# Patient Record
Sex: MALE | Race: WHITE | NOT HISPANIC OR LATINO | Employment: OTHER | URBAN - METROPOLITAN AREA
[De-identification: names, ages, dates, MRNs, and addresses within clinical notes are randomized per-mention and may not be internally consistent; named-entity substitution may affect disease eponyms.]

---

## 2017-02-07 ENCOUNTER — OFFICE VISIT (OUTPATIENT)
Dept: NEPHROLOGY | Facility: CLINIC | Age: 72
End: 2017-02-07
Payer: MEDICARE

## 2017-02-07 ENCOUNTER — LAB VISIT (OUTPATIENT)
Dept: LAB | Facility: HOSPITAL | Age: 72
End: 2017-02-07
Attending: INTERNAL MEDICINE
Payer: MEDICARE

## 2017-02-07 VITALS
SYSTOLIC BLOOD PRESSURE: 122 MMHG | HEIGHT: 75 IN | HEART RATE: 84 BPM | WEIGHT: 283.5 LBS | DIASTOLIC BLOOD PRESSURE: 64 MMHG | BODY MASS INDEX: 35.25 KG/M2

## 2017-02-07 DIAGNOSIS — N18.30 CKD (CHRONIC KIDNEY DISEASE) STAGE 3, GFR 30-59 ML/MIN: Primary | ICD-10-CM

## 2017-02-07 DIAGNOSIS — N17.9 AKI (ACUTE KIDNEY INJURY): ICD-10-CM

## 2017-02-07 DIAGNOSIS — N18.30 CKD (CHRONIC KIDNEY DISEASE) STAGE 3, GFR 30-59 ML/MIN: ICD-10-CM

## 2017-02-07 LAB
ALBUMIN SERPL BCP-MCNC: 3.8 G/DL
ANION GAP SERPL CALC-SCNC: 14 MMOL/L
BUN SERPL-MCNC: 59 MG/DL
CALCIUM SERPL-MCNC: 9.7 MG/DL
CHLORIDE SERPL-SCNC: 88 MMOL/L
CO2 SERPL-SCNC: 31 MMOL/L
CREAT SERPL-MCNC: 2.1 MG/DL
EST. GFR  (AFRICAN AMERICAN): 36 ML/MIN/1.73 M^2
EST. GFR  (NON AFRICAN AMERICAN): 31 ML/MIN/1.73 M^2
GLUCOSE SERPL-MCNC: 218 MG/DL
PHOSPHATE SERPL-MCNC: 4.2 MG/DL
POTASSIUM SERPL-SCNC: 3.6 MMOL/L
SODIUM SERPL-SCNC: 133 MMOL/L

## 2017-02-07 PROCEDURE — 99213 OFFICE O/P EST LOW 20 MIN: CPT | Mod: PBBFAC,PO | Performed by: INTERNAL MEDICINE

## 2017-02-07 PROCEDURE — 99999 PR PBB SHADOW E&M-EST. PATIENT-LVL III: CPT | Mod: PBBFAC,,, | Performed by: INTERNAL MEDICINE

## 2017-02-07 PROCEDURE — 99214 OFFICE O/P EST MOD 30 MIN: CPT | Mod: S$PBB,,, | Performed by: INTERNAL MEDICINE

## 2017-02-07 NOTE — MR AVS SNAPSHOT
[facility] Neph[facility]  [address] [place] [address]  Phone: [phone]  Fax: [phone]                  Beni Cosby   2017 1:20 PM   Office Visit    Description:  Male : 1945   Provider:  Francisco Jackson MD   Department:  [facility] - Nephrology           Reason for Visit     Chronic Kidney Disease     acute kidney injury     Follow-up           Diagnoses this Visit        Comments    CKD (chronic kidney disease) stage 3, GFR 30-59 ml/min    -  Primary     ANA LAURA (acute kidney injury)                To Do List           Future Appointments        Provider Department Dept Phone    3/8/2017 12:00 PM SPECIMEN, [facility] [phone]    3/8/2017 12:10 PM LABORATORY, [facility] [phone]    3/8/2017 1:20 PM Francisco Jackson [facility] [phone]      Goals (5 Years of Data)     None      Follow-Up and Disposition     Return in about 4 weeks (around 3/7/2017).      [facility]     [facility] Care [facility] -  Assistance  Registered nurses in the [facility] provide clinical advisement, health education, appointment booking, and other advisory services.  Call for this free service at [phone].             Medications           STOP taking these medications     glimepiride (AMARYL) 1 MG tablet            Verify that the below list of medications is an accurate representation of the medications you are currently taking.  If none reported, the list may be blank. If incorrect, please contact your healthcare provider. Carry this list with you in case of emergency.           Current Medications     aspirin (ECOTRIN) 81 MG EC tablet Take 1 tablet (81 mg total) by mouth once daily.    blood sugar diagnostic (CONTOUR NEXT STRIPS) Strp Use once daily.    bumetanide (BUMEX) 1 MG tablet Take 1 mg by mouth 2 (two) times daily. 2 mg in am & 1mg in pm    gabapentin (NEURONTIN) 100 MG capsule Take 100 mg by  "mouth 3 (three) times daily.    glipiZIDE (GLUCOTROL) 5 MG tablet Take 1 tablet (5 mg total) by mouth daily with breakfast.    hydrocodone-acetaminophen 7.5-325mg (NORCO) 7.5-325 mg per tablet     lansoprazole (PREVACID) 30 MG capsule TAKE 1 CAPSULE BY MOUTH ONCE DAILY    metolazone (ZAROXOLYN) 5 MG tablet once daily.     metoprolol succinate (TOPROL-XL) 100 MG 24 hr tablet Take 100 mg by mouth once daily. 1/2 tablet daily    mometasone (NASONEX) 50 mcg/actuation nasal spray 2 sprays by Nasal route daily as needed.     potassium chloride SA (K-DUR,KLOR-CON) 20 MEQ tablet Take 20 mEq by mouth 3 (three) times daily. Take 2 tablets daily    rosuvastatin (CRESTOR) 10 MG tablet 10 mg.    spironolactone (ALDACTONE) 25 MG tablet Take 25 mg by mouth 2 (two) times daily.     warfarin (COUMADIN) 5 MG tablet 5 mg.    warfarin (COUMADIN) 7.5 MG tablet 7.5 mg.           Clinical Reference Information           Your Vitals Were     BP Pulse Height Weight BMI    122/64 84 6' 3" (1.905 m) 128.6 kg (283 lb 8.2 oz) 35.44 kg/m2      Blood Pressure          Most Recent Value    BP  122/64      Allergies as of 2/7/2017     Morphine      Immunizations Administered on Date of Encounter - 2/7/2017     None      Orders Placed During Today's Visit     Future Labs/Procedures Expected by Expires    Urinalysis  2/7/2017 4/8/2018    Recurring Lab Work Interval Expires    CBC auto differential  Every Weekday until 2/7/2018 2/7/2018    Renal function panel  Every Weekday until 2/7/2018 2/7/2018      [facility] Sign-Up     Activating your [facility] account is as easy as 1-2-3!     1) Visit [email or web address], select Sign Up Now, enter this activation code and your date of birth, then select Next.  [email or web address]  Expires: 3/24/2017  1:48 PM      2) Create a username and password to use when you visit [facility] in the future and select a security question in case you lose your password and select Next.    3) Enter your e-mail address and click Sign " Up!    Additional Information  If you have questions, please e-mail [email or web address] or call [phone] to talk to our [facility] staff. Remember, [facility] is NOT to be used for urgent needs. For medical emergencies, dial 911.         Instructions    Increase Bumex to 2 mg twice per day (maximum would be 2 mg three times per day if needed).        Language Assistance Services     ATTENTION: Language assistance services are available, free of charge. Please call [phone].      ATENCIÓN: Si habla español, tiene a lira disposición servicios gratuitos de asistencia lingüística. Llame al [phone].     CHÚ Ý: N?u b?n nói Ti?ng Vi?t, có các d?ch v? h? tr? ngôn ng? mi?n phí dành cho b?n. G?i s? [phone].         [facility] complies with applicable Federal civil rights laws and does not discriminate on the basis of race, color, national origin, age, disability, or sex.

## 2017-02-07 NOTE — PROGRESS NOTES
NEPHROLOGY CONSULTATION    PHYSICIAN REQUESTING THE CONSULT: Hospital follow up, PMD: Dr. Jackson Brandt    REASON FOR CONSULTATION: ANA LAURA    71 y.o. male with history of AV stenosis (s/p AVR with pig valve), atrial fibrillation, right renal cell cancer (s/p partial resection of right kidney), CHF, CKD 3, CAD (s/p CABG), DM2 presents to the renal clinic for evaluation of renal insufficiency.     Patient  reports hospital stay in 1/2017 at [facility] for pneumonia. He also mentions chronic bilateral LE edema. His is currently on Bumex 2 mg po am/1 mg po pm plus daily metolazone.             Past Medical History   Diagnosis Date    Aortic valve stenosis with insufficiency 4/2008     Surgery [facility] [place]    Chronic a-fib     CKD (chronic kidney disease) stage 3, GFR 30-59 ml/min     Primary cancer of right kidney      Surgery, no further treatment       No past surgical history on file.    Review of patient's allergies indicates:   Allergen Reactions    Morphine      Other reaction(s): Nausea Only       Current Outpatient Prescriptions   Medication Sig Dispense Refill    aspirin (ECOTRIN) 81 MG EC tablet Take 1 tablet (81 mg total) by mouth once daily.  0    blood sugar diagnostic (CONTOUR NEXT STRIPS) Strp Use once daily.      bumetanide (BUMEX) 1 MG tablet Take 1 mg by mouth 2 (two) times daily. 2 mg in am & 1mg in pm      gabapentin (NEURONTIN) 100 MG capsule Take 100 mg by mouth 3 (three) times daily.      glimepiride (AMARYL) 1 MG tablet       glipiZIDE (GLUCOTROL) 5 MG tablet Take 1 tablet (5 mg total) by mouth daily with breakfast. 30 tablet 11    hydrocodone-acetaminophen 7.5-325mg (NORCO) 7.5-325 mg per tablet       lansoprazole (PREVACID) 30 MG capsule TAKE 1 CAPSULE BY MOUTH ONCE DAILY      metolazone (ZAROXOLYN) 5 MG tablet once daily.       metoprolol succinate (TOPROL-XL) 100 MG 24 hr tablet Take 100 mg by mouth once daily. 1/2 tablet daily      mometasone (NASONEX) 50 mcg/actuation nasal spray 2  "sprays by Nasal route daily as needed.       potassium chloride SA (K-DUR,KLOR-CON) 20 MEQ tablet Take 20 mEq by mouth once daily. Take 2 tablets daily      rosuvastatin (CRESTOR) 10 MG tablet 10 mg.      spironolactone (ALDACTONE) 25 MG tablet Take 25 mg by mouth 2 (two) times daily.       warfarin (COUMADIN) 5 MG tablet 5 mg.      warfarin (COUMADIN) 7.5 MG tablet 7.5 mg.       No current facility-administered medications for this visit.        No family history on file.    Social History     Social History    Marital status:      Spouse name: N/A    Number of children: N/A    Years of education: N/A     Occupational History    Not on file.     Social History Main Topics    Smoking status: Never Smoker    Smokeless tobacco: Never Used    Alcohol use No    Drug use: Not on file    Sexual activity: Not on file     Other Topics Concern    Not on file     Social History Narrative       Review of Systems:  1. GENERAL: patient denies any fever, weight changes, no dizziness  2. HEENT: patient denies headaches, visual disturbances, swallowing problems, sinus pain, nasal congestion.  3. CARDIOVASCULAR: patient denies chest pain, palpitations.  4. PULMONARY: patient denies SOB, no coughing, hemoptysis, wheezing.  5. GASTROINTESTINAL: patient denies abdominal pain, nausea, vomiting, diarrhea.  6. GENITOURINARY: patient denies dysuria, hematuria, hesitancy, frequency.  7. EXTREMITIES: patient reports bilateral LE edema, no LE cramping.  8. DERMATOLOGY: patient denies rashes, ulcers.  9. NEURO: patient denies tremors, extremity weakness, extremity numbness/tingling.  10. MUSCULOSKELETAL: patient denies joint pain, joint swelling.  11. HEMATOLOGY: patient denies rectal or gum bleeding.  12: PSYCH: patient denies anxiety, depression.      PHYSICAL EXAM:  Visit Vitals    Ht 6' 3" (1.905 m)    Wt 128.6 kg (283 lb 8.2 oz)    BMI 35.44 kg/m2       GENERAL: Pleasant tall gentleman presents to clinic with " non-labored breathing.  HEENT: PERRL, no nasal discharge, no icterus, no oral exudates, moist mucosal membranes.  NECK: no thyroid mass, no lymphadenopathy.  HEART: Regular, S1/S2, no rubs, good peripheral pulses.  LUNGS: CTA bilaterally, no wheezing, breathing is nonlabored.  ABDOMEN: soft, nontender, not distended, bowel sounds are present, no abdominal hernia.  EXTREM: pitting bilateral LE edema, +1  SKIN: no rashes, skin is warm and dry.  NEURO: A & O x 3, no obvious focal signs.    LABORATORY RESULTS:    Lab Results   Component Value Date    CREATININE 2.1 (H) 02/07/2017    BUN 59 (H) 02/07/2017     (L) 02/07/2017    K 3.6 02/07/2017    CL 88 (L) 02/07/2017    CO2 31 (H) 02/07/2017     Lab Results   Component Value Date    CALCIUM 9.7 02/07/2017    PHOS 4.2 02/07/2017     Lab Results   Component Value Date    ALBUMIN 3.8 02/07/2017       Lab Results   Component Value Date    WBC 11.23 05/12/2016    HGB 13.6 (L) 05/12/2016    HCT 43.8 05/12/2016    MCV 89 05/12/2016     05/12/2016         OUTSIDE LABS (11/28/16):    Na-129, K-3.5, CO2-35, BUN-29, Cr-1.6, Ca-8.8, Alb-3.9  WBC-8.3, Hgb-8.8, Plt-277          ASSESSMENT AND PLAN:  71 y.o. male with history of AV stenosis (s/p AVR with pig valve), atrial fibrillation, right renal cell cancer, CHF, CKD 3, CAD (s/p CABG), DM2 presents to the renal clinic for evaluation of renal insufficiency.     1. Renal insufficiency: Patient's renal function has worsened with creatinine increasing to 2.1. This is likely related to cardiorenal syndrome. Patient was advised to increase Bumex to 2 mg po bid (plus metolazone).     2. Electrolytes: Hyponatremia: stable, likely due to CHF, patient on fluid restrictions (as per cardiologist, Dr. Chew).     3. Acid base status: mild alkalosis, monitor. This is chronic.     4. Volume: Bilateral LE edema. Continue Bumex at 2mg am/2 mg pm plus metolazone.     5. Hypertension: good BP control.       6. Medications: Reviewed.  (Benazepril was stopped previously)    7. CHF: continue fluid restrictions. Patient has follow up with Cardiology (Dr. Chew).    8. Atrial fibrillation/AVR: continue Coumadin.    9. DM2: Continue glipizide.     10. Anemia: mild, monitor for now.

## 2017-02-23 ENCOUNTER — TELEPHONE (OUTPATIENT)
Dept: PULMONOLOGY | Facility: CLINIC | Age: 72
End: 2017-02-23

## 2017-02-23 DIAGNOSIS — Z01.818 PRE-OP TESTING: Primary | ICD-10-CM

## 2017-02-23 DIAGNOSIS — J96.11 CHRONIC RESPIRATORY FAILURE WITH HYPOXIA: ICD-10-CM

## 2017-02-23 DIAGNOSIS — R06.02 SOB (SHORTNESS OF BREATH): ICD-10-CM

## 2017-02-23 NOTE — TELEPHONE ENCOUNTER
----- Message from Felipe Mclain sent at 2/23/2017  3:10 PM CST -----  Contact: pt's spouse  She's calling in regards to working the pt into the dr's schedule on 3/8/17 after Dr. Jackson appt, please advise, [phone] (home)

## 2017-03-08 ENCOUNTER — PROCEDURE VISIT (OUTPATIENT)
Dept: PULMONOLOGY | Facility: CLINIC | Age: 72
End: 2017-03-08
Payer: MEDICARE

## 2017-03-08 ENCOUNTER — OFFICE VISIT (OUTPATIENT)
Dept: URGENT CARE | Facility: CLINIC | Age: 72
End: 2017-03-08
Payer: MEDICARE

## 2017-03-08 ENCOUNTER — OFFICE VISIT (OUTPATIENT)
Dept: NEPHROLOGY | Facility: CLINIC | Age: 72
End: 2017-03-08
Payer: MEDICARE

## 2017-03-08 ENCOUNTER — HOSPITAL ENCOUNTER (OUTPATIENT)
Dept: RADIOLOGY | Facility: HOSPITAL | Age: 72
Discharge: HOME | End: 2017-03-08
Attending: INTERNAL MEDICINE
Payer: MEDICARE

## 2017-03-08 VITALS
DIASTOLIC BLOOD PRESSURE: 72 MMHG | WEIGHT: 284.19 LBS | SYSTOLIC BLOOD PRESSURE: 120 MMHG | BODY MASS INDEX: 35.34 KG/M2 | HEIGHT: 75 IN | HEART RATE: 76 BPM

## 2017-03-08 VITALS
SYSTOLIC BLOOD PRESSURE: 100 MMHG | DIASTOLIC BLOOD PRESSURE: 56 MMHG | OXYGEN SATURATION: 98 % | BODY MASS INDEX: 34.94 KG/M2 | TEMPERATURE: 97 F | HEART RATE: 58 BPM | WEIGHT: 281 LBS | HEIGHT: 75 IN

## 2017-03-08 DIAGNOSIS — J96.11 CHRONIC RESPIRATORY FAILURE WITH HYPOXIA: ICD-10-CM

## 2017-03-08 DIAGNOSIS — D64.9 ANEMIA, UNSPECIFIED TYPE: ICD-10-CM

## 2017-03-08 DIAGNOSIS — R42 DIZZY: ICD-10-CM

## 2017-03-08 DIAGNOSIS — R06.02 SOB (SHORTNESS OF BREATH): ICD-10-CM

## 2017-03-08 DIAGNOSIS — N18.4 CKD (CHRONIC KIDNEY DISEASE) STAGE 4, GFR 15-29 ML/MIN: Primary | ICD-10-CM

## 2017-03-08 DIAGNOSIS — I95.9 HYPOTENSION, UNSPECIFIED HYPOTENSION TYPE: Primary | ICD-10-CM

## 2017-03-08 LAB — GLUCOSE SERPL-MCNC: 174 MG/DL (ref 70–110)

## 2017-03-08 PROCEDURE — 99214 OFFICE O/P EST MOD 30 MIN: CPT | Mod: S$PBB,,, | Performed by: INTERNAL MEDICINE

## 2017-03-08 PROCEDURE — 99214 OFFICE O/P EST MOD 30 MIN: CPT | Mod: PBBFAC,PO | Performed by: NURSE PRACTITIONER

## 2017-03-08 PROCEDURE — 99214 OFFICE O/P EST MOD 30 MIN: CPT | Mod: S$PBB,,, | Performed by: NURSE PRACTITIONER

## 2017-03-08 PROCEDURE — 82948 REAGENT STRIP/BLOOD GLUCOSE: CPT | Mod: PBBFAC,PO | Performed by: NURSE PRACTITIONER

## 2017-03-08 PROCEDURE — 99999 PR PBB SHADOW E&M-EST. PATIENT-LVL IV: CPT | Mod: PBBFAC,,, | Performed by: NURSE PRACTITIONER

## 2017-03-08 PROCEDURE — 99999 PR PBB SHADOW E&M-EST. PATIENT-LVL III: CPT | Mod: PBBFAC,,, | Performed by: INTERNAL MEDICINE

## 2017-03-08 RX ORDER — BUMETANIDE 2 MG/1
2 TABLET ORAL 2 TIMES DAILY
COMMUNITY

## 2017-03-08 NOTE — MR AVS SNAPSHOT
[facility]phrology  [address] [place] [address]  Phone: [phone]  Fax: [phone]                  Beni Cosby   3/8/2017 1:20 PM   Office Visit    Description:  Male : 1945   Provider:  Francisco Jackson MD   Department:  [facility] - Nephrology           Reason for Visit     Chronic Kidney Disease     acute kidney injury     Follow-up           Diagnoses this Visit        Comments    CKD (chronic kidney disease) stage 4, GFR 15-29 ml/min    -  Primary     Anemia, unspecified type                To Do List           Future Appointments        Provider Department Dept Phone    3/8/2017 3:00 PM PULMONARY LAB, [place] Pul[facility] Function [facility] [phone]    3/8/2017 3:40 PM PULMONARY LAB, [place]- Pul[facility] Function [facility] [phone]    3/8/2017 4:20 PM Holden Johnson MD [facility] [phone]    2017 10:30 AM LABORATORY, [facility] [phone]    2017 10:40 AM SPECIMEN, [facility] [phone]      Goals (5 Years of Data)     None      Follow-Up and Disposition     Return in about 4 weeks (around 2017).      [facility]     [facility] Care [facility] - 24/ Assistance  Registered nurses in the [facility] provide clinical advisement, health education, appointment booking, and other advisory services.  Call for this free service at [phone].             Medications                Verify that the below list of medications is an accurate representation of the medications you are currently taking.  If none reported, the list may be blank. If incorrect, please contact your healthcare provider. Carry this list with you in case of emergency.           Current Medications     aspirin (ECOTRIN) 81 MG EC tablet Take 1 tablet (81 mg total) by mouth once daily.    blood sugar diagnostic (CONTOUR NEXT STRIPS) Strp Use once daily.    bumetanide (BUMEX) 2 MG tablet Take 2 mg by mouth  "2 (two) times daily.    gabapentin (NEURONTIN) 100 MG capsule Take 100 mg by mouth 3 (three) times daily.    glipiZIDE (GLUCOTROL) 5 MG tablet Take 1 tablet (5 mg total) by mouth daily with breakfast.    hydrocodone-acetaminophen 7.5-325mg (NORCO) 7.5-325 mg per tablet     lansoprazole (PREVACID) 30 MG capsule TAKE 1 CAPSULE BY MOUTH ONCE DAILY    metolazone (ZAROXOLYN) 5 MG tablet once daily.     metoprolol succinate (TOPROL-XL) 100 MG 24 hr tablet Take 100 mg by mouth once daily. 1/2 tablet daily    mometasone (NASONEX) 50 mcg/actuation nasal spray 2 sprays by Nasal route daily as needed.     potassium chloride SA (K-DUR,KLOR-CON) 20 MEQ tablet Take 20 mEq by mouth 3 (three) times daily. Take 2 tablets daily    rosuvastatin (CRESTOR) 10 MG tablet 10 mg.    spironolactone (ALDACTONE) 25 MG tablet Take 25 mg by mouth 2 (two) times daily.     warfarin (COUMADIN) 5 MG tablet 5 mg.    warfarin (COUMADIN) 7.5 MG tablet 7.5 mg.           Clinical Reference Information           Your Vitals Were     BP Pulse Height Weight BMI    120/72 76 6' 3" (1.905 m) 128.9 kg (284 lb 2.8 oz) 35.52 kg/m2      Blood Pressure          Most Recent Value    BP  120/72      Allergies as of 3/8/2017     Morphine      Immunizations Administered on Date of Encounter - 3/8/2017     None      Orders Placed During Today's Visit      Normal Orders This Visit    Ambulatory Referral to Hematology / Oncology     Future Labs/Procedures Expected by Expires    PTH, intact  3/8/2017 5/7/2018    Urinalysis  3/8/2017 5/7/2018    Recurring Lab Work Interval Expires    CBC auto differential  Every Weekday until 3/8/2018 3/8/2018    Renal function panel  Every Weekday until 3/8/2018 3/8/2018      [facility] Sign-Up     Activating your [facility] account is as easy as 1-2-3!     1) Visit [email or web address], select Sign Up Now, enter this activation code and your date of birth, then select Next.  [email or web address]  Expires: 3/24/2017  1:48 PM      2) Create a " username and password to use when you visit [facility] in the future and select a security question in case you lose your password and select Next.    3) Enter your e-mail address and click Sign Up!    Additional Information  If you have questions, please e-mail [email or web address] or call [phone] to talk to our [facility] staff. Remember, [facility] is NOT to be used for urgent needs. For medical emergencies, dial 911.         Instructions    Please follow up with your GI physician, Dr. Jensen.        Language Assistance Services     ATTENTION: Language assistance services are available, free of charge. Please call [phone].      ATENCIÓN: Si habla lv, tiene a lira disposición servicios gratuitos de asistencia lingüística. Llame al [phone].     CHÚ Ý: N?u b?n nói Ti?ng Vi?t, có các d?ch v? h? tr? ngôn ng? mi?n phí dành cho b?n. G?i s? [phone].         [facility] complies with applicable Federal civil rights laws and does not discriminate on the basis of race, color, national origin, age, disability, or sex.

## 2017-03-08 NOTE — MR AVS SNAPSHOT
[facility]  [address] [facility] [place] [address]  Phone: [phone]  Fax: [phone]                  Beni Cosby   3/8/2017 3:50 PM   Office Visit    Description:  Male : 1945   Provider:  Mathew Gamble NP   Department:  [facility]           Reason for Visit     Hypotension           Diagnoses this Visit        Comments    Hypotension, unspecified hypotension type    -  Primary     Dizzy                To Do List           Future Appointments        Provider Department Dept Phone    3/10/2017 2:00 PM Natalia Santiago MD [facility] Hemotology Oncology [phone]    2017 10:30 AM LABORATORY, [facility] [phone]    2017 10:40 AM SPECIMEN, [facility] [phone]    2017 12:05 PM LABORATORY, [facility] [phone]    2017 1:40 PM Francisco Jackson MD [facility] Nephrology [phone]      Goals (5 Years of Data)     None      Follow-Up and Disposition     Return if symptoms worsen or fail to improve.      [facility]     [facility] Nurse Care Line -  Assistance  Registered nurses in the [facility] provide clinical advisement, health education, appointment booking, and other advisory services.  Call for this free service at [phone].             Medications                Verify that the below list of medications is an accurate representation of the medications you are currently taking.  If none reported, the list may be blank. If incorrect, please contact your healthcare provider. Carry this list with you in case of emergency.           Current Medications     aspirin (ECOTRIN) 81 MG EC tablet Take 1 tablet (81 mg total) by mouth once daily.    blood sugar diagnostic (CONTOUR NEXT STRIPS) Strp Use once daily.    bumetanide (BUMEX) 2 MG tablet Take 2 mg by mouth 2 (two) times daily.    gabapentin (NEURONTIN) 100 MG capsule Take 100 mg by mouth  "3 (three) times daily.    glipiZIDE (GLUCOTROL) 5 MG tablet Take 1 tablet (5 mg total) by mouth daily with breakfast.    hydrocodone-acetaminophen 7.5-325mg (NORCO) 7.5-325 mg per tablet     lansoprazole (PREVACID) 30 MG capsule TAKE 1 CAPSULE BY MOUTH ONCE DAILY    metolazone (ZAROXOLYN) 5 MG tablet once daily.     metoprolol succinate (TOPROL-XL) 100 MG 24 hr tablet Take 100 mg by mouth once daily. 1/2 tablet daily    mometasone (NASONEX) 50 mcg/actuation nasal spray 2 sprays by Nasal route daily as needed.     potassium chloride SA (K-DUR,KLOR-CON) 20 MEQ tablet Take 20 mEq by mouth 3 (three) times daily. Take 2 tablets daily    rosuvastatin (CRESTOR) 10 MG tablet 10 mg.    spironolactone (ALDACTONE) 25 MG tablet Take 25 mg by mouth 2 (two) times daily.     warfarin (COUMADIN) 5 MG tablet 5 mg.    warfarin (COUMADIN) 7.5 MG tablet 7.5 mg.           Clinical Reference Information           Your Vitals Were     BP Pulse Temp Height Weight SpO2    100/56 (BP Location: Left arm, Patient Position: Sitting, BP Method: Manual) 58 97.3 °F (36.3 °C) (Tympanic) 6' 3" (1.905 m) 127.5 kg (281 lb) 98%    BMI                35.12 kg/m2          Blood Pressure          Most Recent Value    BP  (!)  100/56      Allergies as of 3/8/2017     Morphine      Immunizations Administered on Date of Encounter - 3/8/2017     None      Orders Placed During Today's Visit      Normal Orders This Visit    Orthostatic blood pressure     POCT glucose          3/8/2017  4:46 PM - Rosi Greenwood LPN      Component Results     Component Value Flag Ref Range Units Status    POC Glucose 174 (A) 70 - 110 mg/dL Final            [facility] Sign-Up     Activating your [facility] account is as easy as 1-2-3!     1) Visit [email or web address], select Sign Up Now, enter this activation code and your date of birth, then select Next.  [email or web address]  Expires: 3/24/2017  1:48 PM      2) Create a username and password to use when you visit [facility] in the future " and select a security question in case you lose your password and select Next.    3) Enter your e-mail address and click Sign Up!    Additional Information  If you have questions, please e-mail [email or web address] or call [phone] to talk to our [facility] staff. Remember, [facility] is NOT to be used for urgent needs. For medical emergencies, dial 911.         Instructions      Low Blood Pressure (All Causes)  A blood pressure reading is made up of 2 numbers There is a top number over a bottom number. The top number is the systolic pressure. The bottom number is the diastolic pressure. A normal blood pressure is less than 120 over less than 80. Low blood pressure (hypotension) is a blood pressure that is less than what is normal for you. Low blood pressure can cause dizziness, lightheadedness, or fainting.  Medicines can cause low blood pressure. They include:  · High blood pressure pills  · Water pills (diuretics)  · Some heart medicines  · Some antidepressants  · Pain, anxiety, sedative, and sleeping medicines  Other causes include:  · Dehydration, severe infection, or fever  · Blood loss. This could be bleeding from the stomach or intestines.  · Congestive heart failure  · Change in heart rate or rhythm (arrhythmia)  · Orthostatic hypotension from a sudden change in body position, from lying down to standing  · Alcohol or drug intoxication  · Changed responses of the blood vessels and heart that keep blood pressure normal as you change position  Treatment will depend on what is causing your low blood pressure.  Home care  Follow these guidelines when caring for yourself at home:  · Rest until your symptoms get better.  · Follow the treatment plan described by your health care provider.  Follow-up care  Follow up with your health care provider, or as advised.  When to seek medical advice  Call your health care provider right away if any of these occur:  · Dizziness, lightheadedness, or fainting  · Black or  red color in your stools or vomit  · Shortness of breath or difficulty breathing  · Chest, shoulder, arm, neck, or upper back pain  · Abdominal pain  · Diarrhea or vomiting that doesnt go away  · You arent able to eat or drink  · Fever of 100.4°F (38°C) or higher, or as directed by your health care provider  · Burning when you urinate  · Foul-smelling urine  Date Last Reviewed: 11/25/2014 © 2000-2016 [employer]. [address] [place] [address]. All rights reserved. This information is not intended as a substitute for professional medical care. Always follow your healthcare professional's instructions.        Orthostatic Low Blood Pressure (Hypotension)  A blood pressure reading is made up of 2 numbers There is a top number over a bottom number. The top number is the systolic pressure. The bottom number is the diastolic pressure. A normal blood pressure is less than 120 over less than 80. Low blood pressure (hypotension) is a blood pressure that is less than what is normal for you.  Orthostatic hypotension is a type of low blood pressure that occurs only when you change position from lying to standing. It can cause dizziness, lightheadedness, or fainting.  Medicines can cause orthostatic hypotension. These include:  · High blood pressure medicines  · Water pills (diuretics)  · Some heart medicines  · Some antidepressants  · Pain, anxiety, sedative, and sleeping medicines  Other causes include:  · Dehydration from vomiting, diarrhea, or not getting enough fluids  · Severe infection  · High fever  · Blood loss. This could be bleeding from the stomach or intestines.  Treatment will depend on what is causing your low blood pressure.  Home care  Follow these guidelines when caring for yourself at home:  · Rest until your symptoms get better.  · Change positions slowly from lying to standing. When getting out of bed, sit on the side of the bed with your legs down for at least 30 seconds before  standing. This gives your body time to adjust to the position change.  · Follow the treatment plan described by your health care provider.  Follow-up care  Follow up with your health care provider, or as advised.  When to seek medical advice  Call your health care provider right away if any of these occur:  · Dizziness, lightheadedness, or fainting  · Black or red color in your stools or vomit  · Diarrhea or vomiting that doesnt go away  · You arent able to eat or drink  · Fever of 100.4°F (38°C) or higher, or as directed by your health care provider  · Burning when you urinate  · Foul-smelling urine  Date Last Reviewed: 11/25/2014  © 7626-4518 [employer]. [address] [place] [address], [place] [address]. All rights reserved. This information is not intended as a substitute for professional medical care. Always follow your healthcare professional's instructions.             Language Assistance Services     ATTENTION: Language assistance services are available, free of charge. Please call [phone].      ATENCIÓN: Si habla lv, tiene a lira disposición servicios gratuitos de asistencia lingüística. Llame al [phone].     CHÚ Ý: N?u b?n nói Ti?ng Vi?t, có các d?ch v? h? tr? ngôn ng? mi?n phí dành cho b?n. G?i s? [phone].         [facility] complies with applicable Federal civil rights laws and does not discriminate on the basis of race, color, national origin, age, disability, or sex.

## 2017-03-08 NOTE — PROGRESS NOTES
NEPHROLOGY CONSULTATION    PHYSICIAN REQUESTING THE CONSULT: Hospital follow up, PMD: Dr. Jackson Brandt    REASON FOR CONSULTATION: ANAL AURA    71 y.o. male with history of AV stenosis (s/p AVR with pig valve), atrial fibrillation, right renal cell cancer (s/p partial resection of right kidney), CHF, CKD 3, CAD (s/p CABG), DM2 presents to the renal clinic for evaluation of renal insufficiency.     Patient  reports hospital stay in 1/2017 at [facility] for pneumonia. He was seen by Vascular Surgery (Dr. Tellez) for his chronic LE edema and his LE have been wrapped. No interventions are being planned at present. His is currently on Bumex 2 mg po am/1 mg po pm plus daily metolazone. He reports chronic SOB with exercise and chronic LE edema. He denies any rectal bleeding or black stool.             Past Medical History:   Diagnosis Date    Aortic valve stenosis with insufficiency 4/2008    Surgery [facility]    Chronic a-fib     CKD (chronic kidney disease) stage 3, GFR 30-59 ml/min     Primary cancer of right kidney     Surgery, no further treatment       No past surgical history on file.    Review of patient's allergies indicates:   Allergen Reactions    Morphine      Other reaction(s): Nausea Only       Current Outpatient Prescriptions   Medication Sig Dispense Refill    bumetanide (BUMEX) 2 MG tablet Take 2 mg by mouth 2 (two) times daily.      glipiZIDE (GLUCOTROL) 5 MG tablet Take 1 tablet (5 mg total) by mouth daily with breakfast. 30 tablet 11    warfarin (COUMADIN) 5 MG tablet 5 mg.      warfarin (COUMADIN) 7.5 MG tablet 7.5 mg.      aspirin (ECOTRIN) 81 MG EC tablet Take 1 tablet (81 mg total) by mouth once daily.  0    blood sugar diagnostic (CONTOUR NEXT STRIPS) Strp Use once daily.      gabapentin (NEURONTIN) 100 MG capsule Take 100 mg by mouth 3 (three) times daily.      hydrocodone-acetaminophen 7.5-325mg (NORCO) 7.5-325 mg per tablet       lansoprazole (PREVACID) 30 MG capsule TAKE 1 CAPSULE BY  MOUTH ONCE DAILY      metolazone (ZAROXOLYN) 5 MG tablet once daily.       metoprolol succinate (TOPROL-XL) 100 MG 24 hr tablet Take 100 mg by mouth once daily. 1/2 tablet daily      mometasone (NASONEX) 50 mcg/actuation nasal spray 2 sprays by Nasal route daily as needed.       potassium chloride SA (K-DUR,KLOR-CON) 20 MEQ tablet Take 20 mEq by mouth 3 (three) times daily. Take 2 tablets daily      rosuvastatin (CRESTOR) 10 MG tablet 10 mg.      spironolactone (ALDACTONE) 25 MG tablet Take 25 mg by mouth 2 (two) times daily.        No current facility-administered medications for this visit.        No family history on file.    Social History     Social History    Marital status:      Spouse name: N/A    Number of children: N/A    Years of education: N/A     Occupational History    Not on file.     Social History Main Topics    Smoking status: Never Smoker    Smokeless tobacco: Never Used    Alcohol use No    Drug use: Not on file    Sexual activity: Not on file     Other Topics Concern    Not on file     Social History Narrative       Review of Systems:  1. GENERAL: patient denies any fever, weight changes, no dizziness, he reports chronic fatigue  2. HEENT: patient denies headaches, visual disturbances, swallowing problems, sinus pain, nasal congestion.  3. CARDIOVASCULAR: patient denies chest pain, palpitations.  4. PULMONARY: patient reports chronic SOB with exercise, no coughing, hemoptysis, wheezing.  5. GASTROINTESTINAL: patient denies abdominal pain, nausea, vomiting, diarrhea.  6. GENITOURINARY: patient denies dysuria, hematuria, hesitancy, frequency.  7. EXTREMITIES: patient reports bilateral LE edema, no LE cramping.  8. DERMATOLOGY: patient denies rashes, ulcers.  9. NEURO: patient denies tremors, extremity weakness, extremity numbness/tingling.  10. MUSCULOSKELETAL: patient denies joint pain, joint swelling.  11. HEMATOLOGY: patient denies rectal or gum bleeding.  12: PSYCH:  "patient denies anxiety, depression.      PHYSICAL EXAM:  Ht 6' 3" (1.905 m)  Wt 128.9 kg (284 lb 2.8 oz)  BMI 35.52 kg/m2    GENERAL: Pleasant tall gentleman presents to clinic with non-labored breathing.  HEENT: PERRL, no nasal discharge, no icterus, no oral exudates, moist mucosal membranes.  NECK: no thyroid mass, no lymphadenopathy.  HEART: Regular, S1/S2, no rubs, good peripheral pulses.  LUNGS: CTA bilaterally, no wheezing, breathing is nonlabored.  ABDOMEN: soft, nontender, not distended, bowel sounds are present, no abdominal hernia.  EXTREM: pitting bilateral LE edema, +1  SKIN: no rashes, skin is warm and dry.  NEURO: A & O x 3, no obvious focal signs.    LABORATORY RESULTS:    Lab Results   Component Value Date    CREATININE 2.3 (H) 03/08/2017    BUN 97 (H) 03/08/2017     (L) 03/08/2017    K 3.7 03/08/2017    CL 83 (L) 03/08/2017    CO2 33 (H) 03/08/2017     Lab Results   Component Value Date    CALCIUM 9.8 03/08/2017    PHOS 5.3 (H) 03/08/2017     Lab Results   Component Value Date    ALBUMIN 3.9 03/08/2017       Lab Results   Component Value Date    WBC 8.20 03/08/2017    HGB 7.6 (L) 03/08/2017    HCT 25.1 (L) 03/08/2017    MCV 74 (L) 03/08/2017     03/08/2017             ASSESSMENT AND PLAN:  71 y.o. male with history of AV stenosis (s/p AVR with pig valve), atrial fibrillation, right renal cell cancer, CHF, CKD 3, CAD (s/p CABG), DM2 presents to the renal clinic for evaluation of renal insufficiency.     1. Renal insufficiency: Patient's renal function has slightly worsened with creatinine increasing to 2.3. This is likely related to cardiorenal syndrome and drop in hematocrit. Patient was advised to continue Bumex and metolazone at current dose. He will return in 1-2 months for follow up.     2. Electrolytes: Hyponatremia: stable, likely due to CHF, patient on fluid restrictions (as per cardiologist, Dr. Chew).                            Mild hyperphosphatemia. Monitor for now.     3. " Acid base status: mild alkalosis, monitor. This is chronic. Monitor.     4. Volume: Bilateral LE edema. Continue Bumex at 2mg am/2 mg pm plus metolazone.     5. Hypertension: good BP control.       6. Medications: Reviewed.     7. CHF: continue fluid restrictions. Patient has follow up with Cardiology (Dr. Chew).    8. Atrial fibrillation/AVR: continue Coumadin.    9. DM2: Continue glipizide.     10. Anemia: Hematology referral was made. Patient will also follow up with his GI physician, Dr. Jensen.

## 2017-03-08 NOTE — PATIENT INSTRUCTIONS
Low Blood Pressure (All Causes)  A blood pressure reading is made up of 2 numbers There is a top number over a bottom number. The top number is the systolic pressure. The bottom number is the diastolic pressure. A normal blood pressure is less than 120 over less than 80. Low blood pressure (hypotension) is a blood pressure that is less than what is normal for you. Low blood pressure can cause dizziness, lightheadedness, or fainting.  Medicines can cause low blood pressure. They include:  · High blood pressure pills  · Water pills (diuretics)  · Some heart medicines  · Some antidepressants  · Pain, anxiety, sedative, and sleeping medicines  Other causes include:  · Dehydration, severe infection, or fever  · Blood loss. This could be bleeding from the stomach or intestines.  · Congestive heart failure  · Change in heart rate or rhythm (arrhythmia)  · Orthostatic hypotension from a sudden change in body position, from lying down to standing  · Alcohol or drug intoxication  · Changed responses of the blood vessels and heart that keep blood pressure normal as you change position  Treatment will depend on what is causing your low blood pressure.  Home care  Follow these guidelines when caring for yourself at home:  · Rest until your symptoms get better.  · Follow the treatment plan described by your health care provider.  Follow-up care  Follow up with your health care provider, or as advised.  When to seek medical advice  Call your health care provider right away if any of these occur:  · Dizziness, lightheadedness, or fainting  · Black or red color in your stools or vomit  · Shortness of breath or difficulty breathing  · Chest, shoulder, arm, neck, or upper back pain  · Abdominal pain  · Diarrhea or vomiting that doesnt go away  · You arent able to eat or drink  · Fever of 100.4°F (38°C) or higher, or as directed by your health care provider  · Burning when you urinate  · Foul-smelling urine  Date Last Reviewed:  11/25/2014 © 2000-2016 [employer]. [address] [place] [address], [place] [address]. All rights reserved. This information is not intended as a substitute for professional medical care. Always follow your healthcare professional's instructions.        Orthostatic Low Blood Pressure (Hypotension)  A blood pressure reading is made up of 2 numbers There is a top number over a bottom number. The top number is the systolic pressure. The bottom number is the diastolic pressure. A normal blood pressure is less than 120 over less than 80. Low blood pressure (hypotension) is a blood pressure that is less than what is normal for you.  Orthostatic hypotension is a type of low blood pressure that occurs only when you change position from lying to standing. It can cause dizziness, lightheadedness, or fainting.  Medicines can cause orthostatic hypotension. These include:  · High blood pressure medicines  · Water pills (diuretics)  · Some heart medicines  · Some antidepressants  · Pain, anxiety, sedative, and sleeping medicines  Other causes include:  · Dehydration from vomiting, diarrhea, or not getting enough fluids  · Severe infection  · High fever  · Blood loss. This could be bleeding from the stomach or intestines.  Treatment will depend on what is causing your low blood pressure.  Home care  Follow these guidelines when caring for yourself at home:  · Rest until your symptoms get better.  · Change positions slowly from lying to standing. When getting out of bed, sit on the side of the bed with your legs down for at least 30 seconds before standing. This gives your body time to adjust to the position change.  · Follow the treatment plan described by your health care provider.  Follow-up care  Follow up with your health care provider, or as advised.  When to seek medical advice  Call your health care provider right away if any of these occur:  · Dizziness, lightheadedness, or fainting  · Black or red color in your  stools or vomit  · Diarrhea or vomiting that doesnt go away  · You arent able to eat or drink  · Fever of 100.4°F (38°C) or higher, or as directed by your health care provider  · Burning when you urinate  · Foul-smelling urine  Date Last Reviewed: 11/25/2014  © 1664-0594 [employer]. [address] [place] [address], [place] [address]. All rights reserved. This information is not intended as a substitute for professional medical care. Always follow your healthcare professional's instructions.

## 2017-03-10 ENCOUNTER — INITIAL CONSULT (OUTPATIENT)
Dept: HEMATOLOGY/ONCOLOGY | Facility: CLINIC | Age: 72
End: 2017-03-10
Payer: MEDICARE

## 2017-03-10 ENCOUNTER — HOSPITAL ENCOUNTER (OUTPATIENT)
Facility: HOSPITAL | Age: 72
Discharge: HOME | End: 2017-03-11
Attending: EMERGENCY MEDICINE | Admitting: INTERNAL MEDICINE
Payer: MEDICARE

## 2017-03-10 VITALS
WEIGHT: 278.88 LBS | TEMPERATURE: 98 F | BODY MASS INDEX: 34.68 KG/M2 | DIASTOLIC BLOOD PRESSURE: 70 MMHG | HEIGHT: 75 IN | HEART RATE: 74 BPM | OXYGEN SATURATION: 100 % | RESPIRATION RATE: 18 BRPM | SYSTOLIC BLOOD PRESSURE: 112 MMHG

## 2017-03-10 DIAGNOSIS — N18.30 CHRONIC KIDNEY DISEASE (CKD), STAGE III (MODERATE): Primary | ICD-10-CM

## 2017-03-10 DIAGNOSIS — I50.9 CONGESTIVE HEART FAILURE, UNSPECIFIED CONGESTIVE HEART FAILURE CHRONICITY, UNSPECIFIED CONGESTIVE HEART FAILURE TYPE: ICD-10-CM

## 2017-03-10 DIAGNOSIS — E08.21 DIABETES MELLITUS DUE TO UNDERLYING CONDITION WITH DIABETIC NEPHROPATHY, WITH LONG-TERM CURRENT USE OF INSULIN: ICD-10-CM

## 2017-03-10 DIAGNOSIS — D64.9 SYMPTOMATIC ANEMIA: Primary | ICD-10-CM

## 2017-03-10 DIAGNOSIS — Z79.4 DIABETES MELLITUS DUE TO UNDERLYING CONDITION WITH DIABETIC NEPHROPATHY, WITH LONG-TERM CURRENT USE OF INSULIN: ICD-10-CM

## 2017-03-10 DIAGNOSIS — D50.9 MICROCYTIC ANEMIA: ICD-10-CM

## 2017-03-10 DIAGNOSIS — D50.0 IRON DEFICIENCY ANEMIA DUE TO CHRONIC BLOOD LOSS: ICD-10-CM

## 2017-03-10 DIAGNOSIS — I50.32 CHRONIC DIASTOLIC CONGESTIVE HEART FAILURE: ICD-10-CM

## 2017-03-10 DIAGNOSIS — I50.33 ACUTE ON CHRONIC DIASTOLIC CONGESTIVE HEART FAILURE: ICD-10-CM

## 2017-03-10 LAB
ABO + RH BLD: NORMAL
ALBUMIN SERPL BCP-MCNC: 3.9 G/DL
ALP SERPL-CCNC: 72 U/L
ALT SERPL W/O P-5'-P-CCNC: 14 U/L
ANION GAP SERPL CALC-SCNC: 14 MMOL/L
ANISOCYTOSIS BLD QL SMEAR: SLIGHT
AST SERPL-CCNC: 24 U/L
BASO STIPL BLD QL SMEAR: ABNORMAL
BASOPHILS # BLD AUTO: 0.01 K/UL
BASOPHILS NFR BLD: 0.1 %
BILIRUB SERPL-MCNC: 0.8 MG/DL
BLD GP AB SCN CELLS X3 SERPL QL: NORMAL
BUN SERPL-MCNC: 97 MG/DL
CALCIUM SERPL-MCNC: 9.9 MG/DL
CHLORIDE SERPL-SCNC: 85 MMOL/L
CO2 SERPL-SCNC: 33 MMOL/L
CREAT SERPL-MCNC: 2 MG/DL
DACRYOCYTES BLD QL SMEAR: ABNORMAL
DAT IGG-SP REAG RBC-IMP: NORMAL
DIFFERENTIAL METHOD: ABNORMAL
EOSINOPHIL # BLD AUTO: 0.1 K/UL
EOSINOPHIL NFR BLD: 0.8 %
ERYTHROCYTE [DISTWIDTH] IN BLOOD BY AUTOMATED COUNT: 16.7 %
EST. GFR  (AFRICAN AMERICAN): 38 ML/MIN/1.73 M^2
EST. GFR  (NON AFRICAN AMERICAN): 33 ML/MIN/1.73 M^2
GLUCOSE SERPL-MCNC: 107 MG/DL
HCT VFR BLD AUTO: 24.6 %
HGB BLD-MCNC: 7.5 G/DL
HYPOCHROMIA BLD QL SMEAR: ABNORMAL
INR PPP: 1.7
LYMPHOCYTES # BLD AUTO: 1.2 K/UL
LYMPHOCYTES NFR BLD: 16.5 %
MCH RBC QN AUTO: 22 PG
MCHC RBC AUTO-ENTMCNC: 30.5 %
MCV RBC AUTO: 72 FL
MONOCYTES # BLD AUTO: 0.8 K/UL
MONOCYTES NFR BLD: 11.7 %
NEUTROPHILS # BLD AUTO: 5 K/UL
NEUTROPHILS NFR BLD: 71 %
OVALOCYTES BLD QL SMEAR: ABNORMAL
PLATELET # BLD AUTO: 254 K/UL
PLATELET BLD QL SMEAR: ABNORMAL
PMV BLD AUTO: 8.7 FL
POIKILOCYTOSIS BLD QL SMEAR: SLIGHT
POLYCHROMASIA BLD QL SMEAR: ABNORMAL
POTASSIUM SERPL-SCNC: 3.7 MMOL/L
PROT SERPL-MCNC: 7 G/DL
PROTHROMBIN TIME: 17.4 SEC
RBC # BLD AUTO: 3.41 M/UL
SODIUM SERPL-SCNC: 132 MMOL/L
STOMATOCYTES BLD QL SMEAR: PRESENT
TARGETS BLD QL SMEAR: ABNORMAL
WBC # BLD AUTO: 7.08 K/UL

## 2017-03-10 PROCEDURE — G0378 HOSPITAL OBSERVATION PER HR: HCPCS

## 2017-03-10 PROCEDURE — 99205 OFFICE O/P NEW HI 60 MIN: CPT | Mod: S$PBB,,, | Performed by: INTERNAL MEDICINE

## 2017-03-10 PROCEDURE — 80053 COMPREHEN METABOLIC PANEL: CPT

## 2017-03-10 PROCEDURE — 99284 EMERGENCY DEPT VISIT MOD MDM: CPT | Mod: 25,27

## 2017-03-10 PROCEDURE — 86900 BLOOD TYPING SEROLOGIC ABO: CPT

## 2017-03-10 PROCEDURE — 82728 ASSAY OF FERRITIN: CPT

## 2017-03-10 PROCEDURE — 86920 COMPATIBILITY TEST SPIN: CPT

## 2017-03-10 PROCEDURE — 99999 PR PBB SHADOW E&M-EST. PATIENT-LVL III: CPT | Mod: PBBFAC,,, | Performed by: INTERNAL MEDICINE

## 2017-03-10 PROCEDURE — P9016 RBC LEUKOCYTES REDUCED: HCPCS

## 2017-03-10 PROCEDURE — 83540 ASSAY OF IRON: CPT

## 2017-03-10 PROCEDURE — 86880 COOMBS TEST DIRECT: CPT

## 2017-03-10 PROCEDURE — 36415 COLL VENOUS BLD VENIPUNCTURE: CPT

## 2017-03-10 PROCEDURE — 36430 TRANSFUSION BLD/BLD COMPNT: CPT

## 2017-03-10 PROCEDURE — 25000003 PHARM REV CODE 250: Performed by: INTERNAL MEDICINE

## 2017-03-10 PROCEDURE — 86850 RBC ANTIBODY SCREEN: CPT

## 2017-03-10 PROCEDURE — 85025 COMPLETE CBC W/AUTO DIFF WBC: CPT

## 2017-03-10 PROCEDURE — 85610 PROTHROMBIN TIME: CPT

## 2017-03-10 RX ORDER — IBUPROFEN 200 MG
24 TABLET ORAL
Status: DISCONTINUED | OUTPATIENT
Start: 2017-03-10 | End: 2017-03-11 | Stop reason: HOSPADM

## 2017-03-10 RX ORDER — HEPARIN 100 UNIT/ML
500 SYRINGE INTRAVENOUS
Status: CANCELLED | OUTPATIENT
Start: 2017-03-10

## 2017-03-10 RX ORDER — SODIUM CHLORIDE 0.9 % (FLUSH) 0.9 %
10 SYRINGE (ML) INJECTION
Status: CANCELLED | OUTPATIENT
Start: 2017-03-10

## 2017-03-10 RX ORDER — HEPARIN 100 UNIT/ML
500 SYRINGE INTRAVENOUS
Status: CANCELLED | OUTPATIENT
Start: 2017-03-17

## 2017-03-10 RX ORDER — WARFARIN SODIUM 5 MG/1
5 TABLET ORAL
Status: DISCONTINUED | OUTPATIENT
Start: 2017-03-13 | End: 2017-03-10

## 2017-03-10 RX ORDER — PANTOPRAZOLE SODIUM 40 MG/1
40 TABLET, DELAYED RELEASE ORAL DAILY
Status: DISCONTINUED | OUTPATIENT
Start: 2017-03-11 | End: 2017-03-11 | Stop reason: HOSPADM

## 2017-03-10 RX ORDER — SODIUM CHLORIDE 0.9 % (FLUSH) 0.9 %
10 SYRINGE (ML) INJECTION
Status: CANCELLED | OUTPATIENT
Start: 2017-03-17

## 2017-03-10 RX ORDER — GABAPENTIN 100 MG/1
100 CAPSULE ORAL 3 TIMES DAILY
Status: DISCONTINUED | OUTPATIENT
Start: 2017-03-10 | End: 2017-03-11 | Stop reason: HOSPADM

## 2017-03-10 RX ORDER — BUMETANIDE 0.25 MG/ML
2 INJECTION INTRAMUSCULAR; INTRAVENOUS ONCE AS NEEDED
Status: ACTIVE | OUTPATIENT
Start: 2017-03-10 | End: 2017-03-10

## 2017-03-10 RX ORDER — METOPROLOL SUCCINATE 50 MG/1
50 TABLET, EXTENDED RELEASE ORAL DAILY
Status: DISCONTINUED | OUTPATIENT
Start: 2017-03-11 | End: 2017-03-11 | Stop reason: HOSPADM

## 2017-03-10 RX ORDER — BUMETANIDE 1 MG/1
2 TABLET ORAL 2 TIMES DAILY
Status: DISCONTINUED | OUTPATIENT
Start: 2017-03-10 | End: 2017-03-11 | Stop reason: HOSPADM

## 2017-03-10 RX ORDER — WARFARIN 2.5 MG/1
2.5 TABLET ORAL ONCE
Status: COMPLETED | OUTPATIENT
Start: 2017-03-10 | End: 2017-03-10

## 2017-03-10 RX ORDER — IBUPROFEN 200 MG
16 TABLET ORAL
Status: DISCONTINUED | OUTPATIENT
Start: 2017-03-10 | End: 2017-03-11 | Stop reason: HOSPADM

## 2017-03-10 RX ORDER — HYDROCODONE BITARTRATE AND ACETAMINOPHEN 7.5; 325 MG/1; MG/1
1 TABLET ORAL EVERY 6 HOURS PRN
Status: DISCONTINUED | OUTPATIENT
Start: 2017-03-10 | End: 2017-03-11 | Stop reason: HOSPADM

## 2017-03-10 RX ORDER — WARFARIN SODIUM 5 MG/1
5 TABLET ORAL
Status: DISCONTINUED | OUTPATIENT
Start: 2017-03-10 | End: 2017-03-11 | Stop reason: HOSPADM

## 2017-03-10 RX ORDER — METOLAZONE 5 MG/1
5 TABLET ORAL DAILY
Status: DISCONTINUED | OUTPATIENT
Start: 2017-03-11 | End: 2017-03-11 | Stop reason: HOSPADM

## 2017-03-10 RX ORDER — GLUCAGON 1 MG
1 KIT INJECTION
Status: DISCONTINUED | OUTPATIENT
Start: 2017-03-10 | End: 2017-03-11 | Stop reason: HOSPADM

## 2017-03-10 RX ORDER — HYDROCODONE BITARTRATE AND ACETAMINOPHEN 500; 5 MG/1; MG/1
TABLET ORAL
Status: DISCONTINUED | OUTPATIENT
Start: 2017-03-10 | End: 2017-03-11 | Stop reason: HOSPADM

## 2017-03-10 RX ORDER — ROSUVASTATIN CALCIUM 10 MG/1
10 TABLET, COATED ORAL NIGHTLY
Status: DISCONTINUED | OUTPATIENT
Start: 2017-03-10 | End: 2017-03-11 | Stop reason: HOSPADM

## 2017-03-10 RX ORDER — INSULIN ASPART 100 [IU]/ML
0-5 INJECTION, SOLUTION INTRAVENOUS; SUBCUTANEOUS
Status: DISCONTINUED | OUTPATIENT
Start: 2017-03-10 | End: 2017-03-11 | Stop reason: HOSPADM

## 2017-03-10 RX ORDER — ASPIRIN 81 MG/1
81 TABLET ORAL DAILY
Status: DISCONTINUED | OUTPATIENT
Start: 2017-03-11 | End: 2017-03-11 | Stop reason: HOSPADM

## 2017-03-10 RX ORDER — SPIRONOLACTONE 25 MG/1
25 TABLET ORAL 2 TIMES DAILY
Status: DISCONTINUED | OUTPATIENT
Start: 2017-03-10 | End: 2017-03-11 | Stop reason: HOSPADM

## 2017-03-10 RX ADMIN — GABAPENTIN 100 MG: 100 CAPSULE ORAL at 11:03

## 2017-03-10 RX ADMIN — ROSUVASTATIN CALCIUM 10 MG: 10 TABLET, FILM COATED ORAL at 11:03

## 2017-03-10 RX ADMIN — WARFARIN SODIUM 2.5 MG: 2.5 TABLET ORAL at 11:03

## 2017-03-10 RX ADMIN — BUMETANIDE 2 MG: 1 TABLET ORAL at 11:03

## 2017-03-10 RX ADMIN — SPIRONOLACTONE 25 MG: 25 TABLET ORAL at 11:03

## 2017-03-10 RX ADMIN — WARFARIN SODIUM 5 MG: 5 TABLET ORAL at 11:03

## 2017-03-10 NOTE — LETTER
March 10, 2017      Francisco Jackson MD  [address] [facility] [place] [address]           [facility] Hemotology Oncology  [address] [facility] [place] [address]  Phone: [phone]  Fax: [phone]          Patient: Beni Cosby   MR Number: [id number]   YOB: 1945   Date of Visit: 3/10/2017       Dear Dr. Francisco Jackson:    Thank you for referring Beni Cosby to me for evaluation. Attached you will find relevant portions of my assessment and plan of care.    If you have questions, please do not hesitate to call me. I look forward to following Beni Cosby along with you.    Sincerely,    Natalia Santiago MD    Enclosure  CC:  No Recipients    If you would like to receive this communication electronically, please contact [email or web address] or [phone] to request more information on [employer] access.    For providers and/or their staff who would like to refer a patient to [facility], please contact us through our one-stop-shop provider referral line, [facility], at [phone].    If you feel you have received this communication in error or would no longer like to receive these types of communications, please e-mail [email or web address]

## 2017-03-10 NOTE — IP AVS SNAPSHOT
[address] [place] [address]           Patient Discharge Instructions     Our goal is to set you up for success. This packet includes information on your condition, medications, and your home care. It will help you to care for yourself so you don't get sicker and need to go back to the hospital.     Please ask your nurse if you have any questions.        There are many details to remember when preparing to leave the hospital. Here is what you will need to do:    1. Take your medicine. If you are prescribed medications, review your Medication List in the following pages. You may have new medications to  at the pharmacy and others that you'll need to stop taking. Review the instructions for how and when to take your medications. Talk with your doctor or nurses if you are unsure of what to do.     2. Go to your follow-up appointments. Specific follow-up information is listed in the following pages. Your may be contacted by a transition nurse or clinical provider about future appointments. Be sure we have all of the phone numbers to reach you, if needed. Please contact your provider's office if you are unable to make an appointment.     3. Watch for warning signs. Your doctor or nurse will give you detailed warning signs to watch for and when to call for assistance. These instructions may also include educational information about your condition. If you experience any of warning signs to your health, call your doctor.               ** Verify the list of medication(s) below is accurate and up to date. Carry this with you in case of emergency. If your medications have changed, please notify your healthcare provider.             Medication List      CHANGE how you take these medications        Additional Info                      glipiZIDE 5 MG tablet   Commonly known as:  GLUCOTROL   Quantity:  30 tablet   Refills:  11   Dose:  5 mg   What changed:  when to take  this    Instructions:  Take 1 tablet (5 mg total) by mouth daily with breakfast.     Begin Date    AM    Noon    PM    Bedtime       warfarin 5 MG tablet   Commonly known as:  COUMADIN   Refills:  0   Dose:  5 mg   What changed:  Another medication with the same name was removed. Continue taking this medication, and follow the directions you see here.    Last time this was given:  5 mg on 3/10/2017 11:22 PM   Instructions:  5 mg.     Begin Date    AM    Noon    PM    Bedtime         CONTINUE taking these medications        Additional Info                      aspirin 81 MG EC tablet   Commonly known as:  ECOTRIN   Refills:  0   Dose:  81 mg    Last time this was given:  81 mg on 3/11/2017  8:55 AM   Instructions:  Take 1 tablet (81 mg total) by mouth once daily.     Begin Date    AM    Noon    PM    Bedtime       bumetanide 2 MG tablet   Commonly known as:  BUMEX   Refills:  0   Dose:  2 mg    Last time this was given:  2 mg on 3/11/2017  8:53 AM   Instructions:  Take 2 mg by mouth 2 (two) times daily.     Begin Date    AM    Noon    PM    Bedtime       CONTOUR NEXT STRIPS Strp   Refills:  0   Generic drug:  blood sugar diagnostic    Instructions:  Use once daily.     Begin Date    AM    Noon    PM    Bedtime       gabapentin 100 MG capsule   Commonly known as:  NEURONTIN   Refills:  0   Dose:  100 mg    Last time this was given:  100 mg on 3/11/2017  5:41 AM   Instructions:  Take 100 mg by mouth 3 (three) times daily.     Begin Date    AM    Noon    PM    Bedtime       hydrocodone-acetaminophen 7.5-325mg 7.5-325 mg per tablet   Commonly known as:  NORCO   Refills:  0      Begin Date    AM    Noon    PM    Bedtime       lansoprazole 30 MG capsule   Commonly known as:  PREVACID   Refills:  0    Instructions:  TAKE 1 CAPSULE BY MOUTH ONCE DAILY     Begin Date    AM    Noon    PM    Bedtime       metOLazone 5 MG tablet   Commonly known as:  ZAROXOLYN   Refills:  0    Last time this was given:  5 mg on 3/11/2017  8:53  AM   Instructions:  once daily.     Begin Date    AM    Noon    PM    Bedtime       metoprolol succinate 100 MG 24 hr tablet   Commonly known as:  TOPROL-XL   Refills:  0   Dose:  50 mg    Last time this was given:  50 mg on 3/11/2017  8:53 AM   Instructions:  Take 50 mg by mouth once daily. 1/2 tablet daily     Begin Date    AM    Noon    PM    Bedtime       mometasone 50 mcg/actuation nasal spray   Commonly known as:  NASONEX   Refills:  0   Dose:  2 spray    Instructions:  2 sprays by Nasal route daily as needed.     Begin Date    AM    Noon    PM    Bedtime       potassium chloride SA 20 MEQ tablet   Commonly known as:  K-DUR,KLOR-CON   Refills:  0   Dose:  20 mEq   Indications:  take 2 tablets daily    Instructions:  Take 20 mEq by mouth 3 (three) times daily. Take 2 tablets daily     Begin Date    AM    Noon    PM    Bedtime       rosuvastatin 10 MG tablet   Commonly known as:  CRESTOR   Refills:  0   Dose:  10 mg    Last time this was given:  10 mg on 3/10/2017 11:19 PM   Instructions:  10 mg.     Begin Date    AM    Noon    PM    Bedtime       spironolactone 25 MG tablet   Commonly known as:  ALDACTONE   Refills:  0   Dose:  25 mg    Last time this was given:  25 mg on 3/11/2017  8:53 AM   Instructions:  Take 25 mg by mouth 2 (two) times daily.     Begin Date    AM    Noon    PM    Bedtime                  Please bring to all follow up appointments:    1. A copy of your discharge instructions.  2. All medicines you are currently taking in their original bottles.  3. Identification and insurance card.    Please arrive 15 minutes ahead of scheduled appointment time.    Please call 24 hours in advance if you must reschedule your appointment and/or time.        Your Scheduled Appointments     Mar 16, 2017  1:00 PM CDT   Infusion 060 Min with CHAIR 02 [facility]    [address] [place] [address]   [phone]            Mar 23, 2017  1:00 PM CDT   Established  "Patient Visit with Natalia Santiago MD   [facility]    [address] [facility] [address] [phone]            Mar 23, 2017  1:00 PM CDT   Non-Fasting Lab with LABORATORY, [facility]    [address] [facility] [address] [phone]            Mar 23, 2017  1:30 PM CDT   Infusion 060 Min with CHAIR 01 [facility]    [address] [facility] [address]   [phone]            Apr 04, 2017 10:30 AM CDT   Non-Fasting Lab with LABORATORY, [facility]    [address] [facility] [id number] [phone]              Follow-up Information     Follow up with Jackson Brandt MD In 3 days.    Specialty:  Family Medicine    Contact information:    [facility] [place]  [facility] [address] [phone]          Follow up with Natalia Santiago MD.    Specialty:  Hematology and Oncology    Why:  As needed    Contact information:    [address] [place] [address] [address] [place] [address]  [phone]          Discharge Instructions     Future Orders    Activity as tolerated     Diet Diabetic 2000 Calories         Primary Diagnosis     Your primary diagnosis was:  Not on File      Admission Information     Date & Time Provider Department CSN    3/10/2017  4:40 PM Mc Lucas MD [facility] [id number]      Care Providers     Provider Role Specialty Primary office phone    cM Lucas MD Attending Provider Internal Medicine [phone]    Mc Lucas MD Team Attending  Internal Medicine [phone]    Mc uLcas MD Consulting Physician  Internal Medicine [phone]      Your Vitals Were     BP Pulse Temp Resp Height Weight    117/49 (BP Location: Left arm, Patient Position: Lying, BP Method: Automatic) 70 98.2 °F (36.8 °C) (Oral) 19 6' 3" (1.905 m) 126.1 kg (277 lb 14.4 oz)    " SpO2 BMI             98% 34.74 kg/m2         Recent Lab Values        5/2/2016                           8:44 PM           A1C 7.1 (H)                       Pending Labs     Order Current Status    Ferritin In process    Haptoglobin In process    Iron and TIBC In process    Prepare RBC 2 Units; symptomatic anemia In process    Prepare RBC 2 Units; symtomatic anemia In process    Protein electrophoresis, serum In process    Prepare RBC Preliminary result      Allergies as of 3/11/2017        Reactions    Morphine     Other reaction(s): Nausea Only      [facility]     [facility] Nurse Care Line - 24/7 Assistance  Unless otherwise directed by your provider, please contact [facility], our nurse care line that is available for 24/7 assistance.     Registered nurses in the [facility] provide clinical advisement, health education, appointment booking, and other advisory services.  Call for this free service at [phone].        Advance Directives     An advance directive is a document which, in the event you are no longer able to make decisions for yourself, tells your healthcare team what kind of treatment you do or do not want to receive, or who you would like to make those decisions for you.  If you do not currently have an advance directive, [facility] encourages you to create one.  For more information call:  [phone]), [phone] ([phone]),  or log on to [email or web address].        Language Assistance Services     ATTENTION: Language assistance services are available, free of charge. Please call [phone].      ATENCIÓN: Si habla español, tiene a lira disposición servicios gratuitos de asistencia lingüística. Llame al [phone].     CHÚ Ý: N?u b?n nói Ti?ng Vi?t, có các d?ch v? h? tr? ngôn ng? mi?n phí dành cho b?n. G?i s? [phone].        Blood Transfusion Reaction Signs and Symptoms     The blood you have received has been matched for you as  carefully as possible. Most patients who receive a blood transfusion do not experience problems. However, there can be a delayed reaction that happens a few weeks after your blood transfusion. Contact your physician immediately if you experience any NEW SYMPTOMS listed below:     Fever greater than 100.4 degrees    Chills   Yellow color to your skin or eyes(Jaundice)   Back pain, chest pain, or pain at the infusion site   Weakness (more than usual)   Discomfort or uneasiness more than usual (Malaise)   Nausea or vomiting   Shortness of breath, wheezing, or coughing   Higher or lower blood pressure than normal   Skin rash, itching, skin redness, or localized swelling (example: hands or feet)   Urinating less than normal   Urine appears reddish or orange and is darker than normal      Remember that some these signs may already exist for you--such as having chronic back pain or high blood pressure. You only need to look for and report to your doctor any new occurrences since your blood transfusion that are of concern.        Heart Failure Education       Heart Failure: Being Active  You have a condition called heart failure. Being active doesnt mean that you have to wear yourself out. Even a little movement each day helps to strengthen your heart. If you cant get out to exercise, you can do simple stretching and strengthening exercises at home. These are good ways to keep you well-conditioned and prevent you and your heart from becoming excessively weak.    Ideas to get you started  · Add a little movement to things you do now. Walk to mail letters. Park your car at the far end of the parking lot and walk to the store. Walk up a flight of stairs instead of taking the elevator.  · Choose activities you enjoy. You might walk, swim, or ride an exercise bike. Things like gardening and washing the car count, too. Other possibilities include: washing dishes, walking the dog, walking around the mall, and doing  aerobic activities with friends.  · Join a group exercise program at a [facility] or [facility], a senior center, or a community center. Or look into a hospital cardiac rehabilitation program. Ask your doctor if you qualify.  Tips to keep you going  · Get up and get dressed each day. Go to a coffee shop and read a newspaper or go somewhere that you'll be in the presence of other active people. Youll feel more like being active.  · Make a plan. Choose one or more activities that you enjoy and that you can easily do. Then plan to do at least one each day. You might write your plan on a calendar.  · Go with a friend or a group if you like company. This can help you feel supported and stay motivated, too.  · Plan social events that you enjoy. This will keep you mentally engaged as well as physically motivated to do things you find pleasure in.  For your safety  · Talk with your healthcare provider before starting an exercise program.  · Exercise indoors when its too hot or too cold outside, or when the air quality is poor. Try walking at a shopping mall.  · Wear socks and sturdy shoes to maintain your balance and prevent falls.  · Start slowly. Do a few minutes several times a day at first. Increase your time and speed little by little.  · Stop and rest whenever you feel tired or get short of breath.  · Dont push yourself on days when you dont feel well.  Date Last Reviewed: 3/20/2016  © 9142-5135 "[employer]. [address] [place] [address], [place] [address]. All rights reserved. This information is not intended as a substitute for professional medical care. Always follow your healthcare professional's instructions.              Heart Failure: Evaluating Your Heart  You have a condition called heart failure. To evaluate your condition, your doctor will examine you, ask questions, and do some tests. Along with looking for signs of heart failure, the doctor looks for any other health problems that may have led to heart  failure. The results of your evaluation will help your doctor form a treatment plan.  Health history and physical exam  Your visit will start with a health history. Tell the doctor about any symptoms youve noticed and about all medicines you take. Then youll have a physical exam. This includes listening to your heartbeat and breathing. Youll also be checked for swelling (edema) in your legs and neck. When you have fluid buildup or fluid in the lungs, it may be called congestive heart failure.  Diagnosing heart failure     During an echocardiogram, sound waves bounce off the heart. These are converted into a picture on the screen.   The following may be done to help your doctor form a diagnosis:  · X-rays show the size and shape of your heart. These pictures can also show fluid in your lungs.  · An electrocardiogram (ECG or EKG) shows the pattern of your heartbeat. Small pads (electrodes) are placed on your chest, arms, and legs. Wires connect the pads to the ECG machine, which records your hearts electrical signals. This can give the doctor information about heart function.  · An echocardiogram uses ultrasound waves to show the structure and movement of your heart muscle. This shows how well the heart pumps. It also shows the thickness of the heart walls, and if the heart is enlarged. It is one of the most useful, non-invasive tests as it provides information about the heart's general function. This helps your doctor make treatment decisions.  · Lab tests evaluate small amounts of blood or urine for signs of problems. A BNP lab test can help diagnose and evaluate heart failure. BNP stands for B-type natriuretic peptide. The ventricles secrete more BNP when heart failure worsens. Lab tests can also provide information about metabolic dysfunction or heart dysfunction.  Your treatment plan  Based on the results of your evaluation and tests, your doctor will develop a treatment plan. This plan is designed to relieve  some of your heart failure symptoms and help make you more comfortable. Your treatment plan may include:  · Medicine to help your heart work better and improve your quality of life  · Changes in what you eat and drink to help prevent fluid from backing up in your body  · Daily monitoring of your weight and heart failure symptoms to see how well your treatment plan is working  · Exercise to help you stay healthy  · Help with quitting smoking  · Emotional and psychological support to help adjust to the changes  · Referrals to other specialists to make sure you are being treated comprehensively  Date Last Reviewed: 3/21/2016  © 7210-4151 [employer]. [address] [place] [address]. All rights reserved. This information is not intended as a substitute for professional medical care. Always follow your healthcare professional's instructions.              Heart Failure: Making Changes to Your Diet  You have a condition called heart failure. When you have heart failure, excess fluid is more likely to build up in your body because your heart isn't working well. This makes the heart work harder to pump blood. Fluid buildup causes symptoms such as shortness of breath and swelling (edema). This is often referred to as congestive heart failure or CHF. Controlling the amount of salt (sodium) you eat may help stop fluid from building up. Your doctor may also tell you to reduce the amount of fluid you drink.  Reading food labels    Your healthcare provider will tell you how much sodium you can eat each day. Read food labels to keep track. Keep in mind that certain foods are high in salt. These include canned, frozen, and processed foods. Check the amount of sodium in each serving. Watch out for high-sodium ingredients. These include MSG (monosodium glutamate), baking soda, and sodium phosphate.   Eating less salt  Give yourself time to get used to eating less salt. It may take a little while. Here are some  tips to help:  · Take the saltshaker off the table. Replace it with salt-free herb mixes and spices.  · Eat fresh or plain frozen vegetables. These have much less salt than canned vegetables.  · Choose low-sodium snacks like sodium-free pretzels, crackers, or air-popped popcorn.  · Dont add salt to your food when youre cooking. Instead, season your foods with pepper, lemon, garlic, or onion.  · When you eat out, ask that your food be cooked without added salt.  · Avoid eating fried foods as these often have a great deal of salt.  If youre told to limit fluids  You may need to limit how much fluid you have to help prevent swelling. This includes anything that is liquid at room temperature, such as ice cream and soup. If your doctor tells you to limit fluid, try these tips:  · Measure drinks in a measuring cup before you drink them. This will help you meet daily goals.  · Chill drinks to make them more refreshing.  · Suck on frozen lemon wedges to quench thirst.  · Only drink when youre thirsty.  · Chew sugarless gum or suck on hard candy to keep your mouth moist.  · Weigh yourself daily to know if your body's fluid content is rising.  My sodium goal  Your healthcare provider may give you a sodium goal to meet each day. This includes sodium found in food as well as salt that you add. My goal is to eat no more than ___________ mg of sodium per day.     When to call your doctor  Call your doctor right away if you have any symptoms of worsening heart failure. These can include:  · Sudden weight gain  · Increased swelling of your legs or ankles  · Trouble breathing when youre resting or at night  · Increase in the number of pillows you have to sleep on  · Chest pain, pressure, discomfort, or pain in the jaw, neck, or back   Date Last Reviewed: 3/21/2016  © 8880-6830 [employer]. [address] [place] [address], [place] [address]. All rights reserved. This information is not intended as a substitute for  professional medical care. Always follow your healthcare professional's instructions.              Heart Failure: Medicines to Help Your Heart    You have a condition called heart failure (also known as congestive heart failure, or CHF). Your doctor will likely prescribe medicines for heart failure and any underlying health problems you have. Most heart failure patients take one or more types of medicinen. Your healthcare provider will work to find the combination of medicines that works best for you.  Heart failure medicines  Here are the most common heart failure medicines:  · ACE inhibitors lower blood pressure and decrease strain on the heart. This makes it easier for the heart to pump. Angiotensin receptor blockers have similar effects. These are prescribed for some patients instead of ACE inhibitors.  · Beta-blockers relieve stress on the heart. They also improve symptoms. They may also improve the heart's pumping action over time.  · Diuretics (also called water pills) help rid your body of excess water. This can help rid your body of swelling (edema). Having less fluid to pump means your heart doesnt have to work as hard. Some diuretics make your body lose a mineral called potassium. Your doctor will tell you if you need to take supplements or eat more foods high in potassium.  · Digoxin helps your heart pump with more strength. This helps your heart pump more blood with each beat. So, more oxygen-rich blood travels to the rest of the body.  · Aldosterone antagonists help alter hormones and decrease strain on the heart.  · Hydralazine and nitrates are two separate medicines used together to treat heart failure. They may come in one combination pill. They lower blood pressure and decrease how hard the heart has to pump.  Medicines for related conditions  Controlling other heart problems helps keep heart failure under control, too. Depending on other heart problems you have, medicines may be prescribed  to:  · Lower blood pressure (antihypertensives).  · Lower cholesterol levels (statins).  · Prevent blood clots (anticoagulants or aspirin).  · Keep the heartbeat steady (antiarrhythmics).  Date Last Reviewed: 3/5/2016  © 8975-5104 [employer]. [address] [place] [address]. All rights reserved. This information is not intended as a substitute for professional medical care. Always follow your healthcare professional's instructions.              Heart Failure: Procedures That May Help    The heart is a muscle that pumps oxygen-rich blood to all parts of the body. When you have heart failure, the heart is not able to pump as well as it should. Blood and fluid may back up into the lungs (congestive heart failure), and some parts of the body dont get enough oxygen-rich blood to work normally. These problems lead to the symptoms of heart failure.     Certain procedures may help the heart pump better in some cases of heart failure. Some procedures are done to treat health problems that may have caused the heart failure such as coronary artery disease or heart rhythm problems. For more serious heart failure, other options are available.  Treating artery and valve problems  If you have coronary artery disease or valve disease, procedures may be done to improve blood flow. This helps the heart pump better, which can improve heart failure symptoms. First, your doctor may do a cardiac catheterization to help detect clogged blood vessels or valve damage. During this procedure, a  thin tube (catheter) in inserted into a blood vessel and guided to the heart. There a dye is injected and a special type of X-ray (angiogram) is taken of the blood vessels. Procedures to open a blocked artery or fix damaged valves can also be done using catheterization.  · Angioplasty uses a balloon-tipped instrument at the end of the catheter. The balloon is inflated to widen the narrowed artery. In many cases, a stent is  expanded to further support the narrowed artery. A stent is a metal mesh tube.  · Valve surgery repairs or replacement of faulty valves can also be done during catheterization so blood can flow properly through the chambers of the heart.  Bypass surgery is another option to help treat blocked arteries. It uses a healthy blood vessel from elsewhere in the body. The healthy blood vessel is attached above and below the blocked area so that blood can flow around the blocked artery.  Treating heart rhythm problems  A device may be placed in the chest to help a weak heart maintain a healthy, heartbeat so the heart can pump more effectively:  · Pacemaker. A pacemaker is an implanted device that regulates your heartbeat electronically. It monitors your heart's rhythm and generates a painless electric impulse that helps the heart beat in a regular rhythm. A pacemaker is programmed to meet your specific heart rhythm needs.  · Biventricular pacing/cardiac resynchronization therapy. A type of pacemaker that paces both pumping chambers of the heart at the same time to coordinate contractions and to improve the heart's function. Some people with heart failure are candidates for this therapy.  · Implantable cardioverter defibrillator. A device similar to a pacemaker that senses when the heart is beating too fast and delivers an electrical shock to convert the fast rhythm to a normal rhythm. This can be a life saving device.  In severe cases  In more serious cases of heart failure when other treatments no longer work, other options may include:  · Ventricular assist devices (VADs). These are mechanical devices used to take over the pumping function for one or both of the heart's ventricles, or pumping chambers. A VAD may be necessary when heart failure progresses to the point that medicines and other treatments no longer help. In some cases, a VAD may be used as a bridge to transplant.  · Heart transplant. This is replacing the  diseased heart with a healthy one from a donor. This is an option for a few people who are very sick. A heart transplant is very serious and not an option for all patients. Your doctor can tell you more.  Date Last Reviewed: 3/20/2016  © 5027-5085 [employer]. [address] [place] [address]. All rights reserved. This information is not intended as a substitute for professional medical care. Always follow your healthcare professional's instructions.              Heart Failure: Tracking Your Weight  You have a condition called heart failure. When you have heart failure, a sudden weight gain or a steady rise in weight is a warning sign that your body is retaining too much water and salt. This could mean your heart failure is getting worse. If left untreated, it can cause problems for your lungs and result in shortness of breath. Weighing yourself each day is the best way to know if youre retaining water. If your weight goes up quickly, call your doctor. You will be given instructions on how to get rid of the excess water. You will likely need medicines and to avoid salt. This will help your heart work better.  Call your doctor if you gain more than 2 pounds in 1 day, more than 5 pounds in 1 week, or whatever weight gain you were told to report by your doctor. This is often a sign of worsening heart failure and needs to be evaluated and treated. Your doctor will tell you what to do next.   Tips for weighing yourself    · Weigh yourself at the same time each morning, wearing the same clothes. Weigh yourself after urinating and before eating.  · Use the same scale each day. Make sure the numbers are easy to read. Put the scale on a flat, hard surface -- not on a rug or carpet.  · Do not stop weighing yourself. If you forget one day, weigh again the next morning.  How to use your weight chart  · Keep your weight chart near the scale. Write your weight on the chart as soon as you get off the  scale.  · Fill in the month and the start date on the chart. Then write down your weight each day. Your chart will look like this:    · If you miss a day, leave the space blank. Weigh yourself the next day and write your weight in the next space.  · Take your weight chart with you when you go to see your doctor.  Date Last Reviewed: 3/20/2016  © 7280-7484 [employer] [address] [place] [address], [place] [address]. All rights reserved. This information is not intended as a substitute for professional medical care. Always follow your healthcare professional's instructions.              Heart Failure: Warning Signs of a Flare-Up  You have a condition called heart failure. Once you have heart failure, flare-ups can happen. Below are signs that can mean your heart failure is getting worse. If you notice any of these warning signs, call your healthcare provider.  Swelling    · Your feet, ankles, or lower legs get puffier.  · You notice skin changes on your lower legs.  · Your shoes feel too tight.  · Your clothes are tighter in the waist.  · You have trouble getting rings on or off your fingers.  Shortness of breath  · You have to breathe harder even when youre doing your normal activities or when youre resting.  · You are short of breath walking up stairs or even short distances.  · You wake up at night short of breath or coughing.  · You need to use more pillows or sit up to sleep.  · You wake up tired or restless.  Other warning signs  · You feel weaker, dizzy, or more tired.  · You have chest pain or changes in your heartbeat.  · You have a cough that wont go away.  · You cant remember things or dont feel like eating.  Tracking your weight  Gaining weight is often the first warning sign that heart failure is getting worse. Gaining even a few pounds can be a sign that your body is retaining excess water and salt. Weighing yourself each day in the morning after you urinate and before you eat, is the best  way to know if you're retaining water. Get a scale that is easy to read and make sure you wear the same clothes and use the same scale every time you weigh. Your healthcare provider will show you how to track your weight. Call your doctor if you gain more than 2 pounds in 1 day, 5 pounds in 1 week, or whatever weight gain you were told to report by your doctor. This is often a sign of worsening heart failure and needs to be evaluated and treated before it compromises your breathing. Your doctor will tell you what to do next.    Date Last Reviewed: 3/15/2016  © 7817-4132 [employer]. [address] [place] [address], [place] [address]. All rights reserved. This information is not intended as a substitute for professional medical care. Always follow your healthcare professional's instructions.              Pneumonmia Discharge Instructions                Coumadin Discharge Instructions                         Chronic Kindey Disease Education             Diabetes Discharge Instructions                                   [facility] Sign-Up     Activating your [facility] account is as easy as 1-2-3!     1) Visit [email or web address], select Sign Up Now, enter this activation code and your date of birth, then select Next.  [email or web address]  Expires: 3/24/2017  1:48 PM      2) Create a username and password to use when you visit [facility] in the future and select a security question in case you lose your password and select Next.    3) Enter your e-mail address and click Sign Up!    Additional Information  If you have questions, please e-mail [email or web address] or call [phone] to talk to our [facility] staff. Remember, [facility] is NOT to be used for urgent needs. For medical emergencies, dial 911.          [facility] complies with applicable Federal civil rights laws and does not discriminate on the basis of race, color, national origin, age, disability, or sex.

## 2017-03-10 NOTE — ED PROVIDER NOTES
SCRIBE #1 NOTE: I, April Casey, am scribing for, and in the presence of, Carlos Moore Jr., MD. I have scribed the entire note.      History      Chief Complaint   Patient presents with    Anemia     states their doctor sent them over for blood transfusion        Review of patient's allergies indicates:   Allergen Reactions    Morphine      Other reaction(s): Nausea Only        HPI   HPI    3/10/2017, 5:13 PM   History obtained from the patient      History of Present Illness: Beni Cosby is a 71 y.o. male patient, with a PMHx of CHF and CKD, who presents to the Emergency Department for generalized weakness which onset gradually several days ago. Pt was seen at Dr. Murillo's office (Hem/Onc) PTA and was sent to ED for a blood transfusion due a hemoglobin of 7.6. Sx have been constant and moderate in severity. No modifying factors noted. Pt notes that he has been having difficulty ambulating due to weakness. Associated sx include light-headedness and dizziness. Pt states that light-headedness/dizziness is exacerbated with sitting up. Pt also states that his BP has been fluctuating and has been as low as 80/50. Pt denies any fever, chills, CP, SOB, n/v/d, hematochezia, dysuria, hematuria, LOC, or unilateral weakness/numbness. No further complaints at this time.       Arrival mode: Personal vehicle      PCP: Jackson Brandt MD       Past Medical History:  Past Medical History:   Diagnosis Date    Aortic valve stenosis with insufficiency 4/2008    Surgery [facility]    Chronic a-fib     CKD (chronic kidney disease) stage 3, GFR 30-59 ml/min     Primary cancer of right kidney     Surgery, no further treatment       Past Surgical History:  Reviewed. Not pertinent       Family History:  Reviewed. Not pertinent     Social History:  Social History     Social History Main Topics    Smoking status: Never Smoker    Smokeless tobacco: Never Used    Alcohol use No    Drug use: Unknown     Sexual activity: Unknown         ROS   Review of Systems   Constitutional: Negative for chills, diaphoresis and fever.        (+) generalized weakness   HENT: Negative for sore throat.    Respiratory: Negative for shortness of breath.    Cardiovascular: Negative for chest pain.   Gastrointestinal: Negative for abdominal pain, blood in stool, constipation, diarrhea, nausea and vomiting.   Genitourinary: Negative for dysuria and hematuria.   Musculoskeletal: Negative for back pain, neck pain and neck stiffness.   Skin: Negative for rash.   Neurological: Positive for dizziness and light-headedness. Negative for weakness, numbness and headaches.   Hematological: Does not bruise/bleed easily.     Physical Exam    Initial Vitals   BP Pulse Resp Temp SpO2   03/10/17 1638 03/10/17 1638 03/10/17 1638 03/10/17 1638 03/10/17 1638   127/65 105 18 97.6 °F (36.4 °C) 97 %      Physical Exam  Nursing Notes and Vital Signs Reviewed.  Constitutional: Patient is in no acute distress. Awake and alert. Well-developed and well-nourished.  Head: Atraumatic. Normocephalic.  Eyes: PERRL. EOM intact. No scleral icterus.  ENT: Mucous membranes are moist. Oropharynx is clear and symmetric.    Neck: Supple. Full ROM. No lymphadenopathy.  Cardiovascular: Tachycardic. Regular rhythm. No murmurs, rubs, or gallops. Distal pulses are 2+ and symmetric.  Pulmonary/Chest: No respiratory distress. Inspiratory and expiratory wheezing. No rales or rhonchi.   Abdominal: Soft and non-distended.  There is no tenderness.  No rebound, guarding, or rigidity.   Musculoskeletal: Moves all extremities. No obvious deformities. BLE wrapped with ace wraps with minimal edema. No calf tenderness.  Skin: Warm and dry. Pale.  Multiple bruises to the BUE extremities, no active bleeding.   Neurological:  Alert, awake, and appropriate.  Normal speech.  No acute focal neurological deficits are appreciated.  Psychiatric: Normal affect. Good eye contact. Appropriate in content.    ED Course   "  Procedures  ED Vital Signs:  Vitals:    03/10/17 1638   BP: 127/65   Pulse: 105   Resp: 18   Temp: 97.6 °F (36.4 °C)   TempSrc: Oral   SpO2: 97%   Weight: 127 kg (280 lb)   Height: 6' 3" (1.905 m)       Abnormal Lab Results:  Labs Reviewed   CBC W/ AUTO DIFFERENTIAL - Abnormal; Notable for the following:        Result Value    RBC 3.41 (*)     Hemoglobin 7.5 (*)     Hematocrit 24.6 (*)     MCV 72 (*)     MCH 22.0 (*)     MCHC 30.5 (*)     RDW 16.7 (*)     MPV 8.7 (*)     Lymph% 16.5 (*)     All other components within normal limits   COMPREHENSIVE METABOLIC PANEL - Abnormal; Notable for the following:     Sodium 132 (*)     Chloride 85 (*)     CO2 33 (*)     BUN, Bld 97 (*)     Creatinine 2.0 (*)     eGFR if  38 (*)     eGFR if non  33 (*)     All other components within normal limits   PROTIME-INR - Abnormal; Notable for the following:     Prothrombin Time 17.4 (*)     INR 1.7 (*)     All other components within normal limits   FOLATE   VITAMIN B12   PROTIME-INR   IRON AND TIBC   FERRITIN   HEMOGLOBIN A1C   TYPE & SCREEN   DIRECT ANTIGLOBULIN TEST   TYPE & SCREEN   POCT GLUCOSE MONITORING CONTINUOUS   PREPARE RBC SOFT   PREPARE RBC SOFT        All Lab Results:  Results for orders placed or performed during the hospital encounter of 03/10/17   CBC auto differential   Result Value Ref Range    WBC 7.08 3.90 - 12.70 K/uL    RBC 3.41 (L) 4.60 - 6.20 M/uL    Hemoglobin 7.5 (L) 14.0 - 18.0 g/dL    Hematocrit 24.6 (L) 40.0 - 54.0 %    MCV 72 (L) 82 - 98 fL    MCH 22.0 (L) 27.0 - 31.0 pg    MCHC 30.5 (L) 32.0 - 36.0 %    RDW 16.7 (H) 11.5 - 14.5 %    Platelets 254 150 - 350 K/uL    MPV 8.7 (L) 9.2 - 12.9 fL    Gran # 5.0 1.8 - 7.7 K/uL    Lymph # 1.2 1.0 - 4.8 K/uL    Mono # 0.8 0.3 - 1.0 K/uL    Eos # 0.1 0.0 - 0.5 K/uL    Baso # 0.01 0.00 - 0.20 K/uL    Gran% 71.0 38.0 - 73.0 %    Lymph% 16.5 (L) 18.0 - 48.0 %    Mono% 11.7 4.0 - 15.0 %    Eosinophil% 0.8 0.0 - 8.0 %    Basophil% 0.1 0.0 " - 1.9 %    Platelet Estimate Appears normal     Aniso Slight     Poik Slight     Poly Occasional     Hypo Occasional     Ovalocytes Occasional     Target Cells Occasional     Tear Drop Cells Occasional     Stomatocytes Present     Basophilic Stippling Occasional     Differential Method Automated    Comprehensive metabolic panel   Result Value Ref Range    Sodium 132 (L) 136 - 145 mmol/L    Potassium 3.7 3.5 - 5.1 mmol/L    Chloride 85 (L) 95 - 110 mmol/L    CO2 33 (H) 23 - 29 mmol/L    Glucose 107 70 - 110 mg/dL    BUN, Bld 97 (H) 8 - 23 mg/dL    Creatinine 2.0 (H) 0.5 - 1.4 mg/dL    Calcium 9.9 8.7 - 10.5 mg/dL    Total Protein 7.0 6.0 - 8.4 g/dL    Albumin 3.9 3.5 - 5.2 g/dL    Total Bilirubin 0.8 0.1 - 1.0 mg/dL    Alkaline Phosphatase 72 55 - 135 U/L    AST 24 10 - 40 U/L    ALT 14 10 - 44 U/L    Anion Gap 14 8 - 16 mmol/L    eGFR if African American 38 (A) >60 mL/min/1.73 m^2    eGFR if non African American 33 (A) >60 mL/min/1.73 m^2   Protime-INR   Result Value Ref Range    Prothrombin Time 17.4 (H) 9.0 - 12.5 sec    INR 1.7 (H) 0.8 - 1.2   Type & Screen   Result Value Ref Range    Group & Rh A POS     Indirect Tanisha NEG    Direct antiglobulin test   Result Value Ref Range    Direct Tanisha (JOSE) NEG    Prepare RBC   Result Value Ref Range    UNIT NUMBER [id number]     PRODUCT CODE U8736C40     DISPENSE STATUS CROSSMATCHED     CODING SYSTEM JOMP741     Unit Blood Type Code 6200     Unit Blood Type A POS     Unit Expiration [id number]     UNIT NUMBER [id number]     PRODUCT CODE U7430X66     DISPENSE STATUS CROSSMATCHED     CODING SYSTEM [id number]     Unit Blood Type Code 6200     Unit Blood Type A POS     Unit Expiration [id number]      Imaging Results:  Imaging Results     None                  The Emergency Provider reviewed the vital signs and test results, which are outlined above.    ED Discussion     5:38 PM: Dr. Moore discussed case with Germaine Schultz NP ([facility]). Germaine  recommends calling back when lab results have come back.      5:51 PM: Re-evaluated pt. I have discussed test results, shared treatment plan, and the need for admission with patient and family at bedside. Pt and family express understanding at this time and agree with all information. All questions answered. Pt and family have no further questions or concerns at this time. Pt is ready for admit.    6:39 PM: Re-evaluated pt. Consent for blood transfusion signed at this time. D/w pt all pertinent results. D/w pt any concerns expressed at this time. Answered all questions. Pt expresses understanding at this time.    6:40 PM: Discussed case with Carmela Lowe NP ([facility]). Carmela agrees with current care and management of pt and accepts admission.   Admitting Service: Hospital medicine   Admitting Physician: Dr. Lucas  Admit to: Observation/Telemetry       ED Medication(s):  Medications   0.9%  NaCl infusion (for blood administration) (not administered)   bumetanide tablet 2 mg (not administered)   gabapentin capsule 100 mg (not administered)   hydrocodone-acetaminophen 7.5-325mg per tablet 1 tablet (not administered)   metOLazone tablet 5 mg (not administered)   aspirin EC tablet 81 mg (not administered)   pantoprazole EC tablet 40 mg (not administered)   metoprolol succinate (TOPROL-XL) 24 hr tablet 50 mg (not administered)   rosuvastatin tablet 10 mg (not administered)   spironolactone tablet 25 mg (not administered)   warfarin (COUMADIN) tablet 5 mg (not administered)   glucose chewable tablet 16 g (not administered)   glucose chewable tablet 24 g (not administered)   dextrose 50% injection 12.5 g (not administered)   dextrose 50% injection 25 g (not administered)   glucagon (human recombinant) injection 1 mg (not administered)   insulin aspart pen 0-5 Units (not administered)   bumetanide injection 2 mg (not administered)         Medical Decision Making    Medical Decision Making:   Clinical Tests:   Lab  Tests: Ordered and Reviewed           Scribe Attestation:   Scribe #1: I performed the above scribed service and the documentation accurately describes the services I performed. I attest to the accuracy of the note.    Attending:   Physician Attestation Statement for Scribe #1: I, Carlos Moore Jr., MD, personally performed the services described in this documentation, as scribed by April Casey, in my presence, and it is both accurate and complete.          Clinical Impression       ICD-10-CM ICD-9-CM   1. Symptomatic anemia D64.9 285.9   2. Congestive heart failure, unspecified congestive heart failure chronicity, unspecified congestive heart failure type I50.9 428.0       Disposition:   Disposition: Placed in Observation  Condition: Fair         Carlos Moore Jr., MD  03/10/17 2036

## 2017-03-10 NOTE — PROGRESS NOTES
Hematology/Oncology Consult Note    Reason for Consult: Anemia    Consult requested by: Dr. Jackson, Nephrology    History of Present Illness:  Mr. Cosby is a 70 y/o male with A-fib, Stage III CKD and prior RCC s/p partial nephrectomy in 2007 referred for evaluation of anemia. He reports GI blood loss back in 2007 requiring 8 pints of blood. GI workup at that time was unrevealing and the bleeding resolved. Patient had a hospital stay in 1/2017 at [facility] for pneumonia. His Hgb in 5/2016 was 13, dropped to 9.2 in 1/2017 (based on [facility]) and now is down to 7.6. He endorses ice cravings in the past few months. No melena/hematochezia/hematuria. He reports 3 days of heartburn. Last colonoscopy was a few years performed at [facility] [address], but Dr. Jensen. Patient states he is extremely fatigued, short of breath. He sees Dr. Blankenship. He has not taken any of this diuretics today given appointments.    ROS:  General:  No wt loss, fever/chills, night sweats +fatigue and ice cravings  Eyes: No vision problems, pain or inflammation.   Ears/Nose: No difficultly hearing, ear pain, rhinorrhea, or epistaxis  Oropharynx: No ulcers, dysphagia, or odynophagia  Cardiovascular: No chest pains, sob, PND or dyspnea on exertion  Pulmonary: No cough, hemoptysis +SOB  Gastrointestional: No n/v/d, melena, hematochezia, or change in bowel habits  : No dysuria, hematuria, pelvic pain or flank pain  Musculoskeletal: No myalgias, weakness, or arthralgias  Neurological: No headaches, focal deficits, or seizure activity  Endocrine: No heat or cold intolerance   Skin: No rashes pruritus, or lesions  Psychiatric: No symptoms of mood disorders  Heme/Lymph: No lymph node enlargment    Past Medical History:   Diagnosis Date    Aortic valve stenosis with insufficiency 4/2008    Surgery [facility]    Chronic a-fib     CKD (chronic kidney disease) stage 3, GFR 30-59 ml/min     Primary cancer of right kidney     Surgery, no  further treatment       Social History:  Social History     Social History    Marital status:      Spouse name: N/A    Number of children: N/A    Years of education: N/A     Social History Main Topics    Smoking status: Never Smoker    Smokeless tobacco: Never Used    Alcohol use No    Drug use: None    Sexual activity: Not Asked     Other Topics Concern    None     Social History Narrative       Family History: family history is not on file.    Physical Exam:  Vitals:    03/10/17 1442   BP: 112/70   Pulse: 74   Resp: 18   Temp: 97.9 °F (36.6 °C)     Body mass index is 34.86 kg/(m^2).  General:  AAOx4, no acute distress, sitting in wheelchair  HEENT: EOMI. Normocephalic and atraumatic. No maxillary sinus tenderness. External auditory canals clear and TMs intact without lesions. Nasal and oral mucosal membranes moist. Normal dentition and gums.   Neck: no LAD, thyromegaly, normal ROM  Pulmonary: Bilaterally clear to auscultation, Normal effort with no accessory muscle use, no wheezes/rales/rhonchi  CV: Normal rate, regular rhythm, no murmurs/rubs/gallops, no edema  ABD:  Obese, Soft, nontender, nondistended, no mass, and without hepatosplenomegaly   Ext: No clubbing, cyanosis, or edema, normal ROM. +BLE edema.  Skin: No rashes, lesions, bruising or petechiae  Neurological: No focal deficits, CN II to XII grossly intact, normal coordination    Psychiatric:  Normal mood, affect and judgement  Hem/Lymph:  No submandibular, cervical, supraclavicular, axillary LAD.    Labs:    Lab Results   Component Value Date    WBC 8.20 03/08/2017    HGB 7.6 (L) 03/08/2017    HCT 25.1 (L) 03/08/2017    MCV 74 (L) 03/08/2017     03/08/2017     BMP  Lab Results   Component Value Date     (L) 03/08/2017    K 3.7 03/08/2017    CL 83 (L) 03/08/2017    CO2 33 (H) 03/08/2017    BUN 97 (H) 03/08/2017    CREATININE 2.3 (H) 03/08/2017    CALCIUM 9.8 03/08/2017    ANIONGAP 14 03/08/2017    ESTGFRAFRICA 32 (A)  03/08/2017    EGFRNONAA 28 (A) 03/08/2017     Lab Results   Component Value Date    ALT 15 05/02/2016    AST 29 05/02/2016    ALKPHOS 85 05/02/2016    BILITOT 1.2 (H) 05/02/2016       No results found for: IRON, TIBC, FERRITIN, SATURATEDIRO  No results found for: [id number]  No results found for: FOLATE  Lab Results   Component Value Date    TSH 0.744 05/05/2016       Imaging:  Reviewed.    Assessment / Plan:  Beni Cosby is a 71 y.o. male who comes in with severe anemia.    1. Microcytic anemia: Likely due to iron deficiency given low MCV and steady decline. Pt reports h/o of possible antibodies.   -- Check iron profile with ferritin, JOSE today   -- Refer to ED for admission and 2 Units of PRBCs. Must give IV Lasix during blood transfusion given CHF.  -- Injectafer x 2 starting next week  -- Can check SPEP and EPO level in the future  -- Needs GI workup and pt is already scheduled for this    2. Stage III CKD: Responsible for some component of anemia, but not responsible for the dropping Hgb.     3. CHF: On Bumex 2mg bid, Metolazone, Spironolactone.    Natalia Santiago M.D.  Hematology Oncology

## 2017-03-11 VITALS
RESPIRATION RATE: 19 BRPM | HEART RATE: 70 BPM | BODY MASS INDEX: 34.55 KG/M2 | HEIGHT: 75 IN | DIASTOLIC BLOOD PRESSURE: 49 MMHG | SYSTOLIC BLOOD PRESSURE: 117 MMHG | OXYGEN SATURATION: 98 % | TEMPERATURE: 98 F | WEIGHT: 277.88 LBS

## 2017-03-11 LAB
ANION GAP SERPL CALC-SCNC: 14 MMOL/L
BASOPHILS # BLD AUTO: 0.02 K/UL
BASOPHILS NFR BLD: 0.3 %
BUN SERPL-MCNC: 85 MG/DL
CALCIUM SERPL-MCNC: 10.1 MG/DL
CHLORIDE SERPL-SCNC: 84 MMOL/L
CO2 SERPL-SCNC: 34 MMOL/L
CREAT SERPL-MCNC: 1.8 MG/DL
DIFFERENTIAL METHOD: ABNORMAL
EOSINOPHIL # BLD AUTO: 0.1 K/UL
EOSINOPHIL NFR BLD: 1 %
ERYTHROCYTE [DISTWIDTH] IN BLOOD BY AUTOMATED COUNT: 17.8 %
EST. GFR  (AFRICAN AMERICAN): 43 ML/MIN/1.73 M^2
EST. GFR  (NON AFRICAN AMERICAN): 37 ML/MIN/1.73 M^2
FERRITIN SERPL-MCNC: 28 NG/ML
GLUCOSE SERPL-MCNC: 176 MG/DL
HAPTOGLOB SERPL-MCNC: 20 MG/DL
HCT VFR BLD AUTO: 29 %
HGB BLD-MCNC: 9 G/DL
IRON SERPL-MCNC: 19 UG/DL
LYMPHOCYTES # BLD AUTO: 0.8 K/UL
LYMPHOCYTES NFR BLD: 9.9 %
MCH RBC QN AUTO: 23.3 PG
MCHC RBC AUTO-ENTMCNC: 31 %
MCV RBC AUTO: 75 FL
MONOCYTES # BLD AUTO: 0.6 K/UL
MONOCYTES NFR BLD: 8.3 %
NEUTROPHILS # BLD AUTO: 6.2 K/UL
NEUTROPHILS NFR BLD: 80.5 %
PLATELET # BLD AUTO: 254 K/UL
PMV BLD AUTO: 9 FL
POCT GLUCOSE: 138 MG/DL (ref 70–110)
POCT GLUCOSE: 175 MG/DL (ref 70–110)
POTASSIUM SERPL-SCNC: 3.5 MMOL/L
RBC # BLD AUTO: 3.87 M/UL
SATURATED IRON: 3 %
SODIUM SERPL-SCNC: 132 MMOL/L
TOTAL IRON BINDING CAPACITY: 604 UG/DL
TRANSFERRIN SERPL-MCNC: 408 MG/DL
WBC # BLD AUTO: 7.74 K/UL

## 2017-03-11 PROCEDURE — G0378 HOSPITAL OBSERVATION PER HR: HCPCS

## 2017-03-11 PROCEDURE — 83010 ASSAY OF HAPTOGLOBIN QUANT: CPT

## 2017-03-11 PROCEDURE — 80048 BASIC METABOLIC PNL TOTAL CA: CPT

## 2017-03-11 PROCEDURE — P9016 RBC LEUKOCYTES REDUCED: HCPCS

## 2017-03-11 PROCEDURE — 84165 PROTEIN E-PHORESIS SERUM: CPT

## 2017-03-11 PROCEDURE — 25000003 PHARM REV CODE 250: Performed by: INTERNAL MEDICINE

## 2017-03-11 PROCEDURE — 36415 COLL VENOUS BLD VENIPUNCTURE: CPT

## 2017-03-11 PROCEDURE — 82962 GLUCOSE BLOOD TEST: CPT

## 2017-03-11 PROCEDURE — 36430 TRANSFUSION BLD/BLD COMPNT: CPT

## 2017-03-11 PROCEDURE — 96374 THER/PROPH/DIAG INJ IV PUSH: CPT

## 2017-03-11 PROCEDURE — 84165 PROTEIN E-PHORESIS SERUM: CPT | Mod: 26,,, | Performed by: PATHOLOGY

## 2017-03-11 PROCEDURE — 85025 COMPLETE CBC W/AUTO DIFF WBC: CPT

## 2017-03-11 PROCEDURE — 63600175 PHARM REV CODE 636 W HCPCS: Performed by: EMERGENCY MEDICINE

## 2017-03-11 RX ORDER — HYDROCODONE BITARTRATE AND ACETAMINOPHEN 500; 5 MG/1; MG/1
TABLET ORAL
Status: DISCONTINUED | OUTPATIENT
Start: 2017-03-11 | End: 2017-03-11 | Stop reason: HOSPADM

## 2017-03-11 RX ORDER — FUROSEMIDE 10 MG/ML
40 INJECTION INTRAMUSCULAR; INTRAVENOUS
Status: COMPLETED | OUTPATIENT
Start: 2017-03-11 | End: 2017-03-11

## 2017-03-11 RX ADMIN — FUROSEMIDE 40 MG: 10 INJECTION, SOLUTION INTRAMUSCULAR; INTRAVENOUS at 01:03

## 2017-03-11 RX ADMIN — BUMETANIDE 2 MG: 1 TABLET ORAL at 08:03

## 2017-03-11 RX ADMIN — GABAPENTIN 100 MG: 100 CAPSULE ORAL at 05:03

## 2017-03-11 RX ADMIN — METOPROLOL SUCCINATE 50 MG: 50 TABLET, EXTENDED RELEASE ORAL at 08:03

## 2017-03-11 RX ADMIN — SPIRONOLACTONE 25 MG: 25 TABLET ORAL at 08:03

## 2017-03-11 RX ADMIN — PANTOPRAZOLE SODIUM 40 MG: 40 TABLET, DELAYED RELEASE ORAL at 08:03

## 2017-03-11 RX ADMIN — METOLAZONE 5 MG: 5 TABLET ORAL at 08:03

## 2017-03-11 RX ADMIN — ASPIRIN 81 MG: 81 TABLET, COATED ORAL at 08:03

## 2017-03-11 NOTE — ASSESSMENT & PLAN NOTE
EF 55%  Patient with diastolic dysfunction  Severe Aortic Stenosis  Fluid Management  Cardiology

## 2017-03-11 NOTE — DISCHARGE SUMMARY
"[facility] Hospital Medicine  Discharge Summary      Patient Name: Beni Cosby  MRN: [id number]  Admission Date: 3/10/2017  Hospital Length of Stay: 0 days  Discharge Date and Time:  03/11/2017 11:52 AM  Attending Physician: Mc Lucas MD   Discharging Provider: Mc Lucas MD  Primary Care Provider: Jackson Brandt MD      HPI:   Chief Complaint/Reason for Admission: symtomatic anemia      History of Present Illness:  Mr. Beni Cosby is a 71 y.o. with a past medical history of aortic bioprosthesis, CHF , DM, HLD , Atrial fibrillation on coumadin , Stage III CKD , prior RCC s/p partial nephrectomy here with symptomatic anemia. He states that fo tr the last few weeks, he has had weakness and lightheadedness over the last few weeks, as well as shortness of breath. He states he has been markedly more fatigued that usual. He denies changes in his bowel habits, noting no dark or discolored stools, nor does he note overt blood. He states his INR has been "in range" . His wife notes he is followed at [facility] and his INR was around 2.3 earlier this month. He states he has stable pillow orthopnea , 2-3 pillows , and states that his legs are chronically swollen as well. He otherwise denies chest pain and palpitations .      He was seen by his Hematologist , Dr Gruber in clinic today and was referred here for transfusion ,       * No surgery found *      Indwelling Lines/Drains at time of discharge:   Lines/Drains/Airways          No matching active lines, drains, or airways        Hospital Course:   Patient admitted to hospital from Hem Onc for symptomatic anemia. Patient transfused 2 uprbcs with improvement in sx. Pat with Microcytic anemia and work up underway as O/P. Patient with no sign of acute bleed and GI workup scheduled next week. Patient seen and examined / stable improved and safe for discharge to home.      Consults:     Significant Diagnostic Studies: Labs: "   BMP:   Recent Labs  Lab 03/10/17  1730 03/11/17  0835    176*   * 132*   K 3.7 3.5   CL 85* 84*   CO2 33* 34*   BUN 97* 85*   CREATININE 2.0* 1.8*   CALCIUM 9.9 10.1   , CMP   Recent Labs  Lab 03/10/17  1730 03/11/17  0835   * 132*   K 3.7 3.5   CL 85* 84*   CO2 33* 34*    176*   BUN 97* 85*   CREATININE 2.0* 1.8*   CALCIUM 9.9 10.1   PROT 7.0  --    ALBUMIN 3.9  --    BILITOT 0.8  --    ALKPHOS 72  --    AST 24  --    ALT 14  --    ANIONGAP 14 14   ESTGFRAFRICA 38* 43*   EGFRNONAA 33* 37*   , CBC   Recent Labs  Lab 03/10/17  1730 03/11/17  0835   WBC 7.08 7.74   HGB 7.5* 9.0*   HCT 24.6* 29.0*    254    and All labs within the past 24 hours have been reviewed    Pending Diagnostic Studies:     Procedure Component Value Units Date/Time    Ferritin [[id number]] Collected:  03/10/17 1730    Order Status:  Sent Lab Status:  In process Updated:  03/10/17 1738    Specimen:  Blood from Blood     Haptoglobin [[id number]] Collected:  03/11/17 0835    Order Status:  Sent Lab Status:  In process Updated:  03/11/17 0835    Specimen:  Blood from Blood     Narrative:       Collection has been rescheduled by CAR at 3/11/2017 03:48 Reason:   getting blood  Collection has been rescheduled by JSD at 3/11/2017 04:38 Reason: pt   getting blood    Iron and TIBC [[id number]] Collected:  03/10/17 1730    Order Status:  Sent Lab Status:  In process Updated:  03/10/17 1738    Specimen:  Blood from Blood     Protein electrophoresis, serum [[id number]] Collected:  03/11/17 0835    Order Status:  Sent Lab Status:  In process Updated:  03/11/17 0835    Specimen:  Blood from Blood     Narrative:       Collection has been rescheduled by CAR at 3/11/2017 03:48 Reason:   getting blood  Collection has been rescheduled by JSD at 3/11/2017 04:38 Reason: pt   getting blood  Collection has been rescheduled by CAR at 3/11/2017 03:48 Reason:   getting blood  Collection has been rescheduled by JSD at 3/11/2017 04:38 Reason:  pt   getting blood    Protime-INR [[id number]]     Order Status:  Sent Lab Status:  No result     Specimen:  Blood from Blood         Final Active Diagnoses:    Diagnosis Date Noted POA    PRINCIPAL PROBLEM:  Symptomatic anemia [D64.9] 03/10/2017 Yes    Acute on chronic diastolic congestive heart failure [I50.33] 08/31/2015 Yes    Type 2 diabetes mellitus [E11.9] 08/31/2015 Yes    Congestive heart failure [I50.9] 05/24/2012 Yes    Diabetes mellitus [E11.9] 05/24/2012 Yes    Aortic valve stenosis [I35.0] 05/24/2012 Yes      Problems Resolved During this Admission:    Diagnosis Date Noted Date Resolved POA      * Symptomatic anemia  Transfused 2 units PRBC's   Patient sx improved  Monitor      Acute on chronic diastolic congestive heart failure  Gentle Fluids  Monitor      Congestive heart failure  EF 55%  Patient with diastolic dysfunction  Severe Aortic Stenosis  Fluid Management  Cardiology      Diabetes mellitus  NISS  Accuchecks        Discharged Condition: stable    Disposition: Home or Self Care    Follow Up:  Follow-up Information     Follow up with Jackson Brandt MD In 3 days.    Specialty:  Family Medicine    Contact information:    [address] [place] [address] DR HAGAN [facility] [address] [phone]          Follow up with Natalia Santiago MD.    Specialty:  Hematology and Oncology    Why:  As needed    Contact information:    [address] [place] [address] DR Wen [place]ACON [address] [phone]          Patient Instructions:     Diet Diabetic 2000 Calories     Activity as tolerated       Medications:  Reconciled Home Medications:   Current Discharge Medication List      CONTINUE these medications which have NOT CHANGED    Details   aspirin (ECOTRIN) 81 MG EC tablet Take 1 tablet (81 mg total) by mouth once daily.  Refills: 0      blood sugar diagnostic (CONTOUR NEXT STRIPS) Strp Use once daily.      bumetanide (BUMEX) 2 MG tablet Take 2 mg by mouth 2 (two) times daily.      gabapentin (NEURONTIN) 100 MG capsule Take 100  mg by mouth 3 (three) times daily.      glipiZIDE (GLUCOTROL) 5 MG tablet Take 1 tablet (5 mg total) by mouth daily with breakfast.  Qty: 30 tablet, Refills: 11    Associated Diagnoses: Type 2 diabetes mellitus with diabetic nephropathy      hydrocodone-acetaminophen 7.5-325mg (NORCO) 7.5-325 mg per tablet       lansoprazole (PREVACID) 30 MG capsule TAKE 1 CAPSULE BY MOUTH ONCE DAILY      metolazone (ZAROXOLYN) 5 MG tablet once daily.       metoprolol succinate (TOPROL-XL) 100 MG 24 hr tablet Take 50 mg by mouth once daily. 1/2 tablet daily      mometasone (NASONEX) 50 mcg/actuation nasal spray 2 sprays by Nasal route daily as needed.       potassium chloride SA (K-DUR,KLOR-CON) 20 MEQ tablet Take 20 mEq by mouth 3 (three) times daily. Take 2 tablets daily      rosuvastatin (CRESTOR) 10 MG tablet 10 mg.      spironolactone (ALDACTONE) 25 MG tablet Take 25 mg by mouth 2 (two) times daily.       warfarin (COUMADIN) 5 MG tablet 5 mg.           Time spent on the discharge of patient: 45 minutes    Mc Lucas MD  Department of Hospital Medicine  [facility]

## 2017-03-11 NOTE — PLAN OF CARE
Problem: Patient Care Overview  Goal: Plan of Care Review  Outcome: Ongoing (interventions implemented as appropriate)  Patient remains free from falls. Fall precautions remain in place. Pt rec'd 2 units PRBC this shift. Tolerated well. No c/o pain. VS are stable. No other c/o at this time. Call bell within reach. Reminded to call for assistance.

## 2017-03-11 NOTE — PROGRESS NOTES
Patient arrived to unit via stretcher. Patient ambulated from stretcher to bed without assistance. Patient made comfortable in bed and placed on heart monitor. Vital signs and assessment completed. Patient educated on fall prevention and hourly rounding. No c/o at this time. Call bell and personal belongings within reach. Reminded to call for assistance. Will continue to monitor.

## 2017-03-11 NOTE — H&P
"History & Physical  Hospital Medicine      SUBJECTIVE:     Chief Complaint/Reason for Admission: symtomatic anemia     History of Present Illness:  Mr. Beni Cosby is a 71 y.o. with a past medical history of aortic bioprosthesis,  CHF , DM, HLD , Atrial fibrillation on coumadin , Stage III CKD , prior RCC s/p partial nephrectomy here with symptomatic anemia. He states that fo tr the last few weeks, he has had weakness and lightheadedness over the last few weeks, as well as shortness of breath. He states he has been markedly more fatigued that usual. He denies changes in his bowel habits, noting no dark or discolored stools, nor does he note overt blood. He states his INR has been "in range" . His wife notes he is followed at [facility] and his INR was around 2.3 earlier this month. He states he has stable pillow orthopnea , 2-3 pillows , and states that his legs are chronically swollen as well. He otherwise denies chest pain and palpitations .     He was seen by his Hematologist , Dr Gruber in clinic today and was referred here for transfusion ,         ED course :   ED vitals:   Initial Vitals   BP Pulse Resp Temp SpO2   03/10/17 1638 03/10/17 1638 03/10/17 1638 03/10/17 1638 03/10/17 1638   127/65 105 18 97.6 °F (36.4 °C) 97              (Not in a hospital admission)     Review of patient's allergies indicates:   Allergen Reactions    Morphine      Other reaction(s): Nausea Only       Past Medical History:   Diagnosis Date    Aortic valve stenosis with insufficiency 4/2008    Surgery [facility]    Chronic a-fib     CKD (chronic kidney disease) stage 3, GFR 30-59 ml/min     Primary cancer of right kidney     Surgery, no further treatment       History reviewed. No pertinent surgical history.    History reviewed. No pertinent family history.     Social History     Social History    Marital status:      Spouse name: N/A    Number of children: N/A    Years of education: N/A     Social " History Main Topics    Smoking status: Never Smoker    Smokeless tobacco: Never Used    Alcohol use No    Drug use: None    Sexual activity: Not Asked     Other Topics Concern    None     Social History Narrative       Review of Systems:  Constitutional: + fatigue .  Eyes: No visual changes or photophobia  HEENT: No nasal congestion or sore throat  Respiratory: +SOB as above   Cardiovascular: No chest pain or palpitations. + LE swelling   Gastrointestinal: No nausea or vomiting, no diarrhea or change in bowel habits  Genitourinary: No hematuria or dysuria  Skin: No rash or pruritis  Hematologic/lymphatic: No easy bruising, bleeding or lymphadenopathy        OBJECTIVE:     Temp:  [97.6 °F (36.4 °C)-97.9 °F (36.6 °C)] 97.6 °F (36.4 °C)  Pulse:  [] 105  Resp:  [18] 18  SpO2:  [97 %-100 %] 97 %  BP: (112-127)/(65-70) 127/65  Body mass index is 35 kg/(m^2).     Physical Exam:  General appearance: Well developed, well nourished  HEENT:  Normocephalic, atruamatic, conjunctivae normal, sclarea anictric, PERRL, Mucus membranes moist,  Trachea midline , no JVD   Lungs: bibasilar crackles   Heart: Regular rate and rhythm, S1, S2 normal,3/6 HSM at apex but also 2/6 mid systolic murmur at base  Abdomen: Soft, non-tender, non-distended; bowel sounds normal; no masses, no organomegaly  Extremities: No cyanosis or clubbing. 2+ edema  Pulses: 2+ and symmetric  Numerous ecchymosis on arms       Laboratory: Reviewed and noted  where applicable- Please see chart for full lab data.  Na 132  Creat 2.0 ( previous 2.3 3 days prior , 2.1 2/7/17)   Hbg 7.5 ( 13.6 10 months prior)   Diagnostic Tests:  EKG:     CXR:     CT    ASSESSMENT/PLAN:     Assessment Mr. Beni Cosby is a 71 y.o. with a past medical history of aortic bioprosthesis,  Atrial fibrillation on coumadin , Stage III CKD , prior RCC s/p partial nephrectomy here with symptomatic anemia      Plan     1. Symptomatic Microcytic Anemia   May be multifactorial from  iron loosing condition vs CKD   Iron/Ferritin pending   SPEP ordered  Type and Screen   Transfuse 2 units PRBC slowly , will give 2mg Bumex IV between units given underlying myopathy and clinical mild overload   Has Outpatient GI evaluation later next week.   Given moderate valvuloplasty (AS/AI) , will check heptoglobin     2. CKD III   Creatinine stable       3. Acute on Chronic Myopathy   Unclear if systolic or diastolic from availible records  Mildy wet but warm   Plan for 2mg IV BUMEX between units     4. Afib   Continue coumadin 5mg / 7.5mg alternating ( 7.5mg tonight  )   Rate controlled    5. DMII   ISS   ADA     6. HLD   Continue statin     7. DVT prophy  On coumadin             Code Status : full   Dispo: obs    Don Knowles D.O  [facility]

## 2017-03-13 LAB
ALBUMIN SERPL ELPH-MCNC: 4.01 G/DL
ALPHA1 GLOB SERPL ELPH-MCNC: 0.46 G/DL
ALPHA2 GLOB SERPL ELPH-MCNC: 0.71 G/DL
B-GLOBULIN SERPL ELPH-MCNC: 0.86 G/DL
GAMMA GLOB SERPL ELPH-MCNC: 0.87 G/DL
PROT SERPL-MCNC: 6.9 G/DL

## 2017-03-14 ENCOUNTER — DOCUMENTATION ONLY (OUTPATIENT)
Dept: HEMATOLOGY/ONCOLOGY | Facility: CLINIC | Age: 72
End: 2017-03-14

## 2017-03-14 LAB
BLD PROD TYP BPU: NORMAL
BLOOD UNIT EXPIRATION DATE: NORMAL
BLOOD UNIT TYPE CODE: 6200
BLOOD UNIT TYPE: NORMAL
CODING SYSTEM: NORMAL
DISPENSE STATUS: NORMAL
NUM UNITS TRANS PACKED RBC: NORMAL
PATHOLOGIST INTERPRETATION SPE: NORMAL

## 2017-03-14 NOTE — PROGRESS NOTES
Reviewed records from Dr. Brad Blankenship:  Patient has history of increased bruising and left chest hematoma after a fall, had normal PLT function study in 2013, but later abnormal PLT function study, but no evidence of von Willebrand disease. Recommendations were to continue Coumadin with low INR due to PLT abnormality. If patient needs surgical procedure or biopsy, recommend PLT transfusion prior to procedure.     Labs 1/20/16:  WBC 9.7, Hgb 11.2, MVC 90. 2,   Factor VIII Activity 199% high  VWF Activity 59%

## 2017-03-15 ENCOUNTER — TELEPHONE (OUTPATIENT)
Dept: HEMATOLOGY/ONCOLOGY | Facility: CLINIC | Age: 72
End: 2017-03-15

## 2017-03-15 NOTE — TELEPHONE ENCOUNTER
----- Message from Linda Victoria sent at 3/15/2017  1:20 PM CDT -----  Contact: Deepthi/wife  Deepthi called to speak with the nurse about the patient's infusion appointment tomorrow. Please call her at [phone] cell.    Thanks,  Linda

## 2017-03-16 ENCOUNTER — INFUSION (OUTPATIENT)
Dept: INFUSION THERAPY | Facility: HOSPITAL | Age: 72
End: 2017-03-16
Attending: INTERNAL MEDICINE
Payer: MEDICARE

## 2017-03-16 VITALS
HEART RATE: 65 BPM | TEMPERATURE: 97 F | SYSTOLIC BLOOD PRESSURE: 108 MMHG | DIASTOLIC BLOOD PRESSURE: 53 MMHG | RESPIRATION RATE: 18 BRPM

## 2017-03-16 DIAGNOSIS — R11.0 NAUSEA: Primary | ICD-10-CM

## 2017-03-16 DIAGNOSIS — D50.0 IRON DEFICIENCY ANEMIA DUE TO CHRONIC BLOOD LOSS: Primary | ICD-10-CM

## 2017-03-16 PROCEDURE — 63600175 PHARM REV CODE 636 W HCPCS: Mod: PO | Performed by: INTERNAL MEDICINE

## 2017-03-16 PROCEDURE — 96365 THER/PROPH/DIAG IV INF INIT: CPT | Mod: PO

## 2017-03-16 PROCEDURE — 25000003 PHARM REV CODE 250: Mod: PO | Performed by: INTERNAL MEDICINE

## 2017-03-16 RX ORDER — ONDANSETRON 4 MG/1
4 TABLET, FILM COATED ORAL EVERY 12 HOURS PRN
Qty: 30 TABLET | Refills: 1 | Status: ON HOLD | OUTPATIENT
Start: 2017-03-16 | End: 2017-12-20

## 2017-03-16 RX ADMIN — SODIUM CHLORIDE: 0.9 INJECTION, SOLUTION INTRAVENOUS at 01:03

## 2017-03-16 RX ADMIN — FERRIC CARBOXYMALTOSE INJECTION 750 MG: 50 INJECTION, SOLUTION INTRAVENOUS at 01:03

## 2017-03-16 NOTE — MR AVS SNAPSHOT
Patient Information     Patient Name Sex Beni Gill Male 1945      Visit Information        Provider Department Dept Phone Center    3/16/2017 1:00 PM [facility] Chemo Infusion [facility] Chemotherapy [facility]fusion [phone] [facility]      Patient Instructions    .  [facility]  [address] [facility] [address]  [address]  [phone] phone     [phone] fax  Hours of Operation: Monday- Friday 8:00am- 5:00pm  After hours phone  [phone]  Hematology / Oncology Physicians on call      Dr. Ralf Santiago    Please call with any concerns regarding your appointment today.    .FALL PREVENTION   Falls often occur due to slipping, tripping or losing your balance. Here are ways to reduce your risk of falling again.   Was there anything that caused your fall that can be fixed, removed or replaced?   Make your home safe by keeping walkways clear of objects you may trip over.   Use non-slip pads under rugs.   Do not walk in poorly lit areas.   Do not stand on chairs or wobbly ladders.   Use caution when reaching overhead or looking upward. This position can cause a loss of balance.   Be sure your shoes fit properly, have non-slip bottoms and are in good condition.   Be cautious when going up and down stairs, curbs, and when walking on uneven sidewalks.   If your balance is poor, consider using a cane or walker.   If your fall was related to alcohol use, stop or limit alcohol intake.   If your fall was related to use of sleeping medicines, talk to your doctor about this. You may need to reduce your dosage at bedtime if you awaken during the night to go to the bathroom.   To reduce the need for nighttime bathroom trips:   Avoid drinking fluids for several hours before going to bed   Empty your bladder before going to bed   Men can keep a urinal at the bedside   © 5480-3900 [employer], [address] [place] [address], [place] [address]. All rights  reserved. This information is not intended as a substitute for professional medical care. Always follow your healthcare professional's instructions.      Ferric carboxymaltose injection  What is this medicine?  FERRIC CARBOXYMALTOSE (ferr-ik car-box-ee-mol-toes) is an iron complex. Iron is used to make healthy red blood cells, which carry oxygen and nutrients throughout the body. This medicine is used to treat anemia in people with chronic kidney disease or people who cannot take iron by mouth.  How should I use this medicine?  This medicine is for infusion into a vein. It is given by a health care professional in a hospital or clinic setting.  Talk to your pediatrician regarding the use of this medicine in children. Special care may be needed.  What side effects may I notice from receiving this medicine?  Side effects that you should report to your doctor or health care professional as soon as possible:  · allergic reactions like skin rash, itching or hives, swelling of the face, lips, or tongue -breathing problems  · changes in blood pressure  · feeling faint or lightheaded, falls  · flushing, sweating, or hot feelings  Side effects that usually do not require medical attention (Report these to your doctor or health care professional if they continue or are bothersome.):  · changes in taste  · constipation  · dizziness  · headache  · nausea  · pain, redness, or irritation at site where injected  · vomiting  What may interact with this medicine?  Do not take this medicine with any of the following medications:  · deferoxamine  · dimercaprol  · other iron products  This medicine may also interact with the following medications:  · chloramphenicol  · deferasirox  What if I miss a dose?  It is important not to miss your dose. Call your doctor or health care professional if you are unable to keep an appointment.  Where should I keep my medicine?  This drug is given in a hospital or clinic and will not be stored at  home.  What should I tell my health care provider before I take this medicine?  They need to know if you have any of these conditions:  · anemia not caused by low iron levels  · high levels of iron in the blood  · liver disease  · an unusual or allergic reaction to iron, other medicines, foods, dyes, or preservatives  · pregnant or trying to get pregnant  · breast-feeding  What should I watch for while using this medicine?  Visit your doctor or health care professional regularly. Tell your doctor if your symptoms do not start to get better or if they get worse. You may need blood work done while you are taking this medicine.  You may need to follow a special diet. Talk to your doctor. Foods that contain iron include: whole grains/cereals, dried fruits, beans, or peas, leafy green vegetables, and organ meats (liver, kidney).  Date Last Reviewed:   NOTE:This sheet is a summary. It may not cover all possible information. If you have questions about this medicine, talk to your doctor, pharmacist, or health care provider. Copyright© 2016 [employer]             Your Current Medications Are     aspirin (ECOTRIN) 81 MG EC tablet    blood sugar diagnostic (CONTOUR NEXT STRIPS) Strp    bumetanide (BUMEX) 2 MG tablet    gabapentin (NEURONTIN) 100 MG capsule    glipiZIDE (GLUCOTROL) 5 MG tablet    hydrocodone-acetaminophen 7.5-325mg (NORCO) 7.5-325 mg per tablet    lansoprazole (PREVACID) 30 MG capsule    metolazone (ZAROXOLYN) 5 MG tablet    metoprolol succinate (TOPROL-XL) 100 MG 24 hr tablet    mometasone (NASONEX) 50 mcg/actuation nasal spray    ondansetron (ZOFRAN, AS HYDROCHLORIDE,) 4 MG tablet    potassium chloride SA (K-DUR,KLOR-CON) 20 MEQ tablet    rosuvastatin (CRESTOR) 10 MG tablet    spironolactone (ALDACTONE) 25 MG tablet    warfarin (COUMADIN) 5 MG tablet      Facility-Administered Medications     ferric carboxymaltose (INJECTAFER) 750 mg in sodium chloride 0.9% 250 mL infusion    sodium chloride 0.9% 100 mL  flush bag    ferric carboxymaltose (INJECTAFER) 750 mg in sodium chloride 0.9% 250 mL IVPB (ready to mix system) (Discontinued)      Appointments for Next Year     3/23/2017  1:00 PM NON FASTING LAB (10 min.) [facility]    Arrive at check-in approximately 15 minutes before your scheduled appointment time. Bring all outside medical records and imaging, along with a list of your current medications and insurance card.    3/23/2017  1:00 PM ESTABLISHED PATIENT (20 min.) [facility] - Hematology Oncology Natalia Santiago MD    Arrive at check-in approximately 15 minutes before your scheduled appointment time. Bring all outside medical records and imaging, along with a list of your current medications and insurance card.    (off [facility]) 1st Floor Appointments with Sienna Jung - 2nd Floor    3/23/2017  1:30 PM INFUSION 060 MIN (60 min.) [facility] CHAIR 01 ONLH    Arrive at check-in approximately 15 minutes before your scheduled appointment time. Bring all outside medical records and imaging, along with a list of your current medications and insurance card.    (off [facility]) 1st floor    4/4/2017 10:30 AM NON FASTING LAB (5 min.) [facility]    Arrive at check-in approximately 15 minutes before your scheduled appointment time. Bring all outside medical records and imaging, along with a list of your current medications and insurance card.    (off [facility]) 2nd floor    4/4/2017 10:40 AM URINE (10 min.) [facility]    Arrive at check-in approximately 15 minutes before your scheduled appointment time. Bring all outside medical records and imaging, along with a list of your current medications and insurance card.    5/16/2017 12:05 PM NON FASTING LAB (5 min.) [facility]    Arrive at check-in approximately 15 minutes before your scheduled appointment  time. Bring all outside medical records and imaging, along with a list of your current medications and insurance card.    (off [facility]) 2nd floor    5/16/2017  1:40 PM ESTABLISHED PATIENT (20 min.) [facility] - Nephrology Francisco Jackson MD    Arrive at check-in approximately 15 minutes before your scheduled appointment time. Bring all outside medical records and imaging, along with a list of your current medications and insurance card.    (off [facility]) 3th floor         Default Flowsheet Data (last 24 hours)      Amb Complex Vitals Reg        03/16/17 1254                Measurements    BP (!)  106/58        Temp 97.2 °F (36.2 °C)        Pulse 82        Resp 18        Pain Assessment    Pain Score Zero                Allergies     Morphine     Other reaction(s): Nausea Only      Medications You Received from 03/15/2017 1346 to 03/16/2017 1346        Date/Time Order Dose Route Action     03/16/2017 1310 ferric carboxymaltose (INJECTAFER) 750 mg in sodium chloride 0.9% 250 mL infusion 750 mg Intravenous New Bag     03/16/2017 1301 sodium chloride 0.9% 100 mL flush bag   Intravenous New Bag      Current Discharge Medication List     Cannot display discharge medications since this is not an admission.

## 2017-03-16 NOTE — PLAN OF CARE
"Problem: Patient Care Overview  Goal: Plan of Care Review  Outcome: Ongoing (interventions implemented as appropriate)  Pt reports, " Monika been feeling tired and weak"      "

## 2017-03-16 NOTE — PATIENT INSTRUCTIONS
.  [facility]  [address]  [address] [address]  [phone] phone     [phone] fax  Hours of Operation: Monday- Friday 8:00am- 5:00pm  After hours phone  [phone]  Hematology / Oncology Physicians on call      Dr. Ralf Santiago    Please call with any concerns regarding your appointment today.    .FALL PREVENTION   Falls often occur due to slipping, tripping or losing your balance. Here are ways to reduce your risk of falling again.   Was there anything that caused your fall that can be fixed, removed or replaced?   Make your home safe by keeping walkways clear of objects you may trip over.   Use non-slip pads under rugs.   Do not walk in poorly lit areas.   Do not stand on chairs or wobbly ladders.   Use caution when reaching overhead or looking upward. This position can cause a loss of balance.   Be sure your shoes fit properly, have non-slip bottoms and are in good condition.   Be cautious when going up and down stairs, curbs, and when walking on uneven sidewalks.   If your balance is poor, consider using a cane or walker.   If your fall was related to alcohol use, stop or limit alcohol intake.   If your fall was related to use of sleeping medicines, talk to your doctor about this. You may need to reduce your dosage at bedtime if you awaken during the night to go to the bathroom.   To reduce the need for nighttime bathroom trips:   Avoid drinking fluids for several hours before going to bed   Empty your bladder before going to bed   Men can keep a urinal at the bedside   © 2188-8404 [facility] [facility], [address] [place] [address]. All rights reserved. This information is not intended as a substitute for professional medical care. Always follow your healthcare professional's instructions.      Ferric carboxymaltose injection  What is this medicine?  FERRIC CARBOXYMALTOSE (ferr-ik car-box-ee-mol-toes) is an iron  complex. Iron is used to make healthy red blood cells, which carry oxygen and nutrients throughout the body. This medicine is used to treat anemia in people with chronic kidney disease or people who cannot take iron by mouth.  How should I use this medicine?  This medicine is for infusion into a vein. It is given by a health care professional in a hospital or clinic setting.  Talk to your pediatrician regarding the use of this medicine in children. Special care may be needed.  What side effects may I notice from receiving this medicine?  Side effects that you should report to your doctor or health care professional as soon as possible:  · allergic reactions like skin rash, itching or hives, swelling of the face, lips, or tongue -breathing problems  · changes in blood pressure  · feeling faint or lightheaded, falls  · flushing, sweating, or hot feelings  Side effects that usually do not require medical attention (Report these to your doctor or health care professional if they continue or are bothersome.):  · changes in taste  · constipation  · dizziness  · headache  · nausea  · pain, redness, or irritation at site where injected  · vomiting  What may interact with this medicine?  Do not take this medicine with any of the following medications:  · deferoxamine  · dimercaprol  · other iron products  This medicine may also interact with the following medications:  · chloramphenicol  · deferasirox  What if I miss a dose?  It is important not to miss your dose. Call your doctor or health care professional if you are unable to keep an appointment.  Where should I keep my medicine?  This drug is given in a hospital or clinic and will not be stored at home.  What should I tell my health care provider before I take this medicine?  They need to know if you have any of these conditions:  · anemia not caused by low iron levels  · high levels of iron in the blood  · liver disease  · an unusual or allergic reaction to iron, other  medicines, foods, dyes, or preservatives  · pregnant or trying to get pregnant  · breast-feeding  What should I watch for while using this medicine?  Visit your doctor or health care professional regularly. Tell your doctor if your symptoms do not start to get better or if they get worse. You may need blood work done while you are taking this medicine.  You may need to follow a special diet. Talk to your doctor. Foods that contain iron include: whole grains/cereals, dried fruits, beans, or peas, leafy green vegetables, and organ meats (liver, kidney).  Date Last Reviewed:   NOTE:This sheet is a summary. It may not cover all possible information. If you have questions about this medicine, talk to your doctor, pharmacist, or health care provider. Copyright© 2016 [employer]

## 2017-03-23 ENCOUNTER — INFUSION (OUTPATIENT)
Dept: INFUSION THERAPY | Facility: HOSPITAL | Age: 72
End: 2017-03-23
Attending: INTERNAL MEDICINE
Payer: MEDICARE

## 2017-03-23 ENCOUNTER — OFFICE VISIT (OUTPATIENT)
Dept: HEMATOLOGY/ONCOLOGY | Facility: CLINIC | Age: 72
End: 2017-03-23
Payer: MEDICARE

## 2017-03-23 VITALS — DIASTOLIC BLOOD PRESSURE: 65 MMHG | SYSTOLIC BLOOD PRESSURE: 110 MMHG | HEART RATE: 68 BPM

## 2017-03-23 VITALS
HEIGHT: 75 IN | TEMPERATURE: 98 F | WEIGHT: 278.88 LBS | RESPIRATION RATE: 18 BRPM | HEART RATE: 76 BPM | BODY MASS INDEX: 34.68 KG/M2 | SYSTOLIC BLOOD PRESSURE: 118 MMHG | DIASTOLIC BLOOD PRESSURE: 82 MMHG | OXYGEN SATURATION: 98 %

## 2017-03-23 DIAGNOSIS — D50.0 IRON DEFICIENCY ANEMIA DUE TO CHRONIC BLOOD LOSS: Primary | ICD-10-CM

## 2017-03-23 DIAGNOSIS — R53.83 FATIGUE, UNSPECIFIED TYPE: ICD-10-CM

## 2017-03-23 DIAGNOSIS — N18.30 CHRONIC KIDNEY DISEASE (CKD), STAGE III (MODERATE): ICD-10-CM

## 2017-03-23 DIAGNOSIS — I50.32 CHF (CONGESTIVE HEART FAILURE), NYHA CLASS III, CHRONIC, DIASTOLIC: ICD-10-CM

## 2017-03-23 PROCEDURE — 25000003 PHARM REV CODE 250: Performed by: INTERNAL MEDICINE

## 2017-03-23 PROCEDURE — 63600175 PHARM REV CODE 636 W HCPCS: Performed by: INTERNAL MEDICINE

## 2017-03-23 PROCEDURE — 99214 OFFICE O/P EST MOD 30 MIN: CPT | Mod: S$PBB,,, | Performed by: INTERNAL MEDICINE

## 2017-03-23 PROCEDURE — 96365 THER/PROPH/DIAG IV INF INIT: CPT

## 2017-03-23 PROCEDURE — 99999 PR PBB SHADOW E&M-EST. PATIENT-LVL III: CPT | Mod: PBBFAC,,, | Performed by: INTERNAL MEDICINE

## 2017-03-23 RX ADMIN — FERRIC CARBOXYMALTOSE INJECTION 750 MG: 50 INJECTION, SOLUTION INTRAVENOUS at 02:03

## 2017-03-23 NOTE — PATIENT INSTRUCTIONS
[facility]  [address]  [address] [address]  [phone] phone     [phone] fax  Hours of Operation: Monday- Friday 8:00am- 5:00pm  After hours phone  [phone]  Hematology / Oncology Physicians on call      Dr. Ralf Santiago    Please call with any concerns regarding your appointment today.FALL PREVENTION   Falls often occur due to slipping, tripping or losing your balance. Here are ways to reduce your risk of falling again.   Was there anything that caused your fall that can be fixed, removed or replaced?   Make your home safe by keeping walkways clear of objects you may trip over.   Use non-slip pads under rugs.   Do not walk in poorly lit areas.   Do not stand on chairs or wobbly ladders.   Use caution when reaching overhead or looking upward. This position can cause a loss of balance.   Be sure your shoes fit properly, have non-slip bottoms and are in good condition.   Be cautious when going up and down stairs, curbs, and when walking on uneven sidewalks.   If your balance is poor, consider using a cane or walker.   If your fall was related to alcohol use, stop or limit alcohol intake.   If your fall was related to use of sleeping medicines, talk to your doctor about this. You may need to reduce your dosage at bedtime if you awaken during the night to go to the bathroom.   To reduce the need for nighttime bathroom trips:   Avoid drinking fluids for several hours before going to bed   Empty your bladder before going to bed   Men can keep a urinal at the bedside   © 5072-0045 [employer], [address] [place] [address], [place] [address]. All rights reserved. This information is not intended as a substitute for professional medical care. Always follow your healthcare professional's instructions.

## 2017-03-23 NOTE — LETTER
March 23, 2017      Francisco Jackson MD  [address] [facility] [address] [address]           [facility]  [address] [place] [address]  Phone: [phone]  Fax: [phone]          Patient: Beni Cosby   MR Number: [id number]   YOB: 1945   Date of Visit: 3/23/2017       Dear Dr. Francisco Jackson:    Thank you for referring Beni Cosby to me for evaluation. Attached you will find relevant portions of my assessment and plan of care.    If you have questions, please do not hesitate to call me. I look forward to following Beni Csoby along with you.    Sincerely,    Natalia Santiago MD    Enclosure  CC:  No Recipients    If you would like to receive this communication electronically, please contact [email or web address] or [phone] to request more information on [employer] access.    For providers and/or their staff who would like to refer a patient to [facility], please contact us through our one-stop-shop provider referral line, [facility], at [phone].    If you feel you have received this communication in error or would no longer like to receive these types of communications, please e-mail [email or web address]

## 2017-03-23 NOTE — PLAN OF CARE
Problem: Patient Care Overview  Goal: Plan of Care Review  Outcome: Ongoing (interventions implemented as appropriate)  im tired of feeling bad . i want to feel better

## 2017-03-23 NOTE — PROGRESS NOTES
Hematology/Oncology Established Visit    Reason for Visit: Anemia    Consult requested by: Dr. Jackson, Nephrology    History of Present Illness:  Mr. Cosby is a 72 y/o male with A-fib, Stage III CKD and prior RCC s/p partial nephrectomy in 2007 referred for evaluation of anemia. He reports GI blood loss back in 2007 requiring 8 pints of blood. GI workup at that time was unrevealing and the bleeding resolved. Patient had a hospital stay in 1/2017 at [facility] for pneumonia. His Hgb in 5/2016 was 13, dropped to 9.2 in 1/2017 (based on CareEverywhere) and now is down to 7.6. He endorses ice cravings in the past few months. No melena/hematochezia/hematuria. He reports 3 days of heartburn. Last colonoscopy was a few years performed at [facility] [place], but Dr. Jensen. Patient states he is extremely fatigued, short of breath. He sees Dr. Blankenship. He has not taken any of this diuretics today given appointments.    Since the last appointment, patient was admitted for blood transfusion and IV lasix. He received 2 Units of PRBCs with improvement from Hgb 7.5 to 9.0. He also received his first dose of Injectafer last week. He states that although his ice cravings have improved, his energy level has not improved at all. He did have a bout of diarrhea related to a GI virus that affected several of his family members last week. He is very distraught about his severe fatigue causing inability to walk. He also had a fall last week after slipping in the bathtub. He was evaluated with x-rays and no fractures found.    ROS:  General:  No wt loss, fever/chills, night sweats +severe fatigue   Eyes: No vision problems, pain or inflammation.   Ears/Nose: No difficultly hearing, ear pain, rhinorrhea, or epistaxis  Oropharynx: No ulcers, dysphagia, or odynophagia  Cardiovascular: No chest pains, sob, PND or dyspnea on exertion  Pulmonary: No cough, hemoptysis +SOB  Gastrointestional: No n/v/d, melena, hematochezia, or change in  bowel habits +low appetite  : No dysuria, hematuria, pelvic pain or flank pain  Musculoskeletal: No myalgias, weakness, or arthralgias  Neurological: No headaches, focal deficits, or seizure activity  Endocrine: No heat or cold intolerance   Skin: No rashes pruritus, or lesions  Psychiatric: +depression  Heme/Lymph: No lymph node enlargment    Answers for HPI/ROS submitted by the patient on 3/23/2017   appetite change : No  unexpected weight change: No  visual disturbance: No  cough: Yes  shortness of breath: No  chest pain: No  abdominal pain: No  diarrhea: No  frequency: Yes  back pain: Yes  rash: No  headaches: No  adenopathy: No  nervous/ anxious: Yes    Past Medical History:   Diagnosis Date    Aortic valve stenosis with insufficiency 4/2008    Surgery [facility]    Chronic a-fib     CKD (chronic kidney disease) stage 3, GFR 30-59 ml/min     Primary cancer of right kidney     Surgery, no further treatment       Social History:  Social History     Social History    Marital status:      Spouse name: N/A    Number of children: N/A    Years of education: N/A     Social History Main Topics    Smoking status: Never Smoker    Smokeless tobacco: Never Used    Alcohol use No    Drug use: None    Sexual activity: Not Asked     Other Topics Concern    None     Social History Narrative       Family History: No family history of blood disorders or malignancy.    Physical Exam:  Vitals:    03/23/17 1306   BP: 118/82   Pulse: 76   Resp: 18   Temp: 98 °F (36.7 °C)     Body mass index is 34.86 kg/(m^2).  General:  AAOx4, no acute distress, sitting in wheelchair  HEENT: EOMI. Normocephalic and atraumatic. No maxillary sinus tenderness. External auditory canals clear and TMs intact without lesions. Nasal and oral mucosal membranes moist. Normal dentition and gums.   Neck: no LAD, thyromegaly, normal ROM  Pulmonary: Bilaterally clear to auscultation, Normal effort with no accessory muscle use, no  wheezes/rales/rhonchi  CV: Normal rate, regular rhythm, no murmurs/rubs/gallops, no edema  ABD:  Obese, Soft, nontender, nondistended, no mass, and without hepatosplenomegaly   Ext: No clubbing, cyanosis, or edema, normal ROM. +BLE edema.  Skin: No rashes, lesions, petechiae. Multiple scattered ecchymoses on bilateral arms.  Neurological: No focal deficits, CN II to XII grossly intact, normal coordination  5/5 strength in hip flexors and arm flexion. 3/5 strength in shoulder abduction and neck rotation.  Psychiatric:  Depressed mood  Hem/Lymph:  No submandibular, cervical, supraclavicular, axillary LAD.    Labs:    Lab Results   Component Value Date    WBC 11.98 03/23/2017    HGB 10.6 (L) 03/23/2017    HCT 32.7 (L) 03/23/2017    MCV 74 (L) 03/23/2017     03/23/2017     BMP  Lab Results   Component Value Date     (L) 03/11/2017    K 3.5 03/11/2017    CL 84 (L) 03/11/2017    CO2 34 (H) 03/11/2017    BUN 85 (H) 03/11/2017    CREATININE 1.8 (H) 03/11/2017    CALCIUM 10.1 03/11/2017    ANIONGAP 14 03/11/2017    ESTGFRAFRICA 43 (A) 03/11/2017    EGFRNONAA 37 (A) 03/11/2017     Lab Results   Component Value Date    ALT 14 03/10/2017    AST 24 03/10/2017    ALKPHOS 72 03/10/2017    BILITOT 0.8 03/10/2017       Lab Results   Component Value Date    IRON 19 (L) 03/10/2017    TIBC 604 (H) 03/10/2017    FERRITIN 28 03/10/2017     No results found for: [id number]  No results found for: FOLATE  Lab Results   Component Value Date    TSH 0.744 05/05/2016       Imaging:  Reviewed.    Procedures:   EGD at [facility] 3/29/17:  Gastric ulcer found in antrum. Gastritis. Multiple gastric polyps. No specimens collected. Increase PPI to bid.    Assessment / Plan:  Beni Cosby is a 71 y.o. male who comes in with severe anemia.    1. Iron deficiency anemia: Likely due to occult GI blood loss from gastric ulcer and gastritis seen on EGD in 3/2017. SPEP negative. Hgb improved from 7.5 to 9.0 after receiving 2  Units of PRBCs. Further improvement to 10.6 after 1 Injectafer dose. Do see teardrops and schistocytes and will continue to monitor this and check for thalassemias.  -- Proceed with 2nd Injectafer dose today  -- Will check EPO, hemoglobin electrophoresis at next visit.   -- Return in 1 months with repeat labs given ongoing severe fatigue.  -- May need bone marrow biopsy if teardrop cells still seen in peripheral smear after correction of anemia.    2. Severe fatigue: As patient does not feel improved after improvement in Hgb, I am concerned that his fatigue is stemming from another cause. Given occult GI blood loss, I agree with EGD to rule out ulcers, source of bleeding or tumor. Depression and recent gastroenteritis could be contributing as well as deconditioning from lack of activity.  -- Return in 1 month for follow up.    3. Gastric ulcers/gastritis: Seen on recent endoscopy 3/27/17. GI recommended that he increase his PPI to bid.     4. Stage III CKD: Responsible for some component of anemia, but was not responsible for the dropping Hgb.     5. CHF: On Bumex 2mg bid, Metolazone, Spironolactone.    Natalia Santiago M.D.  Hematology Oncology

## 2017-03-23 NOTE — MR AVS SNAPSHOT
Patient Information     Patient Name Sex Beni Gill Male 1945      Visit Information        Provider Department Dept Phone Center    3/23/2017 1:30 PM Tani [facility] [phone] [facility]      Patient Instructions      [facility]  [address]  [address] [address]  [phone] phone     [phone] fax  Hours of Operation: Monday- Friday 8:00am- 5:00pm  After hours phone  [phone]  Hematology / Oncology Physicians on call      Dr. Ralf Santiago    Please call with any concerns regarding your appointment today.FALL PREVENTION   Falls often occur due to slipping, tripping or losing your balance. Here are ways to reduce your risk of falling again.   Was there anything that caused your fall that can be fixed, removed or replaced?   Make your home safe by keeping walkways clear of objects you may trip over.   Use non-slip pads under rugs.   Do not walk in poorly lit areas.   Do not stand on chairs or wobbly ladders.   Use caution when reaching overhead or looking upward. This position can cause a loss of balance.   Be sure your shoes fit properly, have non-slip bottoms and are in good condition.   Be cautious when going up and down stairs, curbs, and when walking on uneven sidewalks.   If your balance is poor, consider using a cane or walker.   If your fall was related to alcohol use, stop or limit alcohol intake.   If your fall was related to use of sleeping medicines, talk to your doctor about this. You may need to reduce your dosage at bedtime if you awaken during the night to go to the bathroom.   To reduce the need for nighttime bathroom trips:   Avoid drinking fluids for several hours before going to bed   Empty your bladder before going to bed   Men can keep a urinal at the bedside   © 3321-3352 [employer] De [employer], [address] [place] [address], [place] [address]. All rights  reserved. This information is not intended as a substitute for professional medical care. Always follow your healthcare professional's instructions.         Your Current Medications Are     aspirin (ECOTRIN) 81 MG EC tablet    blood sugar diagnostic (CONTOUR NEXT STRIPS) Strp    bumetanide (BUMEX) 2 MG tablet    gabapentin (NEURONTIN) 100 MG capsule    glipiZIDE (GLUCOTROL) 5 MG tablet    hydrocodone-acetaminophen 7.5-325mg (NORCO) 7.5-325 mg per tablet    lansoprazole (PREVACID) 30 MG capsule    metolazone (ZAROXOLYN) 5 MG tablet    metoprolol succinate (TOPROL-XL) 100 MG 24 hr tablet    mometasone (NASONEX) 50 mcg/actuation nasal spray    ondansetron (ZOFRAN, AS HYDROCHLORIDE,) 4 MG tablet    potassium chloride SA (K-DUR,KLOR-CON) 20 MEQ tablet    rosuvastatin (CRESTOR) 10 MG tablet    spironolactone (ALDACTONE) 25 MG tablet    warfarin (COUMADIN) 5 MG tablet      Facility-Administered Medications     ferric carboxymaltose (INJECTAFER) 750 mg in sodium chloride 0.9% 250 mL infusion    sodium chloride 0.9% 100 mL flush bag      Appointments for Next Year     4/4/2017 10:30 AM NON FASTING LAB (5 min.) [facility]    Arrive at check-in approximately 15 minutes before your scheduled appointment time. Bring all outside medical records and imaging, along with a list of your current medications and insurance card.    (off [facility]) 2nd floor    4/4/2017 10:40 AM URINE (10 min.) [facility]    Arrive at check-in approximately 15 minutes before your scheduled appointment time. Bring all outside medical records and imaging, along with a list of your current medications and insurance card.    4/20/2017 10:20 AM NON FASTING LAB (5 min.) [facility]    Arrive at check-in approximately 15 minutes before your scheduled appointment time. Bring all outside medical records and imaging, along with a list of your  "current medications and insurance card.    (off [email or web address]) 2nd floor    4/24/2017 11:20 AM ESTABLISHED PATIENT (20 min.) [facility] - Hemotology Oncology Natalia Santiago MD    Arrive at check-in approximately 15 minutes before your scheduled appointment time. Bring all outside medical records and imaging, along with a list of your current medications and insurance card.    (off [address]) 3rd Floor    5/16/2017 12:05 PM NON FASTING LAB (5 min.) [facility], [facility]    Arrive at check-in approximately 15 minutes before your scheduled appointment time. Bring all outside medical records and imaging, along with a list of your current medications and insurance card.    (off [email or web address]) 2nd floor    5/16/2017  1:40 PM ESTABLISHED PATIENT (20 min.) [facility] - Nephrology Francisco Jackson MD    Arrive at check-in approximately 15 minutes before your scheduled appointment time. Bring all outside medical records and imaging, along with a list of your current medications and insurance card.    (off [address]) 3th floor         Default Flowsheet Data (last 24 hours)      Amb Complex Vitals Reg        03/23/17 1358 03/23/17 1306             Measurements    Weight  126.5 kg (278 lb 14.1 oz)       Height  6' 3" (1.905 m)       BSA (Calculated - sq m)  2.59 sq meters       BMI (Calculated)  34.9       BP  118/82       Temp  98 °F (36.7 °C)       Pulse  76       Resp  18       SpO2  98 %       Pain Assessment    Pain Score Five Five       Pain Frequency 2 Intermittent        Pain Loc BACK   buttocks                Allergies     Morphine     Other reaction(s): Nausea Only      Medications You Received from 03/22/2017 1522 to 03/23/2017 1522        Date/Time Order Dose Route Action     03/23/2017 1416 ferric carboxymaltose (INJECTAFER) 750 mg in sodium chloride 0.9% 250 mL infusion 750 mg Intravenous New Bag      Current Discharge Medication List     Cannot display discharge medications since " this is not an admission.

## 2017-04-04 ENCOUNTER — LAB VISIT (OUTPATIENT)
Dept: LAB | Facility: HOSPITAL | Age: 72
End: 2017-04-04
Attending: INTERNAL MEDICINE
Payer: MEDICARE

## 2017-04-04 DIAGNOSIS — D64.9 ANEMIA, UNSPECIFIED TYPE: ICD-10-CM

## 2017-04-04 DIAGNOSIS — N18.4 CKD (CHRONIC KIDNEY DISEASE) STAGE 4, GFR 15-29 ML/MIN: ICD-10-CM

## 2017-04-04 LAB
BASOPHILS # BLD AUTO: 0.02 K/UL
BASOPHILS NFR BLD: 0.2 %
DIFFERENTIAL METHOD: ABNORMAL
EOSINOPHIL # BLD AUTO: 0.1 K/UL
EOSINOPHIL NFR BLD: 0.7 %
ERYTHROCYTE [DISTWIDTH] IN BLOOD BY AUTOMATED COUNT: 25.3 %
HCT VFR BLD AUTO: 32.5 %
HGB BLD-MCNC: 10.4 G/DL
LYMPHOCYTES # BLD AUTO: 1.6 K/UL
LYMPHOCYTES NFR BLD: 18 %
MCH RBC QN AUTO: 25.3 PG
MCHC RBC AUTO-ENTMCNC: 32 %
MCV RBC AUTO: 79 FL
MONOCYTES # BLD AUTO: 0.8 K/UL
MONOCYTES NFR BLD: 8.8 %
NEUTROPHILS # BLD AUTO: 6.4 K/UL
NEUTROPHILS NFR BLD: 72.2 %
PLATELET # BLD AUTO: 285 K/UL
PMV BLD AUTO: 9.2 FL
PTH-INTACT SERPL-MCNC: 100 PG/ML
RBC # BLD AUTO: 4.11 M/UL
WBC # BLD AUTO: 8.8 K/UL

## 2017-04-04 PROCEDURE — 36415 COLL VENOUS BLD VENIPUNCTURE: CPT | Mod: PO

## 2017-04-04 PROCEDURE — 83970 ASSAY OF PARATHORMONE: CPT

## 2017-04-04 PROCEDURE — 80069 RENAL FUNCTION PANEL: CPT

## 2017-04-04 PROCEDURE — 85025 COMPLETE CBC W/AUTO DIFF WBC: CPT

## 2017-04-05 LAB
ALBUMIN SERPL BCP-MCNC: 4 G/DL
ANION GAP SERPL CALC-SCNC: 14 MMOL/L
BUN SERPL-MCNC: 63 MG/DL
CALCIUM SERPL-MCNC: 9.7 MG/DL
CHLORIDE SERPL-SCNC: 84 MMOL/L
CO2 SERPL-SCNC: 30 MMOL/L
CREAT SERPL-MCNC: 1.5 MG/DL
EST. GFR  (AFRICAN AMERICAN): 53.4 ML/MIN/1.73 M^2
EST. GFR  (NON AFRICAN AMERICAN): 46.2 ML/MIN/1.73 M^2
GLUCOSE SERPL-MCNC: 167 MG/DL
PHOSPHATE SERPL-MCNC: 3.1 MG/DL
POTASSIUM SERPL-SCNC: 3.6 MMOL/L
SODIUM SERPL-SCNC: 128 MMOL/L

## 2017-04-20 ENCOUNTER — LAB VISIT (OUTPATIENT)
Dept: LAB | Facility: HOSPITAL | Age: 72
End: 2017-04-20
Attending: INTERNAL MEDICINE
Payer: MEDICARE

## 2017-04-20 DIAGNOSIS — D50.0 IRON DEFICIENCY ANEMIA DUE TO CHRONIC BLOOD LOSS: ICD-10-CM

## 2017-04-20 DIAGNOSIS — N18.30 CHRONIC KIDNEY DISEASE (CKD), STAGE III (MODERATE): ICD-10-CM

## 2017-04-20 LAB
ANISOCYTOSIS BLD QL SMEAR: ABNORMAL
BASOPHILS # BLD AUTO: 0.02 K/UL
BASOPHILS NFR BLD: 0.2 %
DACRYOCYTES BLD QL SMEAR: ABNORMAL
DIFFERENTIAL METHOD: ABNORMAL
EOSINOPHIL # BLD AUTO: 0.1 K/UL
EOSINOPHIL NFR BLD: 1.2 %
ERYTHROCYTE [DISTWIDTH] IN BLOOD BY AUTOMATED COUNT: ABNORMAL %
FERRITIN SERPL-MCNC: 238 NG/ML
HAPTOGLOB SERPL-MCNC: 12 MG/DL
HCT VFR BLD AUTO: 33.7 %
HGB BLD-MCNC: 10.5 G/DL
IRON SERPL-MCNC: 63 UG/DL
LDH SERPL L TO P-CCNC: 253 U/L
LYMPHOCYTES # BLD AUTO: 1.3 K/UL
LYMPHOCYTES NFR BLD: 15.6 %
MCH RBC QN AUTO: 27 PG
MCHC RBC AUTO-ENTMCNC: 31.2 %
MCV RBC AUTO: 87 FL
MONOCYTES # BLD AUTO: 0.8 K/UL
MONOCYTES NFR BLD: 9.4 %
NEUTROPHILS # BLD AUTO: 6 K/UL
NEUTROPHILS NFR BLD: 73.7 %
OVALOCYTES BLD QL SMEAR: ABNORMAL
PLATELET # BLD AUTO: 198 K/UL
PLATELET BLD QL SMEAR: ABNORMAL
PMV BLD AUTO: 8.9 FL
POIKILOCYTOSIS BLD QL SMEAR: SLIGHT
POLYCHROMASIA BLD QL SMEAR: ABNORMAL
RBC # BLD AUTO: 3.89 M/UL
RETICS/RBC NFR AUTO: 2.5 %
SATURATED IRON: 16 %
TOTAL IRON BINDING CAPACITY: 404 UG/DL
TRANSFERRIN SERPL-MCNC: 273 MG/DL
WBC # BLD AUTO: 8.18 K/UL

## 2017-04-20 PROCEDURE — 36415 COLL VENOUS BLD VENIPUNCTURE: CPT | Mod: PO

## 2017-04-20 PROCEDURE — 84466 ASSAY OF TRANSFERRIN: CPT

## 2017-04-20 PROCEDURE — 83021 HEMOGLOBIN CHROMOTOGRAPHY: CPT

## 2017-04-20 PROCEDURE — 83520 IMMUNOASSAY QUANT NOS NONAB: CPT

## 2017-04-20 PROCEDURE — 85025 COMPLETE CBC W/AUTO DIFF WBC: CPT | Mod: PO

## 2017-04-20 PROCEDURE — 83010 ASSAY OF HAPTOGLOBIN QUANT: CPT

## 2017-04-20 PROCEDURE — 82728 ASSAY OF FERRITIN: CPT

## 2017-04-20 PROCEDURE — 83020 HEMOGLOBIN ELECTROPHORESIS: CPT

## 2017-04-20 PROCEDURE — 83615 LACTATE (LD) (LDH) ENZYME: CPT | Mod: PO

## 2017-04-20 PROCEDURE — 82668 ASSAY OF ERYTHROPOIETIN: CPT

## 2017-04-20 PROCEDURE — 85045 AUTOMATED RETICULOCYTE COUNT: CPT

## 2017-04-20 PROCEDURE — 83540 ASSAY OF IRON: CPT

## 2017-04-21 LAB
HGB A2 MFR BLD HPLC: 2.4 %
HGB FRACT BLD ELPH-IMP: NORMAL
HGB FRACT BLD ELPH-IMP: NORMAL
KAPPA LC SER QL IA: 4.84 MG/DL
KAPPA LC/LAMBDA SER IA: 2.43
LAMBDA LC SER QL IA: 1.99 MG/DL

## 2017-04-24 ENCOUNTER — OFFICE VISIT (OUTPATIENT)
Dept: HEMATOLOGY/ONCOLOGY | Facility: CLINIC | Age: 72
End: 2017-04-24
Payer: MEDICARE

## 2017-04-24 VITALS
SYSTOLIC BLOOD PRESSURE: 140 MMHG | WEIGHT: 274.06 LBS | TEMPERATURE: 98 F | HEIGHT: 75 IN | HEART RATE: 85 BPM | OXYGEN SATURATION: 97 % | BODY MASS INDEX: 34.08 KG/M2 | RESPIRATION RATE: 18 BRPM | DIASTOLIC BLOOD PRESSURE: 70 MMHG

## 2017-04-24 DIAGNOSIS — D59.9 ACQUIRED HEMOLYTIC ANEMIA: ICD-10-CM

## 2017-04-24 DIAGNOSIS — K31.83 ACHLORHYDRIA: ICD-10-CM

## 2017-04-24 DIAGNOSIS — D50.0 IRON DEFICIENCY ANEMIA DUE TO CHRONIC BLOOD LOSS: Primary | ICD-10-CM

## 2017-04-24 LAB — ERYTHROPOIETIN: 22.1 MIU/ML

## 2017-04-24 PROCEDURE — 99999 PR PBB SHADOW E&M-EST. PATIENT-LVL IV: CPT | Mod: PBBFAC,,, | Performed by: INTERNAL MEDICINE

## 2017-04-24 PROCEDURE — 99214 OFFICE O/P EST MOD 30 MIN: CPT | Mod: PBBFAC,PO | Performed by: INTERNAL MEDICINE

## 2017-04-24 PROCEDURE — 99214 OFFICE O/P EST MOD 30 MIN: CPT | Mod: S$PBB,,, | Performed by: INTERNAL MEDICINE

## 2017-04-24 RX ORDER — WARFARIN 7.5 MG/1
TABLET ORAL
COMMUNITY
Start: 2017-03-24

## 2017-04-24 NOTE — PROGRESS NOTES
Hematology/Oncology Established Visit    Reason for Visit: Anemia    Consult requested by: Dr. Jackson, Nephrology    History of Present Illness:  Mr. Cosby is a 72 y/o male with A-fib, Stage III CKD and prior RCC s/p partial nephrectomy in 2007 referred for evaluation of anemia. He reports GI blood loss back in 2007 requiring 8 pints of blood. GI workup at that time was unrevealing and the bleeding resolved. Patient had a hospital stay in 1/2017 at [facility] for pneumonia. His Hgb in 5/2016 was 13, dropped to 9.2 in 1/2017 (based on CareEverywhere) and now is down to 7.6. He endorses ice cravings in the past few months. No melena/hematochezia/hematuria. He reports 3 days of heartburn. Last colonoscopy was a few years performed at [facility] [address], but Dr. Jensen. Patient states he is extremely fatigued, short of breath. He sees Dr. Blankenship. He has not taken any of this diuretics today given appointments.    Interval History:  Patient has received 2 Units PRBCs blood transfusion on 3/10/17 and Injectafer x 2 in 3/2017. His Hgb improved from Hgb 7.5 to 9.0 and further to 10.6 after iron infusions. His energy level is now much improved per patient. He is now walking and mowing the lawn whereas he was previously using a wheelchair. He underwent EGD on 3/29/17 with finding of gastric ulcer and gastritis. His gastroenterologist asked him to increase his PPI to bid, which he has been taking with food rather than on an empty stomach. He reports seeing black stools for 3 days last week, which has since cleared up. His cardiologist also reports that patient has a history of intermittent large hematomas which form spontaneously (the size of a deflated b-ball), and this has not occurred in the past 2 yrs.    ROS:  General:  No wt loss, fever/chills, night sweats, fatigue   Eyes: No vision problems, pain or inflammation.   Ears/Nose: No difficultly hearing, ear pain, rhinorrhea, or epistaxis  Oropharynx: No ulcers,  dysphagia, or odynophagia  Cardiovascular: No chest pains, sob, PND or dyspnea on exertion  Pulmonary: No cough, hemoptysis  Gastrointestional: No n/v/d, melena, hematochezia, or change in bowel habits +low appetite  : No dysuria, hematuria, pelvic pain or flank pain  Musculoskeletal: No myalgias, weakness, or arthralgias  Neurological: No headaches, focal deficits, or seizure activity  Endocrine: No heat or cold intolerance   Skin: No rashes pruritus. +easy bruising  Psychiatric: +depression, no SI/HI  Heme/Lymph: No lymph node enlargment    Answers for HPI/ROS submitted by the patient on 4/24/2017   appetite change : No  unexpected weight change: No  visual disturbance: No  cough: No  shortness of breath: No  chest pain: No  abdominal pain: No  diarrhea: No  frequency: No  back pain: No  rash: No  headaches: No  adenopathy: No  nervous/ anxious: No    Past Medical History:   Diagnosis Date    Aortic valve stenosis with insufficiency 4/2008    [facility]    Chronic a-fib     CKD (chronic kidney disease) stage 3, GFR 30-59 ml/min     Primary cancer of right kidney     Surgery, no further treatment       Social History:  Social History     Social History    Marital status:      Spouse name: N/A    Number of children: N/A    Years of education: N/A     Social History Main Topics    Smoking status: Never Smoker    Smokeless tobacco: Never Used    Alcohol use No    Drug use: Not on file    Sexual activity: Not on file     Other Topics Concern    Not on file     Social History Narrative       Family History: No family history of blood disorders or malignancy.    Physical Exam:  Vitals:    04/24/17 1123   BP: (!) 140/70   Pulse: 85   Resp: 18   Temp: 97.7 °F (36.5 °C)     Body mass index is 34.25 kg/(m^2).  General:  AAOx4, no acute distress, sitting in exam room without wheelchair or walker.   HEENT: EOMI. Normocephalic and atraumatic. No maxillary sinus tenderness. External auditory canals  clear and TMs intact without lesions. Nasal and oral mucosal membranes moist. Normal dentition and gums.   Neck: no LAD, thyromegaly, normal ROM  Pulmonary: Bilaterally clear to auscultation, Normal effort with no accessory muscle use, no wheezes/rales/rhonchi  CV: Normal rate, regular rhythm, no murmurs/rubs/gallops, no edema  ABD:  Obese, Soft, nontender, nondistended, no mass, and without hepatosplenomegaly   Ext: No clubbing, cyanosis, or edema, normal ROM. +BLE edema.  Skin: No rashes, lesions, petechiae. Multiple scattered ecchymoses on bilateral arms.  Neurological: No focal deficits, CN II to XII grossly intact, normal coordination  5/5 strength in hip flexors and arm flexion. 3/5 strength in shoulder abduction and neck rotation.  Psychiatric:  Depressed mood is better today.  Hem/Lymph:  No submandibular, cervical, supraclavicular, axillary LAD.    Labs:    Lab Results   Component Value Date    WBC 8.18 04/20/2017    HGB 10.5 (L) 04/20/2017    HCT 33.7 (L) 04/20/2017    MCV 87 04/20/2017     04/20/2017     BMP  Lab Results   Component Value Date     (L) 04/04/2017    K 3.6 04/04/2017    CL 84 (L) 04/04/2017    CO2 30 (H) 04/04/2017    BUN 63 (H) 04/04/2017    CREATININE 1.5 (H) 04/04/2017    CALCIUM 9.7 04/04/2017    ANIONGAP 14 04/04/2017    ESTGFRAFRICA 53.4 (A) 04/04/2017    EGFRNONAA 46.2 (A) 04/04/2017     Lab Results   Component Value Date    ALT 14 03/10/2017    AST 24 03/10/2017    ALKPHOS 72 03/10/2017    BILITOT 0.8 03/10/2017       Lab Results   Component Value Date    IRON 63 04/20/2017    TIBC 404 04/20/2017    FERRITIN 238 04/20/2017     No results found for: [id number]  No results found for: FOLATE  Lab Results   Component Value Date    TSH 0.744 05/05/2016       Imaging:  Reviewed.    Procedures:   EGD at [facility] 3/29/17:  Gastric ulcer found in antrum. Gastritis. Multiple gastric polyps. No specimens collected. Increase PPI to bid.    Assessment /  Plan:  Beni Cosby is a 71 y.o. male who comes in with severe anemia.    1. Iron deficiency anemia: Likely due to occult GI blood loss from gastric ulcer and gastritis seen on EGD in 3/2017. SPEP negative. Hgb improved from 7.5 to 9.0 after receiving 2 Units of PRBCs. Further improvement to 10.6 after Injectafer in 3/2017. Hemoglobin electrophoresis is normal. EPO is elevated. Haptoglobin is low, but LDH normal consistent with mild . Given appearance of teardrops, I do recommend bone marrow biopsy for further evaluation.   -- I recommend bone marrow biopsy for further evaluation given appearance of teardrop cells on peripheral smear. Will need to hold Warfarin for 3 days prior to procedure. Will try to coordinate this around the same time as EGD, colonoscopy to reduce interruption in anticoagulation.    2. Severe fatigue: As patient does not feel improved after improvement in Hgb, I am concerned that his fatigue is stemming from another cause. Given occult GI blood loss, I agree with EGD to rule out ulcers, source of bleeding or tumor. Depression and recent gastroenteritis could be contributing as well as deconditioning from lack of activity.  -- Return in 4-6 weeks for follow up.    3. Gastric ulcers/gastritis: Seen on recent endoscopy 3/27/17. GI recommended that he increase his PPI to bid.   -- Will f/u with Dr. Juan F Jensen in GI 5/11/17 to discuss colonoscopy and repeat EGD. [phone].    4. Stage III CKD: Responsible for some component of anemia, but was not responsible for the dropping Hgb.     5. CHF, Afib: On Bumex 2mg bid, Metolazone, Spironolactone. Sees Dr. Edmundo Chew [phone], nurse Emily. IRVIN in 2017 looked good per Dr. Chew. Okay from Afib standpoint to hold his Warfarin for 3-5 days and restart after procedures.    Natalia Santiago M.D.  Hematology Oncology    Addendum:  Outside records from [facility] state that patient h/o Antral AVM and jejunal nodule in  2007. He underwent SBE 3/9/17 which showed gastric ulcer x 2, gastritis, multiple gastric polyps. Lansoprazole was increased to bid. Pt was taken off of Aspirin by Dr. Chew. They will consider repeat EGD in 6-8 weeks as well as colonoscopy.

## 2017-05-04 DIAGNOSIS — D64.9 SEVERE ANEMIA: Primary | ICD-10-CM

## 2017-05-04 RX ORDER — GLIMEPIRIDE 1 MG/1
1 TABLET ORAL
COMMUNITY

## 2017-05-04 RX ORDER — FERROUS SULFATE 325(65) MG
325 TABLET, DELAYED RELEASE (ENTERIC COATED) ORAL 2 TIMES DAILY
COMMUNITY

## 2017-05-04 NOTE — PRE-PROCEDURE INSTRUCTIONS
Pre op instructions reviewed with patient per phone:    To confirm, Your surgeon has instructed you:  Surgery is scheduled 5/12/17 at 1300.      Please report to [facility] 1st floor main lobby by 1130 Pre admit office will call day prior to surgery for final arrival time.      INSTRUCTIONS IMPORTANT!!!  ¨ Do not eat, drink, or smoke after 12 midnight. May have water or sugar free clear liquid juice until 3 hrs prior to surgery.  OK to brush teeth, no gum, candy or mints!    ¨ Take only these medicines with a small swallow of water-morning of surgery.  Prevacid, Metoprolol, Dulera Inhaler    ____  Do not wear makeup, including mascara.  ____  No powder, lotions or creams to surgical area.  ____  Please remove all jewelry, including piercings and leave at home.  ____  No money or valuables needed. Please leave at home.  ____  Please bring identification and insurance information to hospital.  ____  If going home the same day, arrange for a ride home. You will not be able to   drive if Anesthesia was used.  ____  Children, under 12 years old, must remain in the waiting room with an adult.  They are not allowed in patient areas.  ____  Wear loose fitting clothing. Allow for dressings, bandages.  ____  Stop Aspirin, Ibuprofen, Motrin and Aleve at least 5-7 days before surgery, unless otherwise instructed by your doctor, or the nurse.   You MAY use Tylenol/acetaminophen until day of surgery.  ____  If you take diabetic medication, do not take am of surgery unless instructed by   Doctor.  ____ Stop taking any Fish Oil supplement or any Vitamins that contain Vitamin E at least 5 days prior to surgery.          Bathing Instructions-- The night before surgery and the morning prior to coming to the hospital:   -Do not shave the surgical area.   -Shower and wash your hair and body as usual with anti-bacterial  soap and shampoo.   -Rinse your hair and body completely.   -Use one packet of hibiclens to  wash the surgical site (using your hand) gently for 5 minutes.  Do not scrub you skin too hard.   -Do not use hibiclens on your head, face, or genitals.   -Do not wash with anti-bacterial soap after you use the hibiclens.   -Rinse your body thoroughly.   -Dry with clean, soft towel.  Do not use lotion, cream, deodorant, or powders on   the surgical site.    Use antibacterial soap in place of hibiclens if your surgery is on the head, face or genitals.         Surgical Site Infection    Prevention of surgical site infections:     -Keep incisions clean and dry.   -Do not soak/submerge incisions in water until completely healed.   -Do not apply lotions, powders, creams, or deodorants to site.   -Always make sure hands are cleaned with antibacterial soap/ alcohol-based   prior to touching the surgical site.  (This includes doctors, nurses, staff, and yourself.)    Signs and symptoms:   -Redness and pain around the area where you had surgery   -Drainage of cloudy fluid from your surgical wound   -Fever over 100.4  I have read or had read and explained to me, and understand the above information.

## 2017-05-04 NOTE — PRE-PROCEDURE INSTRUCTIONS
Spoke to Dr. Wood regarding pt being off Coumadin only 2 days prior to Bone Marrow Biopsy. He is fine with this.

## 2017-05-05 ENCOUNTER — TELEPHONE (OUTPATIENT)
Dept: HEMATOLOGY/ONCOLOGY | Facility: CLINIC | Age: 72
End: 2017-05-05

## 2017-05-11 ENCOUNTER — ANESTHESIA EVENT (OUTPATIENT)
Dept: SURGERY | Facility: HOSPITAL | Age: 72
End: 2017-05-11
Payer: MEDICARE

## 2017-05-12 ENCOUNTER — HOSPITAL ENCOUNTER (OUTPATIENT)
Facility: HOSPITAL | Age: 72
Discharge: HOME | End: 2017-05-12
Attending: PATHOLOGY | Admitting: INTERNAL MEDICINE
Payer: MEDICARE

## 2017-05-12 ENCOUNTER — CLINICAL SUPPORT (OUTPATIENT)
Dept: CARDIOLOGY | Facility: CLINIC | Age: 72
End: 2017-05-12
Payer: MEDICARE

## 2017-05-12 ENCOUNTER — SURGERY (OUTPATIENT)
Age: 72
End: 2017-05-12

## 2017-05-12 ENCOUNTER — ANESTHESIA (OUTPATIENT)
Dept: SURGERY | Facility: HOSPITAL | Age: 72
End: 2017-05-12
Payer: MEDICARE

## 2017-05-12 VITALS
OXYGEN SATURATION: 95 % | DIASTOLIC BLOOD PRESSURE: 62 MMHG | BODY MASS INDEX: 34.08 KG/M2 | WEIGHT: 274.06 LBS | SYSTOLIC BLOOD PRESSURE: 117 MMHG | RESPIRATION RATE: 21 BRPM | HEIGHT: 75 IN | TEMPERATURE: 98 F | HEART RATE: 60 BPM

## 2017-05-12 VITALS — RESPIRATION RATE: 20 BRPM

## 2017-05-12 DIAGNOSIS — D64.9 SEVERE ANEMIA: ICD-10-CM

## 2017-05-12 LAB — POCT GLUCOSE: 172 MG/DL (ref 70–110)

## 2017-05-12 PROCEDURE — 88185 FLOWCYTOMETRY/TC ADD-ON: CPT | Mod: 59 | Performed by: PATHOLOGY

## 2017-05-12 PROCEDURE — 85097 BONE MARROW INTERPRETATION: CPT | Mod: ,,, | Performed by: PATHOLOGY

## 2017-05-12 PROCEDURE — 88237 TISSUE CULTURE BONE MARROW: CPT

## 2017-05-12 PROCEDURE — 88311 DECALCIFY TISSUE: CPT | Mod: 26,,, | Performed by: PATHOLOGY

## 2017-05-12 PROCEDURE — 85025 COMPLETE CBC W/AUTO DIFF WBC: CPT

## 2017-05-12 PROCEDURE — 88313 SPECIAL STAINS GROUP 2: CPT

## 2017-05-12 PROCEDURE — 37000008 HC ANESTHESIA 1ST 15 MINUTES: Performed by: PATHOLOGY

## 2017-05-12 PROCEDURE — 38220 DX BONE MARROW ASPIRATIONS: CPT | Performed by: PATHOLOGY

## 2017-05-12 PROCEDURE — 88341 IMHCHEM/IMCYTCHM EA ADD ANTB: CPT | Mod: 26,,, | Performed by: PATHOLOGY

## 2017-05-12 PROCEDURE — 25000003 PHARM REV CODE 250: Performed by: ANESTHESIOLOGY

## 2017-05-12 PROCEDURE — 63600175 PHARM REV CODE 636 W HCPCS: Performed by: NURSE ANESTHETIST, CERTIFIED REGISTERED

## 2017-05-12 PROCEDURE — 93010 ELECTROCARDIOGRAM REPORT: CPT | Mod: S$PBB,,, | Performed by: INTERNAL MEDICINE

## 2017-05-12 PROCEDURE — 88264 CHROMOSOME ANALYSIS 20-25: CPT

## 2017-05-12 PROCEDURE — 38221 DX BONE MARROW BIOPSIES: CPT

## 2017-05-12 PROCEDURE — 88313 SPECIAL STAINS GROUP 2: CPT | Mod: 26,,, | Performed by: PATHOLOGY

## 2017-05-12 PROCEDURE — 25000003 PHARM REV CODE 250: Performed by: NURSE ANESTHETIST, CERTIFIED REGISTERED

## 2017-05-12 PROCEDURE — 37000009 HC ANESTHESIA EA ADD 15 MINS: Performed by: PATHOLOGY

## 2017-05-12 PROCEDURE — 88305 TISSUE EXAM BY PATHOLOGIST: CPT | Mod: 26,,, | Performed by: PATHOLOGY

## 2017-05-12 PROCEDURE — 36415 COLL VENOUS BLD VENIPUNCTURE: CPT

## 2017-05-12 PROCEDURE — 88184 FLOWCYTOMETRY/ TC 1 MARKER: CPT | Performed by: PATHOLOGY

## 2017-05-12 PROCEDURE — 88189 FLOWCYTOMETRY/READ 16 & >: CPT | Mod: ,,, | Performed by: PATHOLOGY

## 2017-05-12 PROCEDURE — G0364 BONE MARROW ASPIRATE &BIOPSY: HCPCS | Mod: S$PBB,RT,, | Performed by: PATHOLOGY

## 2017-05-12 PROCEDURE — 88342 IMHCHEM/IMCYTCHM 1ST ANTB: CPT | Mod: 26,59,, | Performed by: PATHOLOGY

## 2017-05-12 PROCEDURE — 84100 ASSAY OF PHOSPHORUS: CPT

## 2017-05-12 PROCEDURE — 38221 DX BONE MARROW BIOPSIES: CPT | Mod: S$PBB,RT,, | Performed by: PATHOLOGY

## 2017-05-12 PROCEDURE — 88305 TISSUE EXAM BY PATHOLOGIST: CPT | Performed by: PATHOLOGY

## 2017-05-12 PROCEDURE — 80053 COMPREHEN METABOLIC PANEL: CPT

## 2017-05-12 RX ORDER — SODIUM CHLORIDE, SODIUM LACTATE, POTASSIUM CHLORIDE, CALCIUM CHLORIDE 600; 310; 30; 20 MG/100ML; MG/100ML; MG/100ML; MG/100ML
INJECTION, SOLUTION INTRAVENOUS CONTINUOUS
Status: DISCONTINUED | OUTPATIENT
Start: 2017-05-12 | End: 2017-05-12 | Stop reason: HOSPADM

## 2017-05-12 RX ORDER — ETOMIDATE 2 MG/ML
INJECTION INTRAVENOUS
Status: DISCONTINUED | OUTPATIENT
Start: 2017-05-12 | End: 2017-05-12

## 2017-05-12 RX ORDER — PROPOFOL 10 MG/ML
VIAL (ML) INTRAVENOUS
Status: DISCONTINUED | OUTPATIENT
Start: 2017-05-12 | End: 2017-05-12

## 2017-05-12 RX ORDER — LIDOCAINE HYDROCHLORIDE 10 MG/ML
INJECTION INFILTRATION; PERINEURAL
Status: DISCONTINUED | OUTPATIENT
Start: 2017-05-12 | End: 2017-05-12

## 2017-05-12 RX ADMIN — LIDOCAINE HYDROCHLORIDE 40 MG: 10 INJECTION, SOLUTION INFILTRATION; PERINEURAL at 01:05

## 2017-05-12 RX ADMIN — SODIUM CHLORIDE, SODIUM LACTATE, POTASSIUM CHLORIDE, AND CALCIUM CHLORIDE: 600; 310; 30; 20 INJECTION, SOLUTION INTRAVENOUS at 01:05

## 2017-05-12 RX ADMIN — PROPOFOL 10 MG: 10 INJECTION, EMULSION INTRAVENOUS at 01:05

## 2017-05-12 RX ADMIN — ETOMIDATE 6 MG: 2 INJECTION, SOLUTION INTRAVENOUS at 01:05

## 2017-05-12 NOTE — TRANSFER OF CARE
"Anesthesia Transfer of Care Note    Patient: Beni Cosby    Procedure(s) Performed: Procedure(s) (LRB):  BIOPSY-BONE MARROW (Left)    Patient location: Other: Preop    Transport from OR: Transported from OR on 2-3 L/min O2 by NC with adequate spontaneous ventilation    Post pain: adequate analgesia    Post assessment: no apparent anesthetic complications    Post vital signs: stable    Level of consciousness: awake    Nausea/Vomiting: no nausea/vomiting    Complications: none    Transfer of care protocol was followed      Last vitals:   Visit Vitals    /72 (BP Location: Right arm, Patient Position: Lying, BP Method: Automatic)    Pulse 71    Temp 36.7 °C (98.1 °F) (Oral)    Resp (!) 24    Ht 6' 3" (1.905 m)    Wt 124.3 kg (274 lb 0.5 oz)    SpO2 97%    BMI 34.25 kg/m2     "

## 2017-05-12 NOTE — H&P (VIEW-ONLY)
Hematology/Oncology Established Visit    Reason for Visit: Anemia    Consult requested by: Dr. Jackson, Nephrology    History of Present Illness:  Mr. Cosby is a 70 y/o male with A-fib, Stage III CKD and prior RCC s/p partial nephrectomy in 2007 referred for evaluation of anemia. He reports GI blood loss back in 2007 requiring 8 pints of blood. GI workup at that time was unrevealing and the bleeding resolved. Patient had a hospital stay in 1/2017 at [facility] for pneumonia. His Hgb in 5/2016 was 13, dropped to 9.2 in 1/2017 (based on CareEverywhere) and now is down to 7.6. He endorses ice cravings in the past few months. No melena/hematochezia/hematuria. He reports 3 days of heartburn. Last colonoscopy was a few years performed at [facility] on [address], but Dr. Jensen. Patient states he is extremely fatigued, short of breath. He sees Dr. Blankenship. He has not taken any of this diuretics today given appointments.    Interval History:  Patient has received 2 Units PRBCs blood transfusion on 3/10/17 and Injectafer x 2 in 3/2017. His Hgb improved from Hgb 7.5 to 9.0 and further to 10.6 after iron infusions. His energy level is now much improved per patient. He is now walking and mowing the lawn whereas he was previously using a wheelchair. He underwent EGD on 3/29/17 with finding of gastric ulcer and gastritis. His gastroenterologist asked him to increase his PPI to bid, which he has been taking with food rather than on an empty stomach. He reports seeing black stools for 3 days last week, which has since cleared up. His cardiologist also reports that patient has a history of intermittent large hematomas which form spontaneously (the size of a deflated b-ball), and this has not occurred in the past 2 yrs.    ROS:  General:  No wt loss, fever/chills, night sweats, fatigue   Eyes: No vision problems, pain or inflammation.   Ears/Nose: No difficultly hearing, ear pain, rhinorrhea, or epistaxis  Oropharynx: No ulcers,  dysphagia, or odynophagia  Cardiovascular: No chest pains, sob, PND or dyspnea on exertion  Pulmonary: No cough, hemoptysis  Gastrointestional: No n/v/d, melena, hematochezia, or change in bowel habits +low appetite  : No dysuria, hematuria, pelvic pain or flank pain  Musculoskeletal: No myalgias, weakness, or arthralgias  Neurological: No headaches, focal deficits, or seizure activity  Endocrine: No heat or cold intolerance   Skin: No rashes pruritus. +easy bruising  Psychiatric: +depression, no SI/HI  Heme/Lymph: No lymph node enlargment    Answers for HPI/ROS submitted by the patient on 4/24/2017   appetite change : No  unexpected weight change: No  visual disturbance: No  cough: No  shortness of breath: No  chest pain: No  abdominal pain: No  diarrhea: No  frequency: No  back pain: No  rash: No  headaches: No  adenopathy: No  nervous/ anxious: No    Past Medical History:   Diagnosis Date    Aortic valve stenosis with insufficiency 4/2008    [facility]    Chronic a-fib     CKD (chronic kidney disease) stage 3, GFR 30-59 ml/min     Primary cancer of right kidney     Surgery, no further treatment       Social History:  Social History     Social History    Marital status:      Spouse name: N/A    Number of children: N/A    Years of education: N/A     Social History Main Topics    Smoking status: Never Smoker    Smokeless tobacco: Never Used    Alcohol use No    Drug use: Not on file    Sexual activity: Not on file     Other Topics Concern    Not on file     Social History Narrative       Family History: No family history of blood disorders or malignancy.    Physical Exam:  Vitals:    04/24/17 1123   BP: (!) 140/70   Pulse: 85   Resp: 18   Temp: 97.7 °F (36.5 °C)     Body mass index is 34.25 kg/(m^2).  General:  AAOx4, no acute distress, sitting in exam room without wheelchair or walker.   HEENT: EOMI. Normocephalic and atraumatic. No maxillary sinus tenderness. External auditory canals  clear and TMs intact without lesions. Nasal and oral mucosal membranes moist. Normal dentition and gums.   Neck: no LAD, thyromegaly, normal ROM  Pulmonary: Bilaterally clear to auscultation, Normal effort with no accessory muscle use, no wheezes/rales/rhonchi  CV: Normal rate, regular rhythm, no murmurs/rubs/gallops, no edema  ABD:  Obese, Soft, nontender, nondistended, no mass, and without hepatosplenomegaly   Ext: No clubbing, cyanosis, or edema, normal ROM. +BLE edema.  Skin: No rashes, lesions, petechiae. Multiple scattered ecchymoses on bilateral arms.  Neurological: No focal deficits, CN II to XII grossly intact, normal coordination  5/5 strength in hip flexors and arm flexion. 3/5 strength in shoulder abduction and neck rotation.  Psychiatric:  Depressed mood is better today.  Hem/Lymph:  No submandibular, cervical, supraclavicular, axillary LAD.    Labs:    Lab Results   Component Value Date    WBC 8.18 04/20/2017    HGB 10.5 (L) 04/20/2017    HCT 33.7 (L) 04/20/2017    MCV 87 04/20/2017     04/20/2017     BMP  Lab Results   Component Value Date     (L) 04/04/2017    K 3.6 04/04/2017    CL 84 (L) 04/04/2017    CO2 30 (H) 04/04/2017    BUN 63 (H) 04/04/2017    CREATININE 1.5 (H) 04/04/2017    CALCIUM 9.7 04/04/2017    ANIONGAP 14 04/04/2017    ESTGFRAFRICA 53.4 (A) 04/04/2017    EGFRNONAA 46.2 (A) 04/04/2017     Lab Results   Component Value Date    ALT 14 03/10/2017    AST 24 03/10/2017    ALKPHOS 72 03/10/2017    BILITOT 0.8 03/10/2017       Lab Results   Component Value Date    IRON 63 04/20/2017    TIBC 404 04/20/2017    FERRITIN 238 04/20/2017     No results found for: [id number]  No results found for: FOLATE  Lab Results   Component Value Date    TSH 0.744 05/05/2016       Imaging:  Reviewed.    Procedures:   EGD at [facility] 3/29/17:  Gastric ulcer found in antrum. Gastritis. Multiple gastric polyps. No specimens collected. Increase PPI to bid.    Assessment /  Plan:  Beni Cosby is a 71 y.o. male who comes in with severe anemia.    1. Iron deficiency anemia: Likely due to occult GI blood loss from gastric ulcer and gastritis seen on EGD in 3/2017. SPEP negative. Hgb improved from 7.5 to 9.0 after receiving 2 Units of PRBCs. Further improvement to 10.6 after Injectafer in 3/2017. Hemoglobin electrophoresis is normal. EPO is elevated. Haptoglobin is low, but LDH normal consistent with mild . Given appearance of teardrops, I do recommend bone marrow biopsy for further evaluation.   -- I recommend bone marrow biopsy for further evaluation given appearance of teardrop cells on peripheral smear. Will need to hold Warfarin for 3 days prior to procedure. Will try to coordinate this around the same time as EGD, colonoscopy to reduce interruption in anticoagulation.    2. Severe fatigue: As patient does not feel improved after improvement in Hgb, I am concerned that his fatigue is stemming from another cause. Given occult GI blood loss, I agree with EGD to rule out ulcers, source of bleeding or tumor. Depression and recent gastroenteritis could be contributing as well as deconditioning from lack of activity.  -- Return in 4-6 weeks for follow up.    3. Gastric ulcers/gastritis: Seen on recent endoscopy 3/27/17. GI recommended that he increase his PPI to bid.   -- Will f/u with Dr. Juan F Jensen in GI 5/11/17 to discuss colonoscopy and repeat EGD. [phone].    4. Stage III CKD: Responsible for some component of anemia, but was not responsible for the dropping Hgb.     5. CHF, Afib: On Bumex 2mg bid, Metolazone, Spironolactone. Sees Dr. Edmundo Chew [phone], nurse Emily. IRVIN in 2017 looked good per Dr. Chew. Okay from Afib standpoint to hold his Warfarin for 3-5 days and restart after procedures.    Natalia Santiago M.D.  Hematology Oncology    Addendum:  Outside records from [facility] state that patient h/o Antral AVM and jejunal nodule in  2007. He underwent SBE 3/9/17 which showed gastric ulcer x 2, gastritis, multiple gastric polyps. Lansoprazole was increased to bid. Pt was taken off of Aspirin by Dr. Chew. They will consider repeat EGD in 6-8 weeks as well as colonoscopy.

## 2017-05-12 NOTE — PLAN OF CARE
Pt and pt wife and son given discharge instructions. Both stated understanding. Pt brought to main entrance via wheelchair with RN.

## 2017-05-12 NOTE — BRIEF OP NOTE
Bone Marrow Biopsy  Procedure Note      Date of Service: 5/12/2017      Indication/Diagnosis: anemia    Consent Source: self    Consent Type: elective procedure    Time Out Completed: yes    Aseptic Technique: Betadine    Anesthesia: systemic    Anesthetic Drugs Used: 1% lidocaine    Instrumentation: bone marrow kit    Procedure Site: right posterior iliac crest    Patient Position: lying on side    Volume Removed (in cc): 8-10    Aspirate Obtained: yes: EDTA - sent to [facility] for analysis    Fluid Characteristics: not examined    Sterile Dressing: yes    Complications: none    Narrative:  Bone marrow aspiration/biopsy performed right posterior iliac crest without complications.  Patient to lie supine x 1 hr.  Follow up with Dr. Santiago.  May resume normal diet/activity.

## 2017-05-12 NOTE — IP AVS SNAPSHOT
[address] [place] [address]           Patient Discharge Instructions   Our goal is to set you up for success. This packet includes information on your condition, medications, and your home care.  It will help you care for yourself to prevent having to return to the hospital.     Please ask your nurse if you have any questions.      There are many details to remember when preparing to leave the hospital. Here is what you will need to do:    1. Take your medicine. If you are prescribed medications, review your Medication List on the following pages. You may have new medications to  at the pharmacy and others that you'll need to stop taking. Review the instructions for how and when to take your medications. Talk with your doctor or nurses if you are unsure of what to do.     2. Go to your follow-up appointments. Specific follow-up information is listed in the following pages. Your may be contacted by a nurse or clinical provider about future appointments. Be sure we have all of the phone numbers to reach you. Please contact your provider's office if you are unable to make an appointment.     3. Watch for warning signs. Your doctor or nurse will give you detailed warning signs to watch for and when to call for assistance. These instructions may also include educational information about your condition. If you experience any of warning signs to your health, call your doctor.               ** Verify the list of medication(s) below is accurate and up to date. Carry this with you in case of emergency. If your medications have changed, please notify your healthcare provider.             Medication List      ASK your doctor about these medications        Additional Info                      aspirin 81 MG EC tablet   Commonly known as:  ECOTRIN   Refills:  0   Dose:  81 mg    Instructions:  Take 1 tablet (81 mg total) by mouth once daily.     Begin Date    AM    Noon     PM    Bedtime       bumetanide 2 MG tablet   Commonly known as:  BUMEX   Refills:  0   Dose:  2 mg    Instructions:  Take 2 mg by mouth 2 (two) times daily.     Begin Date    AM    Noon    PM    Bedtime       CONTOUR NEXT STRIPS Strp   Refills:  0   Generic drug:  blood sugar diagnostic    Instructions:  Use once daily.     Begin Date    AM    Noon    PM    Bedtime       DULERA 100-5 mcg/actuation Hfaa   Refills:  0   Generic drug:  mometasone-formoterol    Instructions:  Inhale into the lungs 2 (two) times daily. Controller     Begin Date    AM    Noon    PM    Bedtime       ferrous sulfate 325 (65 FE) MG EC tablet   Refills:  0   Dose:  325 mg    Instructions:  Take 325 mg by mouth 3 (three) times daily with meals.     Begin Date    AM    Noon    PM    Bedtime       gabapentin 100 MG capsule   Commonly known as:  NEURONTIN   Refills:  0   Dose:  100 mg    Instructions:  Take 100 mg by mouth 3 (three) times daily.     Begin Date    AM    Noon    PM    Bedtime       glimepiride 1 MG tablet   Commonly known as:  AMARYL   Refills:  0   Dose:  1 mg    Instructions:  Take 1 mg by mouth before breakfast.     Begin Date    AM    Noon    PM    Bedtime       glipiZIDE 5 MG tablet   Commonly known as:  GLUCOTROL   Quantity:  30 tablet   Refills:  11   Dose:  5 mg    Instructions:  Take 1 tablet (5 mg total) by mouth daily with breakfast.     Begin Date    AM    Noon    PM    Bedtime       hydrocodone-acetaminophen 7.5-325mg 7.5-325 mg per tablet   Commonly known as:  NORCO   Refills:  0      Begin Date    AM    Noon    PM    Bedtime       lansoprazole 30 MG capsule   Commonly known as:  PREVACID   Refills:  0   Dose:  30 mg    Instructions:  Take 30 mg by mouth 2 (two) times daily. TAKE 1 CAPSULE BY MOUTH ONCE DAILY     Begin Date    AM    Noon    PM    Bedtime       metOLazone 5 MG tablet   Commonly known as:  ZAROXOLYN   Refills:  0    Instructions:  once daily.     Begin Date    AM    Noon    PM    Bedtime        metoprolol succinate 100 MG 24 hr tablet   Commonly known as:  TOPROL-XL   Refills:  0   Dose:  50 mg    Instructions:  Take 50 mg by mouth once daily. 1/2 tablet daily     Begin Date    AM    Noon    PM    Bedtime       mometasone 50 mcg/actuation nasal spray   Commonly known as:  NASONEX   Refills:  0   Dose:  2 spray    Instructions:  2 sprays by Nasal route daily as needed.     Begin Date    AM    Noon    PM    Bedtime       ondansetron 4 MG tablet   Commonly known as:  ZOFRAN (AS HYDROCHLORIDE)   Quantity:  30 tablet   Refills:  1   Dose:  4 mg    Instructions:  Take 1 tablet (4 mg total) by mouth every 12 (twelve) hours as needed for Nausea.     Begin Date    AM    Noon    PM    Bedtime       potassium chloride SA 20 MEQ tablet   Commonly known as:  K-DUR,KLOR-CON   Refills:  0   Dose:  20 mEq   Indications:  take 2 tablets daily    Instructions:  Take 20 mEq by mouth 2 (two) times daily. Take 2 tablets daily     Begin Date    AM    Noon    PM    Bedtime       rosuvastatin 10 MG tablet   Commonly known as:  CRESTOR   Refills:  0   Dose:  10 mg    Instructions:  10 mg every evening.     Begin Date    AM    Noon    PM    Bedtime       spironolactone 25 MG tablet   Commonly known as:  ALDACTONE   Refills:  0   Dose:  25 mg    Instructions:  Take 25 mg by mouth 2 (two) times daily.     Begin Date    AM    Noon    PM    Bedtime       * warfarin 5 MG tablet   Commonly known as:  COUMADIN   Refills:  0   Dose:  5 mg    Instructions:  5 mg.     Begin Date    AM    Noon    PM    Bedtime       * warfarin 7.5 MG tablet   Commonly known as:  COUMADIN   Refills:  0      Begin Date    AM    Noon    PM    Bedtime       * Notice:  This list has 2 medication(s) that are the same as other medications prescribed for you. Read the directions carefully, and ask your doctor or other care provider to review them with you.               Please bring to all follow up appointments:    1. A copy of your discharge instructions.  2. All  medicines you are currently taking in their original bottles.  3. Identification and insurance card.    Please arrive 15 minutes ahead of scheduled appointment time.    Please call 24 hours in advance if you must reschedule your appointment and/or time.        Your Scheduled Appointments     May 16, 2017 12:05 PM CDT   [facility]-F[facility]    [address]  [place] [address]   [phone]            May 16, 2017  1:40 PM CDT   Established Patient Visit with Francisco Jackson MD   [facility] ([facility])    [address] [address]   [phone]            May 30, 2017 10:25 AM CDT   Non-Fasting Lab with [facility]    [address]  [place] [address]   [phone]            May 30, 2017 10:40 AM CDT   Established Patient Visit with Natalia Santiago MD   [facility] Hemotology [facility] ([facility])    [address]  [place] [address]   [phone]                  Discharge Instructions         Bone Marrow Aspiration and Biopsy    Bone marrow is the soft, spongy part inside bones. It makes most of the bodys blood cells. Aspiration and biopsy are procedures done to take a sample of bone marrow out of the body for examination. To perform either procedure, a needle is inserted into one of your bones, usually the back of the hip bone. Then a sample of bone marrow is removed.   If a sample of fluid and cells is taken, it is called bone marrow aspiration. If a solid sample of bone marrow tissue is removed, it is called bone marrow biopsy. In either case, the samples are sent to a lab and studied. The procedures can be done alone, but are most often done together. This sheet tells you more about what to expect.  Why the procedures are done  The procedures may be done for a number of reasons. They can help diagnose certain blood or bone marrow  disorders or infections. They may help find certain cancers, such as leukemia. They can show if cancer in other areas of the body has spread to the bone marrow. They can be used during cancer treatment, such as chemotherapy, to monitor treatment progress. And they may be done before certain treatments, such as a stem cell transplant, which require a bone marrow sample. Your healthcare provider will explain why you need the procedure and answer any questions you have.  Preparing for the procedure  Prepare for the procedure as told. In addition:  · Tell your healthcare provider:  ¨ What medicines you take. This includes blood thinners, such as aspirin. This also includes over-the-counter medicines, herbs, and other supplements. You may need to stop taking some or all of them before the procedure.  ¨ If you are allergic to any medicines. Also mention if you have ever had a reaction to medicines used during other tests or procedures in the past.  ¨ If you have a history of bleeding problems.  ¨ If you are pregnant or may be pregnant.  · Follow any directions youre given for not eating or drinking before the procedure.  The day of the procedure  The procedures can be done at a hospital, clinic, or healthcare providers office. They are performed by a healthcare provider or trained healthcare provider. Whether youre having one or both procedures, plan to be at the facility for 1 to 2 hours. Youll likely go home the same day.  Before the procedure begins  What to expect before the procedure:  · Youll change into a patient gown.  · An IV line may be put into a vein in your arm or hand. This line supplies fluids and medicines.  · Youll be given a sedative to help you relax, if needed. This medicine is given by pill, injection, or through the IV line.  During the procedure  What to expect during the procedure:  · Youll lie on your side or your stomach.  · The site to be used for the bone marrow samples is marked and  cleaned. The most common site is the back of the hip bone. Less common sites include the front of the hip bone or breastbone.  · Numbing medicine (local anesthesia) is injected at the site.  · A small cut is made through the numbed skin.  · One or both procedures are then done. Be sure to lie still for each procedure. It is normal to feel some pressure or pain during each procedure.  ¨ For the bone marrow aspiration, a thin needle is put through the cut and into the bone. A syringe is then attached to the needle and used to remove a sample of bone marrow fluid and cells.  ¨ For the bone marrow biopsy, a different needle is put through the same cut and into the bone. A small amount of bone marrow tissue is then removed.  · The samples are sent to a lab to be evaluated.  · When the procedure is complete, pressure is applied to the site for 10 to 15 minutes to help stop bleeding. The site is then bandaged.  After the procedure  Most people can go home after a short period of observation. If you need it, youll be given medicine to manage pain. If you were given a sedative, you may be taken to a recovery room to rest until the medicine wears off. An adult family member or friend must drive you home afterward.  Recovering at home  Once at home, follow any instructions youre given. Be sure to:  · Take all medicines as directed.  · Care for the procedure site as instructed.  · Check for signs of infection at the procedure site (see below).  · Avoid getting the procedure site wet. Do not bathe or shower until your healthcare providers say it is OK to do so. If you wish, you may wash with a sponge or washcloth.  · Avoid heavy lifting and other strenuous activities as directed.     Call the healthcare provider  Call your healthcare provider if you have any of the following:  · Fever of 100.4 ºF (38 ºC) or higher, or as directed by your healthcare provider  · Signs of infection at the procedure site, such as increased redness  or swelling, warmth, worsening pain, bleeding, or foul-smelling drainage   Follow-up  Your healthcare provider will discuss the results with you when they are ready. This is usually within a week after the procedure.     Risks and possible complications of these procedures  These include:  · Severe bleeding or bruising at the procedure site  · Infection at the procedure site  · Bone fracture  · Bone infection   Date Last Reviewed: 10/8/2015  © 1213-4252 [employer]. [address] [place] [address], [place] [address]. All rights reserved. This information is not intended as a substitute for professional medical care. Always follow your healthcare professional's instructions.      General Information:  1.  Do not drink alcoholic beverages including beer for 24 hours or as long as you are on pain medication..  2.  Do not drive a motor vehicle, operate machinery or power tools, or signs legal papers for 24 hours or as long as you are on pain medication.   3.  You may experience light-headedness, dizziness, and sleepiness following surgery. Please do not stay alone. A responsible adult should be with you for this 24 hour period.  4.  Go home and rest.  5. Progress slowly to a normal diet unless instructed.  Otherwise, begin with liquids such as soft drinks, then soup and crackers working up to solid foods. Drink plenty of nonalcoholic fluids.  6.  Certain anesthetics and pain medications produce nausea and vomiting in certain       individuals. If nausea becomes a problem at home, call you doctor.  7. A nurse will be calling you sometime after surgery. Do not be alarmed. This is our way of finding out how you are doing.  8. Several times every hour while you are awake, take 2-3 deep breaths and cough. If you had stomach surgery hold a pillow or rolled towel firmly against your stomach before you cough. This will help with any pain the cough might cause.  9. Several times every hour while you are awake, pump and flex  "your feet 5-6 times and do foot circles. This will help prevent blood clots.  10.Call your doctor for severe pain, bleeding, fever, or signs or symptoms of infection (pain, swelling, redness, foul odor, drainage).  11.You can contact your doctor anytime by [phone] for the [facility] (at [facility]) or [phone] for the [facility] on [address].   [email or web address] is another way to contact your doctor if you are an active participant online with [facility].                Admission Information     Date & Time Provider Department CSN    2017 12:21 PM Adrian Chaidez MD [facility] [id number]      Care Providers     Provider Role Specialty Primary office phone    Adrian Chaidez MD Attending Provider Pathology [phone]    Adrian Chaidez MD Surgeon  Pathology [phone]      Your Vitals Were     BP Pulse Temp Resp Height Weight    124/60 59 98.1 °F (36.7 °C) (Temporal) 19 6' 3" (1.905 m) 124.3 kg (274 lb 0.5 oz)    SpO2 BMI             96% 34.25 kg/m2         Recent Lab Values        2016                           8:44 PM           A1C 7.1 (H)                       Pending Labs     Order Current Status    Tissue Specimen to Pathology, Bone Marrow Aspiration/Biopsy Procedure Collected (17 140)    Tissue Specimen to Pathology, Bone Marrow Aspiration/Biopsy Procedure Collected (17)    Tissue Specimen to Pathology, Bone Marrow Aspiration/Biopsy Procedure Collected (17 140)    Bone Marrow Prep and Stain In process    Chromosome Analysis, Bone Marrow In process    Iron Stain, Bone Marrow In process    Leukemia/Lymphoma Screen - Bone Marrow Right Posterior Iliac Crest In process      Allergies as of 2017        Reactions    Morphine     Other reaction(s): Nausea Only      [facility]     [facility] Nurse Care Line - 24 Assistance  Unless otherwise directed by your provider, please contact [facility], our nurse " care line that is available for 24/7 assistance.     Registered nurses in the [facility] provide clinical advisement, health education, appointment booking, and other advisory services.  Call for this free service at [phone].        Advance Directives     An advance directive is a document which, in the event you are no longer able to make decisions for yourself, tells your healthcare team what kind of treatment you do or do not want to receive, or who you would like to make those decisions for you.  If you do not currently have an advance directive, [facility] encourages you to create one.  For more information call:  [phone] ([phone]),  or log on to [email or web address].        Language Assistance Services     ATTENTION: Language assistance services are available, free of charge. Please call [phone].      ATENCIÓN: Si habla español, tiene a lira disposición servicios gratuitos de asistencia lingüística. Llame al [phone].     CHÚ Ý: N?u b?n nói Ti?ng Vi?t, có các d?ch v? h? tr? ngôn ng? mi?n phí dành cho b?n. G?i s? [phone].        Heart Failure Education       Heart Failure: Being Active  You have a condition called heart failure. Being active doesnt mean that you have to wear yourself out. Even a little movement each day helps to strengthen your heart. If you cant get out to exercise, you can do simple stretching and strengthening exercises at home. These are good ways to keep you well-conditioned and prevent you and your heart from becoming excessively weak.    Ideas to get you started  · Add a little movement to things you do now. Walk to mail letters. Park your car at the far end of the parking lot and walk to the store. Walk up a flight of stairs instead of taking the elevator.  · Choose activities you enjoy. You might walk, swim, or ride an exercise bike. Things like gardening and washing the car count, too. Other possibilities  include: washing dishes, walking the dog, walking around the mall, and doing aerobic activities with friends.  · Join a group exercise program at a [facility], a senior center, or a community center. Or look into a hospital cardiac rehabilitation program. Ask your doctor if you qualify.  Tips to keep you going  · Get up and get dressed each day. Go to a coffee shop and read a newspaper or go somewhere that you'll be in the presence of other active people. Youll feel more like being active.  · Make a plan. Choose one or more activities that you enjoy and that you can easily do. Then plan to do at least one each day. You might write your plan on a calendar.  · Go with a friend or a group if you like company. This can help you feel supported and stay motivated, too.  · Plan social events that you enjoy. This will keep you mentally engaged as well as physically motivated to do things you find pleasure in.  For your safety  · Talk with your healthcare provider before starting an exercise program.  · Exercise indoors when its too hot or too cold outside, or when the air quality is poor. Try walking at a shopping mall.  · Wear socks and sturdy shoes to maintain your balance and prevent falls.  · Start slowly. Do a few minutes several times a day at first. Increase your time and speed little by little.  · Stop and rest whenever you feel tired or get short of breath.  · Dont push yourself on days when you dont feel well.  Date Last Reviewed: 3/20/2016  © 8217-6481 [employer]. [address] [place] [address], [place] [address]. All rights reserved. This information is not intended as a substitute for professional medical care. Always follow your healthcare professional's instructions.              Heart Failure: Evaluating Your Heart  You have a condition called heart failure. To evaluate your condition, your doctor will examine you, ask questions, and do some tests. Along with looking for signs of heart failure,  the doctor looks for any other health problems that may have led to heart failure. The results of your evaluation will help your doctor form a treatment plan.  Health history and physical exam  Your visit will start with a health history. Tell the doctor about any symptoms youve noticed and about all medicines you take. Then youll have a physical exam. This includes listening to your heartbeat and breathing. Youll also be checked for swelling (edema) in your legs and neck. When you have fluid buildup or fluid in the lungs, it may be called congestive heart failure.  Diagnosing heart failure     During an echocardiogram, sound waves bounce off the heart. These are converted into a picture on the screen.   The following may be done to help your doctor form a diagnosis:  · X-rays show the size and shape of your heart. These pictures can also show fluid in your lungs.  · An electrocardiogram (ECG or EKG) shows the pattern of your heartbeat. Small pads (electrodes) are placed on your chest, arms, and legs. Wires connect the pads to the ECG machine, which records your hearts electrical signals. This can give the doctor information about heart function.  · An echocardiogram uses ultrasound waves to show the structure and movement of your heart muscle. This shows how well the heart pumps. It also shows the thickness of the heart walls, and if the heart is enlarged. It is one of the most useful, non-invasive tests as it provides information about the heart's general function. This helps your doctor make treatment decisions.  · Lab tests evaluate small amounts of blood or urine for signs of problems. A BNP lab test can help diagnose and evaluate heart failure. BNP stands for B-type natriuretic peptide. The ventricles secrete more BNP when heart failure worsens. Lab tests can also provide information about metabolic dysfunction or heart dysfunction.  Your treatment plan  Based on the results of your evaluation and tests,  your doctor will develop a treatment plan. This plan is designed to relieve some of your heart failure symptoms and help make you more comfortable. Your treatment plan may include:  · Medicine to help your heart work better and improve your quality of life  · Changes in what you eat and drink to help prevent fluid from backing up in your body  · Daily monitoring of your weight and heart failure symptoms to see how well your treatment plan is working  · Exercise to help you stay healthy  · Help with quitting smoking  · Emotional and psychological support to help adjust to the changes  · Referrals to other specialists to make sure you are being treated comprehensively  Date Last Reviewed: 3/21/2016  © 8262-6780 [employer]. [address] [place] [address], [place] [address]. All rights reserved. This information is not intended as a substitute for professional medical care. Always follow your healthcare professional's instructions.              Heart Failure: Making Changes to Your Diet  You have a condition called heart failure. When you have heart failure, excess fluid is more likely to build up in your body because your heart isn't working well. This makes the heart work harder to pump blood. Fluid buildup causes symptoms such as shortness of breath and swelling (edema). This is often referred to as congestive heart failure or CHF. Controlling the amount of salt (sodium) you eat may help stop fluid from building up. Your doctor may also tell you to reduce the amount of fluid you drink.  Reading food labels    Your healthcare provider will tell you how much sodium you can eat each day. Read food labels to keep track. Keep in mind that certain foods are high in salt. These include canned, frozen, and processed foods. Check the amount of sodium in each serving. Watch out for high-sodium ingredients. These include MSG (monosodium glutamate), baking soda, and sodium phosphate.   Eating less salt  Give yourself time  to get used to eating less salt. It may take a little while. Here are some tips to help:  · Take the saltshaker off the table. Replace it with salt-free herb mixes and spices.  · Eat fresh or plain frozen vegetables. These have much less salt than canned vegetables.  · Choose low-sodium snacks like sodium-free pretzels, crackers, or air-popped popcorn.  · Dont add salt to your food when youre cooking. Instead, season your foods with pepper, lemon, garlic, or onion.  · When you eat out, ask that your food be cooked without added salt.  · Avoid eating fried foods as these often have a great deal of salt.  If youre told to limit fluids  You may need to limit how much fluid you have to help prevent swelling. This includes anything that is liquid at room temperature, such as ice cream and soup. If your doctor tells you to limit fluid, try these tips:  · Measure drinks in a measuring cup before you drink them. This will help you meet daily goals.  · Chill drinks to make them more refreshing.  · Suck on frozen lemon wedges to quench thirst.  · Only drink when youre thirsty.  · Chew sugarless gum or suck on hard candy to keep your mouth moist.  · Weigh yourself daily to know if your body's fluid content is rising.  My sodium goal  Your healthcare provider may give you a sodium goal to meet each day. This includes sodium found in food as well as salt that you add. My goal is to eat no more than ___________ mg of sodium per day.     When to call your doctor  Call your doctor right away if you have any symptoms of worsening heart failure. These can include:  · Sudden weight gain  · Increased swelling of your legs or ankles  · Trouble breathing when youre resting or at night  · Increase in the number of pillows you have to sleep on  · Chest pain, pressure, discomfort, or pain in the jaw, neck, or back   Date Last Reviewed: 3/21/2016  © 3953-6225 [employer]. [address] [place] [address], [place] [address]. All  rights reserved. This information is not intended as a substitute for professional medical care. Always follow your healthcare professional's instructions.              Heart Failure: Medicines to Help Your Heart    You have a condition called heart failure (also known as congestive heart failure, or CHF). Your doctor will likely prescribe medicines for heart failure and any underlying health problems you have. Most heart failure patients take one or more types of medicinen. Your healthcare provider will work to find the combination of medicines that works best for you.  Heart failure medicines  Here are the most common heart failure medicines:  · ACE inhibitors lower blood pressure and decrease strain on the heart. This makes it easier for the heart to pump. Angiotensin receptor blockers have similar effects. These are prescribed for some patients instead of ACE inhibitors.  · Beta-blockers relieve stress on the heart. They also improve symptoms. They may also improve the heart's pumping action over time.  · Diuretics (also called water pills) help rid your body of excess water. This can help rid your body of swelling (edema). Having less fluid to pump means your heart doesnt have to work as hard. Some diuretics make your body lose a mineral called potassium. Your doctor will tell you if you need to take supplements or eat more foods high in potassium.  · Digoxin helps your heart pump with more strength. This helps your heart pump more blood with each beat. So, more oxygen-rich blood travels to the rest of the body.  · Aldosterone antagonists help alter hormones and decrease strain on the heart.  · Hydralazine and nitrates are two separate medicines used together to treat heart failure. They may come in one combination pill. They lower blood pressure and decrease how hard the heart has to pump.  Medicines for related conditions  Controlling other heart problems helps keep heart failure under control, too.  Depending on other heart problems you have, medicines may be prescribed to:  · Lower blood pressure (antihypertensives).  · Lower cholesterol levels (statins).  · Prevent blood clots (anticoagulants or aspirin).  · Keep the heartbeat steady (antiarrhythmics).  Date Last Reviewed: 3/5/2016  © 1147-8314 [employer]. [address] [place] [address], [place] [address]. All rights reserved. This information is not intended as a substitute for professional medical care. Always follow your healthcare professional's instructions.              Heart Failure: Procedures That May Help    The heart is a muscle that pumps oxygen-rich blood to all parts of the body. When you have heart failure, the heart is not able to pump as well as it should. Blood and fluid may back up into the lungs (congestive heart failure), and some parts of the body dont get enough oxygen-rich blood to work normally. These problems lead to the symptoms of heart failure.     Certain procedures may help the heart pump better in some cases of heart failure. Some procedures are done to treat health problems that may have caused the heart failure such as coronary artery disease or heart rhythm problems. For more serious heart failure, other options are available.  Treating artery and valve problems  If you have coronary artery disease or valve disease, procedures may be done to improve blood flow. This helps the heart pump better, which can improve heart failure symptoms. First, your doctor may do a cardiac catheterization to help detect clogged blood vessels or valve damage. During this procedure, a  thin tube (catheter) in inserted into a blood vessel and guided to the heart. There a dye is injected and a special type of X-ray (angiogram) is taken of the blood vessels. Procedures to open a blocked artery or fix damaged valves can also be done using catheterization.  · Angioplasty uses a balloon-tipped instrument at the end of the catheter. The balloon  is inflated to widen the narrowed artery. In many cases, a stent is expanded to further support the narrowed artery. A stent is a metal mesh tube.  · Valve surgery repairs or replacement of faulty valves can also be done during catheterization so blood can flow properly through the chambers of the heart.  Bypass surgery is another option to help treat blocked arteries. It uses a healthy blood vessel from elsewhere in the body. The healthy blood vessel is attached above and below the blocked area so that blood can flow around the blocked artery.  Treating heart rhythm problems  A device may be placed in the chest to help a weak heart maintain a healthy, heartbeat so the heart can pump more effectively:  · Pacemaker. A pacemaker is an implanted device that regulates your heartbeat electronically. It monitors your heart's rhythm and generates a painless electric impulse that helps the heart beat in a regular rhythm. A pacemaker is programmed to meet your specific heart rhythm needs.  · Biventricular pacing/cardiac resynchronization therapy. A type of pacemaker that paces both pumping chambers of the heart at the same time to coordinate contractions and to improve the heart's function. Some people with heart failure are candidates for this therapy.  · Implantable cardioverter defibrillator. A device similar to a pacemaker that senses when the heart is beating too fast and delivers an electrical shock to convert the fast rhythm to a normal rhythm. This can be a life saving device.  In severe cases  In more serious cases of heart failure when other treatments no longer work, other options may include:  · Ventricular assist devices (VADs). These are mechanical devices used to take over the pumping function for one or both of the heart's ventricles, or pumping chambers. A VAD may be necessary when heart failure progresses to the point that medicines and other treatments no longer help. In some cases, a VAD may be used as a  bridge to transplant.  · Heart transplant. This is replacing the diseased heart with a healthy one from a donor. This is an option for a few people who are very sick. A heart transplant is very serious and not an option for all patients. Your doctor can tell you more.  Date Last Reviewed: 3/20/2016  © 0550-1749 [employer]. [address] [place] [address], [place] [address]. All rights reserved. This information is not intended as a substitute for professional medical care. Always follow your healthcare professional's instructions.              Heart Failure: Tracking Your Weight  You have a condition called heart failure. When you have heart failure, a sudden weight gain or a steady rise in weight is a warning sign that your body is retaining too much water and salt. This could mean your heart failure is getting worse. If left untreated, it can cause problems for your lungs and result in shortness of breath. Weighing yourself each day is the best way to know if youre retaining water. If your weight goes up quickly, call your doctor. You will be given instructions on how to get rid of the excess water. You will likely need medicines and to avoid salt. This will help your heart work better.  Call your doctor if you gain more than 2 pounds in 1 day, more than 5 pounds in 1 week, or whatever weight gain you were told to report by your doctor. This is often a sign of worsening heart failure and needs to be evaluated and treated. Your doctor will tell you what to do next.   Tips for weighing yourself    · Weigh yourself at the same time each morning, wearing the same clothes. Weigh yourself after urinating and before eating.  · Use the same scale each day. Make sure the numbers are easy to read. Put the scale on a flat, hard surface -- not on a rug or carpet.  · Do not stop weighing yourself. If you forget one day, weigh again the next morning.  How to use your weight chart  · Keep your weight chart near the scale.  Write your weight on the chart as soon as you get off the scale.  · Fill in the month and the start date on the chart. Then write down your weight each day. Your chart will look like this:    · If you miss a day, leave the space blank. Weigh yourself the next day and write your weight in the next space.  · Take your weight chart with you when you go to see your doctor.  Date Last Reviewed: 3/20/2016  © 2438-4458 [employer]. [address] [place] [address]. All rights reserved. This information is not intended as a substitute for professional medical care. Always follow your healthcare professional's instructions.              Heart Failure: Warning Signs of a Flare-Up  You have a condition called heart failure. Once you have heart failure, flare-ups can happen. Below are signs that can mean your heart failure is getting worse. If you notice any of these warning signs, call your healthcare provider.  Swelling    · Your feet, ankles, or lower legs get puffier.  · You notice skin changes on your lower legs.  · Your shoes feel too tight.  · Your clothes are tighter in the waist.  · You have trouble getting rings on or off your fingers.  Shortness of breath  · You have to breathe harder even when youre doing your normal activities or when youre resting.  · You are short of breath walking up stairs or even short distances.  · You wake up at night short of breath or coughing.  · You need to use more pillows or sit up to sleep.  · You wake up tired or restless.  Other warning signs  · You feel weaker, dizzy, or more tired.  · You have chest pain or changes in your heartbeat.  · You have a cough that wont go away.  · You cant remember things or dont feel like eating.  Tracking your weight  Gaining weight is often the first warning sign that heart failure is getting worse. Gaining even a few pounds can be a sign that your body is retaining excess water and salt. Weighing yourself each day in the  morning after you urinate and before you eat, is the best way to know if you're retaining water. Get a scale that is easy to read and make sure you wear the same clothes and use the same scale every time you weigh. Your healthcare provider will show you how to track your weight. Call your doctor if you gain more than 2 pounds in 1 day, 5 pounds in 1 week, or whatever weight gain you were told to report by your doctor. This is often a sign of worsening heart failure and needs to be evaluated and treated before it compromises your breathing. Your doctor will tell you what to do next.    Date Last Reviewed: 3/15/2016  © 5592-3893 [employer]. [address] [place] [address], [place] [address]. All rights reserved. This information is not intended as a substitute for professional medical care. Always follow your healthcare professional's instructions.              Pneumonmia Discharge Instructions                Coumadin Discharge Instructions                         Chronic Kindey Disease Education             Diabetes Discharge Instructions                                   [facility] Sign-Up     Activating your [facility] account is as easy as 1-2-3!     1) Visit [email or web address], select Sign Up Now, enter this activation code and your date of birth, then select Next.  [email or web address]  Expires: 6/26/2017  2:20 PM      2) Create a username and password to use when you visit [facility] in the future and select a security question in case you lose your password and select Next.    3) Enter your e-mail address and click Sign Up!    Additional Information  If you have questions, please e-mail [email or web address] or call [phone] to talk to our [facility] staff. Remember, [facility] is NOT to be used for urgent needs. For medical emergencies, dial 911.          [facility] complies with applicable Federal civil rights laws and does not discriminate on the basis of race, color, national origin, age,  disability, or sex.

## 2017-05-12 NOTE — DISCHARGE INSTRUCTIONS
Bone Marrow Aspiration and Biopsy    Bone marrow is the soft, spongy part inside bones. It makes most of the bodys blood cells. Aspiration and biopsy are procedures done to take a sample of bone marrow out of the body for examination. To perform either procedure, a needle is inserted into one of your bones, usually the back of the hip bone. Then a sample of bone marrow is removed.   If a sample of fluid and cells is taken, it is called bone marrow aspiration. If a solid sample of bone marrow tissue is removed, it is called bone marrow biopsy. In either case, the samples are sent to a lab and studied. The procedures can be done alone, but are most often done together. This sheet tells you more about what to expect.  Why the procedures are done  The procedures may be done for a number of reasons. They can help diagnose certain blood or bone marrow disorders or infections. They may help find certain cancers, such as leukemia. They can show if cancer in other areas of the body has spread to the bone marrow. They can be used during cancer treatment, such as chemotherapy, to monitor treatment progress. And they may be done before certain treatments, such as a stem cell transplant, which require a bone marrow sample. Your healthcare provider will explain why you need the procedure and answer any questions you have.  Preparing for the procedure  Prepare for the procedure as told. In addition:  · Tell your healthcare provider:  ¨ What medicines you take. This includes blood thinners, such as aspirin. This also includes over-the-counter medicines, herbs, and other supplements. You may need to stop taking some or all of them before the procedure.  ¨ If you are allergic to any medicines. Also mention if you have ever had a reaction to medicines used during other tests or procedures in the past.  ¨ If you have a history of bleeding problems.  ¨ If you are pregnant or may be pregnant.  · Follow any directions youre given for  not eating or drinking before the procedure.  The day of the procedure  The procedures can be done at a hospital, clinic, or healthcare providers office. They are performed by a healthcare provider or trained healthcare provider. Whether youre having one or both procedures, plan to be at the facility for 1 to 2 hours. Youll likely go home the same day.  Before the procedure begins  What to expect before the procedure:  · Youll change into a patient gown.  · An IV line may be put into a vein in your arm or hand. This line supplies fluids and medicines.  · Youll be given a sedative to help you relax, if needed. This medicine is given by pill, injection, or through the IV line.  During the procedure  What to expect during the procedure:  · Youll lie on your side or your stomach.  · The site to be used for the bone marrow samples is marked and cleaned. The most common site is the back of the hip bone. Less common sites include the front of the hip bone or breastbone.  · Numbing medicine (local anesthesia) is injected at the site.  · A small cut is made through the numbed skin.  · One or both procedures are then done. Be sure to lie still for each procedure. It is normal to feel some pressure or pain during each procedure.  ¨ For the bone marrow aspiration, a thin needle is put through the cut and into the bone. A syringe is then attached to the needle and used to remove a sample of bone marrow fluid and cells.  ¨ For the bone marrow biopsy, a different needle is put through the same cut and into the bone. A small amount of bone marrow tissue is then removed.  · The samples are sent to a lab to be evaluated.  · When the procedure is complete, pressure is applied to the site for 10 to 15 minutes to help stop bleeding. The site is then bandaged.  After the procedure  Most people can go home after a short period of observation. If you need it, youll be given medicine to manage pain. If you were given a sedative, you  may be taken to a recovery room to rest until the medicine wears off. An adult family member or friend must drive you home afterward.  Recovering at home  Once at home, follow any instructions youre given. Be sure to:  · Take all medicines as directed.  · Care for the procedure site as instructed.  · Check for signs of infection at the procedure site (see below).  · Avoid getting the procedure site wet. Do not bathe or shower until your healthcare providers say it is OK to do so. If you wish, you may wash with a sponge or washcloth.  · Avoid heavy lifting and other strenuous activities as directed.     Call the healthcare provider  Call your healthcare provider if you have any of the following:  · Fever of 100.4 ºF (38 ºC) or higher, or as directed by your healthcare provider  · Signs of infection at the procedure site, such as increased redness or swelling, warmth, worsening pain, bleeding, or foul-smelling drainage   Follow-up  Your healthcare provider will discuss the results with you when they are ready. This is usually within a week after the procedure.     Risks and possible complications of these procedures  These include:  · Severe bleeding or bruising at the procedure site  · Infection at the procedure site  · Bone fracture  · Bone infection   Date Last Reviewed: 10/8/2015  © 8988-2484 [employer]. [address] [place] [address]. All rights reserved. This information is not intended as a substitute for professional medical care. Always follow your healthcare professional's instructions.      General Information:  1.  Do not drink alcoholic beverages including beer for 24 hours or as long as you are on pain medication..  2.  Do not drive a motor vehicle, operate machinery or power tools, or signs legal papers for 24 hours or as long as you are on pain medication.   3.  You may experience light-headedness, dizziness, and sleepiness following surgery. Please do not stay alone. A  responsible adult should be with you for this 24 hour period.  4.  Go home and rest.  5. Progress slowly to a normal diet unless instructed.  Otherwise, begin with liquids such as soft drinks, then soup and crackers working up to solid foods. Drink plenty of nonalcoholic fluids.  6.  Certain anesthetics and pain medications produce nausea and vomiting in certain       individuals. If nausea becomes a problem at home, call you doctor.  7. A nurse will be calling you sometime after surgery. Do not be alarmed. This is our way of finding out how you are doing.  8. Several times every hour while you are awake, take 2-3 deep breaths and cough. If you had stomach surgery hold a pillow or rolled towel firmly against your stomach before you cough. This will help with any pain the cough might cause.  9. Several times every hour while you are awake, pump and flex your feet 5-6 times and do foot circles. This will help prevent blood clots.  10.Call your doctor for severe pain, bleeding, fever, or signs or symptoms of infection (pain, swelling, redness, foul odor, drainage).  11.You can contact your doctor anytime by [phone] for the [facility] (at [facility]) or [phone] for the [facility] on [facility].   [email or web address] is another way to contact your doctor if you are an active participant online with [facility].

## 2017-05-12 NOTE — ANESTHESIA PREPROCEDURE EVALUATION
05/12/2017  Beni Cosby is a 71 y.o., male.    Anesthesia Evaluation    I have reviewed the Patient Summary Reports.    I have reviewed the Nursing Notes.   I have reviewed the Medications.     Review of Systems  Anesthesia Hx:  No problems with previous Anesthesia  Denies Family Hx of Anesthesia complications.   Denies Personal Hx of Anesthesia complications.   Social:  Non-Smoker    Hematology/Oncology:     Oncology Normal    -- Anemia: Hematology Comments: hemolytic    EENT/Dental:EENT/Dental Normal   Cardiovascular:   Exercise tolerance: poor Valvular problems/Murmurs, AS Dysrhythmias atrial fibrillation CHF hyperlipidemia LERMA ECG has been reviewed. CONCLUSIONS     1 - Biatrial enlargement.     2 - Concentric hypertrophy.     3 - Normal left ventricular systolic function (EF 55-60%).     4 - Right ventricular enlargement with not well seen systolic function.     5 - Pulmonary hypertension. The estimated PA systolic pressure is 58 mmHg.     6 - Moderate aortic stenosis, SEAN = 1.01 cm2, mean gradient = 24.0 mmHg.     7 - Moderate aortic regurgitation.     8 - The mitral valve is moderately sclerotic with mildly restricted leaflet mobility.     9 - Mild mitral regurgitation.     10 - Moderate to severe tricuspid regurgitation.     11 - Increased central venous pressure.   Pulmonary:   COPD Asthma    Renal/:   Chronic Renal Disease, CRI    Hepatic/GI:  Hepatic/GI Normal    Musculoskeletal:  Musculoskeletal Normal    Neurological:  Neurology Normal    Endocrine:   Diabetes, type 2    Dermatological:  Skin Normal    Psych:  Psychiatric Normal           Physical Exam  General:  Obesity    Airway/Jaw/Neck:  Airway Findings: Mouth Opening: Normal Tongue: Normal  General Airway Assessment: Adult  Mallampati: II  TM Distance: Normal, at least 6 cm  Jaw/Neck Findings:  Neck ROM: Normal ROM       Dental:  Dental Findings: In tact   Chest/Lungs:  Chest/Lungs Findings: Clear to auscultation, Normal Respiratory Rate     Heart/Vascular:  Heart Findings: Rate: Normal  Rhythm: Regular Rhythm        Mental Status:  Mental Status Findings:  Alert and Oriented         Anesthesia Plan  Type of Anesthesia, risks & benefits discussed:  Anesthesia Type:  MAC  Patient's Preference:   Intra-op Monitoring Plan:   Intra-op Monitoring Plan Comments:   Post Op Pain Control Plan:   Post Op Pain Control Plan Comments:   Induction:   IV  Beta Blocker:  Patient is on a Beta-Blocker and has received one dose within the past 24 hours (No further documentation required).       Informed Consent: Patient understands risks and agrees with Anesthesia plan.  Questions answered. Anesthesia consent signed with patient.  ASA Score: 3     Day of Surgery Review of History & Physical: I have interviewed and examined the patient. I have reviewed the patient's H&P dated:  There are no significant changes.          Ready For Surgery From Anesthesia Perspective.

## 2017-05-12 NOTE — ANESTHESIA POSTPROCEDURE EVALUATION
"Anesthesia Post Evaluation    Patient: Beni Cosby    Procedure(s) Performed: Procedure(s) (LRB):  BIOPSY-BONE MARROW (Left)    Final Anesthesia Type: MAC  Patient location: Preop.  Patient participation: Yes- Able to Participate  Level of consciousness: awake and alert  Post-procedure vital signs: reviewed and stable  Pain management: adequate  Airway patency: patent  PONV status at discharge: No PONV  Anesthetic complications: no      Cardiovascular status: blood pressure returned to baseline and hemodynamically stable  Respiratory status: unassisted, spontaneous ventilation and nasal cannula  Hydration status: euvolemic  Follow-up not needed.        Visit Vitals    /72 (BP Location: Right arm, Patient Position: Lying, BP Method: Automatic)    Pulse 71    Temp 36.7 °C (98.1 °F) (Oral)    Resp (!) 24    Ht 6' 3" (1.905 m)    Wt 124.3 kg (274 lb 0.5 oz)    SpO2 97%    BMI 34.25 kg/m2       Pain/Benjamín Score: No Data Recorded      "

## 2017-05-12 NOTE — PROGRESS NOTES
Dr strange spoke with dr ceja regarding pt taking coumadin 2 days ago. Per dr strange ok to proceed with procedure as scheduled.

## 2017-05-15 LAB
BODY SITE - BONE MARROW: NORMAL
BONE MARROW IRON STAIN COMMENT: NORMAL
BONE MARROW WRIGHT STAIN COMMENT: NORMAL
CLINICAL DIAGNOSIS - BONE MARROW: NORMAL
FLOW CYTOMETRY ANTIBODIES ANALYZED - BONE MARROW: NORMAL
FLOW CYTOMETRY COMMENT - BONE MARROW: NORMAL
FLOW CYTOMETRY INTERPRETATION - BONE MARROW: NORMAL

## 2017-05-16 ENCOUNTER — OFFICE VISIT (OUTPATIENT)
Dept: NEPHROLOGY | Facility: CLINIC | Age: 72
End: 2017-05-16
Payer: MEDICARE

## 2017-05-16 ENCOUNTER — LAB VISIT (OUTPATIENT)
Dept: LAB | Facility: HOSPITAL | Age: 72
End: 2017-05-16
Attending: INTERNAL MEDICINE
Payer: MEDICARE

## 2017-05-16 VITALS
WEIGHT: 275.13 LBS | HEART RATE: 74 BPM | SYSTOLIC BLOOD PRESSURE: 122 MMHG | BODY MASS INDEX: 34.21 KG/M2 | DIASTOLIC BLOOD PRESSURE: 62 MMHG | HEIGHT: 75 IN

## 2017-05-16 DIAGNOSIS — N18.30 CKD (CHRONIC KIDNEY DISEASE) STAGE 3, GFR 30-59 ML/MIN: ICD-10-CM

## 2017-05-16 DIAGNOSIS — N17.9 AKI (ACUTE KIDNEY INJURY): ICD-10-CM

## 2017-05-16 DIAGNOSIS — E87.6 HYPOKALEMIA: ICD-10-CM

## 2017-05-16 DIAGNOSIS — E87.1 HYPONATREMIA: ICD-10-CM

## 2017-05-16 DIAGNOSIS — N18.30 CKD (CHRONIC KIDNEY DISEASE) STAGE 3, GFR 30-59 ML/MIN: Primary | ICD-10-CM

## 2017-05-16 LAB
ALBUMIN SERPL BCP-MCNC: 4 G/DL
ANION GAP SERPL CALC-SCNC: 14 MMOL/L
BASOPHILS # BLD AUTO: 0.02 K/UL
BASOPHILS NFR BLD: 0.3 %
BUN SERPL-MCNC: 50 MG/DL
CALCIUM SERPL-MCNC: 10.1 MG/DL
CHLORIDE SERPL-SCNC: 92 MMOL/L
CO2 SERPL-SCNC: 29 MMOL/L
CREAT SERPL-MCNC: 1.7 MG/DL
DIFFERENTIAL METHOD: ABNORMAL
EOSINOPHIL # BLD AUTO: 0.1 K/UL
EOSINOPHIL NFR BLD: 1.1 %
ERYTHROCYTE [DISTWIDTH] IN BLOOD BY AUTOMATED COUNT: 20.3 %
EST. GFR  (AFRICAN AMERICAN): 46 ML/MIN/1.73 M^2
EST. GFR  (NON AFRICAN AMERICAN): 40 ML/MIN/1.73 M^2
GLUCOSE SERPL-MCNC: 197 MG/DL
HCT VFR BLD AUTO: 35.5 %
HGB BLD-MCNC: 11.5 G/DL
LYMPHOCYTES # BLD AUTO: 1.4 K/UL
LYMPHOCYTES NFR BLD: 19.7 %
MCH RBC QN AUTO: 29.5 PG
MCHC RBC AUTO-ENTMCNC: 32.4 %
MCV RBC AUTO: 91 FL
MONOCYTES # BLD AUTO: 0.6 K/UL
MONOCYTES NFR BLD: 7.7 %
NEUTROPHILS # BLD AUTO: 5.1 K/UL
NEUTROPHILS NFR BLD: 71.2 %
PHOSPHATE SERPL-MCNC: 4.2 MG/DL
PLATELET # BLD AUTO: 187 K/UL
PMV BLD AUTO: 9.2 FL
POTASSIUM SERPL-SCNC: 3.6 MMOL/L
RBC # BLD AUTO: 3.9 M/UL
SODIUM SERPL-SCNC: 135 MMOL/L
WBC # BLD AUTO: 7.15 K/UL

## 2017-05-16 PROCEDURE — 99999 PR PBB SHADOW E&M-EST. PATIENT-LVL II: CPT | Mod: PBBFAC,,, | Performed by: INTERNAL MEDICINE

## 2017-05-16 PROCEDURE — 99214 OFFICE O/P EST MOD 30 MIN: CPT | Mod: S$PBB,,, | Performed by: INTERNAL MEDICINE

## 2017-05-16 PROCEDURE — 99212 OFFICE O/P EST SF 10 MIN: CPT | Mod: PBBFAC,PO | Performed by: INTERNAL MEDICINE

## 2017-05-16 RX ORDER — ROSUVASTATIN CALCIUM 10 MG/1
TABLET, COATED ORAL
COMMUNITY
Start: 2017-04-26

## 2017-05-16 NOTE — MR AVS SNAPSHOT
[facility]  [address] [facility] [facility] [address] [place] [address]  Phone: [phone]  Fax: [phone]                  Beni Cosby   2017 1:40 PM   Office Visit    Description:  Male : 1945   Provider:  Francisco Jackson MD   Department:  [facility] - [facility]rology           Reason for Visit     Chronic Kidney Disease           Diagnoses this Visit        Comments    CKD (chronic kidney disease) stage 3, GFR 30-59 ml/min    -  Primary            To Do List           Future Appointments        Provider Department Dept Phone    2017 10:25 AM LABORATORY, [facility] [phone]    2017 10:40 AM Natalia Santiago MD [facility] [phone]    2017 9:30 AM LABORATORY, [facility] [phone]    2017 9:40 AM SPECIMEN, [facility] [phone]    2017 11:00 AM Francisco Jackson MD [facility] [phone]      Goals (5 Years of Data)     None      Follow-Up and Disposition     Return in about 4 months (around 2017).      [facility]     [facility] Nurse Care Line -  Assistance  Unless otherwise directed by your provider, please contact [facility], our nurse care line that is available for  assistance.     Registered nurses in the [facility] provide: appointment scheduling, clinical advisement, health education, and other advisory services.  Call: [phone] (toll free)               Medications           STOP taking these medications     hydrocodone-acetaminophen 7.5-325mg (NORCO) 7.5-325 mg per tablet            Verify that the below list of medications is an accurate representation of the medications you are currently taking.  If none reported, the list may be blank. If incorrect, please contact your healthcare provider. Carry this list with you in case of emergency.           Current Medications     blood sugar diagnostic (CONTOUR NEXT STRIPS)  "Strp Use once daily.    bumetanide (BUMEX) 2 MG tablet Take 2 mg by mouth 2 (two) times daily.    ferrous sulfate 325 (65 FE) MG EC tablet Take 325 mg by mouth 3 (three) times daily with meals.    gabapentin (NEURONTIN) 100 MG capsule Take 100 mg by mouth 3 (three) times daily.    glimepiride (AMARYL) 1 MG tablet Take 1 mg by mouth before breakfast.    glipiZIDE (GLUCOTROL) 5 MG tablet Take 1 tablet (5 mg total) by mouth daily with breakfast.    lansoprazole (PREVACID) 30 MG capsule Take 30 mg by mouth 2 (two) times daily. TAKE 1 CAPSULE BY MOUTH ONCE DAILY    metolazone (ZAROXOLYN) 5 MG tablet once daily.     metoprolol succinate (TOPROL-XL) 100 MG 24 hr tablet Take 50 mg by mouth once daily. 1/2 tablet daily    mometasone (NASONEX) 50 mcg/actuation nasal spray 2 sprays by Nasal route daily as needed.     mometasone-formoterol (DULERA) 100-5 mcg/actuation HFAA Inhale into the lungs 2 (two) times daily. Controller    ondansetron (ZOFRAN, AS HYDROCHLORIDE,) 4 MG tablet Take 1 tablet (4 mg total) by mouth every 12 (twelve) hours as needed for Nausea.    potassium chloride SA (K-DUR,KLOR-CON) 20 MEQ tablet Take 20 mEq by mouth 2 (two) times daily. Take 2 tablets daily    rosuvastatin (CRESTOR) 10 MG tablet 10 mg every evening.     rosuvastatin (CRESTOR) 10 MG tablet TAKE 1 TABLET BY MOUTH DAILY.    spironolactone (ALDACTONE) 25 MG tablet Take 25 mg by mouth 2 (two) times daily.     warfarin (COUMADIN) 5 MG tablet 5 mg.    warfarin (COUMADIN) 7.5 MG tablet     aspirin (ECOTRIN) 81 MG EC tablet Take 1 tablet (81 mg total) by mouth once daily.           Clinical Reference Information           Your Vitals Were     BP Pulse Height Weight BMI    122/62 74 6' 3" (1.905 m) 124.8 kg (275 lb 2.2 oz) 34.39 kg/m2      Blood Pressure          Most Recent Value    BP  122/62      Allergies as of 5/16/2017     Morphine      Immunizations Administered on Date of Encounter - 5/16/2017     None      Orders Placed During Today's Visit  "    Future Labs/Procedures Expected by Expires    Urinalysis  5/16/2017 7/15/2018    Recurring Lab Work Interval Expires    Renal function panel  Every Weekday until 5/16/2018 5/16/2018      [facility] Sign-Up     Activating your [facility] account is as easy as 1-2-3!     1) Visit [email or web address], select Sign Up Now, enter this activation code and your date of birth, then select Next.  [email or web address]  Expires: 6/26/2017  2:20 PM      2) Create a username and password to use when you visit [facility] in the future and select a security question in case you lose your password and select Next.    3) Enter your e-mail address and click Sign Up!    Additional Information  If you have questions, please e-mail [email or web address] or call [phone] to talk to our [facility] staff. Remember, [facility] is NOT to be used for urgent needs. For medical emergencies, dial 911.         Language Assistance Services     ATTENTION: Language assistance services are available, free of charge. Please call [phone].      ATENCIÓN: Si habla español, tiene a lira disposición servicios gratuitos de asistencia lingüística. Llame al [phone].     CHÚ Ý: N?u b?n nói Ti?ng Vi?t, có các d?ch v? h? tr? ngôn ng? mi?n phí dành cho b?n. G?i s? [phone].         [facility] complies with applicable Federal civil rights laws and does not discriminate on the basis of race, color, national origin, age, disability, or sex.

## 2017-05-16 NOTE — PROGRESS NOTES
PROGRESS NOTE FOR ESTABLISHED PATIENT    PHYSICIAN REQUESTING THE CONSULT: Hospital follow up, PMD: Dr. Jackson Brandt    REASON FOR CONSULTATION: CKD/ANA LAURA    71 y.o. male with history of AV stenosis (s/p AVR with pig valve), atrial fibrillation, right renal cell cancer (s/p partial resection of right kidney), CHF, CKD 3, CAD (s/p CABG), DM2 presents to the renal clinic for evaluation of renal insufficiency.     Patient had recent colonoscopy/EGD and bone marrow biopsy done. Results are pending and patient has follow up with GI and Hematology. He reports chronic LE edema. No other complaints today.             Past Medical History:   Diagnosis Date    Anticoagulant long-term use     Aortic valve stenosis with insufficiency 4/2008    Surgery Pig Velve    Arthritis     CHF (congestive heart failure)     Chronic a-fib     CKD (chronic kidney disease) stage 3, GFR 30-59 ml/min     COPD (chronic obstructive pulmonary disease)     Diabetes mellitus     Encounter for blood transfusion     Primary cancer of right kidney     Surgery, no further treatment       Past Surgical History:   Procedure Laterality Date    Atrial septem device implanted  2005    CARDIAC SURGERY  2008    Aotric valve replacement     KIDNEY SURGERY Right 2007    NOSE SURGERY  2008       Review of patient's allergies indicates:   Allergen Reactions    Morphine      Other reaction(s): Nausea Only       Current Outpatient Prescriptions   Medication Sig Dispense Refill    aspirin (ECOTRIN) 81 MG EC tablet Take 1 tablet (81 mg total) by mouth once daily.  0    blood sugar diagnostic (CONTOUR NEXT STRIPS) Strp Use once daily.      bumetanide (BUMEX) 2 MG tablet Take 2 mg by mouth 2 (two) times daily.      ferrous sulfate 325 (65 FE) MG EC tablet Take 325 mg by mouth 3 (three) times daily with meals.      gabapentin (NEURONTIN) 100 MG capsule Take 100 mg by mouth 3 (three) times daily.      glimepiride (AMARYL) 1 MG tablet Take 1 mg by  mouth before breakfast.      glipiZIDE (GLUCOTROL) 5 MG tablet Take 1 tablet (5 mg total) by mouth daily with breakfast. (Patient taking differently: Take 5 mg by mouth 2 (two) times daily with meals. ) 30 tablet 11    hydrocodone-acetaminophen 7.5-325mg (NORCO) 7.5-325 mg per tablet       lansoprazole (PREVACID) 30 MG capsule Take 30 mg by mouth 2 (two) times daily. TAKE 1 CAPSULE BY MOUTH ONCE DAILY      metolazone (ZAROXOLYN) 5 MG tablet once daily.       metoprolol succinate (TOPROL-XL) 100 MG 24 hr tablet Take 50 mg by mouth once daily. 1/2 tablet daily      mometasone (NASONEX) 50 mcg/actuation nasal spray 2 sprays by Nasal route daily as needed.       mometasone-formoterol (DULERA) 100-5 mcg/actuation HFAA Inhale into the lungs 2 (two) times daily. Controller      ondansetron (ZOFRAN, AS HYDROCHLORIDE,) 4 MG tablet Take 1 tablet (4 mg total) by mouth every 12 (twelve) hours as needed for Nausea. 30 tablet 1    potassium chloride SA (K-DUR,KLOR-CON) 20 MEQ tablet Take 20 mEq by mouth 2 (two) times daily. Take 2 tablets daily      rosuvastatin (CRESTOR) 10 MG tablet 10 mg every evening.       spironolactone (ALDACTONE) 25 MG tablet Take 25 mg by mouth 2 (two) times daily.       warfarin (COUMADIN) 5 MG tablet 5 mg.      warfarin (COUMADIN) 7.5 MG tablet        No current facility-administered medications for this visit.        No family history on file.    Social History     Social History    Marital status:      Spouse name: N/A    Number of children: N/A    Years of education: N/A     Occupational History    Not on file.     Social History Main Topics    Smoking status: Never Smoker    Smokeless tobacco: Never Used    Alcohol use No    Drug use: No    Sexual activity: Not on file     Other Topics Concern    Not on file     Social History Narrative       Review of Systems:  1. GENERAL: patient denies any fever, weight changes, no dizziness, fatigue.   2. HEENT: patient denies  "headaches, visual disturbances, swallowing problems, sinus pain, nasal congestion.  3. CARDIOVASCULAR: patient denies chest pain, palpitations.  4. PULMONARY: patient reports chronic SOB with exercise, none at present, no coughing, hemoptysis, wheezing.  5. GASTROINTESTINAL: patient denies abdominal pain, nausea, vomiting, diarrhea.  6. GENITOURINARY: patient denies dysuria, hematuria, hesitancy, frequency.  7. EXTREMITIES: patient reports chronic bilateral LE edema, no LE cramping.  8. DERMATOLOGY: patient denies rashes, ulcers.  9. NEURO: patient denies tremors, extremity weakness, extremity numbness/tingling.  10. MUSCULOSKELETAL: patient denies joint pain, joint swelling.  11. HEMATOLOGY: patient denies rectal or gum bleeding.  12: PSYCH: patient denies anxiety, depression.      PHYSICAL EXAM:  /62  Pulse 74  Ht 6' 3" (1.905 m)  Wt 124.8 kg (275 lb 2.2 oz)  BMI 34.39 kg/m2    GENERAL: Pleasant tall gentleman presents to clinic with non-labored breathing.  HEENT: PERRL, no nasal discharge, no icterus, no oral exudates, moist mucosal membranes.  NECK: no thyroid mass, no lymphadenopathy.  HEART: Regular, S1/S2, no rubs, good peripheral pulses.  LUNGS: CTA bilaterally, no wheezing, breathing is nonlabored.  ABDOMEN: soft, nontender, not distended, bowel sounds are present, no abdominal hernia.  EXTREM: pitting bilateral LE edema, +1  SKIN: no rashes, skin is warm and dry.  NEURO: A & O x 3, no obvious focal signs.    LABORATORY RESULTS:    Lab Results   Component Value Date    CREATININE 1.7 (H) 05/16/2017    BUN 50 (H) 05/16/2017     (L) 05/16/2017    K 3.6 05/16/2017    CL 92 (L) 05/16/2017    CO2 29 05/16/2017     Lab Results   Component Value Date    .0 (H) 04/04/2017    CALCIUM 10.1 05/16/2017    PHOS 4.2 05/16/2017     Lab Results   Component Value Date    ALBUMIN 4.0 05/16/2017       Lab Results   Component Value Date    WBC 7.15 05/16/2017    HGB 11.5 (L) 05/16/2017    HCT 35.5 (L) " 05/16/2017    MCV 91 05/16/2017     05/16/2017             ASSESSMENT AND PLAN:  71 y.o. male with history of AV stenosis (s/p AVR with pig valve), atrial fibrillation, right renal cell cancer, CHF, CKD 3, CAD (s/p CABG), DM2 presents to the renal clinic for evaluation of renal insufficiency.     1. Renal insufficiency: Patient's renal function has improved with creatinine decreasing to 1.7. Patient was advised to continue Bumex and metolazone at current dose. He will return in 3 months for follow up.     2. Electrolytes: Hyponatremia: has resolved. Patient is on fluid restrictions (as per cardiologist, Dr. Chew).                            Mild hyperphosphatemia: Has resolved.     3. Acid base status: mild alkalosis has resolved.     4. Volume: Bilateral LE edema. Continue Bumex/metolazone.     5. Hypertension: good BP control.       6. Medications: Reviewed.     7. CHF: continue fluid restrictions. Patient has follow up with Cardiology (Dr. Chew).    8. Atrial fibrillation/AVR: continue Coumadin.    9. DM2: Continue glipizide/glimepiride.      10. Anemia: Hematology is following. S/p bone marrow biopsy.

## 2017-05-22 LAB
CHROM BANDING METHOD: NORMAL
CHROMOSOME ANALYSIS BM ADDITIONAL INFORMATION: NORMAL
CHROMOSOME ANALYSIS BM RELEASED BY: NORMAL
CHROMOSOME ANALYSIS BM RESULT SUMMARY: NORMAL
CLINICAL CYTOGENETICIST REVIEW: NORMAL
KARYOTYP MAR: NORMAL
REASON FOR REFERRAL (NARRATIVE): NORMAL
REF LAB TEST METHOD: NORMAL
SPECIMEN SOURCE: NORMAL
SPECIMEN: NORMAL

## 2017-05-30 ENCOUNTER — LAB VISIT (OUTPATIENT)
Dept: LAB | Facility: HOSPITAL | Age: 72
End: 2017-05-30
Attending: INTERNAL MEDICINE
Payer: MEDICARE

## 2017-05-30 ENCOUNTER — OFFICE VISIT (OUTPATIENT)
Dept: HEMATOLOGY/ONCOLOGY | Facility: CLINIC | Age: 72
End: 2017-05-30
Payer: MEDICARE

## 2017-05-30 VITALS
OXYGEN SATURATION: 96 % | SYSTOLIC BLOOD PRESSURE: 130 MMHG | WEIGHT: 283.94 LBS | TEMPERATURE: 98 F | RESPIRATION RATE: 18 BRPM | DIASTOLIC BLOOD PRESSURE: 70 MMHG | HEIGHT: 75 IN | BODY MASS INDEX: 35.3 KG/M2 | HEART RATE: 83 BPM

## 2017-05-30 DIAGNOSIS — K25.7 CHRONIC GASTRIC ULCER, UNSPECIFIED WHETHER GASTRIC ULCER HEMORRHAGE OR PERFORATION PRESENT: ICD-10-CM

## 2017-05-30 DIAGNOSIS — D50.0 IRON DEFICIENCY ANEMIA DUE TO CHRONIC BLOOD LOSS: Primary | ICD-10-CM

## 2017-05-30 DIAGNOSIS — D50.0 IRON DEFICIENCY ANEMIA DUE TO CHRONIC BLOOD LOSS: ICD-10-CM

## 2017-05-30 LAB
ALBUMIN SERPL BCP-MCNC: 4.1 G/DL
ALP SERPL-CCNC: 85 U/L
ALT SERPL W/O P-5'-P-CCNC: 17 U/L
ANION GAP SERPL CALC-SCNC: 13 MMOL/L
AST SERPL-CCNC: 24 U/L
BASOPHILS # BLD AUTO: 0.02 K/UL
BASOPHILS NFR BLD: 0.2 %
BILIRUB SERPL-MCNC: 1 MG/DL
BUN SERPL-MCNC: 63 MG/DL
CALCIUM SERPL-MCNC: 10.2 MG/DL
CHLORIDE SERPL-SCNC: 89 MMOL/L
CO2 SERPL-SCNC: 34 MMOL/L
CREAT SERPL-MCNC: 1.8 MG/DL
DAT IGG-SP REAG RBC-IMP: NORMAL
DIFFERENTIAL METHOD: ABNORMAL
EOSINOPHIL # BLD AUTO: 0.1 K/UL
EOSINOPHIL NFR BLD: 1.1 %
ERYTHROCYTE [DISTWIDTH] IN BLOOD BY AUTOMATED COUNT: 18.1 %
EST. GFR  (AFRICAN AMERICAN): 43 ML/MIN/1.73 M^2
EST. GFR  (NON AFRICAN AMERICAN): 37 ML/MIN/1.73 M^2
GLUCOSE SERPL-MCNC: 256 MG/DL
HAPTOGLOB SERPL-MCNC: 23 MG/DL
HCT VFR BLD AUTO: 35.7 %
HGB BLD-MCNC: 11.4 G/DL
LDH SERPL L TO P-CCNC: 260 U/L
LYMPHOCYTES # BLD AUTO: 1.4 K/UL
LYMPHOCYTES NFR BLD: 16.9 %
MCH RBC QN AUTO: 29.9 PG
MCHC RBC AUTO-ENTMCNC: 31.9 %
MCV RBC AUTO: 94 FL
MONOCYTES # BLD AUTO: 0.4 K/UL
MONOCYTES NFR BLD: 4.8 %
NEUTROPHILS # BLD AUTO: 6.4 K/UL
NEUTROPHILS NFR BLD: 77 %
PLATELET # BLD AUTO: 170 K/UL
PMV BLD AUTO: 9.8 FL
POTASSIUM SERPL-SCNC: 3.7 MMOL/L
PROT SERPL-MCNC: 7.7 G/DL
RBC # BLD AUTO: 3.81 M/UL
RETICS/RBC NFR AUTO: 2.9 %
SODIUM SERPL-SCNC: 136 MMOL/L
WBC # BLD AUTO: 8.3 K/UL

## 2017-05-30 PROCEDURE — 99213 OFFICE O/P EST LOW 20 MIN: CPT | Mod: S$PBB,,, | Performed by: INTERNAL MEDICINE

## 2017-05-30 PROCEDURE — 99214 OFFICE O/P EST MOD 30 MIN: CPT | Mod: PBBFAC,PO | Performed by: INTERNAL MEDICINE

## 2017-05-30 PROCEDURE — 99999 PR PBB SHADOW E&M-EST. PATIENT-LVL IV: CPT | Mod: PBBFAC,,, | Performed by: INTERNAL MEDICINE

## 2017-05-30 NOTE — PROGRESS NOTES
Hematology/Oncology Established Visit    Reason for Visit: Anemia    Consult requested by: Dr. Jackson, Nephrology    Pathology:  Bone marrow biopsy 5/14/17:  BONE MARROW ASPIRATE SMEARS, CORE BIOPSY, RIGHT ILIAC CREST, AND CLOT SECTION:  --Normocellular bone marrow with an erythroid predominance, occasional granulocyte dyspoiesis, and atypical  lymphocytes, see comment.  COMMENT: The evaluation is limited by the lack of adequate aspirate smears, which contain occasional dyspoietic  myeloid cells and atypical lymphocytes. With the limited sampling, these do not meet minimal morphologic criteria  for a diagnosis of myelodysplastic syndrome. While atypical lymphocytes are also present in the aspirate smear  preparations, atypical lymphoid aggregates are not identified in either the core biopsy or clot section and the  corresponding flow cytometric analysis (please see separate report) does not detect an abnormal hematopoietic  population. As very few CD20 positive B lymphocytes are present, these likely do not represent the atypical  lymphocytes seen. The clot section is sent to [facility] for T-cell gene rearrangement studies  and these results will be reported in a supplemental report. Please correlate with the corresponding cytogenetics  analysis.    History of Present Illness:  Mr. Cosby is a 70 y/o male with A-fib, Stage III CKD and prior RCC s/p partial nephrectomy in 2007 referred for evaluation of anemia. He reports GI blood loss back in 2007 requiring 8 pints of blood. GI workup at that time was unrevealing and the bleeding resolved. Patient had a hospital stay in 1/2017 at [facility] for pneumonia. His Hgb in 5/2016 was 13, dropped to 9.2 in 1/2017 (based on [facility]Everywhere) and now is down to 7.6. He endorses ice cravings in the past few months. No melena/hematochezia/hematuria. He reports 3 days of heartburn. Last colonoscopy was a few years performed at [facility] [address], but   [place]. Patient states he is extremely fatigued, short of breath. He sees Dr. Blankenship. He has not taken any of this diuretics today given appointments.    Interval History:  Patient underwent bone marrow biopsy for further workup of his anemia and comes in today to review results. His last blood transfusion was on 3/10/17 (2 Units) and Injectafer x 2 in 3/2017. His Hgb improved from Hgb 7.5 to 9.0 and further to 10.6 after transfusions and iron infusions, respectively. His energy level is now much improved per patient. He is now walking and mowing the lawn whereas he was previously using a wheelchair. He underwent EGD on 3/29/17 with finding of gastric ulcer and gastritis. His gastroenterologist asked him to increase his PPI to bid, which he has been taking with food rather than on an empty stomach. He reports seeing black stools for 3 days last week, which has since cleared up. His cardiologist also reports that patient has a history of intermittent large hematomas which form spontaneously (the size of a deflated b-ball), and this has not occurred in the past 2 yrs. He is now doing much better and states his fatigue is improved.    ROS:  General:  No wt loss, fever/chills, night sweats, fatigue   Eyes: No vision problems, pain or inflammation.   Ears/Nose: No difficultly hearing, ear pain, rhinorrhea, or epistaxis  Oropharynx: No ulcers, dysphagia, or odynophagia  Cardiovascular: No chest pains, sob, PND or dyspnea on exertion  Pulmonary: No cough, hemoptysis  Gastrointestional: No n/v/d, melena, hematochezia, or change in bowel habits +low appetite  : No dysuria, hematuria, pelvic pain or flank pain  Musculoskeletal: No myalgias, weakness, or arthralgias  Neurological: No headaches, focal deficits, or seizure activity  Endocrine: No heat or cold intolerance   Skin: No rashes pruritus. +easy bruising  Psychiatric: +depression, no SI/HI  Heme/Lymph: No lymph node enlargment    Answers for HPI/ROS submitted by  the patient on 5/30/2017   appetite change : No  unexpected weight change: No  visual disturbance: No  cough: No  shortness of breath: Yes  chest pain: No  abdominal pain: No  diarrhea: No  frequency: No  back pain: Yes  rash: No  headaches: No  adenopathy: No  nervous/ anxious: No      Past Medical History:   Diagnosis Date    Anticoagulant long-term use     Aortic valve stenosis with insufficiency 4/2008    Surgery [facility]elve    Arthritis     CHF (congestive heart failure)     Chronic a-fib     CKD (chronic kidney disease) stage 3, GFR 30-59 ml/min     COPD (chronic obstructive pulmonary disease)     Diabetes mellitus     Encounter for blood transfusion     Primary cancer of right kidney     Surgery, no further treatment       Social History:  Social History     Social History    Marital status:      Spouse name: N/A    Number of children: N/A    Years of education: N/A     Social History Main Topics    Smoking status: Never Smoker    Smokeless tobacco: Never Used    Alcohol use No    Drug use: No    Sexual activity: Not Asked     Other Topics Concern    None     Social History Narrative    None       Family History: No family history of blood disorders or malignancy.    Physical Exam:  Vitals:    05/30/17 1034   BP: 130/70   Pulse: 83   Resp: 18   Temp: 97.5 °F (36.4 °C)     Body mass index is 35.49 kg/m².  General:  AAOx4, no acute distress, sitting in exam room without wheelchair or walker.   HEENT: EOMI. Normocephalic and atraumatic. No maxillary sinus tenderness. External auditory canals clear and TMs intact without lesions. Nasal and oral mucosal membranes moist. Normal dentition and gums.   Neck: no LAD, thyromegaly, normal ROM  Pulmonary: Bilaterally clear to auscultation, Normal effort with no accessory muscle use, no wheezes/rales/rhonchi  CV: Normal rate, regular rhythm, no murmurs/rubs/gallops, no edema  ABD:  Obese, Soft, nontender, nondistended, no mass, and without  hepatosplenomegaly   Ext: No clubbing, cyanosis, or edema, normal ROM. +BLE edema.  Skin: No rashes, lesions, petechiae. Multiple scattered ecchymoses on bilateral arms.  Neurological: No focal deficits, CN II to XII grossly intact, normal coordination  5/5 strength in hip flexors and arm flexion. 3/5 strength in shoulder abduction and neck rotation.  Psychiatric:  Depressed mood is better today.  Hem/Lymph:  No submandibular, cervical, supraclavicular, axillary LAD.    Labs:    Lab Results   Component Value Date    WBC 8.30 05/30/2017    HGB 11.4 (L) 05/30/2017    HCT 35.7 (L) 05/30/2017    MCV 94 05/30/2017     05/30/2017     BMP  Lab Results   Component Value Date     (L) 05/16/2017    K 3.6 05/16/2017    CL 92 (L) 05/16/2017    CO2 29 05/16/2017    BUN 50 (H) 05/16/2017    CREATININE 1.7 (H) 05/16/2017    CALCIUM 10.1 05/16/2017    ANIONGAP 14 05/16/2017    ESTGFRAFRICA 46 (A) 05/16/2017    EGFRNONAA 40 (A) 05/16/2017     Lab Results   Component Value Date    ALT 14 05/12/2017    AST 25 05/12/2017    ALKPHOS 73 05/12/2017    BILITOT 1.3 (H) 05/12/2017       Lab Results   Component Value Date    IRON 63 04/20/2017    TIBC 404 04/20/2017    FERRITIN 238 04/20/2017     No results found for: [id number]  No results found for: FOLATE  Lab Results   Component Value Date    TSH 0.744 05/05/2016       Imaging:  Reviewed.    Procedures:   EGD at [facility] 3/29/17:  Gastric ulcer found in antrum. Gastritis. Multiple gastric polyps. No specimens collected. Increase PPI to bid.    Outside records from [facility] state that patient h/o Antral AVM and jejunal nodule in 2007. He underwent SBE 3/9/17 which showed gastric ulcer x 2, gastritis, multiple gastric polyps. Lansoprazole was increased to bid. Pt was taken off of Aspirin by Dr. Chew. They will consider repeat EGD in 6-8 weeks as well as colonoscopy.    EGD 5/15/17 at [facility]: Gastritis.  Biopsied.  Colonoscopy 5/15/17: Eight medium polyps in the sigmoid colon, transverse colon, ascending colon and cecum, resected and retrieved. Repeat colonoscopy in 2 yrs.    Assessment / Plan:  Beni Cosby is a 71 y.o. male who comes in with severe anemia.    1. Iron deficiency anemia: Now improved to Hgb 11.4. The anemia is likely due to occult GI blood loss from gastric ulcer and gastritis seen on EGD in 3/2017. SPEP negative. Hgb improved from 7.5 to 9.0 after receiving 2 Units of PRBCs. Further improvement to 10.6 after Injectafer in 3/2017. Hemoglobin electrophoresis is normal. EPO is elevated. Haptoglobin is low, but LDH normal consistent with mild . Given appearance of teardrops on peripheral smear, I recommended bone marrow biopsy. BM showed normocellular marrow and mild dyspoiesis, but no clear MDS.  -- Increase ferrous sulfate to 325mg bid with meals.  -- Return in 3 months for repeat CBC and iron profile    2. H/o gastric ulcers/gastritis: Seen on endoscopy 3/27/17 and appears resolved on endoscopy 5/14/17. On bid PPI per GI recommendations.   -- Pt sees Dr. Juan F Jensen in GI, [phone].    4. Stage III CKD: Responsible for some component of anemia, but was not responsible for the dropping Hgb.     5. CHF, Afib: On Bumex 2mg bid, Metolazone, Spironolactone. Sees Dr. Edmundo Chew [phone], nurse Emily. IRVIN in 2017 looked good per Dr. Chew. On Warfarin.    Natalia Santiago M.D.  Hematology Oncology    Fwd note to Dr. Chew.

## 2017-08-30 ENCOUNTER — TELEPHONE (OUTPATIENT)
Dept: NEPHROLOGY | Facility: CLINIC | Age: 72
End: 2017-08-30

## 2017-08-30 ENCOUNTER — TELEPHONE (OUTPATIENT)
Dept: HEMATOLOGY/ONCOLOGY | Facility: CLINIC | Age: 72
End: 2017-08-30

## 2017-08-30 NOTE — TELEPHONE ENCOUNTER
----- Message from Chelo Chase sent at 8/30/2017 11:36 AM CDT -----  Contact: pt   Call pt regarding appt that is schedule for Friday.   ..[phone]

## 2017-08-30 NOTE — TELEPHONE ENCOUNTER
Returned call, spoke with pt.'s wife, rescheduled pt. Appt. On 9/1 as pt. Is currently in the hospital.

## 2017-09-05 ENCOUNTER — LAB VISIT (OUTPATIENT)
Dept: LAB | Facility: HOSPITAL | Age: 72
End: 2017-09-05
Attending: INTERNAL MEDICINE
Payer: MEDICARE

## 2017-09-05 DIAGNOSIS — D50.0 IRON DEFICIENCY ANEMIA DUE TO CHRONIC BLOOD LOSS: ICD-10-CM

## 2017-09-05 LAB
BASOPHILS # BLD AUTO: 0.02 K/UL
BASOPHILS NFR BLD: 0.3 %
DIFFERENTIAL METHOD: ABNORMAL
EOSINOPHIL # BLD AUTO: 0.1 K/UL
EOSINOPHIL NFR BLD: 0.8 %
ERYTHROCYTE [DISTWIDTH] IN BLOOD BY AUTOMATED COUNT: 15.7 %
FERRITIN SERPL-MCNC: 153 NG/ML
HCT VFR BLD AUTO: 35.5 %
HGB BLD-MCNC: 12 G/DL
IRON SERPL-MCNC: 79 UG/DL
LYMPHOCYTES # BLD AUTO: 1.3 K/UL
LYMPHOCYTES NFR BLD: 16.7 %
MCH RBC QN AUTO: 31.2 PG
MCHC RBC AUTO-ENTMCNC: 33.8 G/DL
MCV RBC AUTO: 92 FL
MONOCYTES # BLD AUTO: 0.7 K/UL
MONOCYTES NFR BLD: 8.7 %
NEUTROPHILS # BLD AUTO: 5.6 K/UL
NEUTROPHILS NFR BLD: 73.5 %
PLATELET # BLD AUTO: 148 K/UL
PMV BLD AUTO: 9.5 FL
RBC # BLD AUTO: 3.85 M/UL
SATURATED IRON: 17 %
TOTAL IRON BINDING CAPACITY: 462 UG/DL
TRANSFERRIN SERPL-MCNC: 312 MG/DL
WBC # BLD AUTO: 7.66 K/UL

## 2017-09-05 PROCEDURE — 82728 ASSAY OF FERRITIN: CPT

## 2017-09-05 PROCEDURE — 85025 COMPLETE CBC W/AUTO DIFF WBC: CPT | Mod: PO

## 2017-09-05 PROCEDURE — 83540 ASSAY OF IRON: CPT

## 2017-09-05 PROCEDURE — 36415 COLL VENOUS BLD VENIPUNCTURE: CPT | Mod: PO

## 2017-09-12 ENCOUNTER — OFFICE VISIT (OUTPATIENT)
Dept: HEMATOLOGY/ONCOLOGY | Facility: CLINIC | Age: 72
End: 2017-09-12
Payer: MEDICARE

## 2017-09-12 ENCOUNTER — LAB VISIT (OUTPATIENT)
Dept: LAB | Facility: HOSPITAL | Age: 72
End: 2017-09-12
Attending: INTERNAL MEDICINE
Payer: MEDICARE

## 2017-09-12 VITALS
TEMPERATURE: 98 F | DIASTOLIC BLOOD PRESSURE: 74 MMHG | OXYGEN SATURATION: 96 % | HEART RATE: 69 BPM | WEIGHT: 289.44 LBS | SYSTOLIC BLOOD PRESSURE: 130 MMHG | RESPIRATION RATE: 18 BRPM | BODY MASS INDEX: 35.99 KG/M2 | HEIGHT: 75 IN

## 2017-09-12 DIAGNOSIS — N18.30 CKD (CHRONIC KIDNEY DISEASE) STAGE 3, GFR 30-59 ML/MIN: ICD-10-CM

## 2017-09-12 DIAGNOSIS — I50.32 CHF (CONGESTIVE HEART FAILURE), NYHA CLASS III, CHRONIC, DIASTOLIC: ICD-10-CM

## 2017-09-12 DIAGNOSIS — N18.30 CHRONIC KIDNEY DISEASE (CKD), STAGE III (MODERATE): ICD-10-CM

## 2017-09-12 DIAGNOSIS — D50.0 IRON DEFICIENCY ANEMIA DUE TO CHRONIC BLOOD LOSS: Primary | ICD-10-CM

## 2017-09-12 DIAGNOSIS — J96.11 CHRONIC RESPIRATORY FAILURE WITH HYPOXIA: ICD-10-CM

## 2017-09-12 PROBLEM — D64.9 SYMPTOMATIC ANEMIA: Status: RESOLVED | Noted: 2017-03-10 | Resolved: 2017-09-12

## 2017-09-12 LAB
BILIRUB UR QL STRIP: NEGATIVE
CLARITY UR: CLEAR
COLOR UR: YELLOW
GLUCOSE UR QL STRIP: NEGATIVE
HGB UR QL STRIP: NEGATIVE
KETONES UR QL STRIP: NEGATIVE
LEUKOCYTE ESTERASE UR QL STRIP: NEGATIVE
NITRITE UR QL STRIP: NEGATIVE
PH UR STRIP: 6 [PH] (ref 5–8)
PROT UR QL STRIP: NEGATIVE
SP GR UR STRIP: <=1.005 (ref 1–1.03)
URN SPEC COLLECT METH UR: ABNORMAL

## 2017-09-12 PROCEDURE — 1125F AMNT PAIN NOTED PAIN PRSNT: CPT | Mod: ,,, | Performed by: INTERNAL MEDICINE

## 2017-09-12 PROCEDURE — 99214 OFFICE O/P EST MOD 30 MIN: CPT | Mod: S$PBB,,, | Performed by: INTERNAL MEDICINE

## 2017-09-12 PROCEDURE — 99213 OFFICE O/P EST LOW 20 MIN: CPT | Mod: PBBFAC,PO | Performed by: INTERNAL MEDICINE

## 2017-09-12 PROCEDURE — 1159F MED LIST DOCD IN RCRD: CPT | Mod: ,,, | Performed by: INTERNAL MEDICINE

## 2017-09-12 PROCEDURE — 81003 URINALYSIS AUTO W/O SCOPE: CPT | Mod: PO

## 2017-09-12 PROCEDURE — 99999 PR PBB SHADOW E&M-EST. PATIENT-LVL III: CPT | Mod: PBBFAC,,, | Performed by: INTERNAL MEDICINE

## 2017-09-12 RX ORDER — HEPARIN 100 UNIT/ML
500 SYRINGE INTRAVENOUS
Status: CANCELLED | OUTPATIENT
Start: 2017-09-19

## 2017-09-12 RX ORDER — HEPARIN 100 UNIT/ML
500 SYRINGE INTRAVENOUS
Status: CANCELLED | OUTPATIENT
Start: 2017-09-12

## 2017-09-12 RX ORDER — SODIUM CHLORIDE 0.9 % (FLUSH) 0.9 %
10 SYRINGE (ML) INJECTION
Status: CANCELLED | OUTPATIENT
Start: 2017-09-12

## 2017-09-12 RX ORDER — SODIUM CHLORIDE 0.9 % (FLUSH) 0.9 %
10 SYRINGE (ML) INJECTION
Status: CANCELLED | OUTPATIENT
Start: 2017-09-19

## 2017-09-12 RX ORDER — POLYETHYLENE GLYCOL 3350 17 G/17G
POWDER, FOR SOLUTION ORAL
COMMUNITY

## 2017-09-12 NOTE — PROGRESS NOTES
Subjective:       Patient ID: Beni Cosby is a 71 y.o. male.    Chief Complaint: Follow-up; Results; and Anemia    HPI 71-year-old male history of recurrent iron deficiency anemia status post treatment with intravenous iron returns for review    Past Medical History:   Diagnosis Date    Anticoagulant long-term use     Aortic valve stenosis with insufficiency 4/2008    Surgery Pig Velve    Arthritis     CHF (congestive heart failure)     Chronic a-fib     CKD (chronic kidney disease) stage 3, GFR 30-59 ml/min     COPD (chronic obstructive pulmonary disease)     Diabetes mellitus     Encounter for blood transfusion     Primary cancer of right kidney     Surgery, no further treatment     History reviewed. No pertinent family history.  Social History     Social History    Marital status:      Spouse name: N/A    Number of children: N/A    Years of education: N/A     Occupational History    Not on file.     Social History Main Topics    Smoking status: Never Smoker    Smokeless tobacco: Never Used    Alcohol use No    Drug use: No    Sexual activity: Not on file     Other Topics Concern    Not on file     Social History Narrative    No narrative on file     Past Surgical History:   Procedure Laterality Date    Atrial septem device implanted  2005    CARDIAC SURGERY  2008    Aotric valve replacement     KIDNEY SURGERY Right 2007    NOSE SURGERY  2008       Labs:  Lab Results   Component Value Date    WBC 7.66 09/05/2017    HGB 12.0 (L) 09/05/2017    HCT 35.5 (L) 09/05/2017    MCV 92 09/05/2017     (L) 09/05/2017     BMP  Lab Results   Component Value Date     05/30/2017    K 3.7 05/30/2017    CL 89 (L) 05/30/2017    CO2 34 (H) 05/30/2017    BUN 63 (H) 05/30/2017    CREATININE 1.8 (H) 05/30/2017    CALCIUM 10.2 05/30/2017    ANIONGAP 13 05/30/2017    ESTGFRAFRICA 43 (A) 05/30/2017    EGFRNONAA 37 (A) 05/30/2017     Lab Results   Component Value Date    ALT 17 05/30/2017     AST 24 05/30/2017    ALKPHOS 85 05/30/2017    BILITOT 1.0 05/30/2017       Lab Results   Component Value Date    IRON 79 09/05/2017    TIBC 462 (H) 09/05/2017    FERRITIN 153 09/05/2017     No results found for: [id number]  No results found for: FOLATE  Lab Results   Component Value Date    TSH 0.744 05/05/2016         Review of Systems   Constitutional: Positive for activity change, appetite change and fatigue. Negative for chills, diaphoresis, fever and unexpected weight change.   HENT: Negative for congestion, dental problem, drooling, ear discharge, ear pain, facial swelling, hearing loss, mouth sores, nosebleeds, postnasal drip, rhinorrhea, sinus pressure, sneezing, sore throat, tinnitus, trouble swallowing and voice change.    Eyes: Positive for visual disturbance. Negative for photophobia, pain, discharge, redness and itching.   Respiratory: Positive for shortness of breath. Negative for apnea, cough, choking, chest tightness, wheezing and stridor.    Cardiovascular: Negative for chest pain, palpitations and leg swelling.   Gastrointestinal: Negative for abdominal distention, abdominal pain, anal bleeding, blood in stool, constipation, diarrhea, nausea, rectal pain and vomiting.   Endocrine: Negative for cold intolerance, heat intolerance, polydipsia, polyphagia and polyuria.   Genitourinary: Positive for frequency. Negative for decreased urine volume, difficulty urinating, discharge, dysuria, enuresis, flank pain, genital sores, hematuria, penile pain, penile swelling, scrotal swelling, testicular pain and urgency.   Musculoskeletal: Negative for arthralgias, back pain, gait problem, joint swelling, myalgias, neck pain and neck stiffness.   Skin: Negative for color change, pallor, rash and wound.   Allergic/Immunologic: Negative for environmental allergies, food allergies and immunocompromised state.   Neurological: Positive for weakness. Negative for dizziness, tremors, seizures, syncope, facial asymmetry,  speech difficulty, light-headedness, numbness and headaches.   Hematological: Negative for adenopathy. Does not bruise/bleed easily.   Psychiatric/Behavioral: Negative for agitation, behavioral problems, confusion, decreased concentration, dysphoric mood, hallucinations, self-injury, sleep disturbance and suicidal ideas. The patient is not nervous/anxious and is not hyperactive.        Objective:      Physical Exam   Constitutional: He is oriented to person, place, and time. He has a sickly appearance. He appears ill. He appears distressed.   HENT:   Head: Normocephalic.   Right Ear: External ear normal.   Left Ear: External ear normal.   Nose: Nose normal. Right sinus exhibits no maxillary sinus tenderness and no frontal sinus tenderness. Left sinus exhibits no maxillary sinus tenderness and no frontal sinus tenderness.   Mouth/Throat: Oropharynx is clear and moist. No oropharyngeal exudate.   Eyes: EOM and lids are normal. Pupils are equal, round, and reactive to light. Right eye exhibits no discharge. Left eye exhibits no discharge. Right conjunctiva is not injected. Right conjunctiva has no hemorrhage. Left conjunctiva is not injected. Left conjunctiva has no hemorrhage. No scleral icterus. Right eye exhibits normal extraocular motion. Left eye exhibits normal extraocular motion.   Neck: Normal range of motion. Neck supple. No JVD present. No tracheal deviation present. No thyromegaly present.   Cardiovascular: Normal rate and regular rhythm.    Pulmonary/Chest: Effort normal. No stridor. No respiratory distress.   Abdominal: Soft. He exhibits no mass. There is no hepatosplenomegaly, splenomegaly or hepatomegaly. There is no tenderness.   Musculoskeletal: Normal range of motion. He exhibits no edema or tenderness.   Lymphadenopathy:        Head (right side): No posterior auricular and no occipital adenopathy present.        Head (left side): No posterior auricular and no occipital adenopathy present.     He has  no cervical adenopathy.        Right cervical: No superficial cervical, no deep cervical and no posterior cervical adenopathy present.       Left cervical: No superficial cervical, no deep cervical and no posterior cervical adenopathy present.     He has no axillary adenopathy.        Right: No supraclavicular adenopathy present.        Left: No supraclavicular adenopathy present.   Neurological: He is alert and oriented to person, place, and time. He has normal strength. No cranial nerve deficit. Coordination normal.   Skin: Skin is dry. No rash noted. He is not diaphoretic. No cyanosis or erythema. Nails show no clubbing.   Psychiatric: His behavior is normal. Judgment and thought content normal. His mood appears anxious. Cognition and memory are normal. He exhibits a depressed mood.   Vitals reviewed.          Assessment:      1. Iron deficiency anemia due to chronic blood loss    2. Chronic kidney disease (CKD), stage III (moderate)    3. CHF (congestive heart failure), NYHA class III, chronic, diastolic    4. Chronic respiratory failure with hypoxia           Plan:   Markedly improved in terms of iron status was still not completely repleted at this point will treat with intravenous iron patient will return in 2-3 months patient's hemoglobin may not return to normal based off her renal insufficiency but would like to have fully iron repleted to assess response

## 2017-09-16 ENCOUNTER — OFFICE VISIT (OUTPATIENT)
Dept: NEPHROLOGY | Facility: CLINIC | Age: 72
End: 2017-09-16
Payer: MEDICARE

## 2017-09-16 VITALS
HEART RATE: 74 BPM | WEIGHT: 293.44 LBS | HEIGHT: 75 IN | BODY MASS INDEX: 36.48 KG/M2 | SYSTOLIC BLOOD PRESSURE: 122 MMHG | DIASTOLIC BLOOD PRESSURE: 78 MMHG

## 2017-09-16 DIAGNOSIS — N18.30 CKD (CHRONIC KIDNEY DISEASE) STAGE 3, GFR 30-59 ML/MIN: Primary | ICD-10-CM

## 2017-09-16 DIAGNOSIS — I13.0 CARDIORENAL SYNDROME, STAGE 1-4 OR UNSPECIFIED CHRONIC KIDNEY DISEASE, WITH HEART FAILURE: ICD-10-CM

## 2017-09-16 PROCEDURE — 1126F AMNT PAIN NOTED NONE PRSNT: CPT | Mod: ,,, | Performed by: INTERNAL MEDICINE

## 2017-09-16 PROCEDURE — 99999 PR PBB SHADOW E&M-EST. PATIENT-LVL III: CPT | Mod: PBBFAC,,, | Performed by: INTERNAL MEDICINE

## 2017-09-16 PROCEDURE — 99214 OFFICE O/P EST MOD 30 MIN: CPT | Mod: S$PBB,,, | Performed by: INTERNAL MEDICINE

## 2017-09-16 PROCEDURE — 1159F MED LIST DOCD IN RCRD: CPT | Mod: ,,, | Performed by: INTERNAL MEDICINE

## 2017-09-16 PROCEDURE — 99213 OFFICE O/P EST LOW 20 MIN: CPT | Mod: PBBFAC,PO | Performed by: INTERNAL MEDICINE

## 2017-09-16 NOTE — PROGRESS NOTES
PROGRESS NOTE FOR ESTABLISHED PATIENT    PHYSICIAN REQUESTING THE CONSULT: Hospital follow up, PMD: Dr. Jackson Brandt    REASON FOR CONSULTATION: CKD/ANA LAURA    71 y.o. male with history of AV stenosis (s/p AVR with pig valve), atrial fibrillation, right renal cell cancer (s/p partial resection of right kidney), CHF, CKD 3, CAD (s/p CABG), DM2 presents to the renal clinic for evaluation of renal insufficiency.     Patient had recent kidney biopsy which was negative for malignancy and fungus/AFB (Care [facility] on 8/30/17) . Rare Granuloma tissue was noted. He reports chronic LE edema.             Past Medical History:   Diagnosis Date    Anticoagulant long-term use     Aortic valve stenosis with insufficiency 4/2008    Surgery Pig Velve    Arthritis     CHF (congestive heart failure)     Chronic a-fib     CKD (chronic kidney disease) stage 3, GFR 30-59 ml/min     COPD (chronic obstructive pulmonary disease)     Diabetes mellitus     Encounter for blood transfusion     Primary cancer of right kidney     Surgery, no further treatment       Past Surgical History:   Procedure Laterality Date    Atrial septem device implanted  2005    CARDIAC SURGERY  2008    Aotric valve replacement     KIDNEY SURGERY Right 2007    NOSE SURGERY  2008       Review of patient's allergies indicates:   Allergen Reactions    Morphine      Other reaction(s): Nausea Only       Current Outpatient Prescriptions   Medication Sig Dispense Refill    aspirin (ECOTRIN) 81 MG EC tablet Take 1 tablet (81 mg total) by mouth once daily.  0    blood sugar diagnostic (CONTOUR NEXT STRIPS) Strp Use once daily.      bumetanide (BUMEX) 2 MG tablet Take 2 mg by mouth 2 (two) times daily. Take 2mg every  Am and 1 mg every pm      ferrous sulfate 325 (65 FE) MG EC tablet Take 325 mg by mouth 3 (three) times daily with meals.      gabapentin (NEURONTIN) 100 MG capsule Take 100 mg by mouth 3 (three) times daily.      glimepiride (AMARYL) 1 MG  tablet Take 1 mg by mouth before breakfast.      lansoprazole (PREVACID) 30 MG capsule Take 30 mg by mouth 2 (two) times daily. TAKE 1 CAPSULE BY MOUTH ONCE DAILY      metolazone (ZAROXOLYN) 5 MG tablet once daily.       metoprolol succinate (TOPROL-XL) 100 MG 24 hr tablet Take 50 mg by mouth once daily. 1/2 tablet daily      mometasone (NASONEX) 50 mcg/actuation nasal spray 2 sprays by Nasal route daily as needed.       mometasone-formoterol (DULERA) 100-5 mcg/actuation HFAA Inhale into the lungs 2 (two) times daily. Controller      ondansetron (ZOFRAN, AS HYDROCHLORIDE,) 4 MG tablet Take 1 tablet (4 mg total) by mouth every 12 (twelve) hours as needed for Nausea. 30 tablet 1    polyethylene glycol (GLYCOLAX) 17 gram PwPk Take by mouth as needed.      potassium chloride SA (K-DUR,KLOR-CON) 20 MEQ tablet Take 20 mEq by mouth 2 (two) times daily. Take 2 tablets daily      rosuvastatin (CRESTOR) 10 MG tablet TAKE 1 TABLET BY MOUTH DAILY.      spironolactone (ALDACTONE) 25 MG tablet Take 25 mg by mouth 2 (two) times daily.       warfarin (COUMADIN) 5 MG tablet 5 mg.      warfarin (COUMADIN) 7.5 MG tablet        No current facility-administered medications for this visit.        No family history on file.    Social History     Social History    Marital status:      Spouse name: N/A    Number of children: N/A    Years of education: N/A     Occupational History    Not on file.     Social History Main Topics    Smoking status: Never Smoker    Smokeless tobacco: Never Used    Alcohol use No    Drug use: No    Sexual activity: Not on file     Other Topics Concern    Not on file     Social History Narrative    No narrative on file       Review of Systems:  1. GENERAL: patient denies any fever, weight changes, no dizziness, fatigue.   2. HEENT: patient denies headaches, visual disturbances, swallowing problems, sinus pain, nasal congestion.  3. CARDIOVASCULAR: patient denies chest pain,  "palpitations.  4. PULMONARY: patient reports chronic SOB with exercise, none at present, no coughing, hemoptysis, wheezing.  5. GASTROINTESTINAL: patient denies abdominal pain, nausea, vomiting, diarrhea.  6. GENITOURINARY: patient denies dysuria, hematuria, hesitancy, frequency.  7. EXTREMITIES: patient reports chronic bilateral LE edema, no LE cramping.  8. DERMATOLOGY: patient denies rashes, ulcers.  9. NEURO: patient denies tremors, extremity weakness, extremity numbness/tingling.  10. MUSCULOSKELETAL: patient denies joint pain, joint swelling.  11. HEMATOLOGY: patient denies rectal or gum bleeding.  12: PSYCH: patient denies anxiety, depression.      PHYSICAL EXAM:  /78   Pulse 74   Ht 6' 3" (1.905 m)   Wt 133.1 kg (293 lb 6.9 oz)   BMI 36.68 kg/m²     GENERAL: Pleasant tall gentleman presents to clinic with non-labored breathing.  HEENT: PERRL, no nasal discharge, no icterus, no oral exudates, moist mucosal membranes.  NECK: no thyroid mass, no lymphadenopathy.  HEART: Regular, S1/S2, no rubs, good peripheral pulses.  LUNGS: CTA bilaterally, no wheezing, breathing is nonlabored.  ABDOMEN: soft, nontender, not distended, bowel sounds are present, no abdominal hernia.  EXTREM: pitting bilateral LE edema, +1/+2  SKIN: no rashes, skin is warm and dry.  NEURO: A & O x 3, no obvious focal signs.    LABORATORY RESULTS:    Lab Results   Component Value Date    CREATININE 1.9 (H) 09/12/2017    BUN 80 (H) 09/12/2017     (L) 09/12/2017    K 3.5 09/12/2017    CL 84 (L) 09/12/2017    CO2 33 (H) 09/12/2017     Lab Results   Component Value Date    .0 (H) 04/04/2017    CALCIUM 10.0 09/12/2017    PHOS 3.3 09/12/2017     Lab Results   Component Value Date    ALBUMIN 4.0 09/12/2017       Lab Results   Component Value Date    WBC 7.66 09/05/2017    HGB 12.0 (L) 09/05/2017    HCT 35.5 (L) 09/05/2017    MCV 92 09/05/2017     (L) 09/05/2017             ASSESSMENT AND PLAN:  71 y.o. male with history of " AV stenosis (s/p AVR with pig valve), atrial fibrillation, right renal cell cancer, CHF, CKD 3, CAD (s/p CABG), DM2 presents to the renal clinic for evaluation of renal insufficiency.     1. Renal insufficiency: Patient's renal function has slightly worsened with creatinine increasing to 1.9. This is likely related to cardiorenal syndrome. Patient was advised to hydrate as recommended by his Cardiologist.   Patient was advised to continue Bumex and metolazone at current dose. He will return in 3 months for follow up.     2. Electrolytes: Hyponatremia: Patient is on fluid restrictions (as per cardiologist, Dr. Chew).     3. Acid base status: mild alkalosis, monitor.     4. Volume: Bilateral LE edema. Continue Bumex/metolazone. Patient was advised to avoid excessive salt.     5. Hypertension: good BP control.       6. Medications: Reviewed.     7. CHF: continue fluid restrictions. Patient has follow up with Cardiology (Dr. Chew).    8. Atrial fibrillation/AVR: continue Coumadin.    9. DM2: Continue glipizide.     10. Anemia: Hematology is following. S/p bone marrow biopsy.

## 2017-09-18 ENCOUNTER — INFUSION (OUTPATIENT)
Dept: INFUSION THERAPY | Facility: HOSPITAL | Age: 72
End: 2017-09-18
Attending: INTERNAL MEDICINE
Payer: MEDICARE

## 2017-09-18 VITALS
SYSTOLIC BLOOD PRESSURE: 122 MMHG | HEIGHT: 75 IN | BODY MASS INDEX: 36.48 KG/M2 | HEART RATE: 64 BPM | DIASTOLIC BLOOD PRESSURE: 69 MMHG | TEMPERATURE: 98 F | WEIGHT: 293.44 LBS | OXYGEN SATURATION: 96 % | RESPIRATION RATE: 18 BRPM

## 2017-09-18 DIAGNOSIS — D50.0 IRON DEFICIENCY ANEMIA DUE TO CHRONIC BLOOD LOSS: Primary | ICD-10-CM

## 2017-09-18 PROCEDURE — 96365 THER/PROPH/DIAG IV INF INIT: CPT | Mod: PO

## 2017-09-18 PROCEDURE — 63600175 PHARM REV CODE 636 W HCPCS: Mod: PO | Performed by: INTERNAL MEDICINE

## 2017-09-18 PROCEDURE — 25000003 PHARM REV CODE 250: Mod: PO | Performed by: INTERNAL MEDICINE

## 2017-09-18 RX ADMIN — FERRIC CARBOXYMALTOSE INJECTION 750 MG: 50 INJECTION, SOLUTION INTRAVENOUS at 10:09

## 2017-09-18 NOTE — PLAN OF CARE
Problem: Anemia (Adult)  Intervention: Facilitate Safe Activity  Reviewed side effects of medication as well as efficacy of iron infusion.

## 2017-09-18 NOTE — PATIENT INSTRUCTIONS
FALL PREVENTION   Falls often occur due to slipping, tripping or losing your balance. Here are ways to reduce your risk of falling again.   Was there anything that caused your fall that can be fixed, removed or replaced?   Make your home safe by keeping walkways clear of objects you may trip over.   Use non-slip pads under rugs.   Do not walk in poorly lit areas.   Do not stand on chairs or wobbly ladders.   Use caution when reaching overhead or looking upward. This position can cause a loss of balance.   Be sure your shoes fit properly, have non-slip bottoms and are in good condition.   Be cautious when going up and down stairs, curbs, and when walking on uneven sidewalks.   If your balance is poor, consider using a cane or walker.   If your fall was related to alcohol use, stop or limit alcohol intake.   If your fall was related to use of sleeping medicines, talk to your doctor about this. You may need to reduce your dosage at bedtime if you awaken during the night to go to the bathroom.   To reduce the need for nighttime bathroom trips:   Avoid drinking fluids for several hours before going to bed   Empty your bladder before going to bed   Men can keep a urinal at the bedside   © 2577-9230 [employer] [facility], [address] [place] [address], [place] [address]. All rights reserved. This information is not intended as a substitute for professional medical care. Always follow your healthcare professional's instructions.

## 2017-09-18 NOTE — PLAN OF CARE
"Problem: Patient Care Overview  Goal: Plan of Care Review  Outcome: Ongoing (interventions implemented as appropriate)  Pt states, " I feel tired and weak today, also been having issues with constipation"      "

## 2017-09-25 ENCOUNTER — INFUSION (OUTPATIENT)
Dept: INFUSION THERAPY | Facility: HOSPITAL | Age: 72
End: 2017-09-25
Attending: INTERNAL MEDICINE
Payer: MEDICARE

## 2017-09-25 VITALS
HEIGHT: 75 IN | TEMPERATURE: 98 F | BODY MASS INDEX: 36.06 KG/M2 | DIASTOLIC BLOOD PRESSURE: 61 MMHG | WEIGHT: 290 LBS | RESPIRATION RATE: 18 BRPM | SYSTOLIC BLOOD PRESSURE: 107 MMHG | HEART RATE: 65 BPM

## 2017-09-25 DIAGNOSIS — D50.0 IRON DEFICIENCY ANEMIA DUE TO CHRONIC BLOOD LOSS: Primary | ICD-10-CM

## 2017-09-25 PROCEDURE — 25000003 PHARM REV CODE 250: Performed by: INTERNAL MEDICINE

## 2017-09-25 PROCEDURE — 96365 THER/PROPH/DIAG IV INF INIT: CPT

## 2017-09-25 PROCEDURE — 63600175 PHARM REV CODE 636 W HCPCS: Performed by: INTERNAL MEDICINE

## 2017-09-25 RX ADMIN — FERRIC CARBOXYMALTOSE INJECTION 750 MG: 50 INJECTION, SOLUTION INTRAVENOUS at 10:09

## 2017-09-25 RX ADMIN — SODIUM CHLORIDE: 9 INJECTION, SOLUTION INTRAVENOUS at 10:09

## 2017-09-25 NOTE — PATIENT INSTRUCTIONS
.  [facility]  [address]  [address] [address]  [phone] phone     [phone] fax  Hours of Operation: Monday- Friday 8:00am- 5:00pm  After hours phone  [phone]  Hematology / On[facility] Phys[facility] on call      Dr. Ralf Santiago    Please call with any concerns regarding your appointment today.    .FALL PREVENTION   Falls often occur due to slipping, tripping or losing your balance. Here are ways to reduce your risk of falling again.   Was there anything that caused your fall that can be fixed, removed or replaced?   Make your home safe by keeping walkways clear of objects you may trip over.   Use non-slip pads under rugs.   Do not walk in poorly lit areas.   Do not stand on chairs or wobbly ladders.   Use caution when reaching overhead or looking upward. This position can cause a loss of balance.   Be sure your shoes fit properly, have non-slip bottoms and are in good condition.   Be cautious when going up and down stairs, curbs, and when walking on uneven sidewalks.   If your balance is poor, consider using a cane or walker.   If your fall was related to alcohol use, stop or limit alcohol intake.   If your fall was related to use of sleeping medicines, talk to your doctor about this. You may need to reduce your dosage at bedtime if you awaken during the night to go to the bathroom.   To reduce the need for nighttime bathroom trips:   Avoid drinking fluids for several hours before going to bed   Empty your bladder before going to bed   Men can keep a urinal at the bedside   © 2519-3804 [employer], [address] [place] [address], [place] [address]. All rights reserved. This information is not intended as a substitute for professional medical care. Always follow your healthcare professional's instructions.

## 2017-09-25 NOTE — PLAN OF CARE
Problem: Fall Risk (Adult)  Intervention: Safety Promotion/Fall Prevention  Placed pt on fall risk and reviewed fall precautions

## 2017-09-25 NOTE — PLAN OF CARE
"Problem: Patient Care Overview  Goal: Plan of Care Review  Outcome: Ongoing (interventions implemented as appropriate)  Pt states, "I feel tired and weak, along with shortness of breath"      "

## 2017-09-25 NOTE — PLAN OF CARE
Problem: Anemia (Adult)  Intervention: Promote Hydration and Nutrition  Reviewed side effects and efficacy of medication with pt;

## 2017-12-19 ENCOUNTER — TELEPHONE (OUTPATIENT)
Dept: NEPHROLOGY | Facility: CLINIC | Age: 72
End: 2017-12-19

## 2017-12-19 ENCOUNTER — HOSPITAL ENCOUNTER (INPATIENT)
Facility: HOSPITAL | Age: 72
LOS: 3 days | Discharge: HOME | DRG: 640 | End: 2017-12-23
Attending: EMERGENCY MEDICINE | Admitting: FAMILY MEDICINE
Payer: MEDICARE

## 2017-12-19 DIAGNOSIS — J98.01 BRONCHOSPASM: ICD-10-CM

## 2017-12-19 DIAGNOSIS — I48.91 A-FIB: ICD-10-CM

## 2017-12-19 DIAGNOSIS — I50.9 CHF (CONGESTIVE HEART FAILURE): ICD-10-CM

## 2017-12-19 DIAGNOSIS — N18.30 CKD (CHRONIC KIDNEY DISEASE) STAGE 3, GFR 30-59 ML/MIN: Primary | ICD-10-CM

## 2017-12-19 DIAGNOSIS — E87.1 HYPONATREMIA: Primary | ICD-10-CM

## 2017-12-19 DIAGNOSIS — R07.9 CHEST PAIN: ICD-10-CM

## 2017-12-19 DIAGNOSIS — N28.9 RENAL INSUFFICIENCY: ICD-10-CM

## 2017-12-19 DIAGNOSIS — R53.1 WEAKNESS: ICD-10-CM

## 2017-12-19 LAB
ANION GAP SERPL CALC-SCNC: 15 MMOL/L
BUN SERPL-MCNC: 61 MG/DL
CALCIUM SERPL-MCNC: 8.9 MG/DL
CHLORIDE SERPL-SCNC: 90 MMOL/L
CO2 SERPL-SCNC: 19 MMOL/L
CREAT SERPL-MCNC: 1.7 MG/DL
EST. GFR  (AFRICAN AMERICAN): 46 ML/MIN/1.73 M^2
EST. GFR  (NON AFRICAN AMERICAN): 39 ML/MIN/1.73 M^2
GLUCOSE SERPL-MCNC: 102 MG/DL
POTASSIUM SERPL-SCNC: 4.4 MMOL/L
SODIUM SERPL-SCNC: 124 MMOL/L

## 2017-12-19 PROCEDURE — 99285 EMERGENCY DEPT VISIT HI MDM: CPT | Mod: 25

## 2017-12-19 PROCEDURE — 96360 HYDRATION IV INFUSION INIT: CPT

## 2017-12-19 PROCEDURE — 25000242 PHARM REV CODE 250 ALT 637 W/ HCPCS: Performed by: EMERGENCY MEDICINE

## 2017-12-19 PROCEDURE — 85610 PROTHROMBIN TIME: CPT

## 2017-12-19 PROCEDURE — 96361 HYDRATE IV INFUSION ADD-ON: CPT

## 2017-12-19 PROCEDURE — 93005 ELECTROCARDIOGRAM TRACING: CPT

## 2017-12-19 PROCEDURE — 80048 BASIC METABOLIC PNL TOTAL CA: CPT

## 2017-12-19 PROCEDURE — 94640 AIRWAY INHALATION TREATMENT: CPT

## 2017-12-19 RX ORDER — IPRATROPIUM BROMIDE AND ALBUTEROL SULFATE 2.5; .5 MG/3ML; MG/3ML
3 SOLUTION RESPIRATORY (INHALATION)
Status: COMPLETED | OUTPATIENT
Start: 2017-12-19 | End: 2017-12-19

## 2017-12-19 RX ADMIN — IPRATROPIUM BROMIDE AND ALBUTEROL SULFATE 3 ML: .5; 3 SOLUTION RESPIRATORY (INHALATION) at 11:12

## 2017-12-19 NOTE — TELEPHONE ENCOUNTER
Called pt. Regarding low sodium results, advised to repeat blood work today per Dr. Jackson, orders faxed to [facility] lab per pt. Request.

## 2017-12-19 NOTE — TELEPHONE ENCOUNTER
Called patient to precede to the ER per Dr. Jackson for low sodium level, 119. Patient and his wife was informed. They states that they are in [place] now to get their grandson and sure if when he would make it. Patient states that he does feel weak. Advised again to go as soon as possible. They both expressed understanding.

## 2017-12-20 PROBLEM — R63.4 RECENT WEIGHT LOSS: Status: ACTIVE | Noted: 2017-12-20

## 2017-12-20 PROBLEM — R53.1 WEAKNESS: Status: ACTIVE | Noted: 2017-12-20

## 2017-12-20 PROBLEM — E87.1 HYPONATREMIA: Status: ACTIVE | Noted: 2017-12-20

## 2017-12-20 LAB
ANION GAP SERPL CALC-SCNC: 17 MMOL/L
BASOPHILS # BLD AUTO: 0.01 K/UL
BASOPHILS NFR BLD: 0.1 %
BILIRUB UR QL STRIP: NEGATIVE
BUN SERPL-MCNC: 63 MG/DL
CALCIUM SERPL-MCNC: 9 MG/DL
CHLORIDE SERPL-SCNC: 86 MMOL/L
CLARITY UR: CLEAR
CO2 SERPL-SCNC: 21 MMOL/L
COLOR UR: YELLOW
CREAT SERPL-MCNC: 1.6 MG/DL
DIFFERENTIAL METHOD: ABNORMAL
EOSINOPHIL # BLD AUTO: 0.1 K/UL
EOSINOPHIL NFR BLD: 1 %
ERYTHROCYTE [DISTWIDTH] IN BLOOD BY AUTOMATED COUNT: 14.9 %
EST. GFR  (AFRICAN AMERICAN): 49 ML/MIN/1.73 M^2
EST. GFR  (NON AFRICAN AMERICAN): 42 ML/MIN/1.73 M^2
ESTIMATED AVG GLUCOSE: 151 MG/DL
GLUCOSE SERPL-MCNC: 95 MG/DL
GLUCOSE UR QL STRIP: NEGATIVE
HBA1C MFR BLD HPLC: 6.9 %
HCT VFR BLD AUTO: 31.3 %
HGB BLD-MCNC: 11.1 G/DL
HGB UR QL STRIP: NEGATIVE
INR PPP: 3.5
INR PPP: 3.7
KETONES UR QL STRIP: NEGATIVE
LEUKOCYTE ESTERASE UR QL STRIP: NEGATIVE
LYMPHOCYTES # BLD AUTO: 1.2 K/UL
LYMPHOCYTES NFR BLD: 12.4 %
MAGNESIUM SERPL-MCNC: 1.6 MG/DL
MCH RBC QN AUTO: 30.8 PG
MCHC RBC AUTO-ENTMCNC: 35.5 G/DL
MCV RBC AUTO: 87 FL
MONOCYTES # BLD AUTO: 0.8 K/UL
MONOCYTES NFR BLD: 8.4 %
NEUTROPHILS # BLD AUTO: 7.8 K/UL
NEUTROPHILS NFR BLD: 78.1 %
NITRITE UR QL STRIP: NEGATIVE
PH UR STRIP: 6 [PH] (ref 5–8)
PHOSPHATE SERPL-MCNC: 3.2 MG/DL
PLATELET # BLD AUTO: 180 K/UL
PMV BLD AUTO: 8.8 FL
POTASSIUM SERPL-SCNC: 3.8 MMOL/L
PROT UR QL STRIP: NEGATIVE
PROTHROMBIN TIME: 34.5 SEC
PROTHROMBIN TIME: 36.3 SEC
RBC # BLD AUTO: 3.6 M/UL
SODIUM SERPL-SCNC: 124 MMOL/L
SP GR UR STRIP: <=1.005 (ref 1–1.03)
URN SPEC COLLECT METH UR: ABNORMAL
UROBILINOGEN UR STRIP-ACNC: NEGATIVE EU/DL
WBC # BLD AUTO: 9.98 K/UL

## 2017-12-20 PROCEDURE — 11000001 HC ACUTE MED/SURG PRIVATE ROOM

## 2017-12-20 PROCEDURE — 97165 OT EVAL LOW COMPLEX 30 MIN: CPT

## 2017-12-20 PROCEDURE — 83036 HEMOGLOBIN GLYCOSYLATED A1C: CPT

## 2017-12-20 PROCEDURE — 25000003 PHARM REV CODE 250: Performed by: NURSE PRACTITIONER

## 2017-12-20 PROCEDURE — G8987 SELF CARE CURRENT STATUS: HCPCS | Mod: CK

## 2017-12-20 PROCEDURE — G8989 SELF CARE D/C STATUS: HCPCS | Mod: CK

## 2017-12-20 PROCEDURE — 97530 THERAPEUTIC ACTIVITIES: CPT

## 2017-12-20 PROCEDURE — G8979 MOBILITY GOAL STATUS: HCPCS | Mod: CH

## 2017-12-20 PROCEDURE — 84100 ASSAY OF PHOSPHORUS: CPT

## 2017-12-20 PROCEDURE — 83735 ASSAY OF MAGNESIUM: CPT

## 2017-12-20 PROCEDURE — G8978 MOBILITY CURRENT STATUS: HCPCS | Mod: CH

## 2017-12-20 PROCEDURE — G8980 MOBILITY D/C STATUS: HCPCS | Mod: CH

## 2017-12-20 PROCEDURE — 97116 GAIT TRAINING THERAPY: CPT

## 2017-12-20 PROCEDURE — 21400001 HC TELEMETRY ROOM

## 2017-12-20 PROCEDURE — 80048 BASIC METABOLIC PNL TOTAL CA: CPT

## 2017-12-20 PROCEDURE — G8988 SELF CARE GOAL STATUS: HCPCS | Mod: CK

## 2017-12-20 PROCEDURE — 97802 MEDICAL NUTRITION INDIV IN: CPT

## 2017-12-20 PROCEDURE — 97161 PT EVAL LOW COMPLEX 20 MIN: CPT

## 2017-12-20 PROCEDURE — 99222 1ST HOSP IP/OBS MODERATE 55: CPT | Mod: ,,, | Performed by: INTERNAL MEDICINE

## 2017-12-20 PROCEDURE — 81003 URINALYSIS AUTO W/O SCOPE: CPT

## 2017-12-20 PROCEDURE — 93010 ELECTROCARDIOGRAM REPORT: CPT | Mod: ,,, | Performed by: INTERNAL MEDICINE

## 2017-12-20 PROCEDURE — 85025 COMPLETE CBC W/AUTO DIFF WBC: CPT

## 2017-12-20 PROCEDURE — 85610 PROTHROMBIN TIME: CPT

## 2017-12-20 RX ORDER — SODIUM CHLORIDE 9 MG/ML
INJECTION, SOLUTION INTRAVENOUS CONTINUOUS
Status: ACTIVE | OUTPATIENT
Start: 2017-12-20 | End: 2017-12-21

## 2017-12-20 RX ORDER — ROSUVASTATIN CALCIUM 10 MG/1
10 TABLET, COATED ORAL DAILY
Status: DISCONTINUED | OUTPATIENT
Start: 2017-12-20 | End: 2017-12-23 | Stop reason: HOSPADM

## 2017-12-20 RX ORDER — METOPROLOL SUCCINATE 50 MG/1
100 TABLET, EXTENDED RELEASE ORAL DAILY
Status: DISCONTINUED | OUTPATIENT
Start: 2017-12-20 | End: 2017-12-23 | Stop reason: HOSPADM

## 2017-12-20 RX ORDER — GABAPENTIN 100 MG/1
100 CAPSULE ORAL 3 TIMES DAILY
Status: DISCONTINUED | OUTPATIENT
Start: 2017-12-20 | End: 2017-12-21

## 2017-12-20 RX ORDER — FERROUS SULFATE 325(65) MG
325 TABLET, DELAYED RELEASE (ENTERIC COATED) ORAL
Status: DISCONTINUED | OUTPATIENT
Start: 2017-12-20 | End: 2017-12-23 | Stop reason: HOSPADM

## 2017-12-20 RX ORDER — ONDANSETRON 2 MG/ML
4 INJECTION INTRAMUSCULAR; INTRAVENOUS EVERY 8 HOURS PRN
Status: DISCONTINUED | OUTPATIENT
Start: 2017-12-20 | End: 2017-12-23 | Stop reason: HOSPADM

## 2017-12-20 RX ORDER — FAMOTIDINE 20 MG/1
20 TABLET, FILM COATED ORAL 2 TIMES DAILY
Status: DISCONTINUED | OUTPATIENT
Start: 2017-12-20 | End: 2017-12-23 | Stop reason: HOSPADM

## 2017-12-20 RX ORDER — WARFARIN SODIUM 5 MG/1
5 TABLET ORAL EVERY OTHER DAY
Status: DISCONTINUED | OUTPATIENT
Start: 2017-12-23 | End: 2017-12-21

## 2017-12-20 RX ORDER — HEPARIN SODIUM 5000 [USP'U]/ML
5000 INJECTION, SOLUTION INTRAVENOUS; SUBCUTANEOUS EVERY 8 HOURS
Status: DISCONTINUED | OUTPATIENT
Start: 2017-12-20 | End: 2017-12-20 | Stop reason: CLARIF

## 2017-12-20 RX ADMIN — SODIUM CHLORIDE: 0.9 INJECTION, SOLUTION INTRAVENOUS at 03:12

## 2017-12-20 RX ADMIN — FERROUS SULFATE TAB EC 325 MG (65 MG FE EQUIVALENT) 325 MG: 325 (65 FE) TABLET DELAYED RESPONSE at 06:12

## 2017-12-20 RX ADMIN — ROSUVASTATIN CALCIUM 10 MG: 10 TABLET, FILM COATED ORAL at 08:12

## 2017-12-20 RX ADMIN — FAMOTIDINE 20 MG: 20 TABLET, FILM COATED ORAL at 08:12

## 2017-12-20 RX ADMIN — FERROUS SULFATE TAB EC 325 MG (65 MG FE EQUIVALENT) 325 MG: 325 (65 FE) TABLET DELAYED RESPONSE at 01:12

## 2017-12-20 RX ADMIN — FERROUS SULFATE TAB EC 325 MG (65 MG FE EQUIVALENT) 325 MG: 325 (65 FE) TABLET DELAYED RESPONSE at 08:12

## 2017-12-20 RX ADMIN — FAMOTIDINE 20 MG: 20 TABLET, FILM COATED ORAL at 10:12

## 2017-12-20 RX ADMIN — METOPROLOL SUCCINATE 100 MG: 50 TABLET, EXTENDED RELEASE ORAL at 08:12

## 2017-12-20 NOTE — HPI
Patient is a 72-year-old male with history of chronic kidney disease stage III followed by Dr. Jackson in the nephrology division.  Patient has a history of acute kidney injury on Zaroxolyn and Bumex and 2016.  Today patient comes in with hyponatremia on Zaroxolyn.  According to the wife patient has been drinking a lot of water as well as losing a lot of weight from diuretics.  His sodium has gone down to 124.  Patient has been started on normal saline in the emergency room and a consultation is requested.

## 2017-12-20 NOTE — CONSULTS
[facility] -   Nephrology  Consult Note    Patient Name: Beni Cosby  MRN: [id number]  Admission Date: 12/19/2017  Hospital Length of Stay: 0 days  Attending Provider: Mc Lucas MD   Primary Care Physician: Jackson Brandt MD  Principal Problem:Hyponatremia    Consults  Subjective:     HPI: Patient is a 72-year-old male with history of chronic kidney disease stage III followed by Dr. Jackson in the nephrology division.  Patient has a history of acute kidney injury on Zaroxolyn and Bumex and 2016.  Today patient comes in with hyponatremia on Zaroxolyn.  According to the wife patient has been drinking a lot of water as well as losing a lot of weight from diuretics.  His sodium has gone down to 124.  Patient has been started on normal saline in the emergency room and a consultation is requested.    Past Medical History:   Diagnosis Date    Anticoagulant long-term use     Aortic valve stenosis with insufficiency 4/2008    [facility]    Arthritis     CHF (congestive heart failure)     Chronic a-fib     CKD (chronic kidney disease) stage 3, GFR 30-59 ml/min     COPD (chronic obstructive pulmonary disease)     Diabetes mellitus     Encounter for blood transfusion     Primary cancer of right kidney     Surgery, no further treatment       Past Surgical History:   Procedure Laterality Date    Atrial septem device implanted  2005    BONE MARROW BIOPSY      CARDIAC SURGERY  2008    Aotric valve replacement     KIDNEY SURGERY Right 2007    NOSE SURGERY  2008    RENAL BIOPSY  8/30/       Review of patient's allergies indicates:   Allergen Reactions    Morphine      Other reaction(s): Nausea Only       No current facility-administered medications on file prior to encounter.      Current Outpatient Prescriptions on File Prior to Encounter   Medication Sig    bumetanide (BUMEX) 2 MG tablet Take 2 mg by mouth 2 (two) times daily. Take 2mg every  Am and 1 mg every pm    ferrous  sulfate 325 (65 FE) MG EC tablet Take 325 mg by mouth 3 (three) times daily with meals.    glimepiride (AMARYL) 1 MG tablet Take 1 mg by mouth before breakfast.    lansoprazole (PREVACID) 30 MG capsule Take 30 mg by mouth 2 (two) times daily. TAKE 1 CAPSULE BY MOUTH ONCE DAILY    metolazone (ZAROXOLYN) 5 MG tablet once daily.     metoprolol succinate (TOPROL-XL) 100 MG 24 hr tablet Take 50 mg by mouth once daily. 1/2 tablet daily    potassium chloride SA (K-DUR,KLOR-CON) 20 MEQ tablet Take 20 mEq by mouth 2 (two) times daily. Take 2 tablets daily    spironolactone (ALDACTONE) 25 MG tablet Take 25 mg by mouth 2 (two) times daily.     warfarin (COUMADIN) 5 MG tablet 5 mg.    warfarin (COUMADIN) 7.5 MG tablet     aspirin (ECOTRIN) 81 MG EC tablet Take 1 tablet (81 mg total) by mouth once daily.    blood sugar diagnostic (CONTOUR NEXT STRIPS) Strp Use once daily.    mometasone (NASONEX) 50 mcg/actuation nasal spray 2 sprays by Nasal route daily as needed.     mometasone-formoterol (DULERA) 100-5 mcg/actuation HFAA Inhale into the lungs 2 (two) times daily. Controller    ondansetron (ZOFRAN, AS HYDROCHLORIDE,) 4 MG tablet Take 1 tablet (4 mg total) by mouth every 12 (twelve) hours as needed for Nausea.    polyethylene glycol (GLYCOLAX) 17 gram PwPk Take by mouth as needed.    rosuvastatin (CRESTOR) 10 MG tablet TAKE 1 TABLET BY MOUTH DAILY.    [DISCONTINUED] gabapentin (NEURONTIN) 100 MG capsule Take 100 mg by mouth 3 (three) times daily.     Family History     None        Social History Main Topics    Smoking status: Never Smoker    Smokeless tobacco: Never Used    Alcohol use No    Drug use: No    Sexual activity: Not Currently     Review of Systems   Constitutional: Positive for fatigue and unexpected weight change. Negative for chills, diaphoresis and fever.   HENT: Negative for congestion, sore throat and voice change.    Eyes: Negative for photophobia and visual disturbance.   Respiratory:  Negative for cough, shortness of breath, wheezing and stridor.    Cardiovascular: Negative for chest pain and leg swelling.   Gastrointestinal: Negative for abdominal distention, abdominal pain, constipation, diarrhea, nausea and vomiting.   Endocrine: Negative for polydipsia, polyphagia and polyuria.   Genitourinary: Negative for difficulty urinating, dysuria, flank pain, testicular pain and urgency.   Musculoskeletal: Negative for back pain, joint swelling, neck pain and neck stiffness.   Skin: Negative for color change and rash.   Allergic/Immunologic: Negative for immunocompromised state.   Neurological: Negative for dizziness, syncope, weakness, numbness and headaches.   Hematological: Does not bruise/bleed easily.   Psychiatric/Behavioral: Negative for agitation, behavioral problems and confusion.     Objective:     Vital Signs (Most Recent):  Temp: 98.5 °F (36.9 °C) (12/20/17 0602)  Pulse: 67 (12/20/17 1003)  Resp: (!) 22 (12/20/17 1003)  BP: (!) 107/53 (12/20/17 1003)  SpO2: 97 % (12/20/17 1003) Vital Signs (24h Range):  Temp:  [98 °F (36.7 °C)-98.5 °F (36.9 °C)] 98.5 °F (36.9 °C)  Pulse:  [61-71] 67  Resp:  [16-22] 22  SpO2:  [96 %-100 %] 97 %  BP: (102-136)/(52-65) 107/53     Weight: 121.3 kg (267 lb 6 oz)  Body mass index is 33.42 kg/m².    Physical Exam   Constitutional: He is oriented to person, place, and time. He appears well-developed. No distress.   Elderly male    HENT:   Head: Normocephalic and atraumatic.   Nose: Nose normal.   Eyes: Conjunctivae and EOM are normal. Pupils are equal, round, and reactive to light. No scleral icterus.   Neck: Normal range of motion. Neck supple. No tracheal deviation present.   Cardiovascular: Normal rate, regular rhythm and intact distal pulses.    Murmur heard.  Bilateral lower ext edema, +1+2   Pulmonary/Chest: Effort normal and breath sounds normal. No stridor. No respiratory distress. He has no wheezes. He has no rales.   Abdominal: Soft. Bowel sounds are  normal. He exhibits no distension. There is no tenderness. There is no guarding.   obese   Genitourinary:   Genitourinary Comments: deferred   Musculoskeletal: Normal range of motion. He exhibits no edema or deformity.   Neurological: He is alert and oriented to person, place, and time. No cranial nerve deficit.   Skin: Skin is warm and dry. Capillary refill takes 2 to 3 seconds. No rash noted. He is not diaphoretic.   Chronic discoloration to bilateral lower ext.    Psychiatric: He has a normal mood and affect. His behavior is normal. Judgment and thought content normal.   Nursing note reviewed.        CRANIAL NERVES     CN III, IV, VI   Pupils are equal, round, and reactive to light.  Extraocular motions are normal.        Significant Labs: All pertinent labs within the past 24 hours have been reviewed.   Results for orders placed or performed during the hospital encounter of 12/19/17   Basic metabolic panel   Result Value Ref Range    Sodium 124 (L) 136 - 145 mmol/L    Potassium 4.4 3.5 - 5.1 mmol/L    Chloride 90 (L) 95 - 110 mmol/L    CO2 19 (L) 23 - 29 mmol/L    Glucose 102 70 - 110 mg/dL    BUN, Bld 61 (H) 8 - 23 mg/dL    Creatinine 1.7 (H) 0.5 - 1.4 mg/dL    Calcium 8.9 8.7 - 10.5 mg/dL    Anion Gap 15 8 - 16 mmol/L    eGFR if African American 46 (A) >60 mL/min/1.73 m^2    eGFR if non African American 39 (A) >60 mL/min/1.73 m^2   Protime-INR   Result Value Ref Range    Prothrombin Time 36.3 (H) 9.0 - 12.5 sec    INR 3.7 (H) 0.8 - 1.2   Urinalysis   Result Value Ref Range    Specimen UA Urine, Clean Catch     Color, UA Yellow Yellow, Straw, Amaya    Appearance, UA Clear Clear    pH, UA 6.0 5.0 - 8.0    Specific Gravity, UA <=1.005 (A) 1.005 - 1.030    Protein, UA Negative Negative    Glucose, UA Negative Negative    Ketones, UA Negative Negative    Bilirubin (UA) Negative Negative    Occult Blood UA Negative Negative    Nitrite, UA Negative Negative    Urobilinogen, UA Negative <2.0 EU/dL    Leukocytes, UA  Negative Negative   CBC auto differential   Result Value Ref Range    WBC 9.98 3.90 - 12.70 K/uL    RBC 3.60 (L) 4.60 - 6.20 M/uL    Hemoglobin 11.1 (L) 14.0 - 18.0 g/dL    Hematocrit 31.3 (L) 40.0 - 54.0 %    MCV 87 82 - 98 fL    MCH 30.8 27.0 - 31.0 pg    MCHC 35.5 32.0 - 36.0 g/dL    RDW 14.9 (H) 11.5 - 14.5 %    Platelets 180 150 - 350 K/uL    MPV 8.8 (L) 9.2 - 12.9 fL    Gran # 7.8 (H) 1.8 - 7.7 K/uL    Lymph # 1.2 1.0 - 4.8 K/uL    Mono # 0.8 0.3 - 1.0 K/uL    Eos # 0.1 0.0 - 0.5 K/uL    Baso # 0.01 0.00 - 0.20 K/uL    Gran% 78.1 (H) 38.0 - 73.0 %    Lymph% 12.4 (L) 18.0 - 48.0 %    Mono% 8.4 4.0 - 15.0 %    Eosinophil% 1.0 0.0 - 8.0 %    Basophil% 0.1 0.0 - 1.9 %    Differential Method Automated    Magnesium - if not done in ED   Result Value Ref Range    Magnesium 1.6 1.6 - 2.6 mg/dL   Phosphorus - if not done in ED   Result Value Ref Range    Phosphorus 3.2 2.7 - 4.5 mg/dL   Basic metabolic panel    Result Value Ref Range    Sodium 124 (L) 136 - 145 mmol/L    Potassium 3.8 3.5 - 5.1 mmol/L    Chloride 86 (L) 95 - 110 mmol/L    CO2 21 (L) 23 - 29 mmol/L    Glucose 95 70 - 110 mg/dL    BUN, Bld 63 (H) 8 - 23 mg/dL    Creatinine 1.6 (H) 0.5 - 1.4 mg/dL    Calcium 9.0 8.7 - 10.5 mg/dL    Anion Gap 17 (H) 8 - 16 mmol/L    eGFR if African American 49 (A) >60 mL/min/1.73 m^2    eGFR if non African American 42 (A) >60 mL/min/1.73 m^2   Protime-INR   Result Value Ref Range    Prothrombin Time 34.5 (H) 9.0 - 12.5 sec    INR 3.5 (H) 0.8 - 1.2         Significant Imaging: I have reviewed all pertinent imaging results/findings within the past 24 hours.   Imaging Results          X-Ray Chest PA And Lateral (Final result)  Result time 12/19/17 23:17:38    Final result by Kwan Cardenas III, MD (12/19/17 23:17:38)                 Impression:     Stable cardiomegaly and mild vascular congestion.  Stable chronic interstitial lung disease versus interstitial edema.      Electronically signed by: KWAN CARDENAS MD  Date:      12/19/17  Time:    23:17              Narrative:    XR CHEST PA AND LATERAL    Clinical history: R07.9 Chest pain, unspecified    Comparison: 05/12/2016    Findings: Prior sternotomy is again noted.  There is stable mild cardiomegaly and pulmonary vascular congestion.  There are stable mild interstitial thickening and patchy hazy opacities in the bilateral mid and lower lung zone representing interstitial edema or chronic interstitial lung disease. No new acute appearing infiltrate, pleural effusion, pneumothorax or other acute disease identified. No acute osseous abnormality detected.                                Assessment/Plan:     * Hyponatremia    Stop Zaroxolyn and continue with normal saline.  Long discussion with the patient and the family about avoiding free water.  We may have to adjust the diuretics at the time of discharge.  Maybe increase the loop diuretic instead of the Zaroxolyn which can be used sparingly 1 to twice a week            Thank you for your consult.     Rubin Thomas MD  Nephrology  [facility] -

## 2017-12-20 NOTE — CONSULTS
Food & Nutrition  Education     Diet Education: Food and Drug interaction education ~ Vitamin K and Coumadin (warfarin)  Time Spent: 15 minutes  Learners:Patient and Family     Nutrition Education provided with handouts: Yes ~ Vitamin K and Medications from the Nutrition Care Manual      Comments:  Nutrition services was consulted for food and drug interaction education. Patient and Family were educated and a handout on Vitamin K and Coumadin information was provided to patient. Pt verbalized understanding and the importance of diet compliance and making lifestyle changes. All questions and concerns answered. Dietitian's contact information provided for further questions or concerns.      Follow-Up:     Please Re-consult as needed           Thanks!     Lucy Vargas RDN, LDN

## 2017-12-20 NOTE — SUBJECTIVE & OBJECTIVE
Past Medical History:   Diagnosis Date    Anticoagulant long-term use     Aortic valve stenosis with insufficiency 4/2008    [facility]    Arthritis     CHF (congestive heart failure)     Chronic a-fib     CKD (chronic kidney disease) stage 3, GFR 30-59 ml/min     COPD (chronic obstructive pulmonary disease)     Diabetes mellitus     Encounter for blood transfusion     Primary cancer of right kidney     Surgery, no further treatment       Past Surgical History:   Procedure Laterality Date    Atrial septem device implanted  2005    BONE MARROW BIOPSY      CARDIAC SURGERY  2008    Aotric valve replacement     KIDNEY SURGERY Right 2007    NOSE SURGERY  2008    RENAL BIOPSY  8/30/       Review of patient's allergies indicates:   Allergen Reactions    Morphine      Other reaction(s): Nausea Only       No current facility-administered medications on file prior to encounter.      Current Outpatient Prescriptions on File Prior to Encounter   Medication Sig    bumetanide (BUMEX) 2 MG tablet Take 2 mg by mouth 2 (two) times daily. Take 2mg every  Am and 1 mg every pm    ferrous sulfate 325 (65 FE) MG EC tablet Take 325 mg by mouth 3 (three) times daily with meals.    glimepiride (AMARYL) 1 MG tablet Take 1 mg by mouth before breakfast.    lansoprazole (PREVACID) 30 MG capsule Take 30 mg by mouth 2 (two) times daily. TAKE 1 CAPSULE BY MOUTH ONCE DAILY    metolazone (ZAROXOLYN) 5 MG tablet once daily.     metoprolol succinate (TOPROL-XL) 100 MG 24 hr tablet Take 50 mg by mouth once daily. 1/2 tablet daily    potassium chloride SA (K-DUR,KLOR-CON) 20 MEQ tablet Take 20 mEq by mouth 2 (two) times daily. Take 2 tablets daily    spironolactone (ALDACTONE) 25 MG tablet Take 25 mg by mouth 2 (two) times daily.     warfarin (COUMADIN) 5 MG tablet 5 mg.    warfarin (COUMADIN) 7.5 MG tablet     aspirin (ECOTRIN) 81 MG EC tablet Take 1 tablet (81 mg total) by mouth once daily.    blood sugar diagnostic  (CONTOUR NEXT STRIPS) Strp Use once daily.    gabapentin (NEURONTIN) 100 MG capsule Take 100 mg by mouth 3 (three) times daily.    mometasone (NASONEX) 50 mcg/actuation nasal spray 2 sprays by Nasal route daily as needed.     mometasone-formoterol (DULERA) 100-5 mcg/actuation HFAA Inhale into the lungs 2 (two) times daily. Controller    ondansetron (ZOFRAN, AS HYDROCHLORIDE,) 4 MG tablet Take 1 tablet (4 mg total) by mouth every 12 (twelve) hours as needed for Nausea.    polyethylene glycol (GLYCOLAX) 17 gram PwPk Take by mouth as needed.    rosuvastatin (CRESTOR) 10 MG tablet TAKE 1 TABLET BY MOUTH DAILY.     Family History     None        Social History Main Topics    Smoking status: Never Smoker    Smokeless tobacco: Never Used    Alcohol use No    Drug use: No    Sexual activity: Not Currently     Review of Systems   Constitutional: Positive for fatigue and unexpected weight change. Negative for chills, diaphoresis and fever.   HENT: Negative for congestion, sore throat and voice change.    Eyes: Negative for photophobia and visual disturbance.   Respiratory: Negative for cough, shortness of breath, wheezing and stridor.    Cardiovascular: Negative for chest pain and leg swelling.   Gastrointestinal: Negative for abdominal distention, abdominal pain, constipation, diarrhea, nausea and vomiting.   Endocrine: Negative for polydipsia, polyphagia and polyuria.   Genitourinary: Negative for difficulty urinating, dysuria, flank pain, testicular pain and urgency.   Musculoskeletal: Negative for back pain, joint swelling, neck pain and neck stiffness.   Skin: Negative for color change and rash.   Allergic/Immunologic: Negative for immunocompromised state.   Neurological: Negative for dizziness, syncope, weakness, numbness and headaches.   Hematological: Does not bruise/bleed easily.   Psychiatric/Behavioral: Negative for agitation, behavioral problems and confusion.     Objective:     Vital Signs (Most  Recent):  Temp: 98 °F (36.7 °C) (12/19/17 2128)  Pulse: 61 (12/19/17 2334)  Resp: 16 (12/19/17 2334)  BP: 132/60 (12/19/17 2332)  SpO2: 100 % (12/19/17 2334) Vital Signs (24h Range):  Temp:  [98 °F (36.7 °C)] 98 °F (36.7 °C)  Pulse:  [61-66] 61  Resp:  [16-17] 16  SpO2:  [100 %] 100 %  BP: (132-136)/(60-65) 132/60     Weight: 121.3 kg (267 lb 6 oz)  Body mass index is 33.42 kg/m².    Physical Exam   Constitutional: He is oriented to person, place, and time. He appears well-developed. No distress.   Elderly male    HENT:   Head: Normocephalic and atraumatic.   Nose: Nose normal.   Eyes: Conjunctivae and EOM are normal. Pupils are equal, round, and reactive to light. No scleral icterus.   Neck: Normal range of motion. Neck supple. No tracheal deviation present.   Cardiovascular: Normal rate, regular rhythm and intact distal pulses.    Murmur heard.  Bilateral lower ext edema, +1+2   Pulmonary/Chest: Effort normal and breath sounds normal. No stridor. No respiratory distress. He has no wheezes. He has no rales.   Abdominal: Soft. Bowel sounds are normal. He exhibits no distension. There is no tenderness. There is no guarding.   obese   Genitourinary:   Genitourinary Comments: deferred   Musculoskeletal: Normal range of motion. He exhibits no edema or deformity.   Neurological: He is alert and oriented to person, place, and time. No cranial nerve deficit.   Skin: Skin is warm and dry. Capillary refill takes 2 to 3 seconds. No rash noted. He is not diaphoretic.   Chronic discoloration to bilateral lower ext.    Psychiatric: He has a normal mood and affect. His behavior is normal. Judgment and thought content normal.   Nursing note reviewed.        CRANIAL NERVES     CN III, IV, VI   Pupils are equal, round, and reactive to light.  Extraocular motions are normal.        Significant Labs: All pertinent labs within the past 24 hours have been reviewed.   Results for orders placed or performed during the hospital encounter  of 12/19/17   Basic metabolic panel   Result Value Ref Range    Sodium 124 (L) 136 - 145 mmol/L    Potassium 4.4 3.5 - 5.1 mmol/L    Chloride 90 (L) 95 - 110 mmol/L    CO2 19 (L) 23 - 29 mmol/L    Glucose 102 70 - 110 mg/dL    BUN, Bld 61 (H) 8 - 23 mg/dL    Creatinine 1.7 (H) 0.5 - 1.4 mg/dL    Calcium 8.9 8.7 - 10.5 mg/dL    Anion Gap 15 8 - 16 mmol/L    eGFR if African American 46 (A) >60 mL/min/1.73 m^2    eGFR if non African American 39 (A) >60 mL/min/1.73 m^2   Protime-INR   Result Value Ref Range    Prothrombin Time 36.3 (H) 9.0 - 12.5 sec    INR 3.7 (H) 0.8 - 1.2   Urinalysis   Result Value Ref Range    Specimen UA Urine, Clean Catch     Color, UA Yellow Yellow, Straw, Amaya    Appearance, UA Clear Clear    pH, UA 6.0 5.0 - 8.0    Specific Gravity, UA <=1.005 (A) 1.005 - 1.030    Protein, UA Negative Negative    Glucose, UA Negative Negative    Ketones, UA Negative Negative    Bilirubin (UA) Negative Negative    Occult Blood UA Negative Negative    Nitrite, UA Negative Negative    Urobilinogen, UA Negative <2.0 EU/dL    Leukocytes, UA Negative Negative         Significant Imaging: I have reviewed all pertinent imaging results/findings within the past 24 hours.   Imaging Results          X-Ray Chest PA And Lateral (Final result)  Result time 12/19/17 23:17:38    Final result by Kwan Cardenas III, MD (12/19/17 23:17:38)                 Impression:     Stable cardiomegaly and mild vascular congestion.  Stable chronic interstitial lung disease versus interstitial edema.      Electronically signed by: KWAN CARDENAS MD  Date:     12/19/17  Time:    23:17              Narrative:    XR CHEST PA AND LATERAL    Clinical history: R07.9 Chest pain, unspecified    Comparison: 05/12/2016    Findings: Prior sternotomy is again noted.  There is stable mild cardiomegaly and pulmonary vascular congestion.  There are stable mild interstitial thickening and patchy hazy opacities in the bilateral mid and lower lung zone  representing interstitial edema or chronic interstitial lung disease. No new acute appearing infiltrate, pleural effusion, pneumothorax or other acute disease identified. No acute osseous abnormality detected.

## 2017-12-20 NOTE — ED NOTES
Pt resting in bed with family at bedside. Lights dimmed. NAD noted. RR e/u, airway open and patent. VSS. Will continue to monitor.

## 2017-12-20 NOTE — HPI
"Patient is a transfer from [facility], [place] as his renal doctor is [facility].  Beni Cosby is a 72 y.o. male patient was transferred for renal evaluation for hyponatremia. Symptoms are constant and moderate in severity. No mitigating or exacerbating factors reported. Associated sxs include increased fatigue over the past week and 30 Ib weight loss over a 1-2 month period. Patient denies any fever, chills, n/v/d, abd pain, CP, SOB, BLE edema/pain, and all other sxs at this time. Prior Tx includes 2 L of fluids at [facility]. No further complaints or concerns at this time. To note the patient reports he drinks an excessive amount of water daily, "im always thirsty," and takes a number of diuretics. relavent labs from [facility] on arrival: 119Na, 70BUN, 2.04Cr, 78Cl, Ua neg. On repeat prior to arrival 121Na, 67BUN, 2.00Cr, 82Cl. The patient was reevaluated in the ER. Sodium continuing to improve. Patient admitted for Hyponatremia.      "

## 2017-12-20 NOTE — ED PROVIDER NOTES
SCRIBE #1 NOTE: I, Patricia Keyes, am scribing for, and in the presence of, Miguel Arambula MD. I have scribed the entire note.      History      Chief Complaint   Patient presents with    Hyponatremia     sent from [facility] for admission       Review of patient's allergies indicates:   Allergen Reactions    Morphine      Other reaction(s): Nausea Only        HPI   HPI    12/19/2017, 10:51 PM   History obtained from the patient      History of Present Illness: Beni Cosby is a 72 y.o. male patient who presents to the Emergency Department for fatigue which onset gradually 1 week PTA. Pt was evaluated at [facility] today and transferred admission due to Hyponatremia. Symptoms are constant and moderate in severity. No mitigating or exacerbating factors reported. Associated sxs include 30 Ib weight loss over a 1-2 month period. Patient denies any fever, chills, n/v/d, abd pain, CP, SOB, BLE edema/pain, and all other sxs at this time. Prior Tx includes 2 L of fluids at [facility]. No further complaints or concerns at this time.        Arrival mode: AASI       PCP: Jackson Brandt MD       Past Medical History:  Past Medical History:   Diagnosis Date    Anticoagulant long-term use     Aortic valve stenosis with insufficiency 4/2008    Surgery [facility]    Arthritis     CHF (congestive heart failure)     Chronic a-fib     CKD (chronic kidney disease) stage 3, GFR 30-59 ml/min     COPD (chronic obstructive pulmonary disease)     Diabetes mellitus     Encounter for blood transfusion     Primary cancer of right kidney     Surgery, no further treatment       Past Surgical History:  Past Surgical History:   Procedure Laterality Date    Atrial septem device implanted  2005    BONE MARROW BIOPSY      CARDIAC SURGERY  2008    Aotric valve replacement     KIDNEY SURGERY Right 2007    NOSE SURGERY  2008    RENAL BIOPSY  8/30/         Family History:  History reviewed. No pertinent family  history.    Social History:  Social History     Social History Main Topics    Smoking status: Never Smoker    Smokeless tobacco: Never Used    Alcohol use No    Drug use: No    Sexual activity: unknown       ROS   Review of Systems   Constitutional: Positive for fatigue and unexpected weight change. Negative for chills and fever.        (+) Hyponatremia    HENT: Negative for sore throat.    Respiratory: Negative for shortness of breath.    Cardiovascular: Negative for chest pain.   Gastrointestinal: Negative for abdominal pain, diarrhea, nausea and vomiting.   Genitourinary: Negative for dysuria.   Musculoskeletal: Negative for back pain.   Skin: Negative for rash.   Neurological: Negative for weakness.   Hematological: Does not bruise/bleed easily.   All other systems reviewed and are negative.      Physical Exam      Initial Vitals [12/19/17 2128]   BP Pulse Resp Temp SpO2   136/65 66 17 98 °F (36.7 °C) 100 %      MAP       88.67          Physical Exam  Nursing Notes and Vital Signs Reviewed.  Constitutional: Patient is in no acute distress. Well-developed and well-nourished.  Head: Atraumatic. Normocephalic.  Eyes: PERRL. EOM intact. Conjunctivae are not pale. No scleral icterus.  ENT: Mucous membranes are moist. Oropharynx is clear and symmetric.    Neck: Supple. Full ROM. No lymphadenopathy.  Cardiovascular: Regular rate. Regular rhythm. No murmurs, rubs, or gallops. Distal pulses are 2+ and symmetric.  Pulmonary/Chest: No respiratory distress. Bibasilar crackles. Diffuse, faint wheezing bilaterally. No rales.  Abdominal: Soft and non-distended.  There is no tenderness.  No rebound, guarding, or rigidity.   Musculoskeletal: Moves all extremities. No obvious deformities. Trace BLE edema. No calf tenderness.  Skin: Warm and dry.  Neurological:  Alert, awake, and appropriate.  Normal speech.  No acute focal neurological deficits are appreciated.  Psychiatric: Normal affect. Good eye contact. Appropriate in  "content.    ED Course    Procedures  ED Vital Signs:  Vitals:    12/19/17 2128 12/19/17 2141 12/19/17 2332 12/19/17 2334   BP: 136/65  132/60    Pulse: 66  64 61   Resp: 17   16   Temp: 98 °F (36.7 °C)      TempSrc: Oral      SpO2: 100%  100% 100%   Weight:  121.3 kg (267 lb 6 oz)     Height: 6' 3" (1.905 m)          Abnormal Lab Results:  Labs Reviewed   BASIC METABOLIC PANEL - Abnormal; Notable for the following:        Result Value    Sodium 124 (*)     Chloride 90 (*)     CO2 19 (*)     BUN, Bld 61 (*)     Creatinine 1.7 (*)     eGFR if  46 (*)     eGFR if non  39 (*)     All other components within normal limits   PROTIME-INR - Abnormal; Notable for the following:     Prothrombin Time 36.3 (*)     INR 3.7 (*)     All other components within normal limits   URINALYSIS - Abnormal; Notable for the following:     Specific Gravity, UA <=1.005 (*)     All other components within normal limits   CBC W/ AUTO DIFFERENTIAL   MAGNESIUM   PHOSPHORUS   BASIC METABOLIC PANEL   PROTIME-INR        All Lab Results:  Results for orders placed or performed during the hospital encounter of 12/19/17   Basic metabolic panel   Result Value Ref Range    Sodium 124 (L) 136 - 145 mmol/L    Potassium 4.4 3.5 - 5.1 mmol/L    Chloride 90 (L) 95 - 110 mmol/L    CO2 19 (L) 23 - 29 mmol/L    Glucose 102 70 - 110 mg/dL    BUN, Bld 61 (H) 8 - 23 mg/dL    Creatinine 1.7 (H) 0.5 - 1.4 mg/dL    Calcium 8.9 8.7 - 10.5 mg/dL    Anion Gap 15 8 - 16 mmol/L    eGFR if African American 46 (A) >60 mL/min/1.73 m^2    eGFR if non African American 39 (A) >60 mL/min/1.73 m^2   Protime-INR   Result Value Ref Range    Prothrombin Time 36.3 (H) 9.0 - 12.5 sec    INR 3.7 (H) 0.8 - 1.2   Urinalysis   Result Value Ref Range    Specimen UA Urine, Clean Catch     Color, UA Yellow Yellow, Straw, Amaya    Appearance, UA Clear Clear    pH, UA 6.0 5.0 - 8.0    Specific Gravity, UA <=1.005 (A) 1.005 - 1.030    Protein, UA Negative Negative "    Glucose, UA Negative Negative    Ketones, UA Negative Negative    Bilirubin (UA) Negative Negative    Occult Blood UA Negative Negative    Nitrite, UA Negative Negative    Urobilinogen, UA Negative <2.0 EU/dL    Leukocytes, UA Negative Negative       Imaging Results:  Imaging Results          X-Ray Chest PA And Lateral (Final result)  Result time 12/19/17 23:17:38    Final result by Kwan Cardenas III, MD (12/19/17 23:17:38)                 Impression:     Stable cardiomegaly and mild vascular congestion.  Stable chronic interstitial lung disease versus interstitial edema.      Electronically signed by: KWAN CARDENAS MD  Date:     12/19/17  Time:    23:17              Narrative:    XR CHEST PA AND LATERAL    Clinical history: R07.9 Chest pain, unspecified    Comparison: 05/12/2016    Findings: Prior sternotomy is again noted.  There is stable mild cardiomegaly and pulmonary vascular congestion.  There are stable mild interstitial thickening and patchy hazy opacities in the bilateral mid and lower lung zone representing interstitial edema or chronic interstitial lung disease. No new acute appearing infiltrate, pleural effusion, pneumothorax or other acute disease identified. No acute osseous abnormality detected.                                      The Emergency Provider reviewed the vital signs and test results, which are outlined above.    ED Discussion     11:39 PM: Discussed case with Markus Vasquez NP ([facility]). Dr. Sherman agrees with current care and management of pt and accepts admission.   Admitting Service: Hospital medicine   Admitting Physician: Dr. Sherman  Admit to: Telemetry    11:43 PM: Re-evaluated pt. I have discussed test results, shared treatment plan, and the need for admission with patient and family at bedside. Pt and family express understanding at this time and agree with all information. All questions answered. Pt and family have no further questions or concerns at this time. Pt is  ready for admit.        ED Medication(s):  Medications   rosuvastatin tablet 10 mg (not administered)   ferrous sulfate EC tablet 325 mg (not administered)   gabapentin capsule 100 mg (not administered)   heparin (porcine) injection 5,000 Units (not administered)   famotidine tablet 20 mg (not administered)   ondansetron injection 4 mg (not administered)   0.9%  NaCl infusion (not administered)   albuterol-ipratropium 2.5mg-0.5mg/3mL nebulizer solution 3 mL (3 mLs Nebulization Given 12/19/17 4397)       New Prescriptions    No medications on file             Medical Decision Making    Medical Decision Making:   Clinical Tests:   Lab Tests: Ordered and Reviewed  Radiological Study: Ordered and Reviewed           Scribe Attestation:   Scribe #1: I performed the above scribed service and the documentation accurately describes the services I performed. I attest to the accuracy of the note.    Attending:   Physician Attestation Statement for Scribe #1: I, Miguel Arambula MD, personally performed the services described in this documentation, as scribed by Patricia Keyes, in my presence, and it is both accurate and complete.          Clinical Impression       ICD-10-CM ICD-9-CM   1. Hyponatremia E87.1 276.1   2. Chest pain R07.9 786.50   3. Renal insufficiency N28.9 593.9   4. Bronchospasm J98.01 519.11   5. Weakness R53.1 780.79       Disposition:   Disposition: Admitted  Condition: Fair         Miguel Arambula MD  12/20/17 0151       Miguel Arambula MD  12/20/17 0151

## 2017-12-20 NOTE — PT/OT/SLP EVAL
Physical Therapy Evaluation    Patient Name:  Beni Cosby   MRN:  [id number]    Recommendations:     Discharge Recommendations:  home health PT   Discharge Equipment Recommendations: none   Barriers to discharge: None    Assessment:     Beni Cosby is a 72 y.o. male admitted with a medical diagnosis of Hyponatremia.  He presents with the following impairments/functional limitations:    .     Recent Surgery: * No surgery found *      Plan:     During this hospitalization, patient to be seen   to address the above listed problems via    · Plan of Care Expires:      Plan of Care Reviewed with: patient    Subjective     Communicated with NURSE JEREL AND EPIC CHART REVIEW  prior to session.  Patient found SUP IN BED WITH WIFE PRESENT upon PT entry to room, agreeable to evaluation.      Chief Complaint: NONE  Patient comments/goals: GO HOME  Pain/Comfort:  · Pain Rating 1: 0/10  · Pain Rating Post-Intervention 1: 0/10    Patients cultural, spiritual, [employer]al conflicts given the current situation:      Living Environment:  PT LIVES AT HOME WITH WIFE AND WAS MOD I WITH HOME WITH RW.   Prior to admission, patients level of function was MOD I WITH RW.  Patient has the following equipment: walker, rolling.  DME owned (not currently used): none.  Upon discharge, patient will have assistance from WIFE.    Objective:     Patient found with: peripheral IV, telemetry, blood pressure cuff     General Precautions: Standard, fall   Orthopedic Precautions:    Braces:       Exams:  · RLE ROM: WFL  · RLE Strength: WFL  · LLE ROM: WFL  · LLE Strength: WFL    Functional Mobility:   · PT SUP>SIT EOB MOD I AND STOOD WITH RW BOBY FOR GT X 150' WITH NO LOB . PT RETURNED TO RM T/F TO CHAIR AND LEFT SEATED IN CHAIR PT EDUCATED ON MOBILITY PROGRAM AND WILL BE D/C TO MOBILITY PROGRAM FOR MAINTENANCE AS PT IS AT [facility].    AM-PAC 6 CLICK MOBILITY  Total Score:21     Patient left up in chair with call button in reach.    GOALS:    Physical  Therapy Goals     Not on file                History:     Past Medical History:   Diagnosis Date    Anticoagulant long-term use     Aortic valve stenosis with insufficiency 4/2008    Surgery [facility]    Arthritis     CHF (congestive heart failure)     Chronic a-fib     CKD (chronic kidney disease) stage 3, GFR 30-59 ml/min     COPD (chronic obstructive pulmonary disease)     Diabetes mellitus     Encounter for blood transfusion     Primary cancer of right kidney     Surgery, no further treatment       Past Surgical History:   Procedure Laterality Date    Atrial septem device implanted  2005    BONE MARROW BIOPSY      CARDIAC SURGERY  2008    Aotric valve replacement     KIDNEY SURGERY Right 2007    NOSE SURGERY  2008    RENAL BIOPSY  8/30/       Clinical Decision Making:     History  Co-morbidities and personal factors that may impact the plan of care Examination  Body Structures and Functions, activity limitations and participation restrictions that may impact the plan of care Clinical Presentation   Decision Making/ Complexity Score   Co-morbidities:   [] Time since onset of injury / illness / exacerbation  [] Status of current condition  []Patient's cognitive status and safety concerns    [] Multiple Medical Problems (see med hx)  Personal Factors:   [] Patient's age  [] Prior Level of function   [] Patient's home situation (environment and family support)  [] Patient's level of motivation  [] Expected progression of patient      HISTORY:(criteria)    [x] 35006 - no personal factors/history    [] 19635 - has 1-2 personal factor/comorbidity     [] 11794 - has >3 personal factor/comorbidity     Body Regions:  [] Objective examination findings  [] Head     []  Neck  [] Trunk   [] Upper Extremity  [] Lower Extremity    Body Systems:  [] For communication ability, affect, cognition, language, and learning style: the assessment of the ability to make needs known, consciousness, orientation (person,  place, and time), expected emotional /behavioral responses, and learning preferences (eg, learning barriers, education  needs)  [] For the neuromuscular system: a general assessment of gross coordinated movement (eg, balance, gait, locomotion, transfers, and transitions) and motor function  (motor control and motor learning)  [] For the musculoskeletal system: the assessment of gross symmetry, gross range of motion, gross strength, height, and weight  [] For the integumentary system: the assessment of pliability(texture), presence of scar formation, skin color, and skin integrity  [] For cardiovascular/pulmonary system: the assessment of heart rate, respiratory rate, blood pressure, and edema     Activity limitations:    [] Patient's cognitive status and saf ety concerns          [] Status of current condition      [] Weight bearing restriction  [] Cardiopulmunary Restriction    Participation Restrictions:   [] Goals and goal agreement with the patient     [] Rehab potential (prognosis) and probable outcome      Examination of Body System: (criteria)    [x] 83962 - addressing 1-2 elements    [] 02785 - addressing a total of 3 or more elements     [] 58203 -  Addressing a total of 4 or more elements         Clinical Presentation: (criteria)  Stable - 80465     On examination of body system using standardized tests and measures patient presents with (CHOOSE ONE) elements from any of the following: body structures and functions, activity limitations, and/or participation restrictions.  Leading to a clinical presentation that is considered (CHOOSE ONE)                              Clinical Decision Making  (Eval Complexity):  Low- 72750     Time Tracking:     PT Received On: 12/20/17  PT Start Time: 1106     PT Stop Time: 1130  PT Total Time (min): 24 min     Billable Minutes: Evaluation 14 and Gait Training 10      Gauri South, PT  12/20/2017

## 2017-12-20 NOTE — ED NOTES
Pt resting in bed in no acute distress. RR even and unlabored. Afib on cardiac monitor. Skin warm/dry. Safety precautions in place. Will monitor

## 2017-12-20 NOTE — SUBJECTIVE & OBJECTIVE
Past Medical History:   Diagnosis Date    Anticoagulant long-term use     Aortic valve stenosis with insufficiency 4/2008    [facility]    Arthritis     CHF (congestive heart failure)     Chronic a-fib     CKD (chronic kidney disease) stage 3, GFR 30-59 ml/min     COPD (chronic obstructive pulmonary disease)     Diabetes mellitus     Encounter for blood transfusion     Primary cancer of right kidney     Surgery, no further treatment       Past Surgical History:   Procedure Laterality Date    Atrial septem device implanted  2005    BONE MARROW BIOPSY      CARDIAC SURGERY  2008    Aotric valve replacement     KIDNEY SURGERY Right 2007    NOSE SURGERY  2008    RENAL BIOPSY  8/30/       Review of patient's allergies indicates:   Allergen Reactions    Morphine      Other reaction(s): Nausea Only       No current facility-administered medications on file prior to encounter.      Current Outpatient Prescriptions on File Prior to Encounter   Medication Sig    bumetanide (BUMEX) 2 MG tablet Take 2 mg by mouth 2 (two) times daily. Take 2mg every  Am and 1 mg every pm    ferrous sulfate 325 (65 FE) MG EC tablet Take 325 mg by mouth 3 (three) times daily with meals.    glimepiride (AMARYL) 1 MG tablet Take 1 mg by mouth before breakfast.    lansoprazole (PREVACID) 30 MG capsule Take 30 mg by mouth 2 (two) times daily. TAKE 1 CAPSULE BY MOUTH ONCE DAILY    metolazone (ZAROXOLYN) 5 MG tablet once daily.     metoprolol succinate (TOPROL-XL) 100 MG 24 hr tablet Take 50 mg by mouth once daily. 1/2 tablet daily    potassium chloride SA (K-DUR,KLOR-CON) 20 MEQ tablet Take 20 mEq by mouth 2 (two) times daily. Take 2 tablets daily    spironolactone (ALDACTONE) 25 MG tablet Take 25 mg by mouth 2 (two) times daily.     warfarin (COUMADIN) 5 MG tablet 5 mg.    warfarin (COUMADIN) 7.5 MG tablet     aspirin (ECOTRIN) 81 MG EC tablet Take 1 tablet (81 mg total) by mouth once daily.    blood sugar diagnostic  (CONTOUR NEXT STRIPS) Strp Use once daily.    mometasone (NASONEX) 50 mcg/actuation nasal spray 2 sprays by Nasal route daily as needed.     mometasone-formoterol (DULERA) 100-5 mcg/actuation HFAA Inhale into the lungs 2 (two) times daily. Controller    ondansetron (ZOFRAN, AS HYDROCHLORIDE,) 4 MG tablet Take 1 tablet (4 mg total) by mouth every 12 (twelve) hours as needed for Nausea.    polyethylene glycol (GLYCOLAX) 17 gram PwPk Take by mouth as needed.    rosuvastatin (CRESTOR) 10 MG tablet TAKE 1 TABLET BY MOUTH DAILY.    [DISCONTINUED] gabapentin (NEURONTIN) 100 MG capsule Take 100 mg by mouth 3 (three) times daily.     Family History     None        Social History Main Topics    Smoking status: Never Smoker    Smokeless tobacco: Never Used    Alcohol use No    Drug use: No    Sexual activity: Not Currently     Review of Systems   Constitutional: Positive for fatigue and unexpected weight change. Negative for chills, diaphoresis and fever.   HENT: Negative for congestion, sore throat and voice change.    Eyes: Negative for photophobia and visual disturbance.   Respiratory: Negative for cough, shortness of breath, wheezing and stridor.    Cardiovascular: Negative for chest pain and leg swelling.   Gastrointestinal: Negative for abdominal distention, abdominal pain, constipation, diarrhea, nausea and vomiting.   Endocrine: Negative for polydipsia, polyphagia and polyuria.   Genitourinary: Negative for difficulty urinating, dysuria, flank pain, testicular pain and urgency.   Musculoskeletal: Negative for back pain, joint swelling, neck pain and neck stiffness.   Skin: Negative for color change and rash.   Allergic/Immunologic: Negative for immunocompromised state.   Neurological: Negative for dizziness, syncope, weakness, numbness and headaches.   Hematological: Does not bruise/bleed easily.   Psychiatric/Behavioral: Negative for agitation, behavioral problems and confusion.     Objective:     Vital  Signs (Most Recent):  Temp: 98.5 °F (36.9 °C) (12/20/17 0602)  Pulse: 67 (12/20/17 1003)  Resp: (!) 22 (12/20/17 1003)  BP: (!) 107/53 (12/20/17 1003)  SpO2: 97 % (12/20/17 1003) Vital Signs (24h Range):  Temp:  [98 °F (36.7 °C)-98.5 °F (36.9 °C)] 98.5 °F (36.9 °C)  Pulse:  [61-71] 67  Resp:  [16-22] 22  SpO2:  [96 %-100 %] 97 %  BP: (102-136)/(52-65) 107/53     Weight: 121.3 kg (267 lb 6 oz)  Body mass index is 33.42 kg/m².    Physical Exam   Constitutional: He is oriented to person, place, and time. He appears well-developed. No distress.   Elderly male    HENT:   Head: Normocephalic and atraumatic.   Nose: Nose normal.   Eyes: Conjunctivae and EOM are normal. Pupils are equal, round, and reactive to light. No scleral icterus.   Neck: Normal range of motion. Neck supple. No tracheal deviation present.   Cardiovascular: Normal rate, regular rhythm and intact distal pulses.    Murmur heard.  Bilateral lower ext edema, +1+2   Pulmonary/Chest: Effort normal and breath sounds normal. No stridor. No respiratory distress. He has no wheezes. He has no rales.   Abdominal: Soft. Bowel sounds are normal. He exhibits no distension. There is no tenderness. There is no guarding.   obese   Genitourinary:   Genitourinary Comments: deferred   Musculoskeletal: Normal range of motion. He exhibits no edema or deformity.   Neurological: He is alert and oriented to person, place, and time. No cranial nerve deficit.   Skin: Skin is warm and dry. Capillary refill takes 2 to 3 seconds. No rash noted. He is not diaphoretic.   Chronic discoloration to bilateral lower ext.    Psychiatric: He has a normal mood and affect. His behavior is normal. Judgment and thought content normal.   Nursing note reviewed.        CRANIAL NERVES     CN III, IV, VI   Pupils are equal, round, and reactive to light.  Extraocular motions are normal.        Significant Labs: All pertinent labs within the past 24 hours have been reviewed.   Results for orders placed  or performed during the hospital encounter of 12/19/17   Basic metabolic panel   Result Value Ref Range    Sodium 124 (L) 136 - 145 mmol/L    Potassium 4.4 3.5 - 5.1 mmol/L    Chloride 90 (L) 95 - 110 mmol/L    CO2 19 (L) 23 - 29 mmol/L    Glucose 102 70 - 110 mg/dL    BUN, Bld 61 (H) 8 - 23 mg/dL    Creatinine 1.7 (H) 0.5 - 1.4 mg/dL    Calcium 8.9 8.7 - 10.5 mg/dL    Anion Gap 15 8 - 16 mmol/L    eGFR if African American 46 (A) >60 mL/min/1.73 m^2    eGFR if non African American 39 (A) >60 mL/min/1.73 m^2   Protime-INR   Result Value Ref Range    Prothrombin Time 36.3 (H) 9.0 - 12.5 sec    INR 3.7 (H) 0.8 - 1.2   Urinalysis   Result Value Ref Range    Specimen UA Urine, Clean Catch     Color, UA Yellow Yellow, Straw, Amaya    Appearance, UA Clear Clear    pH, UA 6.0 5.0 - 8.0    Specific Gravity, UA <=1.005 (A) 1.005 - 1.030    Protein, UA Negative Negative    Glucose, UA Negative Negative    Ketones, UA Negative Negative    Bilirubin (UA) Negative Negative    Occult Blood UA Negative Negative    Nitrite, UA Negative Negative    Urobilinogen, UA Negative <2.0 EU/dL    Leukocytes, UA Negative Negative   CBC auto differential   Result Value Ref Range    WBC 9.98 3.90 - 12.70 K/uL    RBC 3.60 (L) 4.60 - 6.20 M/uL    Hemoglobin 11.1 (L) 14.0 - 18.0 g/dL    Hematocrit 31.3 (L) 40.0 - 54.0 %    MCV 87 82 - 98 fL    MCH 30.8 27.0 - 31.0 pg    MCHC 35.5 32.0 - 36.0 g/dL    RDW 14.9 (H) 11.5 - 14.5 %    Platelets 180 150 - 350 K/uL    MPV 8.8 (L) 9.2 - 12.9 fL    Gran # 7.8 (H) 1.8 - 7.7 K/uL    Lymph # 1.2 1.0 - 4.8 K/uL    Mono # 0.8 0.3 - 1.0 K/uL    Eos # 0.1 0.0 - 0.5 K/uL    Baso # 0.01 0.00 - 0.20 K/uL    Gran% 78.1 (H) 38.0 - 73.0 %    Lymph% 12.4 (L) 18.0 - 48.0 %    Mono% 8.4 4.0 - 15.0 %    Eosinophil% 1.0 0.0 - 8.0 %    Basophil% 0.1 0.0 - 1.9 %    Differential Method Automated    Magnesium - if not done in ED   Result Value Ref Range    Magnesium 1.6 1.6 - 2.6 mg/dL   Phosphorus - if not done in ED   Result  Value Ref Range    Phosphorus 3.2 2.7 - 4.5 mg/dL   Basic metabolic panel    Result Value Ref Range    Sodium 124 (L) 136 - 145 mmol/L    Potassium 3.8 3.5 - 5.1 mmol/L    Chloride 86 (L) 95 - 110 mmol/L    CO2 21 (L) 23 - 29 mmol/L    Glucose 95 70 - 110 mg/dL    BUN, Bld 63 (H) 8 - 23 mg/dL    Creatinine 1.6 (H) 0.5 - 1.4 mg/dL    Calcium 9.0 8.7 - 10.5 mg/dL    Anion Gap 17 (H) 8 - 16 mmol/L    eGFR if African American 49 (A) >60 mL/min/1.73 m^2    eGFR if non African American 42 (A) >60 mL/min/1.73 m^2   Protime-INR   Result Value Ref Range    Prothrombin Time 34.5 (H) 9.0 - 12.5 sec    INR 3.5 (H) 0.8 - 1.2         Significant Imaging: I have reviewed all pertinent imaging results/findings within the past 24 hours.   Imaging Results          X-Ray Chest PA And Lateral (Final result)  Result time 12/19/17 23:17:38    Final result by Kwan Cardenas III, MD (12/19/17 23:17:38)                 Impression:     Stable cardiomegaly and mild vascular congestion.  Stable chronic interstitial lung disease versus interstitial edema.      Electronically signed by: KWAN ACRDENAS MD  Date:     12/19/17  Time:    23:17              Narrative:    XR CHEST PA AND LATERAL    Clinical history: R07.9 Chest pain, unspecified    Comparison: 05/12/2016    Findings: Prior sternotomy is again noted.  There is stable mild cardiomegaly and pulmonary vascular congestion.  There are stable mild interstitial thickening and patchy hazy opacities in the bilateral mid and lower lung zone representing interstitial edema or chronic interstitial lung disease. No new acute appearing infiltrate, pleural effusion, pneumothorax or other acute disease identified. No acute osseous abnormality detected.

## 2017-12-20 NOTE — PLAN OF CARE
Problem: Patient Care Overview  Goal: Plan of Care Review  Outcome: Ongoing (interventions implemented as appropriate)  Received patient from ER today. Patient remains free from falls and all safety precautions are still maintained. Cont heart monitor, normal sinus. Afebrile. Bed locked/lowererd. Ambulated in room, tolerated well. No s/s of acute distress. Will continue to monitor. 12 hour chart check,.

## 2017-12-20 NOTE — ED NOTES
Pt requesting nightly medications. Hospital medicine paged. NP Ourso states to hold nightly medications and hospital medicine will reassess in the morning. Pt notified. Pt verbalizes understanding.

## 2017-12-21 PROBLEM — I73.9 PVD (PERIPHERAL VASCULAR DISEASE): Status: ACTIVE | Noted: 2017-12-21

## 2017-12-21 LAB
ANION GAP SERPL CALC-SCNC: 14 MMOL/L
BASOPHILS # BLD AUTO: 0.02 K/UL
BASOPHILS NFR BLD: 0.3 %
BUN SERPL-MCNC: 53 MG/DL
CALCIUM SERPL-MCNC: 9.1 MG/DL
CHLORIDE SERPL-SCNC: 88 MMOL/L
CO2 SERPL-SCNC: 21 MMOL/L
CREAT SERPL-MCNC: 1.5 MG/DL
DIFFERENTIAL METHOD: ABNORMAL
EOSINOPHIL # BLD AUTO: 0.2 K/UL
EOSINOPHIL NFR BLD: 2.8 %
ERYTHROCYTE [DISTWIDTH] IN BLOOD BY AUTOMATED COUNT: 15.1 %
EST. GFR  (AFRICAN AMERICAN): 53 ML/MIN/1.73 M^2
EST. GFR  (NON AFRICAN AMERICAN): 46 ML/MIN/1.73 M^2
GLUCOSE SERPL-MCNC: 97 MG/DL
HCT VFR BLD AUTO: 31 %
HGB BLD-MCNC: 11 G/DL
INR PPP: 2.6
INR PPP: 2.6
LYMPHOCYTES # BLD AUTO: 1.2 K/UL
LYMPHOCYTES NFR BLD: 14.6 %
MCH RBC QN AUTO: 31.2 PG
MCHC RBC AUTO-ENTMCNC: 35.5 G/DL
MCV RBC AUTO: 88 FL
MONOCYTES # BLD AUTO: 0.8 K/UL
MONOCYTES NFR BLD: 9.5 %
NEUTROPHILS # BLD AUTO: 5.7 K/UL
NEUTROPHILS NFR BLD: 72.8 %
PLATELET # BLD AUTO: 187 K/UL
PMV BLD AUTO: 9 FL
POTASSIUM SERPL-SCNC: 3.9 MMOL/L
PROTHROMBIN TIME: 26.4 SEC
PROTHROMBIN TIME: 26.4 SEC
RBC # BLD AUTO: 3.53 M/UL
SODIUM SERPL-SCNC: 123 MMOL/L
WBC # BLD AUTO: 7.88 K/UL

## 2017-12-21 PROCEDURE — 21400001 HC TELEMETRY ROOM

## 2017-12-21 PROCEDURE — 36415 COLL VENOUS BLD VENIPUNCTURE: CPT

## 2017-12-21 PROCEDURE — 80048 BASIC METABOLIC PNL TOTAL CA: CPT

## 2017-12-21 PROCEDURE — 25000003 PHARM REV CODE 250: Performed by: FAMILY MEDICINE

## 2017-12-21 PROCEDURE — 85610 PROTHROMBIN TIME: CPT

## 2017-12-21 PROCEDURE — 25000003 PHARM REV CODE 250: Performed by: NURSE PRACTITIONER

## 2017-12-21 PROCEDURE — 99232 SBSQ HOSP IP/OBS MODERATE 35: CPT | Mod: ,,, | Performed by: INTERNAL MEDICINE

## 2017-12-21 PROCEDURE — 25000003 PHARM REV CODE 250: Performed by: INTERNAL MEDICINE

## 2017-12-21 PROCEDURE — 85025 COMPLETE CBC W/AUTO DIFF WBC: CPT

## 2017-12-21 RX ORDER — BISACODYL 5 MG
10 TABLET, DELAYED RELEASE (ENTERIC COATED) ORAL DAILY PRN
Status: DISCONTINUED | OUTPATIENT
Start: 2017-12-21 | End: 2017-12-23 | Stop reason: HOSPADM

## 2017-12-21 RX ORDER — WARFARIN SODIUM 5 MG/1
5 TABLET ORAL EVERY OTHER DAY
Status: DISCONTINUED | OUTPATIENT
Start: 2017-12-21 | End: 2017-12-22

## 2017-12-21 RX ORDER — TOLVAPTAN 15 MG/1
15 TABLET ORAL ONCE
Status: COMPLETED | OUTPATIENT
Start: 2017-12-21 | End: 2017-12-21

## 2017-12-21 RX ADMIN — SODIUM CHLORIDE: 0.9 INJECTION, SOLUTION INTRAVENOUS at 02:12

## 2017-12-21 RX ADMIN — ROSUVASTATIN CALCIUM 10 MG: 10 TABLET, FILM COATED ORAL at 09:12

## 2017-12-21 RX ADMIN — TOLVAPTAN 15 MG: 15 TABLET ORAL at 01:12

## 2017-12-21 RX ADMIN — FAMOTIDINE 20 MG: 20 TABLET, FILM COATED ORAL at 09:12

## 2017-12-21 RX ADMIN — BISACODYL 10 MG: 5 TABLET, COATED ORAL at 06:12

## 2017-12-21 RX ADMIN — WARFARIN SODIUM 5 MG: 5 TABLET ORAL at 06:12

## 2017-12-21 RX ADMIN — FAMOTIDINE 20 MG: 20 TABLET, FILM COATED ORAL at 10:12

## 2017-12-21 RX ADMIN — FERROUS SULFATE TAB EC 325 MG (65 MG FE EQUIVALENT) 325 MG: 325 (65 FE) TABLET DELAYED RESPONSE at 06:12

## 2017-12-21 RX ADMIN — FERROUS SULFATE TAB EC 325 MG (65 MG FE EQUIVALENT) 325 MG: 325 (65 FE) TABLET DELAYED RESPONSE at 09:12

## 2017-12-21 RX ADMIN — FERROUS SULFATE TAB EC 325 MG (65 MG FE EQUIVALENT) 325 MG: 325 (65 FE) TABLET DELAYED RESPONSE at 01:12

## 2017-12-21 RX ADMIN — METOPROLOL SUCCINATE 100 MG: 50 TABLET, EXTENDED RELEASE ORAL at 09:12

## 2017-12-21 NOTE — PROGRESS NOTES
"[facility] Hospital Medicine  Progress Note    Patient Name: Beni Cosby  MRN: [id number]  Patient Class: IP- Inpatient   Admission Date: 12/19/2017  Length of Stay: 1 days  Attending Physician: Jl Dumont MD  Primary Care Provider: Jackson Brandt MD        Subjective:     Principal Problem:Hyponatremia    HPI:  Patient is a transfer from [facility] as his renal doctor is [facility].  Beni Cosby is a 72 y.o. male patient  was transferred for renal evaluation for hyponatremia. Symptoms are constant and moderate in severity. No mitigating or exacerbating factors reported. Associated sxs include increased fatigue over the past week and 30 Ib weight loss over a 1-2 month period. Patient denies any fever, chills, n/v/d, abd pain, CP, SOB, BLE edema/pain, and all other sxs at this time. Prior Tx includes 2 L of fluids at [facility]. No further complaints or concerns at this time. To note the patient reports he drinks an excessive amount of water daily, "im always thirsty," and takes a number of diuretics. relavent labs from [facility] on arrival: 119Na, 70BUN, 2.04Cr, 78Cl, Ua neg. On repeat prior to arrival 121Na, 67BUN, 2.00Cr, 82Cl. The patient was reevaluated in the ER. Sodium continuing to improve. Patient admitted for Hyponatremia.        Hospital Course:  12/20 Patient evaluated by Nephrology who attributes the hyponatremia to medications and free water intake. Patient with no acute events - mentation stable.      Interval History: No acute events overnight. Na remains low tolvaptan given per nephrology.. Patient with no mental changes. Nephrology following. Continue current plan of care.    Review of Systems   Constitutional: Positive for activity change and appetite change. Negative for chills and fever.   HENT: Negative for sore throat.    Eyes: Negative for pain.   Respiratory: Negative for cough, shortness of breath and wheezing.    Cardiovascular: Positive for leg " swelling. Negative for chest pain and palpitations.   Gastrointestinal: Negative for constipation, diarrhea, nausea and vomiting.   Endocrine: Positive for polydipsia.   Genitourinary: Negative for dysuria and hematuria.   Musculoskeletal: Negative for back pain.   Skin: Positive for color change.   Allergic/Immunologic: Negative.    Neurological: Positive for weakness. Negative for dizziness.   Hematological: Negative.    Psychiatric/Behavioral: Negative for agitation and confusion.     Objective:     Vital Signs (Most Recent):  Temp: 98 °F (36.7 °C) (12/21/17 1214)  Pulse: 64 (12/21/17 1214)  Resp: 18 (12/21/17 1214)  BP: (!) 115/57 (12/21/17 1214)  SpO2: 97 % (12/21/17 1214) Vital Signs (24h Range):  Temp:  [97.5 °F (36.4 °C)-98.1 °F (36.7 °C)] 98 °F (36.7 °C)  Pulse:  [58-65] 64  Resp:  [18-20] 18  SpO2:  [97 %-99 %] 97 %  BP: (107-117)/(57-59) 115/57     Weight: 121.3 kg (267 lb 6 oz)  Body mass index is 33.42 kg/m².    Intake/Output Summary (Last 24 hours) at 12/21/17 1338  Last data filed at 12/21/17 0500   Gross per 24 hour   Intake             1640 ml   Output              400 ml   Net             1240 ml      Physical Exam   Constitutional: He is oriented to person, place, and time. He appears well-developed and well-nourished.   HENT:   Head: Normocephalic and atraumatic.   Mouth/Throat: Oropharynx is clear and moist.   Eyes: Conjunctivae and EOM are normal. Pupils are equal, round, and reactive to light.   Neck: Normal range of motion. Neck supple. No JVD present.   Cardiovascular: Normal rate, regular rhythm, normal heart sounds and intact distal pulses.    Pulmonary/Chest: Effort normal and breath sounds normal. No respiratory distress. He has no wheezes.   Abdominal: Soft. Bowel sounds are normal. He exhibits no distension. There is no tenderness.   Musculoskeletal: Normal range of motion. He exhibits edema (1+ to ble).   Neurological: He is alert and oriented to person, place, and time. No cranial  nerve deficit.   Skin: Skin is warm and dry. Capillary refill takes 2 to 3 seconds.   BLE hyperpigmented 2/2 PVD   Psychiatric: He has a normal mood and affect. His behavior is normal.   Nursing note and vitals reviewed.            Significant Labs:   Recent Lab Results       12/21/17  0551      Anion Gap 14     Baso # 0.02     Basophil% 0.3     BUN, Bld 53(H)     Calcium 9.1     Chloride 88(L)     CO2 21(L)     Creatinine 1.5(H)     Differential Method Automated     eGFR if  53(A)     eGFR if non  46  Comment:  Calculation used to obtain the estimated glomerular filtration  rate (eGFR) is the CKD-EPI equation.   (A)     Eos # 0.2     Eosinophil% 2.8     Glucose 97     Gran # 5.7     Gran% 72.8     Hematocrit 31.0(L)     Hemoglobin 11.0(L)     Coumadin Monitoring INR 2.6  Comment:  Coumadin Therapy:  2.0 - 3.0 for INR for all indicators except mechanical heart valves  and antiphospholipid syndromes which should use 2.5 - 3.5.  (H)      2.6  Comment:  Coumadin Therapy:  2.0 - 3.0 for INR for all indicators except mechanical heart valves  and antiphospholipid syndromes which should use 2.5 - 3.5.  (H)     Lymph # 1.2     Lymph% 14.6(L)     MCH 31.2(H)     MCHC 35.5     MCV 88     Mono # 0.8     Mono% 9.5     MPV 9.0(L)     Platelets 187     Potassium 3.9     Protime 26.4(H)      26.4(H)     RBC 3.53(L)     RDW 15.1(H)     Sodium 123(L)     WBC 7.88         All pertinent labs within the past 24 hours have been reviewed.    Significant Imaging: I have reviewed all pertinent imaging results/findings within the past 24 hours.    Assessment/Plan:      * Hyponatremia    Hold fee water  NS monitor closely  Renal consult  Consider medication profile.    12/20 - Hold zaroxylen. NS infusion / Nephrology. D/C planning   12/21 - Na still low - pt reports he drank at least 1 large glass of water yesterday. Tolvaptan started            Weakness    PT/OT eval   consult for dc planning.      12/20 - Monitor        Recent weight loss    ?excessive diuresis  Dietary consult  Daily weights  CBC, CMP, Mg, Phos    12/20 F/U with PCP for LT weight loss  - Diet  - Exercise    12/21: pt reports he has gained weight back since getting IVF's. Discussed need for daily weights once he goes home          Type 2 diabetes mellitus    Check A1C  SSI  Monitor     12/20 - Controlled  - NISS        Atrial fibrillation    Stable rate controlled on CM  Check EKG  INR 3.7, pharmacy to monitor    Continue BB          VTE Risk Mitigation         Ordered     warfarin (COUMADIN) tablet 5 mg  Every other day     Route:  Oral        12/20/17 2046     warfarin tablet 7.5 mg  Every other day     Route:  Oral        12/20/17 2046     Place sequential compression device  Until discontinued      12/20/17 0007     Medium Risk of VTE  Once      12/20/17 0007              ANGELA Correia-HIRAL  Department of Hospital Medicine   [facility]

## 2017-12-21 NOTE — HOSPITAL COURSE
12/20 Patient evaluated by Nephrology who attributes the hyponatremia to medications and free water intake. Patient with no acute events - mentation stable.  12/21: Nephrology following. Na continues to be low likely d/t dilution. Pts mentation stable. Pt reports he drank approx 1 large glass of water yesterday.  tolvaptan given per nephrology.. Patient with no mental changes. . Continue current plan of care.  12/23/17: case discussed with nephrology, Na level range [id number], Na at baseline. Continue fluid restriction, follow-up outpatient. Patient was seen and examined today and was determined stable for discharge

## 2017-12-21 NOTE — CONSULTS
Clinical Pharmacy: Coumadin Consult Note    Patient's Coumadin will be dosed and monitored by Pharmacy at the request of Markus Vasquez NP     Indication: Afib  Goal INR: 2-3  Today's INR: 3.5 (supra therapeutic)    Patient is to hold Coumadin due to supra therapeutic INR. Coumadin doses entered are placeholder doses only. Once INR is WNL, patient will be continued on current home dose of Coumadin 7.5 mg every other day and 5mg on alternating days.  Daily PT/INR is ordered and will be monitored daily. Dose adjustments will be made accordingly.    Patient needs to be educated.     Thank you for allowing us to participate in this patient's care.     Yris Sahni 12/20/2017 8:47 PM

## 2017-12-21 NOTE — PLAN OF CARE
Problem: Patient Care Overview  Goal: Plan of Care Review  PT WAS MOD I WITH GROSS FUNC MOBILITY, GT X 150' WITH RW AND NO LOB. PT WILL BE D/C TO MOBILITY PROGRAM     Outcome: Ongoing (interventions implemented as appropriate)  Pt remains free from injury. Reviewed POC, including indications and possible side effects of administered medications. Pt verbalized understanding and teach back.    12 hour chart check complete.

## 2017-12-21 NOTE — PLAN OF CARE
12/21/17 1415   Medicare Message   Important Message from Medicare regarding Discharge Appeal Rights Given to patient/caregiver;Explained to patient/caregiver;Signed/date by patient/caregiver   Date IMM was signed 12/21/17   Time IMM was signed 1415

## 2017-12-21 NOTE — PT/OT/SLP EVAL
Occupational Therapy   Evaluation and Discharge Note    Name: Beni Cosby  MRN: [id number]  Admitting Diagnosis:  Hyponatremia      Recommendations:     Discharge Recommendations:    Discharge Equipment Recommendations:  none  Barriers to discharge:       History:     Occupational Profile:  Living Environment: lives with spouse in 1 story house  Previous level of function: min a with le dressing, sba with ue dressing and mod (i0 to s with functional [id number]  Roles and Routines: occupational therapy  Equipment Owned:  walker, rolling  Assistance upon Discharge: min a dressing and mod (i) to s with functional mobility. Pt currently at [facility]    Past Medical History:   Diagnosis Date    Anticoagulant long-term use     Aortic valve stenosis with insufficiency 4/2008    [facility]    Arthritis     CHF (congestive heart failure)     Chronic a-fib     CKD (chronic kidney disease) stage 3, GFR 30-59 ml/min     COPD (chronic obstructive pulmonary disease)     Diabetes mellitus     Encounter for blood transfusion     Primary cancer of right kidney     Surgery, no further treatment       Past Surgical History:   Procedure Laterality Date    Atrial septem device implanted  2005    BONE MARROW BIOPSY      CARDIAC SURGERY  2008    Aotric valve replacement     KIDNEY SURGERY Right 2007    NOSE SURGERY  2008    RENAL BIOPSY  8/30/       Subjective     Chief Complaint:   Patient/Family stated goals:   Communicated with: nurse lema and [facility] chart review prior to session.  Pain/Comfort:  · Pain Rating 1: 0/10    Objective:     Patient found with: telemetry, blood pressure cuff, peripheral IV    General Precautions: Standard, fall   Orthopedic Precautions:N/A   Braces:       Occupational Performance:    Bed Mobility:    · Patient completed Rolling/Turning to Right with modified independence  · Patient completed Scooting/Bridging with modified independence  · Patient completed Supine to Sit with modified  "independence    Functional Mobility/Transfers:  · Patient completed Sit <> Stand Transfer with modified independence  with  rolling walker   · Patient completed Bed <> Chair Transfer using Stand Pivot technique with modified independence with rolling walker    Activities of Daily Living:  · UB Dressing: stand by assistance x1  · LB Dressing: stand by assistance and minimum assistance x1 sba with maude doff socks and pants    Cognitive/Visual Perceptual:  Cognitive/Psychosocial Skills:     -       Oriented to: Person, Place, Time and Situation   -       Follows Commands/attention:Follows multistep  commands  -       Communication: clear/fluent  -       Memory: No Deficits noted  Visual/Perceptual:      -Intact    Physical Exam:  Upper Extremity Range of Motion:     -       Right Upper Extremity: WFL  -       Left Upper Extremity: WFL  Upper Extremity Strength:    -       Right Upper Extremity: WFL  -       Left Upper Extremity: WFL   Strength:    -       Right Upper Extremity: WFL  -       Left Upper Extremity: WFL    Patient left up in chair with all lines intact, call button in reach, nurse notified and spouse present    [facility] 6 Click:  [facility] Total Score: 21    Treatment & Education:    Education:    Assessment:     Beni Cosby is a 72 y.o. male with a medical diagnosis of Hyponatremia. At this time, patient is functioning at their prior level of function and does not require further acute OT services.     Clinical Decision Makin.  OT Low:  "Pt evaluation falls under low complexity for evaluation coding due to performance deficits noted in 1-3 areas as stated above and 0 co-morbities affecting current functional status. Data obtained from problem focused assessments. No modifications or assistance was required for completion of evaluation. Only brief occupational profile and history review completed."     Plan:     During this hospitalization, patient does not require further acute OT services.  " Please re-consult if situation changes.    · Plan of Care Reviewed with: patient    This Plan of care has been discussed with the patient who was involved in its development and understands and is in agreement with the identified goals and treatment plan. Pt placed on people 's program    GOALS:    Occupational Therapy Goals     Not on file                Time Tracking:     OT Date of Treatment: 12/20/17  OT Start Time: 1000  OT Stop Time: 1025  OT Total Time (min): 25 min    Billable Minutes:Evaluation 10 minutes  Therapeutic Activity 15 minutes    Navya Maldonado OT  12/20/2017

## 2017-12-21 NOTE — SUBJECTIVE & OBJECTIVE
Interval History: No acute events overnight. Na remains elevated. Patients with no mental changes. Nephrology following. Continue current plan of care.    Review of Systems   Constitutional: Positive for activity change and appetite change. Negative for chills and fever.   HENT: Negative for sore throat.    Eyes: Negative for pain.   Respiratory: Negative for cough, shortness of breath and wheezing.    Cardiovascular: Positive for leg swelling. Negative for chest pain and palpitations.   Gastrointestinal: Negative for constipation, diarrhea, nausea and vomiting.   Endocrine: Positive for polydipsia.   Genitourinary: Negative for dysuria and hematuria.   Musculoskeletal: Negative for back pain.   Skin: Positive for color change.   Allergic/Immunologic: Negative.    Neurological: Positive for weakness. Negative for dizziness.   Hematological: Negative.    Psychiatric/Behavioral: Negative for agitation and confusion.     Objective:     Vital Signs (Most Recent):  Temp: 98 °F (36.7 °C) (12/21/17 1214)  Pulse: 64 (12/21/17 1214)  Resp: 18 (12/21/17 1214)  BP: (!) 115/57 (12/21/17 1214)  SpO2: 97 % (12/21/17 1214) Vital Signs (24h Range):  Temp:  [97.5 °F (36.4 °C)-98.1 °F (36.7 °C)] 98 °F (36.7 °C)  Pulse:  [58-65] 64  Resp:  [18-20] 18  SpO2:  [97 %-99 %] 97 %  BP: (107-117)/(57-59) 115/57     Weight: 121.3 kg (267 lb 6 oz)  Body mass index is 33.42 kg/m².    Intake/Output Summary (Last 24 hours) at 12/21/17 1338  Last data filed at 12/21/17 0500   Gross per 24 hour   Intake             1640 ml   Output              400 ml   Net             1240 ml      Physical Exam   Constitutional: He is oriented to person, place, and time. He appears well-developed and well-nourished.   HENT:   Head: Normocephalic and atraumatic.   Mouth/Throat: Oropharynx is clear and moist.   Eyes: Conjunctivae and EOM are normal. Pupils are equal, round, and reactive to light.   Neck: Normal range of motion. Neck supple. No JVD present.    Cardiovascular: Normal rate, regular rhythm, normal heart sounds and intact distal pulses.    Pulmonary/Chest: Effort normal and breath sounds normal. No respiratory distress. He has no wheezes.   Abdominal: Soft. Bowel sounds are normal. He exhibits no distension. There is no tenderness.   Musculoskeletal: Normal range of motion. He exhibits edema (1+ to ble).   Neurological: He is alert and oriented to person, place, and time. No cranial nerve deficit.   Skin: Skin is warm and dry. Capillary refill takes 2 to 3 seconds.   BLE hyperpigmented 2/2 PVD   Psychiatric: He has a normal mood and affect. His behavior is normal.   Nursing note and vitals reviewed.            Significant Labs:   Recent Lab Results       12/21/17  0551      Anion Gap 14     Baso # 0.02     Basophil% 0.3     BUN, Bld 53(H)     Calcium 9.1     Chloride 88(L)     CO2 21(L)     Creatinine 1.5(H)     Differential Method Automated     eGFR if  53(A)     eGFR if non  46  Comment:  Calculation used to obtain the estimated glomerular filtration  rate (eGFR) is the CKD-EPI equation.   (A)     Eos # 0.2     Eosinophil% 2.8     Glucose 97     Gran # 5.7     Gran% 72.8     Hematocrit 31.0(L)     Hemoglobin 11.0(L)     Coumadin Monitoring INR 2.6  Comment:  Coumadin Therapy:  2.0 - 3.0 for INR for all indicators except mechanical heart valves  and antiphospholipid syndromes which should use 2.5 - 3.5.  (H)      2.6  Comment:  Coumadin Therapy:  2.0 - 3.0 for INR for all indicators except mechanical heart valves  and antiphospholipid syndromes which should use 2.5 - 3.5.  (H)     Lymph # 1.2     Lymph% 14.6(L)     MCH 31.2(H)     MCHC 35.5     MCV 88     Mono # 0.8     Mono% 9.5     MPV 9.0(L)     Platelets 187     Potassium 3.9     Protime 26.4(H)      26.4(H)     RBC 3.53(L)     RDW 15.1(H)     Sodium 123(L)     WBC 7.88         All pertinent labs within the past 24 hours have been reviewed.    Significant Imaging: I have  reviewed all pertinent imaging results/findings within the past 24 hours.

## 2017-12-21 NOTE — SUBJECTIVE & OBJECTIVE
Interval History: No events    Review of Systems   Constitutional: Negative.  Negative for appetite change, fatigue and fever.   HENT: Negative.  Negative for congestion, sinus pressure and sore throat.    Eyes: Negative.  Negative for photophobia, discharge and visual disturbance.   Respiratory: Negative.  Negative for cough, chest tightness, shortness of breath and wheezing.    Cardiovascular: Negative.  Negative for chest pain and palpitations.   Gastrointestinal: Negative.  Negative for abdominal pain, blood in stool, constipation, diarrhea, nausea and vomiting.   Endocrine: Negative.    Genitourinary: Negative.    Musculoskeletal: Negative.  Negative for myalgias, neck pain and neck stiffness.   Skin: Negative.    Allergic/Immunologic: Negative.    Neurological: Negative.  Negative for seizures, syncope, weakness and headaches.   Hematological: Negative.    Psychiatric/Behavioral: Negative.  Negative for agitation, behavioral problems, hallucinations, self-injury and suicidal ideas.     Objective:     Vital Signs (Most Recent):  Temp: 98.1 °F (36.7 °C) (12/20/17 1532)  Pulse: 65 (12/20/17 1532)  Resp: 18 (12/20/17 1532)  BP: (!) 117/58 (12/20/17 1532)  SpO2: 97 % (12/20/17 1532) Vital Signs (24h Range):  Temp:  [98 °F (36.7 °C)-98.5 °F (36.9 °C)] 98.1 °F (36.7 °C)  Pulse:  [61-71] 65  Resp:  [16-22] 18  SpO2:  [96 %-100 %] 97 %  BP: (102-136)/(52-65) 117/58     Weight: 121.3 kg (267 lb 6 oz)  Body mass index is 33.42 kg/m².    Intake/Output Summary (Last 24 hours) at 12/20/17 1814  Last data filed at 12/20/17 0600   Gross per 24 hour   Intake                0 ml   Output              200 ml   Net             -200 ml      Physical Exam   Constitutional: He is oriented to person, place, and time. He appears well-developed and well-nourished.   HENT:   Head: Normocephalic and atraumatic.   Eyes: EOM are normal. Pupils are equal, round, and reactive to light.   Neck: Normal range of motion. Neck supple.    Cardiovascular: Normal rate, regular rhythm, normal heart sounds and intact distal pulses.    Pulmonary/Chest: Effort normal and breath sounds normal. No respiratory distress. He has no wheezes.   Abdominal: Soft. Bowel sounds are normal.   Musculoskeletal: Normal range of motion. He exhibits no deformity.   Neurological: He is alert and oriented to person, place, and time. He has normal reflexes.   Skin: Skin is warm and dry.   Psychiatric: He has a normal mood and affect. His behavior is normal. Judgment and thought content normal.   Nursing note and vitals reviewed.      Significant Labs:   BMP:   Recent Labs  Lab 12/20/17  0610   GLU 95   *   K 3.8   CL 86*   CO2 21*   BUN 63*   CREATININE 1.6*   CALCIUM 9.0   MG 1.6     CBC:   Recent Labs  Lab 12/20/17  0610   WBC 9.98   HGB 11.1*   HCT 31.3*        CMP:   Recent Labs  Lab 12/19/17  2251 12/20/17  0610   * 124*   K 4.4 3.8   CL 90* 86*   CO2 19* 21*    95   BUN 61* 63*   CREATININE 1.7* 1.6*   CALCIUM 8.9 9.0   ANIONGAP 15 17*   EGFRNONAA 39* 42*     Urine Studies:   Recent Labs  Lab 12/20/17  0020   COLORU Yellow   APPEARANCEUA Clear   PHUR 6.0   SPECGRAV <=1.005*   PROTEINUA Negative   GLUCUA Negative   KETONESU Negative   BILIRUBINUA Negative   OCCULTUA Negative   NITRITE Negative   UROBILINOGEN Negative   LEUKOCYTESUR Negative     All pertinent labs within the past 24 hours have been reviewed.    Significant Imaging: I have reviewed all pertinent imaging results/findings within the past 24 hours.

## 2017-12-21 NOTE — PLAN OF CARE
CAMILLA met with patient at the bedside to assess for discharge needs.  Patient lives at home with his wife.  He is here because his sodium level is too low.  He states that he drinks a lot of water at home.  He uses a walker with ambulation.  He does not anticipate any discharge needs at this time.  CAMILLA provided a transitional care folder, information on advanced directives, information on pharmacy bedside delivery, and discharge planning begins on admission with contact information for MELANIE Casey    D/C Plan:  Home with no needs    CAMILLA handed off to MELANIE Casey     12/21/17 7401   Discharge Assessment   Assessment Type Discharge Planning Assessment   Confirmed/corrected address and phone number on facesheet? Yes   Assessment information obtained from? Patient;Caregiver;Medical Record   Expected Length of Stay (days) (TBD)   Communicated expected length of stay with patient/caregiver yes   Prior to hospitilization cognitive status: Alert/Oriented   Prior to hospitalization functional status: Independent;Assistive Equipment   Current cognitive status: Alert/Oriented   Current Functional Status: Independent;Assistive Equipment   Facility Arrived From: home   Lives With spouse   Able to Return to Prior Arrangements yes   Is patient able to care for self after discharge? Yes   Who are your caregiver(s) and their phone number(s)? Deepthi Cosby, spouse [phone]   Patient's perception of discharge disposition home or selfcare   Readmission Within The Last 30 Days no previous admission in last 30 days   Patient currently being followed by outpatient case management? No   Patient currently receives any other outside agency services? No   Equipment Currently Used at Home walker, rolling   Do you have any problems affording any of your prescribed medications? No   Is the patient taking medications as prescribed? yes   Does the patient have transportation home? Yes   Transportation Available family or friend will provide   Dialysis  Name and Scheduled days NA   Does the patient receive services at the [facility]? (Dr Chew monitors INR)   Discharge Plan A Home with family   Discharge Plan B Home with family   Patient/Family In Agreement With Plan yes

## 2017-12-21 NOTE — PROGRESS NOTES
"[facility] Hospital Medicine  Progress Note    Patient Name: Beni Cosby  MRN: [id number]  Patient Class: IP- Inpatient   Admission Date: 12/19/2017  Length of Stay: 0 days  Attending Physician: Abimbola Sherman MD  Primary Care Provider: Jackson Brandt MD        Subjective:     Principal Problem:Hyponatremia    HPI:  Patient is a transfer from [facility] as his renal doctor is [facility].  Beni Cosby is a 72 y.o. male patient  was transferred for renal evaluation for hyponatremia. Symptoms are constant and moderate in severity. No mitigating or exacerbating factors reported. Associated sxs include increased fatigue over the past week and 30 Ib weight loss over a 1-2 month period. Patient denies any fever, chills, n/v/d, abd pain, CP, SOB, BLE edema/pain, and all other sxs at this time. Prior Tx includes 2 L of fluids at [facility]. No further complaints or concerns at this time. To note the patient reports he drinks an excessive amount of water daily, "im always thirsty," and takes a number of diuretics. relavent labs from [facility] on arrival: 119Na, 70BUN, 2.04Cr, 78Cl, Ua neg. On repeat prior to arrival 121Na, 67BUN, 2.00Cr, 82Cl. The patient was reevaluated in the ER. Sodium continuing to improve. Patient admitted for Hyponatremia.        Hospital Course:  12/20 Patient evaluated by Nephrology who attributes the hyponatremia to medications and free water intake. Patient with no acute events - mentation stable.    Interval History: No events    Review of Systems   Constitutional: Negative.  Negative for appetite change, fatigue and fever.   HENT: Negative.  Negative for congestion, sinus pressure and sore throat.    Eyes: Negative.  Negative for photophobia, discharge and visual disturbance.   Respiratory: Negative.  Negative for cough, chest tightness, shortness of breath and wheezing.    Cardiovascular: Negative.  Negative for chest pain and palpitations. "   Gastrointestinal: Negative.  Negative for abdominal pain, blood in stool, constipation, diarrhea, nausea and vomiting.   Endocrine: Negative.    Genitourinary: Negative.    Musculoskeletal: Negative.  Negative for myalgias, neck pain and neck stiffness.   Skin: Negative.    Allergic/Immunologic: Negative.    Neurological: Negative.  Negative for seizures, syncope, weakness and headaches.   Hematological: Negative.    Psychiatric/Behavioral: Negative.  Negative for agitation, behavioral problems, hallucinations, self-injury and suicidal ideas.     Objective:     Vital Signs (Most Recent):  Temp: 98.1 °F (36.7 °C) (12/20/17 1532)  Pulse: 65 (12/20/17 1532)  Resp: 18 (12/20/17 1532)  BP: (!) 117/58 (12/20/17 1532)  SpO2: 97 % (12/20/17 1532) Vital Signs (24h Range):  Temp:  [98 °F (36.7 °C)-98.5 °F (36.9 °C)] 98.1 °F (36.7 °C)  Pulse:  [61-71] 65  Resp:  [16-22] 18  SpO2:  [96 %-100 %] 97 %  BP: (102-136)/(52-65) 117/58     Weight: 121.3 kg (267 lb 6 oz)  Body mass index is 33.42 kg/m².    Intake/Output Summary (Last 24 hours) at 12/20/17 1814  Last data filed at 12/20/17 0600   Gross per 24 hour   Intake                0 ml   Output              200 ml   Net             -200 ml      Physical Exam   Constitutional: He is oriented to person, place, and time. He appears well-developed and well-nourished.   HENT:   Head: Normocephalic and atraumatic.   Eyes: EOM are normal. Pupils are equal, round, and reactive to light.   Neck: Normal range of motion. Neck supple.   Cardiovascular: Normal rate, regular rhythm, normal heart sounds and intact distal pulses.    Pulmonary/Chest: Effort normal and breath sounds normal. No respiratory distress. He has no wheezes.   Abdominal: Soft. Bowel sounds are normal.   Musculoskeletal: Normal range of motion. He exhibits no deformity.   Neurological: He is alert and oriented to person, place, and time. He has normal reflexes.   Skin: Skin is warm and dry.   Psychiatric: He has a normal  mood and affect. His behavior is normal. Judgment and thought content normal.   Nursing note and vitals reviewed.      Significant Labs:   BMP:   Recent Labs  Lab 12/20/17  0610   GLU 95   *   K 3.8   CL 86*   CO2 21*   BUN 63*   CREATININE 1.6*   CALCIUM 9.0   MG 1.6     CBC:   Recent Labs  Lab 12/20/17  0610   WBC 9.98   HGB 11.1*   HCT 31.3*        CMP:   Recent Labs  Lab 12/19/17  2251 12/20/17  0610   * 124*   K 4.4 3.8   CL 90* 86*   CO2 19* 21*    95   BUN 61* 63*   CREATININE 1.7* 1.6*   CALCIUM 8.9 9.0   ANIONGAP 15 17*   EGFRNONAA 39* 42*     Urine Studies:   Recent Labs  Lab 12/20/17  0020   COLORU Yellow   APPEARANCEUA Clear   PHUR 6.0   SPECGRAV <=1.005*   PROTEINUA Negative   GLUCUA Negative   KETONESU Negative   BILIRUBINUA Negative   OCCULTUA Negative   NITRITE Negative   UROBILINOGEN Negative   LEUKOCYTESUR Negative     All pertinent labs within the past 24 hours have been reviewed.    Significant Imaging: I have reviewed all pertinent imaging results/findings within the past 24 hours.    Assessment/Plan:      * Hyponatremia    Hold fee water  NS monitor closely  Renal consult  Consider medication profile.    12/20 - Hold zaroxylen. NS infusion / Nephrology. D/C planning             Weakness    PT/OT eval   consult for dc planning.     12/20 - Monitor        Recent weight loss    ?excessive diuresis  Dietary consult  Daily weights  CBC, CMP, Mg, Phos    12/20 F/U with PCP for LT weight loss  - Diet  - Exercise          Type 2 diabetes mellitus    Check A1C  SSI  Monitor     12/20 - Controlled  - NISS        Atrial fibrillation    Stable rate controlled on CM  Check EKG  INR 3.7, pharmacy to monitor  Check ekg  Continue BB          VTE Risk Mitigation         Ordered     Place sequential compression device  Until discontinued      12/20/17 0007     Medium Risk of VTE  Once      12/20/17 0007              Mc Lucas MD  Department of Hospital Medicine    [facility]

## 2017-12-22 PROBLEM — J45.20 MILD INTERMITTENT ASTHMA: Status: ACTIVE | Noted: 2017-12-22

## 2017-12-22 PROBLEM — R06.2 WHEEZING: Status: ACTIVE | Noted: 2017-12-22

## 2017-12-22 LAB
ALT SERPL W/O P-5'-P-CCNC: 34 U/L
ANION GAP SERPL CALC-SCNC: 11 MMOL/L
ANION GAP SERPL CALC-SCNC: 13 MMOL/L
AST SERPL-CCNC: 37 U/L
BASOPHILS # BLD AUTO: 0.02 K/UL
BASOPHILS NFR BLD: 0.2 %
BUN SERPL-MCNC: 44 MG/DL
BUN SERPL-MCNC: 45 MG/DL
CALCIUM SERPL-MCNC: 9.3 MG/DL
CALCIUM SERPL-MCNC: 9.6 MG/DL
CHLORIDE SERPL-SCNC: 87 MMOL/L
CHLORIDE SERPL-SCNC: 89 MMOL/L
CO2 SERPL-SCNC: 22 MMOL/L
CO2 SERPL-SCNC: 24 MMOL/L
CREAT SERPL-MCNC: 1.4 MG/DL
CREAT SERPL-MCNC: 1.5 MG/DL
DIFFERENTIAL METHOD: ABNORMAL
EOSINOPHIL # BLD AUTO: 0.2 K/UL
EOSINOPHIL NFR BLD: 1.8 %
ERYTHROCYTE [DISTWIDTH] IN BLOOD BY AUTOMATED COUNT: 15.1 %
EST. GFR  (AFRICAN AMERICAN): 53 ML/MIN/1.73 M^2
EST. GFR  (AFRICAN AMERICAN): 58 ML/MIN/1.73 M^2
EST. GFR  (NON AFRICAN AMERICAN): 46 ML/MIN/1.73 M^2
EST. GFR  (NON AFRICAN AMERICAN): 50 ML/MIN/1.73 M^2
GLUCOSE SERPL-MCNC: 102 MG/DL
GLUCOSE SERPL-MCNC: 137 MG/DL
HCT VFR BLD AUTO: 30.3 %
HGB BLD-MCNC: 10.5 G/DL
INR PPP: 1.9
INR PPP: 1.9
LYMPHOCYTES # BLD AUTO: 1.3 K/UL
LYMPHOCYTES NFR BLD: 12.9 %
MCH RBC QN AUTO: 30.8 PG
MCHC RBC AUTO-ENTMCNC: 34.7 G/DL
MCV RBC AUTO: 89 FL
MONOCYTES # BLD AUTO: 0.8 K/UL
MONOCYTES NFR BLD: 8.2 %
NEUTROPHILS # BLD AUTO: 7.5 K/UL
NEUTROPHILS NFR BLD: 76.9 %
OSMOLALITY UR: 428 MOSM/KG
PLATELET # BLD AUTO: 201 K/UL
PMV BLD AUTO: 9.2 FL
POCT GLUCOSE: 125 MG/DL (ref 70–110)
POTASSIUM SERPL-SCNC: 3.9 MMOL/L
POTASSIUM SERPL-SCNC: 4.4 MMOL/L
PROTHROMBIN TIME: 19.4 SEC
PROTHROMBIN TIME: 19.4 SEC
RBC # BLD AUTO: 3.41 M/UL
SODIUM SERPL-SCNC: 122 MMOL/L
SODIUM SERPL-SCNC: 124 MMOL/L
SODIUM UR-SCNC: <20 MMOL/L
URATE SERPL-MCNC: 8.9 MG/DL
WBC # BLD AUTO: 9.81 K/UL

## 2017-12-22 PROCEDURE — 84460 ALANINE AMINO (ALT) (SGPT): CPT

## 2017-12-22 PROCEDURE — 85025 COMPLETE CBC W/AUTO DIFF WBC: CPT

## 2017-12-22 PROCEDURE — 85610 PROTHROMBIN TIME: CPT

## 2017-12-22 PROCEDURE — 21400001 HC TELEMETRY ROOM

## 2017-12-22 PROCEDURE — 25000242 PHARM REV CODE 250 ALT 637 W/ HCPCS: Performed by: NURSE PRACTITIONER

## 2017-12-22 PROCEDURE — 36415 COLL VENOUS BLD VENIPUNCTURE: CPT

## 2017-12-22 PROCEDURE — 25000003 PHARM REV CODE 250: Performed by: INTERNAL MEDICINE

## 2017-12-22 PROCEDURE — 25000003 PHARM REV CODE 250: Performed by: NURSE PRACTITIONER

## 2017-12-22 PROCEDURE — 80048 BASIC METABOLIC PNL TOTAL CA: CPT | Mod: 91

## 2017-12-22 PROCEDURE — 99233 SBSQ HOSP IP/OBS HIGH 50: CPT | Mod: ,,, | Performed by: INTERNAL MEDICINE

## 2017-12-22 PROCEDURE — 84300 ASSAY OF URINE SODIUM: CPT

## 2017-12-22 PROCEDURE — 84550 ASSAY OF BLOOD/URIC ACID: CPT

## 2017-12-22 PROCEDURE — 80048 BASIC METABOLIC PNL TOTAL CA: CPT

## 2017-12-22 PROCEDURE — 63600175 PHARM REV CODE 636 W HCPCS: Performed by: INTERNAL MEDICINE

## 2017-12-22 PROCEDURE — 83935 ASSAY OF URINE OSMOLALITY: CPT

## 2017-12-22 PROCEDURE — 94640 AIRWAY INHALATION TREATMENT: CPT

## 2017-12-22 PROCEDURE — 84450 TRANSFERASE (AST) (SGOT): CPT

## 2017-12-22 RX ORDER — BUDESONIDE 0.25 MG/2ML
0.25 INHALANT ORAL DAILY
Status: DISCONTINUED | OUTPATIENT
Start: 2017-12-22 | End: 2017-12-22

## 2017-12-22 RX ORDER — BUDESONIDE 0.5 MG/2ML
0.5 INHALANT ORAL 2 TIMES DAILY
Status: DISCONTINUED | OUTPATIENT
Start: 2017-12-22 | End: 2017-12-23 | Stop reason: HOSPADM

## 2017-12-22 RX ORDER — TOLVAPTAN 30 MG/1
30 TABLET ORAL ONCE
Status: COMPLETED | OUTPATIENT
Start: 2017-12-22 | End: 2017-12-22

## 2017-12-22 RX ORDER — FUROSEMIDE 10 MG/ML
60 INJECTION INTRAMUSCULAR; INTRAVENOUS ONCE
Status: DISCONTINUED | OUTPATIENT
Start: 2017-12-22 | End: 2017-12-22

## 2017-12-22 RX ORDER — WARFARIN SODIUM 5 MG/1
5 TABLET ORAL
Status: DISCONTINUED | OUTPATIENT
Start: 2017-12-28 | End: 2017-12-23 | Stop reason: HOSPADM

## 2017-12-22 RX ORDER — WARFARIN SODIUM 5 MG/1
5 TABLET ORAL
Status: DISCONTINUED | OUTPATIENT
Start: 2017-12-22 | End: 2017-12-23 | Stop reason: HOSPADM

## 2017-12-22 RX ORDER — ACETAMINOPHEN 500 MG
500 TABLET ORAL ONCE
Status: COMPLETED | OUTPATIENT
Start: 2017-12-23 | End: 2017-12-23

## 2017-12-22 RX ORDER — ARFORMOTEROL TARTRATE 15 UG/2ML
15 SOLUTION RESPIRATORY (INHALATION) 2 TIMES DAILY
Status: DISCONTINUED | OUTPATIENT
Start: 2017-12-22 | End: 2017-12-23 | Stop reason: HOSPADM

## 2017-12-22 RX ORDER — FUROSEMIDE 10 MG/ML
60 INJECTION INTRAMUSCULAR; INTRAVENOUS 3 TIMES DAILY
Status: DISCONTINUED | OUTPATIENT
Start: 2017-12-22 | End: 2017-12-23 | Stop reason: HOSPADM

## 2017-12-22 RX ORDER — BENZONATATE 100 MG/1
100 CAPSULE ORAL 3 TIMES DAILY PRN
Status: DISCONTINUED | OUTPATIENT
Start: 2017-12-22 | End: 2017-12-23 | Stop reason: HOSPADM

## 2017-12-22 RX ADMIN — METOPROLOL SUCCINATE 100 MG: 50 TABLET, EXTENDED RELEASE ORAL at 08:12

## 2017-12-22 RX ADMIN — TOLVAPTAN 30 MG: 30 TABLET ORAL at 01:12

## 2017-12-22 RX ADMIN — FAMOTIDINE 20 MG: 20 TABLET, FILM COATED ORAL at 09:12

## 2017-12-22 RX ADMIN — FERROUS SULFATE TAB EC 325 MG (65 MG FE EQUIVALENT) 325 MG: 325 (65 FE) TABLET DELAYED RESPONSE at 11:12

## 2017-12-22 RX ADMIN — FUROSEMIDE 60 MG: 10 INJECTION, SOLUTION INTRAVENOUS at 07:12

## 2017-12-22 RX ADMIN — ROSUVASTATIN CALCIUM 10 MG: 10 TABLET, FILM COATED ORAL at 08:12

## 2017-12-22 RX ADMIN — FAMOTIDINE 20 MG: 20 TABLET, FILM COATED ORAL at 08:12

## 2017-12-22 RX ADMIN — FERROUS SULFATE TAB EC 325 MG (65 MG FE EQUIVALENT) 325 MG: 325 (65 FE) TABLET DELAYED RESPONSE at 05:12

## 2017-12-22 RX ADMIN — ARFORMOTEROL TARTRATE 15 MCG: 15 SOLUTION RESPIRATORY (INHALATION) at 08:12

## 2017-12-22 RX ADMIN — WARFARIN SODIUM 5 MG: 5 TABLET ORAL at 05:12

## 2017-12-22 RX ADMIN — BENZONATATE 100 MG: 100 CAPSULE ORAL at 11:12

## 2017-12-22 RX ADMIN — FERROUS SULFATE TAB EC 325 MG (65 MG FE EQUIVALENT) 325 MG: 325 (65 FE) TABLET DELAYED RESPONSE at 08:12

## 2017-12-22 RX ADMIN — BUDESONIDE 0.5 MG: 0.5 SUSPENSION RESPIRATORY (INHALATION) at 08:12

## 2017-12-22 NOTE — PLAN OF CARE
Problem: Patient Care Overview  Goal: Plan of Care Review  PT WAS MOD I WITH GROSS FUNC MOBILITY, GT X 150' WITH RW AND NO LOB. PT WILL BE D/C TO MOBILITY PROGRAM     Outcome: Ongoing (interventions implemented as appropriate)  Fall precautions maintained, pt free from falls/injuries  PT repositions and ambulates independently  Pt has np c/o pain  POC and medications reviewed with pt, pt verbalized understanding.  Side rails x 2 up, bed locked and low.  No signs/symptoms of acute distress.  Chart check done. Will cont to monitor.

## 2017-12-22 NOTE — SUBJECTIVE & OBJECTIVE
Past Medical History:   Diagnosis Date    Anticoagulant long-term use     Aortic valve stenosis with insufficiency 4/2008    [facility]    Arthritis     CHF (congestive heart failure)     Chronic a-fib     CKD (chronic kidney disease) stage 3, GFR 30-59 ml/min     COPD (chronic obstructive pulmonary disease)     Diabetes mellitus     Encounter for blood transfusion     Primary cancer of right kidney     Surgery, no further treatment       Past Surgical History:   Procedure Laterality Date    Atrial septem device implanted  2005    BONE MARROW BIOPSY      CARDIAC SURGERY  2008    Aotric valve replacement     KIDNEY SURGERY Right 2007    NOSE SURGERY  2008    RENAL BIOPSY  8/30/       Review of patient's allergies indicates:   Allergen Reactions    Morphine      Other reaction(s): Nausea Only       No current facility-administered medications on file prior to encounter.      Current Outpatient Prescriptions on File Prior to Encounter   Medication Sig    bumetanide (BUMEX) 2 MG tablet Take 2 mg by mouth 2 (two) times daily. Take 2mg every  Am and 1 mg every pm    ferrous sulfate 325 (65 FE) MG EC tablet Take 325 mg by mouth 2 (two) times daily.     glimepiride (AMARYL) 1 MG tablet Take 1 mg by mouth before breakfast.    lansoprazole (PREVACID) 30 MG capsule Take 30 mg by mouth 2 (two) times daily. TAKE 1 CAPSULE BY MOUTH ONCE DAILY    metolazone (ZAROXOLYN) 5 MG tablet once daily.     metoprolol succinate (TOPROL-XL) 100 MG 24 hr tablet Take 50 mg by mouth once daily. 1/2 tablet daily    potassium chloride SA (K-DUR,KLOR-CON) 20 MEQ tablet Take 20 mEq by mouth 2 (two) times daily. Take 2 tablets daily    spironolactone (ALDACTONE) 25 MG tablet Take 50 mg by mouth 2 (two) times daily.     warfarin (COUMADIN) 5 MG tablet 5 mg. Sunday, Monday, Wednesday, Thursday, Friday    warfarin (COUMADIN) 7.5 MG tablet Tuesday and Saturday    aspirin (ECOTRIN) 81 MG EC tablet Take 1 tablet (81 mg  total) by mouth once daily.    blood sugar diagnostic (CONTOUR NEXT STRIPS) Strp Use once daily.    mometasone (NASONEX) 50 mcg/actuation nasal spray 2 sprays by Nasal route daily as needed.     mometasone-formoterol (DULERA) 100-5 mcg/actuation HFAA Inhale into the lungs once daily. Controller     polyethylene glycol (GLYCOLAX) 17 gram PwPk Take by mouth as needed.    rosuvastatin (CRESTOR) 10 MG tablet qhs     Family History     None        Social History Main Topics    Smoking status: Never Smoker    Smokeless tobacco: Never Used    Alcohol use No    Drug use: No    Sexual activity: Not Currently     Review of Systems   Constitutional: Positive for fatigue and unexpected weight change. Negative for chills, diaphoresis and fever.   HENT: Negative for congestion, sore throat and voice change.    Eyes: Negative for photophobia and visual disturbance.   Respiratory: Negative for cough, shortness of breath, wheezing and stridor.    Cardiovascular: Negative for chest pain and leg swelling.   Gastrointestinal: Negative for abdominal distention, abdominal pain, constipation, diarrhea, nausea and vomiting.   Endocrine: Negative for polydipsia, polyphagia and polyuria.   Genitourinary: Negative for difficulty urinating, dysuria, flank pain, testicular pain and urgency.   Musculoskeletal: Negative for back pain, joint swelling, neck pain and neck stiffness.   Skin: Negative for color change and rash.   Allergic/Immunologic: Negative for immunocompromised state.   Neurological: Negative for dizziness, syncope, weakness, numbness and headaches.   Hematological: Does not bruise/bleed easily.   Psychiatric/Behavioral: Negative for agitation, behavioral problems and confusion.     Objective:     Vital Signs (Most Recent):  Temp: 98.5 °F (36.9 °C) (12/21/17 2016)  Pulse: (!) 59 (12/21/17 2016)  Resp: 18 (12/21/17 2016)  BP: (!) 105/52 (12/21/17 2016)  SpO2: 98 % (12/21/17 2016) Vital Signs (24h Range):  Temp:  [97.5 °F  (36.4 °C)-98.5 °F (36.9 °C)] 98.5 °F (36.9 °C)  Pulse:  [58-64] 59  Resp:  [18-20] 18  SpO2:  [97 %-98 %] 98 %  BP: (105-118)/(52-59) 105/52     Weight: 121.3 kg (267 lb 6 oz)  Body mass index is 33.42 kg/m².    Physical Exam   Constitutional: He is oriented to person, place, and time. He appears well-developed. No distress.   Elderly male    HENT:   Head: Normocephalic and atraumatic.   Nose: Nose normal.   Eyes: Conjunctivae and EOM are normal. Pupils are equal, round, and reactive to light. No scleral icterus.   Neck: Normal range of motion. Neck supple. No tracheal deviation present.   Cardiovascular: Normal rate, regular rhythm and intact distal pulses.    Murmur heard.  Bilateral lower ext edema, +1+2   Pulmonary/Chest: Effort normal and breath sounds normal. No stridor. No respiratory distress. He has no wheezes. He has no rales.   Abdominal: Soft. Bowel sounds are normal. He exhibits no distension. There is no tenderness. There is no guarding.   obese   Genitourinary:   Genitourinary Comments: deferred   Musculoskeletal: Normal range of motion. He exhibits no edema or deformity.   Neurological: He is alert and oriented to person, place, and time. No cranial nerve deficit.   Skin: Skin is warm and dry. Capillary refill takes 2 to 3 seconds. No rash noted. He is not diaphoretic.   Chronic discoloration to bilateral lower ext.    Psychiatric: He has a normal mood and affect. His behavior is normal. Judgment and thought content normal.   Nursing note reviewed.        CRANIAL NERVES     CN III, IV, VI   Pupils are equal, round, and reactive to light.  Extraocular motions are normal.        Significant Labs: All pertinent labs within the past 24 hours have been reviewed.   Results for orders placed or performed during the hospital encounter of 12/19/17   Basic metabolic panel   Result Value Ref Range    Sodium 124 (L) 136 - 145 mmol/L    Potassium 4.4 3.5 - 5.1 mmol/L    Chloride 90 (L) 95 - 110 mmol/L    CO2 19  (L) 23 - 29 mmol/L    Glucose 102 70 - 110 mg/dL    BUN, Bld 61 (H) 8 - 23 mg/dL    Creatinine 1.7 (H) 0.5 - 1.4 mg/dL    Calcium 8.9 8.7 - 10.5 mg/dL    Anion Gap 15 8 - 16 mmol/L    eGFR if African American 46 (A) >60 mL/min/1.73 m^2    eGFR if non African American 39 (A) >60 mL/min/1.73 m^2   Protime-INR   Result Value Ref Range    Prothrombin Time 36.3 (H) 9.0 - 12.5 sec    INR 3.7 (H) 0.8 - 1.2   Urinalysis   Result Value Ref Range    Specimen UA Urine, Clean Catch     Color, UA Yellow Yellow, Straw, Amaya    Appearance, UA Clear Clear    pH, UA 6.0 5.0 - 8.0    Specific Gravity, UA <=1.005 (A) 1.005 - 1.030    Protein, UA Negative Negative    Glucose, UA Negative Negative    Ketones, UA Negative Negative    Bilirubin (UA) Negative Negative    Occult Blood UA Negative Negative    Nitrite, UA Negative Negative    Urobilinogen, UA Negative <2.0 EU/dL    Leukocytes, UA Negative Negative   CBC auto differential   Result Value Ref Range    WBC 9.98 3.90 - 12.70 K/uL    RBC 3.60 (L) 4.60 - 6.20 M/uL    Hemoglobin 11.1 (L) 14.0 - 18.0 g/dL    Hematocrit 31.3 (L) 40.0 - 54.0 %    MCV 87 82 - 98 fL    MCH 30.8 27.0 - 31.0 pg    MCHC 35.5 32.0 - 36.0 g/dL    RDW 14.9 (H) 11.5 - 14.5 %    Platelets 180 150 - 350 K/uL    MPV 8.8 (L) 9.2 - 12.9 fL    Gran # 7.8 (H) 1.8 - 7.7 K/uL    Lymph # 1.2 1.0 - 4.8 K/uL    Mono # 0.8 0.3 - 1.0 K/uL    Eos # 0.1 0.0 - 0.5 K/uL    Baso # 0.01 0.00 - 0.20 K/uL    Gran% 78.1 (H) 38.0 - 73.0 %    Lymph% 12.4 (L) 18.0 - 48.0 %    Mono% 8.4 4.0 - 15.0 %    Eosinophil% 1.0 0.0 - 8.0 %    Basophil% 0.1 0.0 - 1.9 %    Differential Method Automated    Magnesium - if not done in ED   Result Value Ref Range    Magnesium 1.6 1.6 - 2.6 mg/dL   Phosphorus - if not done in ED   Result Value Ref Range    Phosphorus 3.2 2.7 - 4.5 mg/dL   Basic metabolic panel    Result Value Ref Range    Sodium 124 (L) 136 - 145 mmol/L    Potassium 3.8 3.5 - 5.1 mmol/L    Chloride 86 (L) 95 - 110 mmol/L    CO2 21 (L) 23  - 29 mmol/L    Glucose 95 70 - 110 mg/dL    BUN, Bld 63 (H) 8 - 23 mg/dL    Creatinine 1.6 (H) 0.5 - 1.4 mg/dL    Calcium 9.0 8.7 - 10.5 mg/dL    Anion Gap 17 (H) 8 - 16 mmol/L    eGFR if African American 49 (A) >60 mL/min/1.73 m^2    eGFR if non African American 42 (A) >60 mL/min/1.73 m^2   Protime-INR   Result Value Ref Range    Prothrombin Time 34.5 (H) 9.0 - 12.5 sec    INR 3.5 (H) 0.8 - 1.2   Hemoglobin A1c   Result Value Ref Range    Hemoglobin A1C 6.9 (H) 4.0 - 5.6 %    Estimated Avg Glucose 151 (H) 68 - 131 mg/dL   CBC auto differential   Result Value Ref Range    WBC 7.88 3.90 - 12.70 K/uL    RBC 3.53 (L) 4.60 - 6.20 M/uL    Hemoglobin 11.0 (L) 14.0 - 18.0 g/dL    Hematocrit 31.0 (L) 40.0 - 54.0 %    MCV 88 82 - 98 fL    MCH 31.2 (H) 27.0 - 31.0 pg    MCHC 35.5 32.0 - 36.0 g/dL    RDW 15.1 (H) 11.5 - 14.5 %    Platelets 187 150 - 350 K/uL    MPV 9.0 (L) 9.2 - 12.9 fL    Gran # 5.7 1.8 - 7.7 K/uL    Lymph # 1.2 1.0 - 4.8 K/uL    Mono # 0.8 0.3 - 1.0 K/uL    Eos # 0.2 0.0 - 0.5 K/uL    Baso # 0.02 0.00 - 0.20 K/uL    Gran% 72.8 38.0 - 73.0 %    Lymph% 14.6 (L) 18.0 - 48.0 %    Mono% 9.5 4.0 - 15.0 %    Eosinophil% 2.8 0.0 - 8.0 %    Basophil% 0.3 0.0 - 1.9 %    Differential Method Automated    Basic metabolic panel    Result Value Ref Range    Sodium 123 (L) 136 - 145 mmol/L    Potassium 3.9 3.5 - 5.1 mmol/L    Chloride 88 (L) 95 - 110 mmol/L    CO2 21 (L) 23 - 29 mmol/L    Glucose 97 70 - 110 mg/dL    BUN, Bld 53 (H) 8 - 23 mg/dL    Creatinine 1.5 (H) 0.5 - 1.4 mg/dL    Calcium 9.1 8.7 - 10.5 mg/dL    Anion Gap 14 8 - 16 mmol/L    eGFR if African American 53 (A) >60 mL/min/1.73 m^2    eGFR if non African American 46 (A) >60 mL/min/1.73 m^2   Protime-INR   Result Value Ref Range    Prothrombin Time 26.4 (H) 9.0 - 12.5 sec    INR 2.6 (H) 0.8 - 1.2   Protime-INR   Result Value Ref Range    Prothrombin Time 26.4 (H) 9.0 - 12.5 sec    INR 2.6 (H) 0.8 - 1.2         Significant Imaging: I have reviewed all  pertinent imaging results/findings within the past 24 hours.   Imaging Results          X-Ray Chest PA And Lateral (Final result)  Result time 12/19/17 23:17:38    Final result by Kwan Cardenas III, MD (12/19/17 23:17:38)                 Impression:     Stable cardiomegaly and mild vascular congestion.  Stable chronic interstitial lung disease versus interstitial edema.      Electronically signed by: KWAN CARDENAS MD  Date:     12/19/17  Time:    23:17              Narrative:    XR CHEST PA AND LATERAL    Clinical history: R07.9 Chest pain, unspecified    Comparison: 05/12/2016    Findings: Prior sternotomy is again noted.  There is stable mild cardiomegaly and pulmonary vascular congestion.  There are stable mild interstitial thickening and patchy hazy opacities in the bilateral mid and lower lung zone representing interstitial edema or chronic interstitial lung disease. No new acute appearing infiltrate, pleural effusion, pneumothorax or other acute disease identified. No acute osseous abnormality detected.

## 2017-12-22 NOTE — PROGRESS NOTES
[facility] -   Nephrology  Progress Note    Patient Name: Beni Cosby  MRN: [id number]  Admission Date: 12/19/2017  Hospital Length of Stay: 1 days  Attending Provider: Jl Dumont MD   Primary Care Physician: Jackson Brandt MD  Principal Problem:Hyponatremia    Subjective:     HPI: Patient is a 72-year-old male with history of chronic kidney disease stage III followed by Dr. Jackson in the nephrology division.  Patient has a history of acute kidney injury on Zaroxolyn and Bumex and 2016.  Today patient comes in with hyponatremia on Zaroxolyn.  According to the wife patient has been drinking a lot of water as well as losing a lot of weight from diuretics.  His sodium has gone down to 124.  Patient has been started on normal saline in the emergency room and a consultation is requested.    Past Medical History:   Diagnosis Date    Anticoagulant long-term use     Aortic valve stenosis with insufficiency 4/2008    [facility]    Arthritis     CHF (congestive heart failure)     Chronic a-fib     CKD (chronic kidney disease) stage 3, GFR 30-59 ml/min     COPD (chronic obstructive pulmonary disease)     Diabetes mellitus     Encounter for blood transfusion     Primary cancer of right kidney     Surgery, no further treatment       Past Surgical History:   Procedure Laterality Date    Atrial septem device implanted  2005    BONE MARROW BIOPSY      CARDIAC SURGERY  2008    Aotric valve replacement     KIDNEY SURGERY Right 2007    NOSE SURGERY  2008    RENAL BIOPSY  8/30/       Review of patient's allergies indicates:   Allergen Reactions    Morphine      Other reaction(s): Nausea Only       No current facility-administered medications on file prior to encounter.      Current Outpatient Prescriptions on File Prior to Encounter   Medication Sig    bumetanide (BUMEX) 2 MG tablet Take 2 mg by mouth 2 (two) times daily. Take 2mg every  Am and 1 mg every pm    ferrous sulfate 325 (65  FE) MG EC tablet Take 325 mg by mouth 2 (two) times daily.     glimepiride (AMARYL) 1 MG tablet Take 1 mg by mouth before breakfast.    lansoprazole (PREVACID) 30 MG capsule Take 30 mg by mouth 2 (two) times daily. TAKE 1 CAPSULE BY MOUTH ONCE DAILY    metolazone (ZAROXOLYN) 5 MG tablet once daily.     metoprolol succinate (TOPROL-XL) 100 MG 24 hr tablet Take 50 mg by mouth once daily. 1/2 tablet daily    potassium chloride SA (K-DUR,KLOR-CON) 20 MEQ tablet Take 20 mEq by mouth 2 (two) times daily. Take 2 tablets daily    spironolactone (ALDACTONE) 25 MG tablet Take 50 mg by mouth 2 (two) times daily.     warfarin (COUMADIN) 5 MG tablet 5 mg. Sunday, Monday, Wednesday, Thursday, Friday    warfarin (COUMADIN) 7.5 MG tablet Tuesday and Saturday    aspirin (ECOTRIN) 81 MG EC tablet Take 1 tablet (81 mg total) by mouth once daily.    blood sugar diagnostic (CONTOUR NEXT STRIPS) Strp Use once daily.    mometasone (NASONEX) 50 mcg/actuation nasal spray 2 sprays by Nasal route daily as needed.     mometasone-formoterol (DULERA) 100-5 mcg/actuation HFAA Inhale into the lungs once daily. Controller     polyethylene glycol (GLYCOLAX) 17 gram PwPk Take by mouth as needed.    rosuvastatin (CRESTOR) 10 MG tablet qhs     Family History     None        Social History Main Topics    Smoking status: Never Smoker    Smokeless tobacco: Never Used    Alcohol use No    Drug use: No    Sexual activity: Not Currently     Review of Systems   Constitutional: Positive for fatigue and unexpected weight change. Negative for chills, diaphoresis and fever.   HENT: Negative for congestion, sore throat and voice change.    Eyes: Negative for photophobia and visual disturbance.   Respiratory: Negative for cough, shortness of breath, wheezing and stridor.    Cardiovascular: Negative for chest pain and leg swelling.   Gastrointestinal: Negative for abdominal distention, abdominal pain, constipation, diarrhea, nausea and vomiting.    Endocrine: Negative for polydipsia, polyphagia and polyuria.   Genitourinary: Negative for difficulty urinating, dysuria, flank pain, testicular pain and urgency.   Musculoskeletal: Negative for back pain, joint swelling, neck pain and neck stiffness.   Skin: Negative for color change and rash.   Allergic/Immunologic: Negative for immunocompromised state.   Neurological: Negative for dizziness, syncope, weakness, numbness and headaches.   Hematological: Does not bruise/bleed easily.   Psychiatric/Behavioral: Negative for agitation, behavioral problems and confusion.     Objective:     Vital Signs (Most Recent):  Temp: 98.5 °F (36.9 °C) (12/21/17 2016)  Pulse: (!) 59 (12/21/17 2016)  Resp: 18 (12/21/17 2016)  BP: (!) 105/52 (12/21/17 2016)  SpO2: 98 % (12/21/17 2016) Vital Signs (24h Range):  Temp:  [97.5 °F (36.4 °C)-98.5 °F (36.9 °C)] 98.5 °F (36.9 °C)  Pulse:  [58-64] 59  Resp:  [18-20] 18  SpO2:  [97 %-98 %] 98 %  BP: (105-118)/(52-59) 105/52     Weight: 121.3 kg (267 lb 6 oz)  Body mass index is 33.42 kg/m².    Physical Exam   Constitutional: He is oriented to person, place, and time. He appears well-developed. No distress.   Elderly male    HENT:   Head: Normocephalic and atraumatic.   Nose: Nose normal.   Eyes: Conjunctivae and EOM are normal. Pupils are equal, round, and reactive to light. No scleral icterus.   Neck: Normal range of motion. Neck supple. No tracheal deviation present.   Cardiovascular: Normal rate, regular rhythm and intact distal pulses.    Murmur heard.  Bilateral lower ext edema, +1+2   Pulmonary/Chest: Effort normal and breath sounds normal. No stridor. No respiratory distress. He has no wheezes. He has no rales.   Abdominal: Soft. Bowel sounds are normal. He exhibits no distension. There is no tenderness. There is no guarding.   obese   Genitourinary:   Genitourinary Comments: deferred   Musculoskeletal: Normal range of motion. He exhibits no edema or deformity.   Neurological: He is  alert and oriented to person, place, and time. No cranial nerve deficit.   Skin: Skin is warm and dry. Capillary refill takes 2 to 3 seconds. No rash noted. He is not diaphoretic.   Chronic discoloration to bilateral lower ext.    Psychiatric: He has a normal mood and affect. His behavior is normal. Judgment and thought content normal.   Nursing note reviewed.        CRANIAL NERVES     CN III, IV, VI   Pupils are equal, round, and reactive to light.  Extraocular motions are normal.        Significant Labs: All pertinent labs within the past 24 hours have been reviewed.   Results for orders placed or performed during the hospital encounter of 12/19/17   Basic metabolic panel   Result Value Ref Range    Sodium 124 (L) 136 - 145 mmol/L    Potassium 4.4 3.5 - 5.1 mmol/L    Chloride 90 (L) 95 - 110 mmol/L    CO2 19 (L) 23 - 29 mmol/L    Glucose 102 70 - 110 mg/dL    BUN, Bld 61 (H) 8 - 23 mg/dL    Creatinine 1.7 (H) 0.5 - 1.4 mg/dL    Calcium 8.9 8.7 - 10.5 mg/dL    Anion Gap 15 8 - 16 mmol/L    eGFR if African American 46 (A) >60 mL/min/1.73 m^2    eGFR if non African American 39 (A) >60 mL/min/1.73 m^2   Protime-INR   Result Value Ref Range    Prothrombin Time 36.3 (H) 9.0 - 12.5 sec    INR 3.7 (H) 0.8 - 1.2   Urinalysis   Result Value Ref Range    Specimen UA Urine, Clean Catch     Color, UA Yellow Yellow, Straw, Amaya    Appearance, UA Clear Clear    pH, UA 6.0 5.0 - 8.0    Specific Gravity, UA <=1.005 (A) 1.005 - 1.030    Protein, UA Negative Negative    Glucose, UA Negative Negative    Ketones, UA Negative Negative    Bilirubin (UA) Negative Negative    Occult Blood UA Negative Negative    Nitrite, UA Negative Negative    Urobilinogen, UA Negative <2.0 EU/dL    Leukocytes, UA Negative Negative   CBC auto differential   Result Value Ref Range    WBC 9.98 3.90 - 12.70 K/uL    RBC 3.60 (L) 4.60 - 6.20 M/uL    Hemoglobin 11.1 (L) 14.0 - 18.0 g/dL    Hematocrit 31.3 (L) 40.0 - 54.0 %    MCV 87 82 - 98 fL    MCH 30.8  27.0 - 31.0 pg    MCHC 35.5 32.0 - 36.0 g/dL    RDW 14.9 (H) 11.5 - 14.5 %    Platelets 180 150 - 350 K/uL    MPV 8.8 (L) 9.2 - 12.9 fL    Gran # 7.8 (H) 1.8 - 7.7 K/uL    Lymph # 1.2 1.0 - 4.8 K/uL    Mono # 0.8 0.3 - 1.0 K/uL    Eos # 0.1 0.0 - 0.5 K/uL    Baso # 0.01 0.00 - 0.20 K/uL    Gran% 78.1 (H) 38.0 - 73.0 %    Lymph% 12.4 (L) 18.0 - 48.0 %    Mono% 8.4 4.0 - 15.0 %    Eosinophil% 1.0 0.0 - 8.0 %    Basophil% 0.1 0.0 - 1.9 %    Differential Method Automated    Magnesium - if not done in ED   Result Value Ref Range    Magnesium 1.6 1.6 - 2.6 mg/dL   Phosphorus - if not done in ED   Result Value Ref Range    Phosphorus 3.2 2.7 - 4.5 mg/dL   Basic metabolic panel    Result Value Ref Range    Sodium 124 (L) 136 - 145 mmol/L    Potassium 3.8 3.5 - 5.1 mmol/L    Chloride 86 (L) 95 - 110 mmol/L    CO2 21 (L) 23 - 29 mmol/L    Glucose 95 70 - 110 mg/dL    BUN, Bld 63 (H) 8 - 23 mg/dL    Creatinine 1.6 (H) 0.5 - 1.4 mg/dL    Calcium 9.0 8.7 - 10.5 mg/dL    Anion Gap 17 (H) 8 - 16 mmol/L    eGFR if African American 49 (A) >60 mL/min/1.73 m^2    eGFR if non African American 42 (A) >60 mL/min/1.73 m^2   Protime-INR   Result Value Ref Range    Prothrombin Time 34.5 (H) 9.0 - 12.5 sec    INR 3.5 (H) 0.8 - 1.2   Hemoglobin A1c   Result Value Ref Range    Hemoglobin A1C 6.9 (H) 4.0 - 5.6 %    Estimated Avg Glucose 151 (H) 68 - 131 mg/dL   CBC auto differential   Result Value Ref Range    WBC 7.88 3.90 - 12.70 K/uL    RBC 3.53 (L) 4.60 - 6.20 M/uL    Hemoglobin 11.0 (L) 14.0 - 18.0 g/dL    Hematocrit 31.0 (L) 40.0 - 54.0 %    MCV 88 82 - 98 fL    MCH 31.2 (H) 27.0 - 31.0 pg    MCHC 35.5 32.0 - 36.0 g/dL    RDW 15.1 (H) 11.5 - 14.5 %    Platelets 187 150 - 350 K/uL    MPV 9.0 (L) 9.2 - 12.9 fL    Gran # 5.7 1.8 - 7.7 K/uL    Lymph # 1.2 1.0 - 4.8 K/uL    Mono # 0.8 0.3 - 1.0 K/uL    Eos # 0.2 0.0 - 0.5 K/uL    Baso # 0.02 0.00 - 0.20 K/uL    Gran% 72.8 38.0 - 73.0 %    Lymph% 14.6 (L) 18.0 - 48.0 %    Mono% 9.5 4.0 - 15.0 %     Eosinophil% 2.8 0.0 - 8.0 %    Basophil% 0.3 0.0 - 1.9 %    Differential Method Automated    Basic metabolic panel    Result Value Ref Range    Sodium 123 (L) 136 - 145 mmol/L    Potassium 3.9 3.5 - 5.1 mmol/L    Chloride 88 (L) 95 - 110 mmol/L    CO2 21 (L) 23 - 29 mmol/L    Glucose 97 70 - 110 mg/dL    BUN, Bld 53 (H) 8 - 23 mg/dL    Creatinine 1.5 (H) 0.5 - 1.4 mg/dL    Calcium 9.1 8.7 - 10.5 mg/dL    Anion Gap 14 8 - 16 mmol/L    eGFR if African American 53 (A) >60 mL/min/1.73 m^2    eGFR if non African American 46 (A) >60 mL/min/1.73 m^2   Protime-INR   Result Value Ref Range    Prothrombin Time 26.4 (H) 9.0 - 12.5 sec    INR 2.6 (H) 0.8 - 1.2   Protime-INR   Result Value Ref Range    Prothrombin Time 26.4 (H) 9.0 - 12.5 sec    INR 2.6 (H) 0.8 - 1.2         Significant Imaging: I have reviewed all pertinent imaging results/findings within the past 24 hours.   Imaging Results          X-Ray Chest PA And Lateral (Final result)  Result time 12/19/17 23:17:38    Final result by Kwan Cardenas III, MD (12/19/17 23:17:38)                 Impression:     Stable cardiomegaly and mild vascular congestion.  Stable chronic interstitial lung disease versus interstitial edema.      Electronically signed by: KWAN CARDENAS MD  Date:     12/19/17  Time:    23:17              Narrative:    XR CHEST PA AND LATERAL    Clinical history: R07.9 Chest pain, unspecified    Comparison: 05/12/2016    Findings: Prior sternotomy is again noted.  There is stable mild cardiomegaly and pulmonary vascular congestion.  There are stable mild interstitial thickening and patchy hazy opacities in the bilateral mid and lower lung zone representing interstitial edema or chronic interstitial lung disease. No new acute appearing infiltrate, pleural effusion, pneumothorax or other acute disease identified. No acute osseous abnormality detected.                                Assessment/Plan:     * Hyponatremia    Stopped Zaroxolyn and continue with  normal saline.  Long discussion with the patient and the family about avoiding free water.  We may have to adjust the diuretics at the time of discharge.  Maybe increase the loop diuretic instead of the Zaroxolyn which can be used sparingly 1 to twice a week    Add tolvaptan for CHF and hyponatremia            Thank you for your consult.     Rubin Thomas MD  Nephrology  [facility] [place]

## 2017-12-22 NOTE — PROGRESS NOTES
Clinical Pharmacy: Coumadin Progress Note    Indication: Afib  Goal INR: 2-3  Today's INR: 2.6 (down from 3.5)    Will resume patient's home dose at this time. Patient's home dose is Coumadin 7.5 mg every other day and 5mg on alternating days.  Daily PT/INR is ordered and will be monitored daily. Dose adjustments will be made accordingly.    Patient needs to be educated. (Attempted twice today. Patient was sleeping)     Thank you for allowing us to participate in this patient's care.   Yris Sahni, PharmD 12/21/2017 8:27 PM

## 2017-12-22 NOTE — PROGRESS NOTES
Coumadin Progress Notes:    INR = 1.9  today ,Home dosing of coumadin 5 mg po every other day alternating with 7.5 mg every other day was resumed last night after being on hold for admission with a supratherapeutic INR of 3.7.   Dx: AFib (goal INR = 2-3)  Will continue with home dosing and monitor with daily INR's/will attempt to educate patient/Radha Huitron [facility] 12/22/2017 2:32 PM

## 2017-12-22 NOTE — PLAN OF CARE
Problem: Fall Risk (Adult)  Intervention: Patient Rounds  Patient remained free from injury. No c/o pain at this time. Wife at bedside. Saline locked. 1200 fluid restriction. Bed locked and low. Call light in reach. Will continue to monitor. Reviewed plan of care. Patient verbalized understanding and teach back.    12hr chart check complete.

## 2017-12-22 NOTE — PROGRESS NOTES
Coumadin Progress Notes:    INR = 1.9  today ,Confirmed Home dosing with patient and family member -takes coumadin  7.5 mg po every tues/sat and 5 mg po all other days was resumed last night after being on hold for admission with a supratherapeutic INR of 3.7.   Dx: AFib (goal INR = 2-3)  Will continue with home dosing and monitor with daily INR's/patient and family member was educated and given a handout/Radha Huitron [facility] 12/22/2017 2:32 PM

## 2017-12-23 VITALS
DIASTOLIC BLOOD PRESSURE: 54 MMHG | SYSTOLIC BLOOD PRESSURE: 107 MMHG | HEART RATE: 67 BPM | BODY MASS INDEX: 33.83 KG/M2 | TEMPERATURE: 98 F | HEIGHT: 75 IN | RESPIRATION RATE: 18 BRPM | WEIGHT: 272.06 LBS | OXYGEN SATURATION: 96 %

## 2017-12-23 LAB
ALBUMIN SERPL BCP-MCNC: 3.6 G/DL
ALBUMIN SERPL BCP-MCNC: 3.6 G/DL
ALP SERPL-CCNC: 93 U/L
ALT SERPL W/O P-5'-P-CCNC: 31 U/L
ANION GAP SERPL CALC-SCNC: 12 MMOL/L
ANION GAP SERPL CALC-SCNC: 12 MMOL/L
AST SERPL-CCNC: 31 U/L
BASOPHILS # BLD AUTO: 0.03 K/UL
BASOPHILS NFR BLD: 0.3 %
BILIRUB DIRECT SERPL-MCNC: 0.9 MG/DL
BILIRUB SERPL-MCNC: 1.6 MG/DL
BNP SERPL-MCNC: 328 PG/ML
BUN SERPL-MCNC: 42 MG/DL
BUN SERPL-MCNC: 42 MG/DL
CALCIUM SERPL-MCNC: 9.7 MG/DL
CALCIUM SERPL-MCNC: 9.7 MG/DL
CHLORIDE SERPL-SCNC: 86 MMOL/L
CHLORIDE SERPL-SCNC: 86 MMOL/L
CO2 SERPL-SCNC: 24 MMOL/L
CO2 SERPL-SCNC: 24 MMOL/L
CREAT SERPL-MCNC: 1.4 MG/DL
CREAT SERPL-MCNC: 1.5 MG/DL
DIFFERENTIAL METHOD: ABNORMAL
EOSINOPHIL # BLD AUTO: 0.1 K/UL
EOSINOPHIL NFR BLD: 1.6 %
ERYTHROCYTE [DISTWIDTH] IN BLOOD BY AUTOMATED COUNT: 15.2 %
EST. GFR  (AFRICAN AMERICAN): 53 ML/MIN/1.73 M^2
EST. GFR  (AFRICAN AMERICAN): 58 ML/MIN/1.73 M^2
EST. GFR  (NON AFRICAN AMERICAN): 46 ML/MIN/1.73 M^2
EST. GFR  (NON AFRICAN AMERICAN): 50 ML/MIN/1.73 M^2
ESTIMATED PA SYSTOLIC PRESSURE: 46.24
GLUCOSE SERPL-MCNC: 106 MG/DL
GLUCOSE SERPL-MCNC: 107 MG/DL
HCT VFR BLD AUTO: 30.7 %
HGB BLD-MCNC: 10.6 G/DL
INR PPP: 2.1
INR PPP: 2.1
LYMPHOCYTES # BLD AUTO: 1 K/UL
LYMPHOCYTES NFR BLD: 11.3 %
MCH RBC QN AUTO: 30.5 PG
MCHC RBC AUTO-ENTMCNC: 34.5 G/DL
MCV RBC AUTO: 88 FL
MONOCYTES # BLD AUTO: 0.6 K/UL
MONOCYTES NFR BLD: 6.8 %
NEUTROPHILS # BLD AUTO: 7.2 K/UL
NEUTROPHILS NFR BLD: 80 %
PHOSPHATE SERPL-MCNC: 3.1 MG/DL
PLATELET # BLD AUTO: 189 K/UL
PMV BLD AUTO: 9.2 FL
POTASSIUM SERPL-SCNC: 4 MMOL/L
POTASSIUM SERPL-SCNC: 4 MMOL/L
PROT SERPL-MCNC: 7 G/DL
PROTHROMBIN TIME: 21.4 SEC
PROTHROMBIN TIME: 21.4 SEC
RBC # BLD AUTO: 3.48 M/UL
RETIRED EF AND QEF - SEE NOTES: 60 (ref 55–65)
SODIUM SERPL-SCNC: 122 MMOL/L
SODIUM SERPL-SCNC: 122 MMOL/L
TRICUSPID VALVE REGURGITATION: ABNORMAL
WBC # BLD AUTO: 9.01 K/UL

## 2017-12-23 PROCEDURE — 85610 PROTHROMBIN TIME: CPT

## 2017-12-23 PROCEDURE — 93306 TTE W/DOPPLER COMPLETE: CPT | Mod: 26,,, | Performed by: INTERNAL MEDICINE

## 2017-12-23 PROCEDURE — 36415 COLL VENOUS BLD VENIPUNCTURE: CPT

## 2017-12-23 PROCEDURE — 25000242 PHARM REV CODE 250 ALT 637 W/ HCPCS: Performed by: NURSE PRACTITIONER

## 2017-12-23 PROCEDURE — 80048 BASIC METABOLIC PNL TOTAL CA: CPT

## 2017-12-23 PROCEDURE — 94640 AIRWAY INHALATION TREATMENT: CPT

## 2017-12-23 PROCEDURE — 25000003 PHARM REV CODE 250: Performed by: NURSE PRACTITIONER

## 2017-12-23 PROCEDURE — 99232 SBSQ HOSP IP/OBS MODERATE 35: CPT | Mod: ,,, | Performed by: INTERNAL MEDICINE

## 2017-12-23 PROCEDURE — 84075 ASSAY ALKALINE PHOSPHATASE: CPT

## 2017-12-23 PROCEDURE — 80069 RENAL FUNCTION PANEL: CPT

## 2017-12-23 PROCEDURE — 93306 TTE W/DOPPLER COMPLETE: CPT

## 2017-12-23 PROCEDURE — 83880 ASSAY OF NATRIURETIC PEPTIDE: CPT

## 2017-12-23 PROCEDURE — 63600175 PHARM REV CODE 636 W HCPCS: Performed by: INTERNAL MEDICINE

## 2017-12-23 PROCEDURE — 85025 COMPLETE CBC W/AUTO DIFF WBC: CPT

## 2017-12-23 RX ADMIN — BUDESONIDE 0.5 MG: 0.5 SUSPENSION RESPIRATORY (INHALATION) at 07:12

## 2017-12-23 RX ADMIN — FUROSEMIDE 60 MG: 10 INJECTION, SOLUTION INTRAVENOUS at 06:12

## 2017-12-23 RX ADMIN — FAMOTIDINE 20 MG: 20 TABLET, FILM COATED ORAL at 08:12

## 2017-12-23 RX ADMIN — ACETAMINOPHEN 500 MG: 500 TABLET, FILM COATED ORAL at 12:12

## 2017-12-23 RX ADMIN — ROSUVASTATIN CALCIUM 10 MG: 10 TABLET, FILM COATED ORAL at 08:12

## 2017-12-23 RX ADMIN — METOPROLOL SUCCINATE 100 MG: 50 TABLET, EXTENDED RELEASE ORAL at 08:12

## 2017-12-23 RX ADMIN — FERROUS SULFATE TAB EC 325 MG (65 MG FE EQUIVALENT) 325 MG: 325 (65 FE) TABLET DELAYED RESPONSE at 11:12

## 2017-12-23 RX ADMIN — FERROUS SULFATE TAB EC 325 MG (65 MG FE EQUIVALENT) 325 MG: 325 (65 FE) TABLET DELAYED RESPONSE at 08:12

## 2017-12-23 RX ADMIN — ARFORMOTEROL TARTRATE 15 MCG: 15 SOLUTION RESPIRATORY (INHALATION) at 07:12

## 2017-12-23 NOTE — PLAN OF CARE
Problem: Patient Care Overview  Goal: Plan of Care Review  PT WAS MOD I WITH GROSS FUNC MOBILITY, GT X 150' WITH RW AND NO LOB. PT WILL BE D/C TO MOBILITY PROGRAM     Outcome: Ongoing (interventions implemented as appropriate)  Patient remains on room air and tolerating breathing treatments well.

## 2017-12-23 NOTE — SUBJECTIVE & OBJECTIVE
Interval History: No acute events overnight. Na+ continues to drop - Nephrology aware. Abd u/s to check for liver cirrhosis - Uric acid elevated. Continue to monitor    Review of Systems   Constitutional: Positive for activity change and appetite change. Negative for chills and fever.   HENT: Negative for sore throat.    Eyes: Negative for pain.   Respiratory: Negative for cough, shortness of breath and wheezing.    Cardiovascular: Positive for leg swelling. Negative for chest pain and palpitations.   Gastrointestinal: Negative for constipation, diarrhea, nausea and vomiting.   Endocrine: Positive for polydipsia.   Genitourinary: Negative for dysuria and hematuria.   Musculoskeletal: Negative for back pain.   Skin: Positive for color change.   Allergic/Immunologic: Negative.    Neurological: Positive for weakness. Negative for dizziness.   Hematological: Negative.    Psychiatric/Behavioral: Negative for agitation and confusion.     Objective:     Vital Signs (Most Recent):  Temp: 97.6 °F (36.4 °C) (12/22/17 1556)  Pulse: 60 (12/22/17 1556)  Resp: 18 (12/22/17 1556)  BP: (!) 120/56 (12/22/17 1556)  SpO2: 100 % (12/22/17 1556) Vital Signs (24h Range):  Temp:  [97.6 °F (36.4 °C)-98.5 °F (36.9 °C)] 97.6 °F (36.4 °C)  Pulse:  [55-70] 60  Resp:  [16-20] 18  SpO2:  [97 %-100 %] 100 %  BP: (102-125)/(51-60) 120/56     Weight: 123.5 kg (272 lb 4.3 oz)  Body mass index is 34.03 kg/m².    Intake/Output Summary (Last 24 hours) at 12/22/17 1809  Last data filed at 12/22/17 0400   Gross per 24 hour   Intake              440 ml   Output              700 ml   Net             -260 ml      Physical Exam   Constitutional: He is oriented to person, place, and time. He appears well-developed and well-nourished.   HENT:   Head: Normocephalic and atraumatic.   Mouth/Throat: Oropharynx is clear and moist.   Eyes: Conjunctivae and EOM are normal. Pupils are equal, round, and reactive to light.   Neck: Normal range of motion. Neck supple. No  JVD present.   Cardiovascular: Normal rate, regular rhythm, normal heart sounds and intact distal pulses.    Pulmonary/Chest: Effort normal and breath sounds normal. No respiratory distress. He has no wheezes.   Abdominal: Soft. Bowel sounds are normal. He exhibits no distension. There is no tenderness.   Musculoskeletal: Normal range of motion. He exhibits edema (1+ to ble).   Neurological: He is alert and oriented to person, place, and time. No cranial nerve deficit.   Skin: Skin is warm and dry. Capillary refill takes 2 to 3 seconds.   BLE hyperpigmented 2/2 PVD   Psychiatric: He has a normal mood and affect. His behavior is normal.   Nursing note and vitals reviewed.      Significant Labs:   Recent Lab Results       12/22/17  1704 12/22/17  1416 12/22/17  1348 12/22/17  0505      ALT  34       Anion Gap  13  11     AST  37       Baso #    0.02     Basophil%    0.2     BUN, Bld  44(H)  45(H)     Calcium  9.6  9.3     Chloride  87(L)  89(L)     CO2  22(L)  24     Creatinine  1.5(H)  1.4     Differential Method    Automated     eGFR if   53(A)  58(A)     eGFR if non   46  Comment:  Calculation used to obtain the estimated glomerular filtration  rate (eGFR) is the CKD-EPI equation.   (A)  50  Comment:  Calculation used to obtain the estimated glomerular filtration  rate (eGFR) is the CKD-EPI equation.   (A)     Eos #    0.2     Eosinophil%    1.8     Glucose  137(H)  102     Gran #    7.5     Gran%    76.9(H)     Hematocrit    30.3(L)     Hemoglobin    10.5(L)     Coumadin Monitoring INR    1.9  Comment:  Coumadin Therapy:  2.0 - 3.0 for INR for all indicators except mechanical heart valves  and antiphospholipid syndromes which should use 2.5 - 3.5.  (H)         1.9  Comment:  Coumadin Therapy:  2.0 - 3.0 for INR for all indicators except mechanical heart valves  and antiphospholipid syndromes which should use 2.5 - 3.5.  (H)     Lymph #    1.3     Lymph%    12.9(L)     MCH    30.8      MCHC    34.7     MCV    89     Mono #    0.8     Mono%    8.2     MPV    9.2     Platelets    201     POCT Glucose 125(H)        Potassium  4.4  3.9     Protime    19.4(H)         19.4(H)     RBC    3.41(L)     RDW    15.1(H)     Sodium  122(L)  124(L)     Sodium Urine Random   <20  Comment:  The random urine reference ranges provided were established   for 24 hour urine collections.  No reference ranges exist for  random urine specimens.  Correlate clinically.  (A)      Uric Acid  8.9(H)       WBC    9.81         All pertinent labs within the past 24 hours have been reviewed.    Significant Imaging: I have reviewed all pertinent imaging results/findings within the past 24 hours.

## 2017-12-23 NOTE — PROGRESS NOTES
Pt c/o dry, non-productive cough. Pt unable to rest. Notified on-call physician. Waiting for orders.

## 2017-12-23 NOTE — PLAN OF CARE
Problem: Patient Care Overview  Goal: Plan of Care Review  PT WAS MOD I WITH GROSS FUNC MOBILITY, GT X 150' WITH RW AND NO LOB. PT WILL BE D/C TO MOBILITY PROGRAM     Outcome: Outcome(s) achieved Date Met: 12/23/17  Fall precautions maintained, pt free from falls/injuries  PT repositions and ambulates independently  Pt has no c/o pain  POC and medications reviewed with pt, pt verbalized understanding.  Side rails x 2 up, bed locked and low.  No signs/symptoms of acute distress.  Chart check done. adequate for DC.

## 2017-12-23 NOTE — NURSING
No follow up appt available for Dr. Jackson, message sent to staff. Notified MELANIE Pemebrton. Patient notified.

## 2017-12-23 NOTE — SUBJECTIVE & OBJECTIVE
Interval History:  No acute events, no complaints     Review of patient's allergies indicates:   Allergen Reactions    Morphine      Other reaction(s): Nausea Only     Current Facility-Administered Medications   Medication Frequency    arformoterol nebulizer solution 15 mcg BID    benzonatate capsule 100 mg TID PRN    bisacodyl EC tablet 10 mg Daily PRN    budesonide nebulizer solution 0.5 mg BID    famotidine tablet 20 mg BID    ferrous sulfate EC tablet 325 mg TID WM    furosemide injection 60 mg TID    metoprolol succinate (TOPROL-XL) 24 hr tablet 100 mg Daily    ondansetron injection 4 mg Q8H PRN    rosuvastatin tablet 10 mg Daily    warfarin (COUMADIN) tablet 5 mg Every Mon, Wed, Fri, Sun    [START ON 12/28/2017] warfarin (COUMADIN) tablet 5 mg Every Thurs    [START ON 12/26/2017] warfarin tablet 7.5 mg Every Tues    warfarin tablet 7.5 mg Every Sat       Objective:     Vital Signs (Most Recent):  Temp: 98.1 °F (36.7 °C) (12/23/17 1119)  Pulse: 67 (12/23/17 1119)  Resp: 18 (12/23/17 1119)  BP: (!) 107/54 (12/23/17 1119)  SpO2: 96 % (12/23/17 1119)  O2 Device (Oxygen Therapy): room air (12/23/17 1119) Vital Signs (24h Range):  Temp:  [97.6 °F (36.4 °C)-98.7 °F (37.1 °C)] 98.1 °F (36.7 °C)  Pulse:  [60-73] 67  Resp:  [16-18] 18  SpO2:  [96 %-100 %] 96 %  BP: (101-128)/(53-59) 107/54     Weight: 123.4 kg (272 lb 0.8 oz) (12/23/17 0600)  Body mass index is 34 kg/m².  Body surface area is 2.56 meters squared.    I/O last 3 completed shifts:  In: 760 [P.O.:760]  Out: 1150 [Urine:1150]    Physical Exam   Constitutional: He is oriented to person, place, and time. He appears well-developed. No distress.   HENT:   Head: Normocephalic and atraumatic.   Eyes: Pupils are equal, round, and reactive to light. Right eye exhibits no discharge.   Neck: Normal range of motion. Neck supple. No tracheal deviation present. No thyromegaly present.   Cardiovascular: Normal rate, regular rhythm and intact distal pulses.   Exam reveals no friction rub.    Murmur heard.  Pulmonary/Chest: Effort normal. He has no wheezes.   Abdominal: Soft. He exhibits no mass. There is no tenderness. There is no rebound and no guarding.   Musculoskeletal: Normal range of motion. He exhibits edema.   Lymphadenopathy:     He has no cervical adenopathy.   Neurological: He is alert and oriented to person, place, and time.   Skin: Skin is warm. No rash noted. He is not diaphoretic. No erythema.   Nursing note and vitals reviewed.      Significant Labs:  CBC:   Recent Labs  Lab 12/23/17  0535   WBC 9.01   RBC 3.48*   HGB 10.6*   HCT 30.7*      MCV 88   MCH 30.5   MCHC 34.5     CMP:   Recent Labs  Lab 12/23/17  0534     106   CALCIUM 9.7  9.7   ALBUMIN 3.6  3.6   PROT 7.0   *  122*   K 4.0  4.0   CO2 24  24   CL 86*  86*   BUN 42*  42*   CREATININE 1.5*  1.4   ALKPHOS 93   ALT 31   AST 31   BILITOT 1.6*     Coagulation:   Recent Labs  Lab 12/23/17  0535   INR 2.1*  2.1*     LFTs:   Recent Labs  Lab 12/23/17  0534   ALT 31   AST 31   ALKPHOS 93   BILITOT 1.6*   PROT 7.0   ALBUMIN 3.6  3.6     All labs within the past 24 hours have been reviewed.     Lab Results   Component Value Date    .0 (H) 04/04/2017    CALCIUM 9.7 12/23/2017    CALCIUM 9.7 12/23/2017    PHOS 3.1 12/23/2017       Lab Results   Component Value Date    ALBUMIN 3.6 12/23/2017    ALBUMIN 3.6 12/23/2017         Significant Imaging: reviewed

## 2017-12-23 NOTE — NURSING
Discharge instructions, medications, and appointments reviewed with patient. Pt verbalized understanding. LDA removed. No further need/questions. Pt adequate for DC.

## 2017-12-23 NOTE — DISCHARGE SUMMARY
"[facility] Hospital Medicine  Discharge Summary      Patient Name: Beni Cosby  MRN: [id number]  Admission Date: 12/19/2017  Hospital Length of Stay: 3 days  Discharge Date and Time:  12/23/2017 2:11 PM  Attending Physician: Ralph Cramer MD   Discharging Provider: Sammy Drake DNP, ACNP-Bc, CCRN  Primary Care Provider: Jackson Brandt MD      HPI:   Patient is a transfer from [facility] as his renal doctor is [facility].  Beni Cosby is a 72 y.o. male patient  was transferred for renal evaluation for hyponatremia. Symptoms are constant and moderate in severity. No mitigating or exacerbating factors reported. Associated sxs include increased fatigue over the past week and 30 Ib weight loss over a 1-2 month period. Patient denies any fever, chills, n/v/d, abd pain, CP, SOB, BLE edema/pain, and all other sxs at this time. Prior Tx includes 2 L of fluids at [place]. No further complaints or concerns at this time. To note the patient reports he drinks an excessive amount of water daily, "im always thirsty," and takes a number of diuretics. relavent labs from [facility] on arrival: 119Na, 70BUN, 2.04Cr, 78Cl, Ua neg. On repeat prior to arrival 121Na, 67BUN, 2.00Cr, 82Cl. The patient was reevaluated in the ER. Sodium continuing to improve. Patient admitted for Hyponatremia.        * No surgery found *      Hospital Course:   12/20 Patient evaluated by Nephrology who attributes the hyponatremia to medications and free water intake. Patient with no acute events - mentation stable.  12/21: Nephrology following. Na continues to be low likely d/t dilution. Pts mentation stable. Pt reports he drank approx 1 large glass of water yesterday.  tolvaptan given per nephrology.. Patient with no mental changes. . Continue current plan of care.  12/23/17: case discussed with nephrology, Na level range [id number], Na at baseline. Continue fluid restriction, follow-up outpatient. " Patient was seen and examined today and was determined stable for discharge     Consults:   Consults         Status Ordering Provider     Inpatient consult to Nephrology  Once     Provider:  Rubin Thomas MD    Acknowledged MARITZA ROCKWELL     Inpatient consult to Social Work  Once     Provider:  (Not yet assigned)    Completed MAX NAIDU     Nutrition Services Referral  Once     Provider:  (Not yet assigned)    Completed MAX NAIDU     Pharmacy to dose Warfarin consult  Once     Provider:  (Not yet assigned)    Acknowledged MAX NAIDU     Pharmacy to dose Warfarin consult  Once     Provider:  (Not yet assigned)    Acknowledged MARITZA ROCKWELL          * Hyponatremia    Hold fee water  NS monitor closely  Renal consult  Consider medication profile.    12/20 - Hold zaroxylen. NS infusion / Nephrology. D/C planning   12/21 - Na still low - pt reports he drank at least 1 large glass of water yesterday. Tolvaptan started  12/22 - Na dropping - abd US to check for liver cirrhosis. AST/ALT WNL. Renal following  12/23/17: abd ultrasound no acute findings, echo with mild TR and pulmonary HTN, improved compared to echo on 5/2/2016. Na at baseline, per nephrology          Mild intermittent asthma    --brovana and budesonide neb tx  --pt uses dulera @ home  --monitor          Atrial fibrillation    Stable rate controlled on CM  Check EKG  INR 3.7, pharmacy to monitor    Continue BB          Final Active Diagnoses:    Diagnosis Date Noted POA    PRINCIPAL PROBLEM:  Hyponatremia [E87.1] 12/20/2017 Yes    Mild intermittent asthma [J45.20] 12/22/2017 Yes    PVD (peripheral vascular disease) [I73.9] 12/21/2017 Yes    Recent weight loss [R63.4] 12/20/2017 Yes    Weakness [R53.1] 12/20/2017 Yes    CHF (congestive heart failure), NYHA class III [I50.9] 05/02/2016 Yes    Type 2 diabetes mellitus [E11.9] 08/31/2015 Yes    Atrial fibrillation [I48.91] 05/24/2012 Yes    Hyperlipidemia [E78.5]  05/24/2012 Yes      Problems Resolved During this Admission:    Diagnosis Date Noted Date Resolved POA       Discharged Condition: stable    Disposition: Home or Self Care    Follow Up:  Follow-up Information     Jackson Brandt MD In 3 days.    Specialty:  Family Medicine  Contact information:  [address]  [address]  [facility] [address] [phone]             Edmundo Chew MD In 1 week.    Specialty:  Cardiology  Contact information:  [address] [facility]  [address]  [facility] [id number] [phone]             Francisco Jackson MD In 3 days.    Specialty:  Nephrology  Contact information:  [address]  [facility] [id number] [phone]                 Patient Instructions:     Diet diabetic   Order Comments: Cardiac diet   Scheduling Instructions: 40 oz daily--- fluid restriction     Activity as tolerated     Notify your health care provider if you experience any of the following:  temperature >100.4     Notify your health care provider if you experience any of the following:  persistent dizziness, light-headedness, or visual disturbances     Notify your health care provider if you experience any of the following:  increased confusion or weakness         Significant Diagnostic Studies: Labs:   BMP:   Recent Labs  Lab 12/22/17  0505 12/22/17  1416 12/23/17  0534    137* 107  106   * 122* 122*  122*   K 3.9 4.4 4.0  4.0   CL 89* 87* 86*  86*   CO2 24 22* 24  24   BUN 45* 44* 42*  42*   CREATININE 1.4 1.5* 1.5*  1.4   CALCIUM 9.3 9.6 9.7  9.7   , CMP   Recent Labs  Lab 12/22/17  0505 12/22/17  1416 12/23/17  0534   * 122* 122*  122*   K 3.9 4.4 4.0  4.0   CL 89* 87* 86*  86*   CO2 24 22* 24  24    137* 107  106   BUN 45* 44* 42*  42*   CREATININE 1.4 1.5* 1.5*  1.4   CALCIUM 9.3 9.6 9.7  9.7   PROT  --   --  7.0   ALBUMIN  --   --  3.6  3.6   BILITOT  --   --  1.6*   ALKPHOS  --   --  93   AST  --  37 31   ALT  --  34 31   ANIONGAP 11 13 12  12   ESTGFRAFRICA 58*  53* 53*  58*   EGFRNONAA 50* 46* 46*  50*   , CBC   Recent Labs  Lab 12/22/17  0505 12/23/17  0535   WBC 9.81 9.01   HGB 10.5* 10.6*   HCT 30.3* 30.7*    189   , INR   Lab Results   Component Value Date    INR 2.1 (H) 12/23/2017    INR 2.1 (H) 12/23/2017    INR 1.9 (H) 12/22/2017    INR 1.9 (H) 12/22/2017   , Lipid Panel   Lab Results   Component Value Date    CHOL 118 (L) 05/03/2016    HDL 32 (L) 05/03/2016    LDLCALC 72.6 05/03/2016    TRIG 67 05/03/2016    CHOLHDL 27.1 05/03/2016   , Troponin No results for input(s): TROPONINI in the last 168 hours. and A1C:   Recent Labs  Lab 12/20/17  0610   HGBA1C 6.9*       Pending Diagnostic Studies:     None         Medications:  Reconciled Home Medications:   Current Discharge Medication List      CONTINUE these medications which have NOT CHANGED    Details   bumetanide (BUMEX) 2 MG tablet Take 2 mg by mouth 2 (two) times daily. Take 2mg every  Am and 1 mg every pm      ferrous sulfate 325 (65 FE) MG EC tablet Take 325 mg by mouth 2 (two) times daily.       glimepiride (AMARYL) 1 MG tablet Take 1 mg by mouth before breakfast.      lansoprazole (PREVACID) 30 MG capsule Take 30 mg by mouth 2 (two) times daily. TAKE 1 CAPSULE BY MOUTH ONCE DAILY      metolazone (ZAROXOLYN) 5 MG tablet once daily.       metoprolol succinate (TOPROL-XL) 100 MG 24 hr tablet Take 50 mg by mouth once daily. 1/2 tablet daily      mometasone-formoterol (DULERA) 200-5 mcg/actuation inhaler Inhale 2 puffs into the lungs once daily. Controller       potassium chloride SA (K-DUR,KLOR-CON) 20 MEQ tablet Take 20 mEq by mouth 2 (two) times daily. Take 2 tablets daily      spironolactone (ALDACTONE) 25 MG tablet Take 50 mg by mouth 2 (two) times daily.       !! warfarin (COUMADIN) 5 MG tablet 5 mg. Sunday, Monday, Wednesday, Thursday, Friday      !! warfarin (COUMADIN) 7.5 MG tablet Tuesday and Saturday      aspirin (ECOTRIN) 81 MG EC tablet Take 1 tablet (81 mg total) by mouth once  daily.  Refills: 0      blood sugar diagnostic (CONTOUR NEXT STRIPS) Strp Use once daily.      mometasone (NASONEX) 50 mcg/actuation nasal spray 2 sprays by Nasal route daily as needed.       mometasone-formoterol (DULERA) 100-5 mcg/actuation HFAA Inhale into the lungs once daily. Controller       polyethylene glycol (GLYCOLAX) 17 gram PwPk Take by mouth as needed.      rosuvastatin (CRESTOR) 10 MG tablet qhs       !! - Potential duplicate medications found. Please discuss with provider.      STOP taking these medications       gabapentin (NEURONTIN) 100 MG capsule Comments:   Reason for Stopping:               Indwelling Lines/Drains at time of discharge:   Lines/Drains/Airways          No matching active lines, drains, or airways          Time spent on the discharge of patient: 35 minutes  Patient was seen and examined on the date of discharge and determined to be suitable for discharge.         Sammy Drake DNP, ACNP-Bc, CCRN  Department of Hospital Medicine  [facility]

## 2017-12-23 NOTE — PROGRESS NOTES
[facility] -   Nephrology  Progress Note    Patient Name: Beni Cosby  MRN: [id number]  Admission Date: 12/19/2017  Hospital Length of Stay: 2 days  Attending Provider: Jl Dumont MD   Primary Care Physician: Jackson Brandt MD  Principal Problem:Hyponatremia    Subjective:     HPI: Patient is a 72-year-old male with history of chronic kidney disease stage III followed by Dr. Jackson in the nephrology division.  Patient has a history of acute kidney injury on Zaroxolyn and Bumex and 2016.  Today patient comes in with hyponatremia on Zaroxolyn.  According to the wife patient has been drinking a lot of water as well as losing a lot of weight from diuretics.  His sodium has gone down to 124.  Patient has been started on normal saline in the emergency room and a consultation is requested.    Interval History:  No acute events,     Review of patient's allergies indicates:   Allergen Reactions    Morphine      Other reaction(s): Nausea Only     Current Facility-Administered Medications   Medication Frequency    arformoterol nebulizer solution 15 mcg BID    bisacodyl EC tablet 10 mg Daily PRN    budesonide nebulizer solution 0.5 mg BID    famotidine tablet 20 mg BID    ferrous sulfate EC tablet 325 mg TID WM    furosemide injection 60 mg TID    metoprolol succinate (TOPROL-XL) 24 hr tablet 100 mg Daily    ondansetron injection 4 mg Q8H PRN    rosuvastatin tablet 10 mg Daily    warfarin (COUMADIN) tablet 5 mg Every Mon, Wed, Fri, Sun    [START ON 12/28/2017] warfarin (COUMADIN) tablet 5 mg Every Thurs    [START ON 12/26/2017] warfarin tablet 7.5 mg Every Tues    [START ON 12/23/2017] warfarin tablet 7.5 mg Every Sat       Objective:     Vital Signs (Most Recent):  Temp: 97.6 °F (36.4 °C) (12/22/17 1556)  Pulse: 60 (12/22/17 1556)  Resp: 18 (12/22/17 1556)  BP: (!) 120/56 (12/22/17 1556)  SpO2: 100 % (12/22/17 1556)  O2 Device (Oxygen Therapy): room air (12/22/17 1556) Vital Signs (24h  Range):  Temp:  [97.6 °F (36.4 °C)-98.5 °F (36.9 °C)] 97.6 °F (36.4 °C)  Pulse:  [55-70] 60  Resp:  [16-20] 18  SpO2:  [97 %-100 %] 100 %  BP: (102-125)/(51-60) 120/56     Weight: 123.5 kg (272 lb 4.3 oz) (12/22/17 0600)  Body mass index is 34.03 kg/m².  Body surface area is 2.56 meters squared.    I/O last 3 completed shifts:  In: 2080 [P.O.:880; I.V.:1200]  Out: 2000 [Urine:2000]    Physical Exam   Constitutional: He is oriented to person, place, and time. He appears well-developed. No distress.   HENT:   Head: Normocephalic and atraumatic.   Eyes: Pupils are equal, round, and reactive to light. Right eye exhibits no discharge.   Neck: Normal range of motion. Neck supple. No tracheal deviation present. No thyromegaly present.   Cardiovascular: Normal rate, regular rhythm and intact distal pulses.  Exam reveals gallop. Exam reveals no friction rub.    Murmur heard.  Pulmonary/Chest: Effort normal. He has no wheezes. He has rales.   Abdominal: Soft. He exhibits no mass. There is no tenderness. There is no rebound and no guarding.   Musculoskeletal: Normal range of motion. He exhibits edema.   Lymphadenopathy:     He has no cervical adenopathy.   Neurological: He is alert and oriented to person, place, and time.   Skin: Skin is warm. No rash noted. He is not diaphoretic. No erythema.   Nursing note and vitals reviewed.      Significant Labs:    CBC:   Recent Labs  Lab 12/22/17  0505   WBC 9.81   RBC 3.41*   HGB 10.5*   HCT 30.3*      MCV 89   MCH 30.8   MCHC 34.7     CMP:   Recent Labs  Lab 12/18/17  1011  12/22/17  1416     < > 137*   CALCIUM 10.1  < > 9.6   ALBUMIN 4.0  --   --    *  < > 122*   K 4.6  < > 4.4   CO2 28  < > 22*   CL 79*  < > 87*   BUN 74*  < > 44*   CREATININE 2.0*  < > 1.5*   ALT  --   --  34   AST  --   --  37   < > = values in this interval not displayed.  Coagulation:   Recent Labs  Lab 12/22/17  0505   INR 1.9*  1.9*     LFTs:   Recent Labs  Lab 12/18/17  1011  12/22/17  1416   ALT  --  34   AST  --  37   ALBUMIN 4.0  --      All labs within the past 24 hours have been reviewed.       Lab Results   Component Value Date    ALT 34 12/22/2017    AST 37 12/22/2017    ALKPHOS 85 05/30/2017    BILITOT 1.0 05/30/2017         Significant Imaging: reviewed     Assessment/Plan:     * Hyponatremia    1. Hyponatremia : acute on chronic , due to ADHF , start IV lasix and monitor, Pt has severe valve disease , repeat an echocardiogram , worsening serum sodium can be a sign of underlying worsening cardiac status , his sodium level has been fluctuating between 120 and 125 in last few months, likely his baseline, will diurese overnight and if stable tomorrow can d/c home , cont fluid restriction of 40 oz a day and low salt diet,     2. CKD stage 3 : renal fn at baseline    3. U/S to eval his liver ,     4. CHF with Pulm HTN - sees Dr Chew , repeat echo, poor prognosis           I will follow-up with patient. Please contact us if you have any additional questions.     Total time spent 40 minutes including time needed to review the records,  patient  evaluation, documentation, face-to-face discussion with the patient, family, primary team, more than 50% of the time was spent on coordination of care and counseling.       Alexey Saavedra MD  Nephrology  [facility] -

## 2017-12-23 NOTE — PLAN OF CARE
Problem: Patient Care Overview  Goal: Plan of Care Review  PT WAS MOD I WITH GROSS FUNC MOBILITY, GT X 150' WITH RW AND NO LOB. PT WILL BE D/C TO MOBILITY PROGRAM     Outcome: Ongoing (interventions implemented as appropriate)  Pt and family refused bed alarm during shift. Pt and family need reinforcement. VSS. Strict I&O's during shift. Heels floated.   Fall precautions in place. Side rails up. Bed locked and low in position. Call light and personal items within reach. 24 hour order chart check completed. Pt educated on medication's side effects. Pt verbalized understanding.   Will continue to monitor.

## 2017-12-23 NOTE — PROGRESS NOTES
"Pt reported to Isaiah, resp. therapist, that he stumbled into the IV pole between sink and bed while attempting to drink water. Pt insisted that he did not fall. Once notified by, I assessed pt and obtained vital signs. VSS. Pt's wife stated that pt brushed Left arm against an unknown object and a scab to a pre-existing skin tear was removed. Pt's wife covered skin tear with a band-aid before I entered room. Pt c/o pain 4/10 to RUQ when taking deep breaths. Pt described pain as "sore".  During previous rounds, I attempted to set bed alarm while wife was not in room. Pt refused bed alarm. Pt and pt's wife was educated again on importance of bed alarm to prevent falls and injury. Pt and Pt's wife both refused bed alarm due to pt's frequent voiding related to lasix. IV pole was removed per wife's request. Notified on-call physician, José Miguel Saenz NP. No new orders and to continue to monitor pt at this time.   "

## 2017-12-23 NOTE — PROGRESS NOTES
[facility] -   Nephrology  Progress Note    Patient Name: Beni Cosby  MRN: [id number]  Admission Date: 12/19/2017  Hospital Length of Stay: 3 days  Attending Provider: Ralph Cramer MD   Primary Care Physician: Jackson Brandt MD  Principal Problem:Hyponatremia    Subjective:     HPI: Patient is a 72-year-old male with history of chronic kidney disease stage III followed by Dr. Jackson in the nephrology division.  Patient has a history of acute kidney injury on Zaroxolyn and Bumex and 2016.  Today patient comes in with hyponatremia on Zaroxolyn.  According to the wife patient has been drinking a lot of water as well as losing a lot of weight from diuretics.  His sodium has gone down to 124.  Patient has been started on normal saline in the emergency room and a consultation is requested.    Interval History:  No acute events, no complaints     Review of patient's allergies indicates:   Allergen Reactions    Morphine      Other reaction(s): Nausea Only     Current Facility-Administered Medications   Medication Frequency    arformoterol nebulizer solution 15 mcg BID    benzonatate capsule 100 mg TID PRN    bisacodyl EC tablet 10 mg Daily PRN    budesonide nebulizer solution 0.5 mg BID    famotidine tablet 20 mg BID    ferrous sulfate EC tablet 325 mg TID WM    furosemide injection 60 mg TID    metoprolol succinate (TOPROL-XL) 24 hr tablet 100 mg Daily    ondansetron injection 4 mg Q8H PRN    rosuvastatin tablet 10 mg Daily    warfarin (COUMADIN) tablet 5 mg Every Mon, Wed, Fri, Sun    [START ON 12/28/2017] warfarin (COUMADIN) tablet 5 mg Every Thurs    [START ON 12/26/2017] warfarin tablet 7.5 mg Every Tues    warfarin tablet 7.5 mg Every Sat       Objective:     Vital Signs (Most Recent):  Temp: 98.1 °F (36.7 °C) (12/23/17 1119)  Pulse: 67 (12/23/17 1119)  Resp: 18 (12/23/17 1119)  BP: (!) 107/54 (12/23/17 1119)  SpO2: 96 % (12/23/17 1119)  O2 Device (Oxygen Therapy): room air  (12/23/17 1119) Vital Signs (24h Range):  Temp:  [97.6 °F (36.4 °C)-98.7 °F (37.1 °C)] 98.1 °F (36.7 °C)  Pulse:  [60-73] 67  Resp:  [16-18] 18  SpO2:  [96 %-100 %] 96 %  BP: (101-128)/(53-59) 107/54     Weight: 123.4 kg (272 lb 0.8 oz) (12/23/17 0600)  Body mass index is 34 kg/m².  Body surface area is 2.56 meters squared.    I/O last 3 completed shifts:  In: 760 [P.O.:760]  Out: 1150 [Urine:1150]    Physical Exam   Constitutional: He is oriented to person, place, and time. He appears well-developed. No distress.   HENT:   Head: Normocephalic and atraumatic.   Eyes: Pupils are equal, round, and reactive to light. Right eye exhibits no discharge.   Neck: Normal range of motion. Neck supple. No tracheal deviation present. No thyromegaly present.   Cardiovascular: Normal rate, regular rhythm and intact distal pulses.  Exam reveals no friction rub.    Murmur heard.  Pulmonary/Chest: Effort normal. He has no wheezes.   Abdominal: Soft. He exhibits no mass. There is no tenderness. There is no rebound and no guarding.   Musculoskeletal: Normal range of motion. He exhibits edema.   Lymphadenopathy:     He has no cervical adenopathy.   Neurological: He is alert and oriented to person, place, and time.   Skin: Skin is warm. No rash noted. He is not diaphoretic. No erythema.   Nursing note and vitals reviewed.      Significant Labs:  CBC:   Recent Labs  Lab 12/23/17  0535   WBC 9.01   RBC 3.48*   HGB 10.6*   HCT 30.7*      MCV 88   MCH 30.5   MCHC 34.5     CMP:   Recent Labs  Lab 12/23/17  0534     106   CALCIUM 9.7  9.7   ALBUMIN 3.6  3.6   PROT 7.0   *  122*   K 4.0  4.0   CO2 24  24   CL 86*  86*   BUN 42*  42*   CREATININE 1.5*  1.4   ALKPHOS 93   ALT 31   AST 31   BILITOT 1.6*     Coagulation:   Recent Labs  Lab 12/23/17  0535   INR 2.1*  2.1*     LFTs:   Recent Labs  Lab 12/23/17  0534   ALT 31   AST 31   ALKPHOS 93   BILITOT 1.6*   PROT 7.0   ALBUMIN 3.6  3.6     All labs within the past  24 hours have been reviewed.     Lab Results   Component Value Date    .0 (H) 04/04/2017    CALCIUM 9.7 12/23/2017    CALCIUM 9.7 12/23/2017    PHOS 3.1 12/23/2017       Lab Results   Component Value Date    ALBUMIN 3.6 12/23/2017    ALBUMIN 3.6 12/23/2017         Significant Imaging: reviewed     Assessment/Plan:     * Hyponatremia    1. Hyponatremia : acute on chronic , due to ADHF ,  Pt has valve disease , repeat  Echocardiogram report reviewed  ,     worsening serum sodium can be a sign of underlying worsening cardiac status , his sodium level has been fluctuating between 120 and 125 in last few months, likely his baseline,  d/c home , cont fluid restriction of 40 oz a day and low salt diet, f/u in clinic next week , Thyroid fn can be tested as out pt ,     2. CKD stage 3 : renal fn at baseline    3. U/S negative for cirrhosis of  liver ,     4. CHF with Pulm HTN - sees Dr Chew ,     5. HTN : BP controlled               I will follow-up with patient. Please contact us if you have any additional questions.     Total time spent 30 minutes including time needed to review the records,  patient  evaluation, documentation, face-to-face discussion with the patient, family , primary team, more than 50% of the time was spent on coordination of care and counseling.       Alexey Saavedra MD  Nephrology  [facility] -

## 2017-12-23 NOTE — SUBJECTIVE & OBJECTIVE
Interval History:  No acute events,     Review of patient's allergies indicates:   Allergen Reactions    Morphine      Other reaction(s): Nausea Only     Current Facility-Administered Medications   Medication Frequency    arformoterol nebulizer solution 15 mcg BID    bisacodyl EC tablet 10 mg Daily PRN    budesonide nebulizer solution 0.5 mg BID    famotidine tablet 20 mg BID    ferrous sulfate EC tablet 325 mg TID WM    furosemide injection 60 mg TID    metoprolol succinate (TOPROL-XL) 24 hr tablet 100 mg Daily    ondansetron injection 4 mg Q8H PRN    rosuvastatin tablet 10 mg Daily    warfarin (COUMADIN) tablet 5 mg Every Mon, Wed, Fri, Sun    [START ON 12/28/2017] warfarin (COUMADIN) tablet 5 mg Every Thurs    [START ON 12/26/2017] warfarin tablet 7.5 mg Every Tues    [START ON 12/23/2017] warfarin tablet 7.5 mg Every Sat       Objective:     Vital Signs (Most Recent):  Temp: 97.6 °F (36.4 °C) (12/22/17 1556)  Pulse: 60 (12/22/17 1556)  Resp: 18 (12/22/17 1556)  BP: (!) 120/56 (12/22/17 1556)  SpO2: 100 % (12/22/17 1556)  O2 Device (Oxygen Therapy): room air (12/22/17 1556) Vital Signs (24h Range):  Temp:  [97.6 °F (36.4 °C)-98.5 °F (36.9 °C)] 97.6 °F (36.4 °C)  Pulse:  [55-70] 60  Resp:  [16-20] 18  SpO2:  [97 %-100 %] 100 %  BP: (102-125)/(51-60) 120/56     Weight: 123.5 kg (272 lb 4.3 oz) (12/22/17 0600)  Body mass index is 34.03 kg/m².  Body surface area is 2.56 meters squared.    I/O last 3 completed shifts:  In: 2080 [P.O.:880; I.V.:1200]  Out: 2000 [Urine:2000]    Physical Exam   Constitutional: He is oriented to person, place, and time. He appears well-developed. No distress.   HENT:   Head: Normocephalic and atraumatic.   Eyes: Pupils are equal, round, and reactive to light. Right eye exhibits no discharge.   Neck: Normal range of motion. Neck supple. No tracheal deviation present. No thyromegaly present.   Cardiovascular: Normal rate, regular rhythm and intact distal pulses.  Exam reveals  gallop. Exam reveals no friction rub.    Murmur heard.  Pulmonary/Chest: Effort normal. He has no wheezes. He has rales.   Abdominal: Soft. He exhibits no mass. There is no tenderness. There is no rebound and no guarding.   Musculoskeletal: Normal range of motion. He exhibits edema.   Lymphadenopathy:     He has no cervical adenopathy.   Neurological: He is alert and oriented to person, place, and time.   Skin: Skin is warm. No rash noted. He is not diaphoretic. No erythema.   Nursing note and vitals reviewed.      Significant Labs:    CBC:   Recent Labs  Lab 12/22/17  0505   WBC 9.81   RBC 3.41*   HGB 10.5*   HCT 30.3*      MCV 89   MCH 30.8   MCHC 34.7     CMP:   Recent Labs  Lab 12/18/17  1011  12/22/17  1416     < > 137*   CALCIUM 10.1  < > 9.6   ALBUMIN 4.0  --   --    *  < > 122*   K 4.6  < > 4.4   CO2 28  < > 22*   CL 79*  < > 87*   BUN 74*  < > 44*   CREATININE 2.0*  < > 1.5*   ALT  --   --  34   AST  --   --  37   < > = values in this interval not displayed.  Coagulation:   Recent Labs  Lab 12/22/17  0505   INR 1.9*  1.9*     LFTs:   Recent Labs  Lab 12/18/17  1011 12/22/17  1416   ALT  --  34   AST  --  37   ALBUMIN 4.0  --      All labs within the past 24 hours have been reviewed.       Lab Results   Component Value Date    ALT 34 12/22/2017    AST 37 12/22/2017    ALKPHOS 85 05/30/2017    BILITOT 1.0 05/30/2017         Significant Imaging: reviewed

## 2017-12-23 NOTE — PROGRESS NOTES
"[facility] Hospital Medicine  Progress Note    Patient Name: Beni Cosby  MRN: [id number]  Patient Class: IP- Inpatient   Admission Date: 12/19/2017  Length of Stay: 2 days  Attending Physician: Jl Dumont MD  Primary Care Provider: Jackson Brandt MD        Subjective:     Principal Problem:Hyponatremia    HPI:  Patient is a transfer from [facility] as his renal doctor is [facility].  Beni Cosby is a 72 y.o. male patient  was transferred for renal evaluation for hyponatremia. Symptoms are constant and moderate in severity. No mitigating or exacerbating factors reported. Associated sxs include increased fatigue over the past week and 30 Ib weight loss over a 1-2 month period. Patient denies any fever, chills, n/v/d, abd pain, CP, SOB, BLE edema/pain, and all other sxs at this time. Prior Tx includes 2 L of fluids at [facility]. No further complaints or concerns at this time. To note the patient reports he drinks an excessive amount of water daily, "im always thirsty," and takes a number of diuretics. relavent labs from [facility] on arrival: 119Na, 70BUN, 2.04Cr, 78Cl, Ua neg. On repeat prior to arrival 121Na, 67BUN, 2.00Cr, 82Cl. The patient was reevaluated in the ER. Sodium continuing to improve. Patient admitted for Hyponatremia.        Hospital Course:  12/20 Patient evaluated by Nephrology who attributes the hyponatremia to medications and free water intake. Patient with no acute events - mentation stable.  12/21: Nephrology following. Na continues to be low likely d/t dilution. Pts mentation stable. Pt reports he drank approx 1 large glass of water yesterday.  tolvaptan given per nephrology.. Patient with no mental changes. . Continue current plan of care.      Interval History: No acute events overnight. Na+ continues to drop - Nephrology aware. Abd u/s to check for liver cirrhosis - Uric acid elevated. Continue to monitor    Review of Systems   Constitutional: Positive " for activity change and appetite change. Negative for chills and fever.   HENT: Negative for sore throat.    Eyes: Negative for pain.   Respiratory: Negative for cough, shortness of breath and wheezing.    Cardiovascular: Positive for leg swelling. Negative for chest pain and palpitations.   Gastrointestinal: Negative for constipation, diarrhea, nausea and vomiting.   Endocrine: Positive for polydipsia.   Genitourinary: Negative for dysuria and hematuria.   Musculoskeletal: Negative for back pain.   Skin: Positive for color change.   Allergic/Immunologic: Negative.    Neurological: Positive for weakness. Negative for dizziness.   Hematological: Negative.    Psychiatric/Behavioral: Negative for agitation and confusion.     Objective:     Vital Signs (Most Recent):  Temp: 97.6 °F (36.4 °C) (12/22/17 1556)  Pulse: 60 (12/22/17 1556)  Resp: 18 (12/22/17 1556)  BP: (!) 120/56 (12/22/17 1556)  SpO2: 100 % (12/22/17 1556) Vital Signs (24h Range):  Temp:  [97.6 °F (36.4 °C)-98.5 °F (36.9 °C)] 97.6 °F (36.4 °C)  Pulse:  [55-70] 60  Resp:  [16-20] 18  SpO2:  [97 %-100 %] 100 %  BP: (102-125)/(51-60) 120/56     Weight: 123.5 kg (272 lb 4.3 oz)  Body mass index is 34.03 kg/m².    Intake/Output Summary (Last 24 hours) at 12/22/17 1809  Last data filed at 12/22/17 0400   Gross per 24 hour   Intake              440 ml   Output              700 ml   Net             -260 ml      Physical Exam   Constitutional: He is oriented to person, place, and time. He appears well-developed and well-nourished.   HENT:   Head: Normocephalic and atraumatic.   Mouth/Throat: Oropharynx is clear and moist.   Eyes: Conjunctivae and EOM are normal. Pupils are equal, round, and reactive to light.   Neck: Normal range of motion. Neck supple. No JVD present.   Cardiovascular: Normal rate, regular rhythm, normal heart sounds and intact distal pulses.    Pulmonary/Chest: Effort normal and breath sounds normal. No respiratory distress. He has no wheezes.    Abdominal: Soft. Bowel sounds are normal. He exhibits no distension. There is no tenderness.   Musculoskeletal: Normal range of motion. He exhibits edema (1+ to ble).   Neurological: He is alert and oriented to person, place, and time. No cranial nerve deficit.   Skin: Skin is warm and dry. Capillary refill takes 2 to 3 seconds.   BLE hyperpigmented 2/2 PVD   Psychiatric: He has a normal mood and affect. His behavior is normal.   Nursing note and vitals reviewed.      Significant Labs:   Recent Lab Results       12/22/17  1704 12/22/17  1416 12/22/17  1348 12/22/17  0505      ALT  34       Anion Gap  13  11     AST  37       Baso #    0.02     Basophil%    0.2     BUN, Bld  44(H)  45(H)     Calcium  9.6  9.3     Chloride  87(L)  89(L)     CO2  22(L)  24     Creatinine  1.5(H)  1.4     Differential Method    Automated     eGFR if   53(A)  58(A)     eGFR if non   46  Comment:  Calculation used to obtain the estimated glomerular filtration  rate (eGFR) is the CKD-EPI equation.   (A)  50  Comment:  Calculation used to obtain the estimated glomerular filtration  rate (eGFR) is the CKD-EPI equation.   (A)     Eos #    0.2     Eosinophil%    1.8     Glucose  137(H)  102     Gran #    7.5     Gran%    76.9(H)     Hematocrit    30.3(L)     Hemoglobin    10.5(L)     Coumadin Monitoring INR    1.9  Comment:  Coumadin Therapy:  2.0 - 3.0 for INR for all indicators except mechanical heart valves  and antiphospholipid syndromes which should use 2.5 - 3.5.  (H)         1.9  Comment:  Coumadin Therapy:  2.0 - 3.0 for INR for all indicators except mechanical heart valves  and antiphospholipid syndromes which should use 2.5 - 3.5.  (H)     Lymph #    1.3     Lymph%    12.9(L)     MCH    30.8     MCHC    34.7     MCV    89     Mono #    0.8     Mono%    8.2     MPV    9.2     Platelets    201     POCT Glucose 125(H)        Potassium  4.4  3.9     Protime    19.4(H)         19.4(H)     RBC    3.41(L)      RDW    15.1(H)     Sodium  122(L)  124(L)     Sodium Urine Random   <20  Comment:  The random urine reference ranges provided were established   for 24 hour urine collections.  No reference ranges exist for  random urine specimens.  Correlate clinically.  (A)      Uric Acid  8.9(H)       WBC    9.81         All pertinent labs within the past 24 hours have been reviewed.    Significant Imaging: I have reviewed all pertinent imaging results/findings within the past 24 hours.    Assessment/Plan:      * Hyponatremia    Hold fee water  NS monitor closely  Renal consult  Consider medication profile.    12/20 - Hold zaroxylen. NS infusion / Nephrology. D/C planning   12/21 - Na still low - pt reports he drank at least 1 large glass of water yesterday. Tolvaptan started  12/22 - Na dropping - abd US to check for liver cirrhosis. AST/ALT WNL. Renal following            Mild intermittent asthma    --brovana and budesonide neb tx  --pt uses dulera @ home  --monitor          Weakness    PT/OT eval   consult for dc planning.   Monitor        Recent weight loss    ?excessive diuresis  Dietary consult  Daily weights  CBC, CMP, Mg, Phos    12/20 F/U with PCP for LT weight loss  - Diet  - Exercise    12/21: pt reports he has gained weight back since getting IVF's. Discussed need for daily weights once he goes home          CHF (congestive heart failure), NYHA class III              Type 2 diabetes mellitus    Check A1C  SSI & accuchecks  Monitor           Hyperlipidemia    --continue statin  --cardiac diet          Atrial fibrillation    Stable rate controlled on CM  Check EKG  INR 3.7, pharmacy to monitor    Continue BB          VTE Risk Mitigation         Ordered     warfarin (COUMADIN) tablet 5 mg  Every Thursday     Route:  Oral        12/22/17 1549     warfarin tablet 7.5 mg  Every Tuesday     Route:  Oral        12/22/17 1544     warfarin tablet 7.5 mg  Every Saturday     Route:  Oral        12/22/17 1544      warfarin (COUMADIN) tablet 5 mg  Every Mon, Wed, Fri, Sun     Route:  Oral        12/22/17 1548     Place sequential compression device  Until discontinued      12/20/17 0007     Medium Risk of VTE  Once      12/20/17 0007              ANGELA Correia-C  Department of Hospital Medicine   [facility]

## 2017-12-26 ENCOUNTER — TELEPHONE (OUTPATIENT)
Dept: NEPHROLOGY | Facility: CLINIC | Age: 72
End: 2017-12-26

## 2017-12-26 NOTE — PLAN OF CARE
12/26/17 1042   Final Note   Assessment Type Final Discharge Note   Discharge Disposition Home

## 2017-12-26 NOTE — TELEPHONE ENCOUNTER
----- Message from Amaya Mckeon LPN sent at 12/26/2017  8:18 AM CST -----      ----- Message -----  From: Alexey Saavedra MD  Sent: 12/23/2017   3:02 PM  To: Amaya Mckeon LPN    Pl lore a f/u appt with Dr Thomas next week

## 2017-12-26 NOTE — TELEPHONE ENCOUNTER
Patient scheduled for appointment and labs. Spoke with wife and patient is sick. Wife with confirm appointment next week.

## 2017-12-27 ENCOUNTER — PATIENT OUTREACH (OUTPATIENT)
Dept: ADMINISTRATIVE | Facility: CLINIC | Age: 72
End: 2017-12-27

## 2017-12-27 RX ORDER — IPRATROPIUM BROMIDE 0.5 MG/2.5ML
500 SOLUTION RESPIRATORY (INHALATION) 4 TIMES DAILY
COMMUNITY

## 2017-12-27 NOTE — PATIENT INSTRUCTIONS
Discharge Instructions for Hyponatremia  You were diagnosed with hyponatremia, which means your blood level of sodium (salt) is too low. Salt is needed for the body and brain to work. Very low blood levels of sodium can be fatal. Symptoms can include headache, confusion, fatigue, muscle cramps, hallucinations, seizures, and coma. You have been treated to raise your blood levels of sodium. The following instructions will help you care for yourself at home as you have been instructed.  Home care  · Limit your intake of fluids. Drink only the amounts directed by your healthcare provider.  · Ask your healthcare provider what you should use to replace fluids if you are throwing up.  · Keep all follow-up appointments. Your provider needs to watch your condition closely.  To help prevent hyponatremia:  · Take all medicines exactly as directed. Certain medicines can lower blood sodium levels.  · If you have done something that makes you sweat a lot, drink fluids that contain salt and other electrolytes.   · Tell all healthcare providers what medicines you take. Mention all prescription and over-the-counter drugs and herbs.  · Have your sodium levels checked often. This is vital if you take a diuretic (medicine that helps your body get rid of water).  Follow-up  Schedule a follow-up visit as directed.  When to call your healthcare provider  Call your provider right away if you have any of the following:  · Severe tiredness  · Fainting  · Dizziness  · Loss of appetite  · Nausea or vomiting  · Confusion or forgetfullness  · Muscle spasms, cramping, or twitching  · Seizures  · Gait disturbances   Date Last Reviewed: 6/8/2015  © 1618-5505 [employer]. [address] [place] [address], [place] [address]. All rights reserved. This information is not intended as a substitute for professional medical care. Always follow your healthcare professional's instructions.

## 2017-12-30 ENCOUNTER — HOSPITAL ENCOUNTER (INPATIENT)
Facility: HOSPITAL | Age: 72
LOS: 9 days | Discharge: NURSING, REHAB OR LONG-TERM CARE | DRG: 643 | End: 2018-01-08
Attending: INTERNAL MEDICINE | Admitting: INTERNAL MEDICINE
Payer: MEDICARE

## 2017-12-30 DIAGNOSIS — I50.23 ACUTE ON CHRONIC SYSTOLIC HEART FAILURE: ICD-10-CM

## 2017-12-30 DIAGNOSIS — J96.11 CHRONIC RESPIRATORY FAILURE WITH HYPOXIA: ICD-10-CM

## 2017-12-30 DIAGNOSIS — I50.33 ACUTE ON CHRONIC DIASTOLIC CONGESTIVE HEART FAILURE, NYHA CLASS 3: ICD-10-CM

## 2017-12-30 DIAGNOSIS — R06.02 SHORTNESS OF BREATH: ICD-10-CM

## 2017-12-30 DIAGNOSIS — R53.1 WEAKNESS: ICD-10-CM

## 2017-12-30 DIAGNOSIS — E87.1 HYPONATREMIA: Primary | ICD-10-CM

## 2017-12-30 DIAGNOSIS — I48.20 CHRONIC ATRIAL FIBRILLATION: ICD-10-CM

## 2017-12-30 DIAGNOSIS — E87.1 DILUTIONAL HYPONATREMIA: ICD-10-CM

## 2017-12-30 DIAGNOSIS — N18.30 CHRONIC KIDNEY DISEASE (CKD), STAGE III (MODERATE): ICD-10-CM

## 2017-12-30 LAB
ALBUMIN SERPL BCP-MCNC: 2.9 G/DL
ALP SERPL-CCNC: 168 U/L
ALT SERPL W/O P-5'-P-CCNC: 43 U/L
ANION GAP SERPL CALC-SCNC: 14 MMOL/L
AST SERPL-CCNC: 51 U/L
BASOPHILS # BLD AUTO: 0.01 K/UL
BASOPHILS NFR BLD: 0.1 %
BILIRUB SERPL-MCNC: 1.1 MG/DL
BNP SERPL-MCNC: 270 PG/ML
BUN SERPL-MCNC: 85 MG/DL
CALCIUM SERPL-MCNC: 9.8 MG/DL
CHLORIDE SERPL-SCNC: 79 MMOL/L
CO2 SERPL-SCNC: 30 MMOL/L
CREAT SERPL-MCNC: 2 MG/DL
DIFFERENTIAL METHOD: ABNORMAL
EOSINOPHIL # BLD AUTO: 0 K/UL
EOSINOPHIL NFR BLD: 0.1 %
ERYTHROCYTE [DISTWIDTH] IN BLOOD BY AUTOMATED COUNT: 15 %
EST. GFR  (AFRICAN AMERICAN): 37 ML/MIN/1.73 M^2
EST. GFR  (NON AFRICAN AMERICAN): 32 ML/MIN/1.73 M^2
GLUCOSE SERPL-MCNC: 158 MG/DL
HCT VFR BLD AUTO: 34.9 %
HGB BLD-MCNC: 12.3 G/DL
LYMPHOCYTES # BLD AUTO: 2 K/UL
LYMPHOCYTES NFR BLD: 11.9 %
MCH RBC QN AUTO: 30.8 PG
MCHC RBC AUTO-ENTMCNC: 35.2 G/DL
MCV RBC AUTO: 88 FL
MONOCYTES # BLD AUTO: 1.5 K/UL
MONOCYTES NFR BLD: 8.9 %
NEUTROPHILS # BLD AUTO: 13 K/UL
NEUTROPHILS NFR BLD: 79 %
PLATELET # BLD AUTO: 284 K/UL
PMV BLD AUTO: 9 FL
POTASSIUM SERPL-SCNC: 4.1 MMOL/L
PROT SERPL-MCNC: 7.5 G/DL
RBC # BLD AUTO: 3.99 M/UL
SODIUM SERPL-SCNC: 123 MMOL/L
TROPONIN I SERPL DL<=0.01 NG/ML-MCNC: 0.31 NG/ML
URATE SERPL-MCNC: 11.6 MG/DL
WBC # BLD AUTO: 16.44 K/UL

## 2017-12-30 PROCEDURE — 84443 ASSAY THYROID STIM HORMONE: CPT

## 2017-12-30 PROCEDURE — 84484 ASSAY OF TROPONIN QUANT: CPT

## 2017-12-30 PROCEDURE — 11000001 HC ACUTE MED/SURG PRIVATE ROOM

## 2017-12-30 PROCEDURE — 93005 ELECTROCARDIOGRAM TRACING: CPT

## 2017-12-30 PROCEDURE — 83735 ASSAY OF MAGNESIUM: CPT

## 2017-12-30 PROCEDURE — 93010 ELECTROCARDIOGRAM REPORT: CPT | Mod: ,,, | Performed by: INTERNAL MEDICINE

## 2017-12-30 PROCEDURE — 83880 ASSAY OF NATRIURETIC PEPTIDE: CPT

## 2017-12-30 PROCEDURE — 85025 COMPLETE CBC W/AUTO DIFF WBC: CPT

## 2017-12-30 PROCEDURE — 80053 COMPREHEN METABOLIC PANEL: CPT

## 2017-12-30 PROCEDURE — 99285 EMERGENCY DEPT VISIT HI MDM: CPT | Mod: 25

## 2017-12-30 PROCEDURE — 96374 THER/PROPH/DIAG INJ IV PUSH: CPT

## 2017-12-30 PROCEDURE — 84550 ASSAY OF BLOOD/URIC ACID: CPT

## 2017-12-30 RX ORDER — TOLVAPTAN 15 MG/1
30 TABLET ORAL DAILY
Status: DISCONTINUED | OUTPATIENT
Start: 2017-12-30 | End: 2018-01-01

## 2017-12-30 RX ORDER — HEPARIN SODIUM 5000 [USP'U]/ML
5000 INJECTION, SOLUTION INTRAVENOUS; SUBCUTANEOUS EVERY 8 HOURS
Status: DISCONTINUED | OUTPATIENT
Start: 2017-12-31 | End: 2017-12-31

## 2017-12-31 PROBLEM — E87.1 DILUTIONAL HYPONATREMIA: Status: ACTIVE | Noted: 2017-12-31

## 2017-12-31 PROBLEM — I50.23 ACUTE ON CHRONIC SYSTOLIC HEART FAILURE: Status: ACTIVE | Noted: 2017-12-31

## 2017-12-31 PROBLEM — J18.9 RIGHT LOWER LOBE PNEUMONIA: Status: ACTIVE | Noted: 2017-12-31

## 2017-12-31 PROBLEM — N18.30 CHRONIC KIDNEY DISEASE (CKD), STAGE III (MODERATE): Status: ACTIVE | Noted: 2017-12-31

## 2017-12-31 PROBLEM — E87.1 HYPONATREMIA: Chronic | Status: ACTIVE | Noted: 2017-12-20

## 2017-12-31 PROBLEM — R79.89 ELEVATED TROPONIN: Status: ACTIVE | Noted: 2017-12-31

## 2017-12-31 LAB
ANION GAP SERPL CALC-SCNC: 15 MMOL/L
ANION GAP SERPL CALC-SCNC: 18 MMOL/L
APTT BLDCRRT: >150 SEC
APTT BLDCRRT: NORMAL SEC
BASOPHILS # BLD AUTO: 0.01 K/UL
BASOPHILS NFR BLD: 0.1 %
BILIRUB UR QL STRIP: NEGATIVE
BUN SERPL-MCNC: 88 MG/DL
BUN SERPL-MCNC: 89 MG/DL
CALCIUM SERPL-MCNC: 9.8 MG/DL
CALCIUM SERPL-MCNC: 9.9 MG/DL
CHLORIDE SERPL-SCNC: 78 MMOL/L
CHLORIDE SERPL-SCNC: 80 MMOL/L
CLARITY UR: CLEAR
CO2 SERPL-SCNC: 26 MMOL/L
CO2 SERPL-SCNC: 30 MMOL/L
COLOR UR: YELLOW
CREAT SERPL-MCNC: 1.8 MG/DL
CREAT SERPL-MCNC: 1.8 MG/DL
DIFFERENTIAL METHOD: ABNORMAL
EOSINOPHIL # BLD AUTO: 0 K/UL
EOSINOPHIL NFR BLD: 0 %
ERYTHROCYTE [DISTWIDTH] IN BLOOD BY AUTOMATED COUNT: 15.1 %
EST. GFR  (AFRICAN AMERICAN): 43 ML/MIN/1.73 M^2
EST. GFR  (AFRICAN AMERICAN): 43 ML/MIN/1.73 M^2
EST. GFR  (NON AFRICAN AMERICAN): 37 ML/MIN/1.73 M^2
EST. GFR  (NON AFRICAN AMERICAN): 37 ML/MIN/1.73 M^2
GLUCOSE SERPL-MCNC: 136 MG/DL
GLUCOSE SERPL-MCNC: 230 MG/DL
GLUCOSE UR QL STRIP: NEGATIVE
HCT VFR BLD AUTO: 34.8 %
HGB BLD-MCNC: 12.5 G/DL
HGB UR QL STRIP: ABNORMAL
INR PPP: 9.4
INR PPP: >10
INR PPP: >10
KETONES UR QL STRIP: NEGATIVE
LEUKOCYTE ESTERASE UR QL STRIP: NEGATIVE
LYMPHOCYTES # BLD AUTO: 1.6 K/UL
LYMPHOCYTES NFR BLD: 9 %
MAGNESIUM SERPL-MCNC: 1.9 MG/DL
MCH RBC QN AUTO: 31.3 PG
MCHC RBC AUTO-ENTMCNC: 35.9 G/DL
MCV RBC AUTO: 87 FL
MICROSCOPIC COMMENT: NORMAL
MONOCYTES # BLD AUTO: 1.5 K/UL
MONOCYTES NFR BLD: 8.7 %
NEUTROPHILS # BLD AUTO: 14.1 K/UL
NEUTROPHILS NFR BLD: 82.2 %
NITRITE UR QL STRIP: NEGATIVE
OSMOLALITY UR: 367 MOSM/KG
PH UR STRIP: 6 [PH] (ref 5–8)
PLATELET # BLD AUTO: 293 K/UL
PMV BLD AUTO: 9.2 FL
POCT GLUCOSE: 193 MG/DL (ref 70–110)
POCT GLUCOSE: 263 MG/DL (ref 70–110)
POTASSIUM SERPL-SCNC: 3.9 MMOL/L
POTASSIUM SERPL-SCNC: 4.1 MMOL/L
PROT UR QL STRIP: NEGATIVE
PROTHROMBIN TIME: 90 SEC
PROTHROMBIN TIME: >100 SEC
PROTHROMBIN TIME: >100 SEC
RBC # BLD AUTO: 3.99 M/UL
RBC #/AREA URNS HPF: 2 /HPF (ref 0–4)
SODIUM SERPL-SCNC: 123 MMOL/L
SODIUM SERPL-SCNC: 124 MMOL/L
SODIUM UR-SCNC: 34 MMOL/L
SODIUM UR-SCNC: 47 MMOL/L
SP GR UR STRIP: 1.01 (ref 1–1.03)
TROPONIN I SERPL DL<=0.01 NG/ML-MCNC: 0.27 NG/ML
TROPONIN I SERPL DL<=0.01 NG/ML-MCNC: 0.3 NG/ML
TROPONIN I SERPL DL<=0.01 NG/ML-MCNC: 0.32 NG/ML
TSH SERPL DL<=0.005 MIU/L-ACNC: 0.99 UIU/ML
URN SPEC COLLECT METH UR: ABNORMAL
UROBILINOGEN UR STRIP-ACNC: NEGATIVE EU/DL
WBC # BLD AUTO: 17.17 K/UL

## 2017-12-31 PROCEDURE — 94640 AIRWAY INHALATION TREATMENT: CPT

## 2017-12-31 PROCEDURE — 85730 THROMBOPLASTIN TIME PARTIAL: CPT

## 2017-12-31 PROCEDURE — 25000003 PHARM REV CODE 250: Performed by: FAMILY MEDICINE

## 2017-12-31 PROCEDURE — 80048 BASIC METABOLIC PNL TOTAL CA: CPT | Mod: 91

## 2017-12-31 PROCEDURE — 25000242 PHARM REV CODE 250 ALT 637 W/ HCPCS: Performed by: FAMILY MEDICINE

## 2017-12-31 PROCEDURE — 81000 URINALYSIS NONAUTO W/SCOPE: CPT

## 2017-12-31 PROCEDURE — 25000242 PHARM REV CODE 250 ALT 637 W/ HCPCS: Performed by: NURSE PRACTITIONER

## 2017-12-31 PROCEDURE — 25000003 PHARM REV CODE 250: Performed by: INTERNAL MEDICINE

## 2017-12-31 PROCEDURE — 85610 PROTHROMBIN TIME: CPT

## 2017-12-31 PROCEDURE — 85610 PROTHROMBIN TIME: CPT | Mod: 91

## 2017-12-31 PROCEDURE — 63600175 PHARM REV CODE 636 W HCPCS: Performed by: NURSE PRACTITIONER

## 2017-12-31 PROCEDURE — 27000221 HC OXYGEN, UP TO 24 HOURS

## 2017-12-31 PROCEDURE — 83935 ASSAY OF URINE OSMOLALITY: CPT

## 2017-12-31 PROCEDURE — 25000003 PHARM REV CODE 250: Performed by: NURSE PRACTITIONER

## 2017-12-31 PROCEDURE — 80048 BASIC METABOLIC PNL TOTAL CA: CPT

## 2017-12-31 PROCEDURE — 84300 ASSAY OF URINE SODIUM: CPT | Mod: 91

## 2017-12-31 PROCEDURE — 94761 N-INVAS EAR/PLS OXIMETRY MLT: CPT

## 2017-12-31 PROCEDURE — 85025 COMPLETE CBC W/AUTO DIFF WBC: CPT

## 2017-12-31 PROCEDURE — 21400001 HC TELEMETRY ROOM

## 2017-12-31 PROCEDURE — 84484 ASSAY OF TROPONIN QUANT: CPT | Mod: 91

## 2017-12-31 PROCEDURE — 84300 ASSAY OF URINE SODIUM: CPT

## 2017-12-31 PROCEDURE — 99223 1ST HOSP IP/OBS HIGH 75: CPT | Mod: ,,, | Performed by: INTERNAL MEDICINE

## 2017-12-31 PROCEDURE — 36415 COLL VENOUS BLD VENIPUNCTURE: CPT

## 2017-12-31 RX ORDER — ONDANSETRON 2 MG/ML
4 INJECTION INTRAMUSCULAR; INTRAVENOUS EVERY 6 HOURS PRN
Status: DISCONTINUED | OUTPATIENT
Start: 2017-12-31 | End: 2018-01-08 | Stop reason: HOSPADM

## 2017-12-31 RX ORDER — BUMETANIDE 0.5 MG/1
2 TABLET ORAL 2 TIMES DAILY
Status: DISCONTINUED | OUTPATIENT
Start: 2017-12-31 | End: 2018-01-01

## 2017-12-31 RX ORDER — POTASSIUM CHLORIDE 20 MEQ/1
20 TABLET, EXTENDED RELEASE ORAL 2 TIMES DAILY
Status: DISCONTINUED | OUTPATIENT
Start: 2017-12-31 | End: 2018-01-07

## 2017-12-31 RX ORDER — IBUPROFEN 200 MG
16 TABLET ORAL
Status: DISCONTINUED | OUTPATIENT
Start: 2017-12-31 | End: 2018-01-08 | Stop reason: HOSPADM

## 2017-12-31 RX ORDER — AZITHROMYCIN 250 MG/1
500 TABLET, FILM COATED ORAL DAILY
Status: DISCONTINUED | OUTPATIENT
Start: 2017-12-31 | End: 2018-01-01

## 2017-12-31 RX ORDER — BUMETANIDE 2 MG/1
2 TABLET ORAL EVERY MORNING
Status: DISCONTINUED | OUTPATIENT
Start: 2017-12-31 | End: 2017-12-31

## 2017-12-31 RX ORDER — SPIRONOLACTONE 25 MG/1
25 TABLET ORAL 2 TIMES DAILY
Status: DISCONTINUED | OUTPATIENT
Start: 2017-12-31 | End: 2018-01-05

## 2017-12-31 RX ORDER — PANTOPRAZOLE SODIUM 40 MG/1
40 TABLET, DELAYED RELEASE ORAL DAILY
Status: DISCONTINUED | OUTPATIENT
Start: 2017-12-31 | End: 2018-01-08 | Stop reason: HOSPADM

## 2017-12-31 RX ORDER — BUDESONIDE 0.5 MG/2ML
0.5 INHALANT ORAL 2 TIMES DAILY
Status: DISCONTINUED | OUTPATIENT
Start: 2017-12-31 | End: 2018-01-08 | Stop reason: HOSPADM

## 2017-12-31 RX ORDER — FERROUS SULFATE 325(65) MG
325 TABLET, DELAYED RELEASE (ENTERIC COATED) ORAL 2 TIMES DAILY
Status: DISCONTINUED | OUTPATIENT
Start: 2017-12-31 | End: 2018-01-08 | Stop reason: HOSPADM

## 2017-12-31 RX ORDER — POLYETHYLENE GLYCOL 3350 17 G/17G
17 POWDER, FOR SOLUTION ORAL 2 TIMES DAILY PRN
Status: DISCONTINUED | OUTPATIENT
Start: 2017-12-31 | End: 2018-01-08 | Stop reason: HOSPADM

## 2017-12-31 RX ORDER — IBUPROFEN 200 MG
24 TABLET ORAL
Status: DISCONTINUED | OUTPATIENT
Start: 2017-12-31 | End: 2018-01-08 | Stop reason: HOSPADM

## 2017-12-31 RX ORDER — PHYTONADIONE 5 MG/1
5 TABLET ORAL ONCE
Status: COMPLETED | OUTPATIENT
Start: 2017-12-31 | End: 2017-12-31

## 2017-12-31 RX ORDER — ROSUVASTATIN CALCIUM 10 MG/1
10 TABLET, COATED ORAL NIGHTLY
Status: DISCONTINUED | OUTPATIENT
Start: 2017-12-31 | End: 2018-01-08 | Stop reason: HOSPADM

## 2017-12-31 RX ORDER — BUMETANIDE 1 MG/1
1 TABLET ORAL 2 TIMES DAILY
Status: DISCONTINUED | OUTPATIENT
Start: 2017-12-31 | End: 2017-12-31

## 2017-12-31 RX ORDER — INSULIN ASPART 100 [IU]/ML
0-5 INJECTION, SOLUTION INTRAVENOUS; SUBCUTANEOUS
Status: DISCONTINUED | OUTPATIENT
Start: 2017-12-31 | End: 2018-01-08 | Stop reason: HOSPADM

## 2017-12-31 RX ORDER — ASPIRIN 81 MG/1
81 TABLET ORAL DAILY
Status: DISCONTINUED | OUTPATIENT
Start: 2017-12-31 | End: 2018-01-08 | Stop reason: HOSPADM

## 2017-12-31 RX ORDER — ARFORMOTEROL TARTRATE 15 UG/2ML
15 SOLUTION RESPIRATORY (INHALATION) 2 TIMES DAILY
Status: DISCONTINUED | OUTPATIENT
Start: 2017-12-31 | End: 2018-01-08 | Stop reason: HOSPADM

## 2017-12-31 RX ORDER — METOPROLOL SUCCINATE 50 MG/1
50 TABLET, EXTENDED RELEASE ORAL DAILY
Status: DISCONTINUED | OUTPATIENT
Start: 2017-12-31 | End: 2018-01-01

## 2017-12-31 RX ORDER — CEFTRIAXONE 1 G/1
1 INJECTION, POWDER, FOR SOLUTION INTRAMUSCULAR; INTRAVENOUS
Status: DISCONTINUED | OUTPATIENT
Start: 2017-12-31 | End: 2017-12-31

## 2017-12-31 RX ORDER — POTASSIUM CHLORIDE 20 MEQ/1
20 TABLET, EXTENDED RELEASE ORAL
COMMUNITY
Start: 2015-12-21 | End: 2017-12-31

## 2017-12-31 RX ORDER — WARFARIN 1 MG/1
1 TABLET ORAL
Status: DISCONTINUED | OUTPATIENT
Start: 2018-01-06 | End: 2018-01-03

## 2017-12-31 RX ORDER — GUAIFENESIN 600 MG/1
600 TABLET, EXTENDED RELEASE ORAL 2 TIMES DAILY
Status: DISCONTINUED | OUTPATIENT
Start: 2017-12-31 | End: 2018-01-08 | Stop reason: HOSPADM

## 2017-12-31 RX ORDER — IPRATROPIUM BROMIDE AND ALBUTEROL SULFATE 2.5; .5 MG/3ML; MG/3ML
3 SOLUTION RESPIRATORY (INHALATION) EVERY 4 HOURS PRN
Status: DISCONTINUED | OUTPATIENT
Start: 2017-12-31 | End: 2018-01-08 | Stop reason: HOSPADM

## 2017-12-31 RX ORDER — IPRATROPIUM BROMIDE AND ALBUTEROL SULFATE 2.5; .5 MG/3ML; MG/3ML
3 SOLUTION RESPIRATORY (INHALATION)
Status: DISCONTINUED | OUTPATIENT
Start: 2017-12-31 | End: 2018-01-08 | Stop reason: HOSPADM

## 2017-12-31 RX ORDER — ACETAMINOPHEN 325 MG/1
650 TABLET ORAL EVERY 6 HOURS PRN
Status: DISCONTINUED | OUTPATIENT
Start: 2017-12-31 | End: 2018-01-08 | Stop reason: HOSPADM

## 2017-12-31 RX ORDER — GLUCAGON 1 MG
1 KIT INJECTION
Status: DISCONTINUED | OUTPATIENT
Start: 2017-12-31 | End: 2018-01-08 | Stop reason: HOSPADM

## 2017-12-31 RX ORDER — METOLAZONE 5 MG/1
5 TABLET ORAL DAILY
Status: DISCONTINUED | OUTPATIENT
Start: 2017-12-31 | End: 2017-12-31 | Stop reason: SDUPTHER

## 2017-12-31 RX ADMIN — BUDESONIDE 0.5 MG: 0.5 SUSPENSION RESPIRATORY (INHALATION) at 07:12

## 2017-12-31 RX ADMIN — SPIRONOLACTONE 25 MG: 25 TABLET ORAL at 10:12

## 2017-12-31 RX ADMIN — PHYTONADIONE 5 MG: 5 TABLET ORAL at 11:12

## 2017-12-31 RX ADMIN — METOPROLOL SUCCINATE 50 MG: 50 TABLET, EXTENDED RELEASE ORAL at 10:12

## 2017-12-31 RX ADMIN — BUMETANIDE 2 MG: 1 TABLET ORAL at 08:12

## 2017-12-31 RX ADMIN — TOLVAPTAN 30 MG: 15 TABLET ORAL at 10:12

## 2017-12-31 RX ADMIN — GUAIFENESIN 600 MG: 600 TABLET, EXTENDED RELEASE ORAL at 10:12

## 2017-12-31 RX ADMIN — CEFTRIAXONE SODIUM 1 G: 1 INJECTION, POWDER, FOR SOLUTION INTRAMUSCULAR; INTRAVENOUS at 06:12

## 2017-12-31 RX ADMIN — IPRATROPIUM BROMIDE AND ALBUTEROL SULFATE 3 ML: .5; 3 SOLUTION RESPIRATORY (INHALATION) at 08:12

## 2017-12-31 RX ADMIN — FERROUS SULFATE TAB EC 325 MG (65 MG FE EQUIVALENT) 325 MG: 325 (65 FE) TABLET DELAYED RESPONSE at 08:12

## 2017-12-31 RX ADMIN — POTASSIUM CHLORIDE 20 MEQ: 1500 TABLET, EXTENDED RELEASE ORAL at 08:12

## 2017-12-31 RX ADMIN — PANTOPRAZOLE SODIUM 40 MG: 40 TABLET, DELAYED RELEASE ORAL at 10:12

## 2017-12-31 RX ADMIN — POTASSIUM CHLORIDE 20 MEQ: 1500 TABLET, EXTENDED RELEASE ORAL at 10:12

## 2017-12-31 RX ADMIN — ROSUVASTATIN CALCIUM 10 MG: 10 TABLET, FILM COATED ORAL at 08:12

## 2017-12-31 RX ADMIN — INSULIN ASPART 3 UNITS: 100 INJECTION, SOLUTION INTRAVENOUS; SUBCUTANEOUS at 06:12

## 2017-12-31 RX ADMIN — SPIRONOLACTONE 25 MG: 25 TABLET ORAL at 08:12

## 2017-12-31 RX ADMIN — ARFORMOTEROL TARTRATE 15 MCG: 15 SOLUTION RESPIRATORY (INHALATION) at 08:12

## 2017-12-31 RX ADMIN — BUDESONIDE 0.5 MG: 0.5 SUSPENSION RESPIRATORY (INHALATION) at 08:12

## 2017-12-31 RX ADMIN — FERROUS SULFATE TAB EC 325 MG (65 MG FE EQUIVALENT) 325 MG: 325 (65 FE) TABLET DELAYED RESPONSE at 10:12

## 2017-12-31 RX ADMIN — PHYTONADIONE 5 MG: 5 TABLET ORAL at 10:12

## 2017-12-31 RX ADMIN — AZITHROMYCIN 500 MG: 250 TABLET, FILM COATED ORAL at 10:12

## 2017-12-31 RX ADMIN — GUAIFENESIN 600 MG: 600 TABLET, EXTENDED RELEASE ORAL at 08:12

## 2017-12-31 RX ADMIN — BUMETANIDE 1 MG: 1 TABLET ORAL at 10:12

## 2017-12-31 RX ADMIN — ARFORMOTEROL TARTRATE 15 MCG: 15 SOLUTION RESPIRATORY (INHALATION) at 07:12

## 2017-12-31 NOTE — ASSESSMENT & PLAN NOTE
- Secondary to HF with worsening cardiac status.  - Baseline Na 120-125.  - Patient with new onset fatigue and generalized weakness.  - Prior treatment with diuretics and metolazone.    - Discussed case with Nephrology in ED.  - Will start tolvaptan therapy.  - Low salt diet, fluid restriction.  - Nephrology to eval patient in AM.

## 2017-12-31 NOTE — ASSESSMENT & PLAN NOTE
- Supplemental O2 as needed.  - Duonebs.  - Mucinex.  - Sputum culture.  - Empiric abx with IV Rocephin and azithromycin.

## 2017-12-31 NOTE — ED NOTES
Pt resting in bed. rr e/u. Skin warm and dry. Pt remains tachypneic, BLE swelling. Pulses intact. Pt denies cp. Awake and alert. Reports fatigue. Call light in reach. Bed locked and lowered. Family at .

## 2017-12-31 NOTE — ED NOTES
23 g needle used to draw repeat pt/inr from pt's right ac. Pt lula well. No bleeding at site. Blood sample delivered to lab.

## 2017-12-31 NOTE — SUBJECTIVE & OBJECTIVE
Past Medical History:   Diagnosis Date    Anticoagulant long-term use     Aortic valve stenosis with insufficiency 4/2008    [facility]    Arthritis     CHF (congestive heart failure)     Chronic a-fib     CKD (chronic kidney disease) stage 3, GFR 30-59 ml/min     COPD (chronic obstructive pulmonary disease)     Diabetes mellitus     Encounter for blood transfusion     Primary cancer of right kidney     Surgery, no further treatment       Past Surgical History:   Procedure Laterality Date    Atrial septem device implanted  2005    BONE MARROW BIOPSY      CARDIAC SURGERY  2008    Aotric valve replacement     KIDNEY SURGERY Right 2007    NOSE SURGERY  2008    RENAL BIOPSY  8/30/       Review of patient's allergies indicates:   Allergen Reactions    Morphine      Other reaction(s): Nausea Only     Current Facility-Administered Medications   Medication Frequency    acetaminophen tablet 650 mg Q6H PRN    albuterol-ipratropium 2.5mg-0.5mg/3mL nebulizer solution 3 mL Q4H PRN    arformoterol nebulizer solution 15 mcg BID    And    budesonide nebulizer solution 0.5 mg BID    aspirin EC tablet 81 mg Daily    azithromycin tablet 500 mg Daily    bumetanide tablet 2 mg BID    [START ON 1/1/2018] cefTRIAXone (ROCEPHIN) 1 g in dextrose 5 % 50 mL IVPB Q24H    dextrose 50% injection 12.5 g PRN    dextrose 50% injection 25 g PRN    ferrous sulfate EC tablet 325 mg BID    glucagon (human recombinant) injection 1 mg PRN    glucose chewable tablet 16 g PRN    glucose chewable tablet 24 g PRN    guaiFENesin 12 hr tablet 600 mg BID    insulin aspart pen 0-5 Units QID (AC + HS) PRN    metoprolol succinate (TOPROL-XL) 24 hr tablet 50 mg Daily    ondansetron injection 4 mg Q6H PRN    pantoprazole EC tablet 40 mg Daily    polyethylene glycol packet 17 g BID PRN    potassium chloride SA CR tablet 20 mEq BID    rosuvastatin tablet 10 mg QHS    spironolactone tablet 25 mg BID    tolvaptan tablet 30  mg Daily    [START ON 1/6/2018] warfarin (COUMADIN) tablet 1 mg Every Sat     Family History     None        Social History Main Topics    Smoking status: Never Smoker    Smokeless tobacco: Never Used    Alcohol use No    Drug use: No    Sexual activity: Not Currently     Review of Systems   Constitutional: Positive for fatigue.   HENT: Negative.    Respiratory: Negative.    Cardiovascular: Negative.    Gastrointestinal: Negative.    Genitourinary: Negative.    Neurological: Negative.    Psychiatric/Behavioral: Negative.      Objective:     Vital Signs (Most Recent):  Temp: 97.6 °F (36.4 °C) (12/31/17 1621)  Pulse: 96 (12/31/17 1621)  Resp: 18 (12/31/17 1621)  BP: 122/71 (12/31/17 1621)  SpO2: 95 % (12/31/17 1621)  O2 Device (Oxygen Therapy): nasal cannula (12/31/17 1621) Vital Signs (24h Range):  Temp:  [97.6 °F (36.4 °C)-98.3 °F (36.8 °C)] 97.6 °F (36.4 °C)  Pulse:  [75-96] 96  Resp:  [13-25] 18  SpO2:  [93 %-99 %] 95 %  BP: (110-141)/(56-82) 122/71     Weight: 114.4 kg (252 lb 3.3 oz) (12/31/17 1335)  Body mass index is 36.19 kg/m².  Body surface area is 2.38 meters squared.    I/O last 3 completed shifts:  In: -   Out: 800 [Urine:800]    Physical Exam   Constitutional: He is oriented to person, place, and time. He appears well-developed and well-nourished.   HENT:   Head: Normocephalic and atraumatic.   Neck: No JVD present.   Cardiovascular: Normal rate, regular rhythm and normal heart sounds.  Exam reveals no friction rub.    Pulmonary/Chest: Effort normal. He has rales.   Abdominal: Soft. There is no tenderness.   Musculoskeletal: He exhibits edema.   Neurological: He is alert and oriented to person, place, and time.   Skin: Skin is warm and dry.   Psychiatric: He has a normal mood and affect. His behavior is normal.   Nursing note and vitals reviewed.      Significant Labs: reviewed  BMP  Lab Results   Component Value Date     (L) 12/31/2017    K 3.9 12/31/2017    CL 80 (L) 12/31/2017    CO2 26  12/31/2017    BUN 88 (H) 12/31/2017    CREATININE 1.8 (H) 12/31/2017    CALCIUM 9.8 12/31/2017    ANIONGAP 18 (H) 12/31/2017    ESTGFRAFRICA 43 (A) 12/31/2017    EGFRNONAA 37 (A) 12/31/2017     Lab Results   Component Value Date    WBC 17.17 (H) 12/31/2017    HGB 12.5 (L) 12/31/2017    HCT 34.8 (L) 12/31/2017    MCV 87 12/31/2017     12/31/2017         Significant Imaging: CXR reviewed: noted mild pulm edema and cephalization.

## 2017-12-31 NOTE — ED NOTES
"Pt resting in bed. rr /eu. Skin warm and dry. Vss. Spouse at bs. Discussed bed status and holding in ER. Verbalized understanding. Reports frustration and unhappy about having to stay in here after being in "ER ALL DAY" pt denies further complaints at this time. Will continue to monitor. Call light in reach. Bed locked and lowered.   "

## 2017-12-31 NOTE — ASSESSMENT & PLAN NOTE
- Initial creatinine 2 (baseline cr. 1.5-1.7).  - ? due to over diuresis/IVVD.  - Will defer to Nephrology and Cardiology, consult in AM.  - Strict I&O's.

## 2017-12-31 NOTE — ASSESSMENT & PLAN NOTE
- Rate controlled.  - Continue home beta blocker.  - Continue Coumadin for AC, pharmacy to dose.  - Daily INR.

## 2017-12-31 NOTE — SUBJECTIVE & OBJECTIVE
Past Medical History:   Diagnosis Date    Anticoagulant long-term use     Aortic valve stenosis with insufficiency 4/2008    [facility]    Arthritis     CHF (congestive heart failure)     Chronic a-fib     CKD (chronic kidney disease) stage 3, GFR 30-59 ml/min     COPD (chronic obstructive pulmonary disease)     Diabetes mellitus     Encounter for blood transfusion     Primary cancer of right kidney     Surgery, no further treatment       Past Surgical History:   Procedure Laterality Date    Atrial septem device implanted  2005    BONE MARROW BIOPSY      CARDIAC SURGERY  2008    Aotric valve replacement     KIDNEY SURGERY Right 2007    NOSE SURGERY  2008    RENAL BIOPSY  8/30/       Review of patient's allergies indicates:   Allergen Reactions    Morphine      Other reaction(s): Nausea Only       No current facility-administered medications on file prior to encounter.      Current Outpatient Prescriptions on File Prior to Encounter   Medication Sig    bumetanide (BUMEX) 2 MG tablet Take 2 mg by mouth 2 (two) times daily. Take 2mg every  Am and 1 mg every pm    ferrous sulfate 325 (65 FE) MG EC tablet Take 325 mg by mouth 2 (two) times daily.     glimepiride (AMARYL) 1 MG tablet Take 1 mg by mouth before breakfast.    ipratropium (ATROVENT) 0.02 % nebulizer solution Take 500 mcg by nebulization 4 (four) times daily. Rescue    lansoprazole (PREVACID) 30 MG capsule Take 30 mg by mouth 2 (two) times daily. TAKE 1 CAPSULE BY MOUTH ONCE DAILY    metolazone (ZAROXOLYN) 5 MG tablet once daily.     metoprolol succinate (TOPROL-XL) 100 MG 24 hr tablet Take 50 mg by mouth once daily. 1/2 tablet daily    mometasone (NASONEX) 50 mcg/actuation nasal spray 2 sprays by Nasal route daily as needed.     mometasone-formoterol (DULERA) 100-5 mcg/actuation HFAA Inhale into the lungs once daily. Controller     mometasone-formoterol (DULERA) 200-5 mcg/actuation inhaler Inhale 2 puffs into the lungs once  daily. Controller     polyethylene glycol (GLYCOLAX) 17 gram PwPk Take by mouth as needed.    potassium chloride SA (K-DUR,KLOR-CON) 20 MEQ tablet Take 20 mEq by mouth 2 (two) times daily. Take 2 tablets daily    rosuvastatin (CRESTOR) 10 MG tablet qhs    spironolactone (ALDACTONE) 25 MG tablet Take 50 mg by mouth 2 (two) times daily.     warfarin (COUMADIN) 5 MG tablet 5 mg. Sunday, Monday, Wednesday, Thursday, Friday    warfarin (COUMADIN) 7.5 MG tablet Tuesday and Saturday    blood sugar diagnostic (CONTOUR NEXT STRIPS) Strp Use once daily.     Family History     Reviewed and not pertinent.         Social History Main Topics    Smoking status: Never Smoker    Smokeless tobacco: Never Used    Alcohol use No    Drug use: No    Sexual activity: Not Currently     Review of Systems   Constitutional: Positive for fatigue. Negative for activity change, chills, diaphoresis, fever and unexpected weight change.   HENT: Negative for congestion, postnasal drip, rhinorrhea, sinus pressure, sore throat and trouble swallowing.    Eyes: Negative for photophobia and visual disturbance.   Respiratory: Positive for cough (chronic) and wheezing. Negative for apnea, chest tightness and shortness of breath.    Cardiovascular: Positive for leg swelling (chronic). Negative for chest pain and palpitations.   Gastrointestinal: Negative for abdominal distention, abdominal pain, blood in stool, constipation, diarrhea, nausea and vomiting.   Endocrine: Negative for polydipsia, polyphagia and polyuria.   Genitourinary: Negative for decreased urine volume, difficulty urinating, dysuria, frequency, hematuria and urgency.   Musculoskeletal: Negative for arthralgias, back pain, gait problem, joint swelling and myalgias.   Skin: Negative for pallor, rash and wound.   Neurological: Positive for weakness (generalized). Negative for dizziness, seizures, syncope, facial asymmetry, speech difficulty, light-headedness, numbness and  headaches.   Psychiatric/Behavioral: Negative for confusion. The patient is not nervous/anxious.    All other systems reviewed and are negative.    Objective:     Vital Signs (Most Recent):  Temp: 98.3 °F (36.8 °C) (12/30/17 2146)  Pulse: 81 (12/31/17 0001)  Resp: (!) 25 (12/30/17 2345)  BP: 120/61 (12/31/17 0001)  SpO2: (!) 93 % (12/31/17 0001) Vital Signs (24h Range):  Temp:  [98.3 °F (36.8 °C)] 98.3 °F (36.8 °C)  Pulse:  [79-82] 81  Resp:  [23-25] 25  SpO2:  [93 %-95 %] 93 %  BP: (110-141)/(59-82) 120/61     Weight: 110 kg (242 lb 8.1 oz)  Body mass index is 30.31 kg/m².    Physical Exam   Constitutional: He is oriented to person, place, and time. He appears well-developed and well-nourished. No distress.   HENT:   Head: Normocephalic and atraumatic.   Eyes: Conjunctivae are normal.   Neck: Normal range of motion. Neck supple. JVD present.   Cardiovascular: Normal rate and intact distal pulses.  An irregularly irregular rhythm present.  No extrasystoles are present. Exam reveals no gallop.    No murmur heard.  Pulses:       Radial pulses are 2+ on the right side, and 2+ on the left side.        Dorsalis pedis pulses are 2+ on the right side, and 2+ on the left side.        Posterior tibial pulses are 2+ on the right side, and 2+ on the left side.   Normal S1, variable S2.   Pulmonary/Chest: Effort normal and breath sounds normal. No accessory muscle usage. Tachypnea noted. No respiratory distress. He has no decreased breath sounds. He has no wheezes. He has no rhonchi. He has no rales.   Abdominal: Soft. Bowel sounds are normal. He exhibits no distension. There is no tenderness. There is no rebound, no guarding and no CVA tenderness.   Musculoskeletal: Normal range of motion. He exhibits edema (2+ BLE). He exhibits no tenderness or deformity.   Neurological: He is alert and oriented to person, place, and time. No cranial nerve deficit or sensory deficit.   Skin: Skin is warm, dry and intact. No rash noted. He is  not diaphoretic. No erythema. No pallor.   Psychiatric: He has a normal mood and affect. His speech is normal and behavior is normal. Cognition and memory are normal.   Nursing note and vitals reviewed.          Significant Labs:   Results for orders placed or performed during the hospital encounter of 12/30/17   CBC auto differential   Result Value Ref Range    WBC 16.44 (H) 3.90 - 12.70 K/uL    RBC 3.99 (L) 4.60 - 6.20 M/uL    Hemoglobin 12.3 (L) 14.0 - 18.0 g/dL    Hematocrit 34.9 (L) 40.0 - 54.0 %    MCV 88 82 - 98 fL    MCH 30.8 27.0 - 31.0 pg    MCHC 35.2 32.0 - 36.0 g/dL    RDW 15.0 (H) 11.5 - 14.5 %    Platelets 284 150 - 350 K/uL    MPV 9.0 (L) 9.2 - 12.9 fL    Gran # 13.0 (H) 1.8 - 7.7 K/uL    Lymph # 2.0 1.0 - 4.8 K/uL    Mono # 1.5 (H) 0.3 - 1.0 K/uL    Eos # 0.0 0.0 - 0.5 K/uL    Baso # 0.01 0.00 - 0.20 K/uL    Gran% 79.0 (H) 38.0 - 73.0 %    Lymph% 11.9 (L) 18.0 - 48.0 %    Mono% 8.9 4.0 - 15.0 %    Eosinophil% 0.1 0.0 - 8.0 %    Basophil% 0.1 0.0 - 1.9 %    Differential Method Automated    Comprehensive metabolic panel   Result Value Ref Range    Sodium 123 (L) 136 - 145 mmol/L    Potassium 4.1 3.5 - 5.1 mmol/L    Chloride 79 (L) 95 - 110 mmol/L    CO2 30 (H) 23 - 29 mmol/L    Glucose 158 (H) 70 - 110 mg/dL    BUN, Bld 85 (H) 8 - 23 mg/dL    Creatinine 2.0 (H) 0.5 - 1.4 mg/dL    Calcium 9.8 8.7 - 10.5 mg/dL    Total Protein 7.5 6.0 - 8.4 g/dL    Albumin 2.9 (L) 3.5 - 5.2 g/dL    Total Bilirubin 1.1 (H) 0.1 - 1.0 mg/dL    Alkaline Phosphatase 168 (H) 55 - 135 U/L    AST 51 (H) 10 - 40 U/L    ALT 43 10 - 44 U/L    Anion Gap 14 8 - 16 mmol/L    eGFR if African American 37 (A) >60 mL/min/1.73 m^2    eGFR if non African American 32 (A) >60 mL/min/1.73 m^2   Troponin I   Result Value Ref Range    Troponin I 0.309 (H) 0.000 - 0.026 ng/mL   Brain natriuretic peptide   Result Value Ref Range     (H) 0 - 99 pg/mL   Uric acid   Result Value Ref Range    Uric Acid 11.6 (H) 3.4 - 7.0 mg/dL      All  pertinent labs within the past 24 hours have been reviewed.    Significant Imaging:   Imaging Results          X-Ray Chest AP Portable (Final result)  Result time 12/30/17 22:16:26    Final result by Migue Oscar Jr., MD (12/30/17 22:16:26)                 Impression:     Cephalization of flow without lily pulmonary edema.        Electronically signed by: MIGUE OSCAR M.D.  Date:     12/30/17  Time:    22:16              Narrative:    XR CHEST AP PORTABLE    Clinical history: CHF    Comparison: 12/19/2017.    Findings: There has been a prior median sternotomy.  The heart is enlarged. There is cephalization of flow without lily pulmonary edema.  The lungs are better aerated than on the previous study. No pneumothorax.  No pleural effusion.  No acute osseous abnormality.                             I have reviewed all pertinent imaging results/findings within the past 24 hours.     EKG: (personally reviewed)  Atrial fibrillation.  No acute changes from prior tracings.

## 2017-12-31 NOTE — ASSESSMENT & PLAN NOTE
Cardiac: pt has mild, acute on chronic diastolic CHF  Mgmt as above with salt and fluid restriction and use of loop diuretics

## 2017-12-31 NOTE — ED NOTES
Spoke with Dr. Dane Ruby with hospital medicine concerning pt/inr 100/10. Order to hold coumadin, repeat pt/inr and call MD will results.

## 2017-12-31 NOTE — CONSULTS
[facility] -   Nephrology  Consult Note    Patient Name: Beni Cosby  MRN: [id number]  Admission Date: 12/30/2017  Hospital Length of Stay: 1 days  Attending Provider: Dane Ruby MD   Primary Care Physician: Jackson Brandt MD  Principal Problem:Hyponatremia. CKD    Reason for consult: hyponatremia  Referring physician: Dr. Ruby    Consults  Subjective:     HPI: Pt was seen and examined. H/o was reviewed. Pt is a 71 y/o male with a pertinent h/o of CKD stage 3 (Cr 1.9 at baseline), diastolic CHF, and DM who presented with ;eg weakness and swqlling. Pt was noted to have s Na was 124, and renal service was consulted for hyponatremia. Pt feels slightly SOB, and has leg swelling. His wife states with certainty that he is on a 1 L fluid restriction at home. Pt takes bumex 2 mg ama nd 1 mg pm at home. He also takes metolazone 2.5 mg qd (2.5 mg), which is a thiazide diuretic. He states his diet is not salty. TSH was normal. No h/o of liver disease but alb is 2.9. Pt denies alcohol use, but noted that AST is slightly higher than ALT. He feels tired and sleepy.    Past Medical History:   Diagnosis Date    Anticoagulant long-term use     Aortic valve stenosis with insufficiency 4/2008    [facility]    Arthritis     CHF (congestive heart failure)     Chronic a-fib     CKD (chronic kidney disease) stage 3, GFR 30-59 ml/min     COPD (chronic obstructive pulmonary disease)     Diabetes mellitus     Encounter for blood transfusion     Primary cancer of right kidney     Surgery, no further treatment       Past Surgical History:   Procedure Laterality Date    Atrial septem device implanted  2005    BONE MARROW BIOPSY      CARDIAC SURGERY  2008    Aotric valve replacement     KIDNEY SURGERY Right 2007    NOSE SURGERY  2008    RENAL BIOPSY  8/30/       Review of patient's allergies indicates:   Allergen Reactions    Morphine      Other reaction(s): Nausea Only     Current  Facility-Administered Medications   Medication Frequency    acetaminophen tablet 650 mg Q6H PRN    albuterol-ipratropium 2.5mg-0.5mg/3mL nebulizer solution 3 mL Q4H PRN    arformoterol nebulizer solution 15 mcg BID    And    budesonide nebulizer solution 0.5 mg BID    aspirin EC tablet 81 mg Daily    azithromycin tablet 500 mg Daily    bumetanide tablet 2 mg BID    [START ON 1/1/2018] cefTRIAXone (ROCEPHIN) 1 g in dextrose 5 % 50 mL IVPB Q24H    dextrose 50% injection 12.5 g PRN    dextrose 50% injection 25 g PRN    ferrous sulfate EC tablet 325 mg BID    glucagon (human recombinant) injection 1 mg PRN    glucose chewable tablet 16 g PRN    glucose chewable tablet 24 g PRN    guaiFENesin 12 hr tablet 600 mg BID    insulin aspart pen 0-5 Units QID (AC + HS) PRN    metoprolol succinate (TOPROL-XL) 24 hr tablet 50 mg Daily    ondansetron injection 4 mg Q6H PRN    pantoprazole EC tablet 40 mg Daily    polyethylene glycol packet 17 g BID PRN    potassium chloride SA CR tablet 20 mEq BID    rosuvastatin tablet 10 mg QHS    spironolactone tablet 25 mg BID    tolvaptan tablet 30 mg Daily    [START ON 1/6/2018] warfarin (COUMADIN) tablet 1 mg Every Sat     Family History     None        Social History Main Topics    Smoking status: Never Smoker    Smokeless tobacco: Never Used    Alcohol use No    Drug use: No    Sexual activity: Not Currently     Review of Systems   Constitutional: Positive for fatigue.   HENT: Negative.    Respiratory: Negative.    Cardiovascular: Negative.    Gastrointestinal: Negative.    Genitourinary: Negative.    Neurological: Negative.    Psychiatric/Behavioral: Negative.      Objective:     Vital Signs (Most Recent):  Temp: 97.6 °F (36.4 °C) (12/31/17 1621)  Pulse: 96 (12/31/17 1621)  Resp: 18 (12/31/17 1621)  BP: 122/71 (12/31/17 1621)  SpO2: 95 % (12/31/17 1621)  O2 Device (Oxygen Therapy): nasal cannula (12/31/17 1621) Vital Signs (24h Range):  Temp:  [97.6 °F (36.4  °C)-98.3 °F (36.8 °C)] 97.6 °F (36.4 °C)  Pulse:  [75-96] 96  Resp:  [13-25] 18  SpO2:  [93 %-99 %] 95 %  BP: (110-141)/(56-82) 122/71     Weight: 114.4 kg (252 lb 3.3 oz) (12/31/17 1335)  Body mass index is 36.19 kg/m².  Body surface area is 2.38 meters squared.    I/O last 3 completed shifts:  In: -   Out: 800 [Urine:800]    Physical Exam   Constitutional: He is oriented to person, place, and time. He appears well-developed and well-nourished.   HENT:   Head: Normocephalic and atraumatic.   Neck: No JVD present.   Cardiovascular: Normal rate, regular rhythm and normal heart sounds.  Exam reveals no friction rub.    Pulmonary/Chest: Effort normal. He has rales.   Abdominal: Soft. There is no tenderness.   Musculoskeletal: He exhibits edema.   Neurological: He is alert and oriented to person, place, and time.   Skin: Skin is warm and dry.   Psychiatric: He has a normal mood and affect. His behavior is normal.   Nursing note and vitals reviewed.      Significant Labs: reviewed  BMP  Lab Results   Component Value Date     (L) 12/31/2017    K 3.9 12/31/2017    CL 80 (L) 12/31/2017    CO2 26 12/31/2017    BUN 88 (H) 12/31/2017    CREATININE 1.8 (H) 12/31/2017    CALCIUM 9.8 12/31/2017    ANIONGAP 18 (H) 12/31/2017    ESTGFRAFRICA 43 (A) 12/31/2017    EGFRNONAA 37 (A) 12/31/2017     Lab Results   Component Value Date    WBC 17.17 (H) 12/31/2017    HGB 12.5 (L) 12/31/2017    HCT 34.8 (L) 12/31/2017    MCV 87 12/31/2017     12/31/2017         Significant Imaging: CXR reviewed: noted mild pulm edema and cephalization.    Assessment/Plan:   73 y/o male with hyponatremia:    * Chronic hyponatremia    Renal: Hyponatremia: is chronic  No indications to correct rapidly or treat aggressively  Pt has hypervolemia (leg edema and cephalization on CXR)  Dilutional hyponatremia due to CHF.  In addition, the thiazide diuretic (metolazone) making hyponatremia worse  In addition, pt has CKD, and decreased GFR contributed to  low s Na.  Fluid intake appears appropriate, per wife  S alb is also low, due to dilutional effects.    Recommendations for treatment:  Reviewed salt (2-3 g/day) and fluid (1-1.5 L/day) with the pt  Increase use of bumex 2 mg bid  Stop metolazone    s Osm pending  Will repeat BMP this afternoon         Chronic diastolic congestive heart failure, NYHA class 3    Cardiac: pt has mild, acute on chronic diastolic CHF  Mgmt as above with salt and fluid restriction and use of loop diuretics        Total time spent 70 minutes including time needed to review the records, the   patient evaluation, documentation, face-to-face discussion with the patient,   more than 50% of the time was spent on coordination of care and counseling.    Level V visit.      George Inman MD  Nephrology  [facility] -

## 2017-12-31 NOTE — ASSESSMENT & PLAN NOTE
- Likely due to demand.  - Chronically elevated troponin (baseline 0.1)  - Initial troponin 0.3; EKG unrevealing.  - No chest pain.  - Trend serial cardiac enzymes, EKG.  - Recent TTE 12/23/17 showed CLV[facility], EF 60-65%, pulmonary HTN (PAP 46mmHg), stable prosthetic aortic valve.  - Continue medical management with ASA, BB, and high-intensity statin.  - Cardiology consult in AM.

## 2017-12-31 NOTE — HPI
Mr. Cosby is a 71yo male with a PMHx of chronic diastolic HFpEF (right-sided), CKD stage III, chronic hyponatremia, COPD, DM II, chronic AF on Coumadin, and AS with remote bioprosthetic AVR, who presented to the ED with c/o fatigue that has progressively worsened over the past 1 day.  Associated generalized weakness.  Denies any fever, chills, CP, SOB, worsening cough, worsening edema, ABD pain, N/V/D, dysuria, back pain, HA, AMS, visual disturbances, seizure-like activity, lightheadedness/dizziness syncope, or numbness/tingling.  No aggravating to alleviating factors.  He was admitted to [facility] 12/19 for hyponatremia with Na 119.  He received 0.9 NS IVF's, and metolazone added per Nephrology.  Initial work-up in ED resulted Na 123, cr. 2, BUN 85, WBC 16.4, , troponin 0.3.  CXR showed RLL infiltrate consistent with PNA.  EKG revealed A-Fib with controlled rate, no acute changes from previous tracings.  Case discussed with Nephrology in ED, who recommend admission for initiation of tolvaptan therapy.  Hospital Medicine consulted for admission.

## 2017-12-31 NOTE — ED NOTES
Reported pt/inr and ptt to Dr Ruby. Order to hold, coumadin, heparin and asa. Watch for signs of bleeding and report to him today if necessary.

## 2017-12-31 NOTE — ASSESSMENT & PLAN NOTE
Renal: Hyponatremia: is chronic  No indications to correct rapidly or treat aggressively  Pt has hypervolemia (leg edema and cephalization on CXR)  Dilutional hyponatremia due to CHF.  In addition, the thiazide diuretic (metolazone) making hyponatremia worse  In addition, pt has CKD, and decreased GFR contributed to low s Na.  Fluid intake appears appropriate, per wife  S alb is also low, due to dilutional effects.    Recommendations for treatment:  Reviewed salt (2-3 g/day) and fluid (1-1.5 L/day) with the pt  Increase use of bumex 2 mg bid  Stop metolazone

## 2017-12-31 NOTE — HPI
Pt was seen and examined. H/o was reviewed. Pt is a 73 y/o male with a pertinent h/o of CKD stage 3 (Cr 1.9 at baseline), diastolic CHF, and DM who presented with ;eg weakness and swqlling. Pt was noted to have s Na was 124, and renal service was consulted for hyponatremia. Pt feels slightly SOB, and has leg swelling. His wife states with certainty that he is on a 1 L fluid restriction at home. Pt takes bumex 2 mg ama nd 1 mg pm at home. He also takes metolazone 2.5 mg qd (2.5 mg), which is a thiazide diuretic. He states his diet is not salty. TSH was normal. No h/o of liver disease but alb is 2.9. Pt denies alcohol use, but noted that AST is slightly higher than ALT. He feels tired and sleepy.

## 2017-12-31 NOTE — ASSESSMENT & PLAN NOTE
- Clinically compensated, ? mild volume depletion.  Will defer to Cardiology.  - Continue home Bumex, conor, and metolazone.   - Strict I&O's, daily weights, Na/fluid restriction.  - Cardiology consult in AM.  - Followed by Dr. Chew ( Cardiology).

## 2017-12-31 NOTE — H&P
[facility] Hospital Medicine  History & Physical    Patient Name: Beni Cosby  MRN: [id number]  Admission Date: 12/30/2017  Attending Physician: Kaitlynn Ibarra MD   Primary Care Provider: Jackson Brandt MD         Patient information was obtained from patient, past medical records and ER records.     Subjective:     Principal Problem:Hyponatremia    Chief Complaint:   Chief Complaint   Patient presents with    Fatigue        HPI: Mr. Cosby is a 71yo male  with a PMHx of chronic diastolic HFpEF (right-sided), CKD stage III, chronic hyponatremia, COPD, DM II, chronic AF on Coumadin, and AS with remote bioprosthetic AVR, who presented to the ED with c/o fatigue that has progressively worsened over the past 1 day.  Associated generalized weakness.  Denies any fever, chills, CP, SOB, worsening cough, worsening edema, ABD pain, N/V/D, dysuria, back pain, HA, AMS, visual disturbances, seizure-like activity, lightheadedness/dizziness syncope, or numbness/tingling.  No aggravating to alleviating factors.  He was admitted to [facility] 12/19 for hyponatremia with Na 119.  He received 0.9 NS IVF's, and metolazone added per Nephrology.  Initial work-up in ED resulted Na 123, cr. 2, BUN 85, WBC 16.4, , troponin 0.3.  CXR showed RLL infiltrate consistent with PNA.  EKG revealed A-Fib with controlled rate, no acute changes from previous tracings.  Case discussed with Nephrology in ED, who recommend admission for initiation of tolvaptan therapy.  Hospital Medicine consulted for admission.      Past Medical History:   Diagnosis Date    Anticoagulant long-term use     Aortic valve stenosis with insufficiency 4/2008    Surgery [facility]    Arthritis     CHF (congestive heart failure)     Chronic a-fib     CKD (chronic kidney disease) stage 3, GFR 30-59 ml/min     COPD (chronic obstructive pulmonary disease)     Diabetes mellitus     Encounter for blood transfusion     Primary cancer of right  kidney     Surgery, no further treatment       Past Surgical History:   Procedure Laterality Date    Atrial septem device implanted  2005    BONE MARROW BIOPSY      CARDIAC SURGERY  2008    Aotric valve replacement     KIDNEY SURGERY Right 2007    NOSE SURGERY  2008    RENAL BIOPSY  8/30/       Review of patient's allergies indicates:   Allergen Reactions    Morphine      Other reaction(s): Nausea Only       No current facility-administered medications on file prior to encounter.      Current Outpatient Prescriptions on File Prior to Encounter   Medication Sig    bumetanide (BUMEX) 2 MG tablet Take 2 mg by mouth 2 (two) times daily. Take 2mg every  Am and 1 mg every pm    ferrous sulfate 325 (65 FE) MG EC tablet Take 325 mg by mouth 2 (two) times daily.     glimepiride (AMARYL) 1 MG tablet Take 1 mg by mouth before breakfast.    ipratropium (ATROVENT) 0.02 % nebulizer solution Take 500 mcg by nebulization 4 (four) times daily. Rescue    lansoprazole (PREVACID) 30 MG capsule Take 30 mg by mouth 2 (two) times daily. TAKE 1 CAPSULE BY MOUTH ONCE DAILY    metolazone (ZAROXOLYN) 5 MG tablet once daily.     metoprolol succinate (TOPROL-XL) 100 MG 24 hr tablet Take 50 mg by mouth once daily. 1/2 tablet daily    mometasone (NASONEX) 50 mcg/actuation nasal spray 2 sprays by Nasal route daily as needed.     mometasone-formoterol (DULERA) 100-5 mcg/actuation HFAA Inhale into the lungs once daily. Controller     mometasone-formoterol (DULERA) 200-5 mcg/actuation inhaler Inhale 2 puffs into the lungs once daily. Controller     polyethylene glycol (GLYCOLAX) 17 gram PwPk Take by mouth as needed.    potassium chloride SA (K-DUR,KLOR-CON) 20 MEQ tablet Take 20 mEq by mouth 2 (two) times daily. Take 2 tablets daily    rosuvastatin (CRESTOR) 10 MG tablet qhs    spironolactone (ALDACTONE) 25 MG tablet Take 50 mg by mouth 2 (two) times daily.     warfarin (COUMADIN) 5 MG tablet 5 mg. Sunday, Monday, Wednesday,  Thursday, Friday    warfarin (COUMADIN) 7.5 MG tablet Tuesday and Saturday    blood sugar diagnostic (CONTOUR NEXT STRIPS) Strp Use once daily.     Family History     Reviewed and not pertinent.         Social History Main Topics    Smoking status: Never Smoker    Smokeless tobacco: Never Used    Alcohol use No    Drug use: No    Sexual activity: Not Currently     Review of Systems   Constitutional: Positive for fatigue. Negative for activity change, chills, diaphoresis, fever and unexpected weight change.   HENT: Negative for congestion, postnasal drip, rhinorrhea, sinus pressure, sore throat and trouble swallowing.    Eyes: Negative for photophobia and visual disturbance.   Respiratory: Positive for cough (chronic) and wheezing. Negative for apnea, chest tightness and shortness of breath.    Cardiovascular: Positive for leg swelling (chronic). Negative for chest pain and palpitations.   Gastrointestinal: Negative for abdominal distention, abdominal pain, blood in stool, constipation, diarrhea, nausea and vomiting.   Endocrine: Negative for polydipsia, polyphagia and polyuria.   Genitourinary: Negative for decreased urine volume, difficulty urinating, dysuria, frequency, hematuria and urgency.   Musculoskeletal: Negative for arthralgias, back pain, gait problem, joint swelling and myalgias.   Skin: Negative for pallor, rash and wound.   Neurological: Positive for weakness (generalized). Negative for dizziness, seizures, syncope, facial asymmetry, speech difficulty, light-headedness, numbness and headaches.   Psychiatric/Behavioral: Negative for confusion. The patient is not nervous/anxious.    All other systems reviewed and are negative.    Objective:     Vital Signs (Most Recent):  Temp: 98.3 °F (36.8 °C) (12/30/17 2146)  Pulse: 81 (12/31/17 0001)  Resp: (!) 25 (12/30/17 2345)  BP: 120/61 (12/31/17 0001)  SpO2: (!) 93 % (12/31/17 0001) Vital Signs (24h Range):  Temp:  [98.3 °F (36.8 °C)] 98.3 °F (36.8  °C)  Pulse:  [79-82] 81  Resp:  [23-25] 25  SpO2:  [93 %-95 %] 93 %  BP: (110-141)/(59-82) 120/61     Weight: 110 kg (242 lb 8.1 oz)  Body mass index is 30.31 kg/m².    Physical Exam   Constitutional: He is oriented to person, place, and time. He appears well-developed and well-nourished. No distress.   HENT:   Head: Normocephalic and atraumatic.   Eyes: Conjunctivae are normal.   Neck: Normal range of motion. Neck supple. JVD present.   Cardiovascular: Normal rate and intact distal pulses.  An irregularly irregular rhythm present.  No extrasystoles are present. Exam reveals no gallop.    No murmur heard.  Pulses:       Radial pulses are 2+ on the right side, and 2+ on the left side.        Dorsalis pedis pulses are 2+ on the right side, and 2+ on the left side.        Posterior tibial pulses are 2+ on the right side, and 2+ on the left side.   Normal S1, variable S2.   Pulmonary/Chest: Effort normal and breath sounds normal. No accessory muscle usage. Tachypnea noted. No respiratory distress. He has no decreased breath sounds. He has no wheezes. He has no rhonchi. He has no rales.   Abdominal: Soft. Bowel sounds are normal. He exhibits no distension. There is no tenderness. There is no rebound, no guarding and no CVA tenderness.   Musculoskeletal: Normal range of motion. He exhibits edema (2+ BLE). He exhibits no tenderness or deformity.   Neurological: He is alert and oriented to person, place, and time. No cranial nerve deficit or sensory deficit.   Skin: Skin is warm, dry and intact. No rash noted. He is not diaphoretic. No erythema. No pallor.   Psychiatric: He has a normal mood and affect. His speech is normal and behavior is normal. Cognition and memory are normal.   Nursing note and vitals reviewed.          Significant Labs:   Results for orders placed or performed during the hospital encounter of 12/30/17   CBC auto differential   Result Value Ref Range    WBC 16.44 (H) 3.90 - 12.70 K/uL    RBC 3.99 (L)  4.60 - 6.20 M/uL    Hemoglobin 12.3 (L) 14.0 - 18.0 g/dL    Hematocrit 34.9 (L) 40.0 - 54.0 %    MCV 88 82 - 98 fL    MCH 30.8 27.0 - 31.0 pg    MCHC 35.2 32.0 - 36.0 g/dL    RDW 15.0 (H) 11.5 - 14.5 %    Platelets 284 150 - 350 K/uL    MPV 9.0 (L) 9.2 - 12.9 fL    Gran # 13.0 (H) 1.8 - 7.7 K/uL    Lymph # 2.0 1.0 - 4.8 K/uL    Mono # 1.5 (H) 0.3 - 1.0 K/uL    Eos # 0.0 0.0 - 0.5 K/uL    Baso # 0.01 0.00 - 0.20 K/uL    Gran% 79.0 (H) 38.0 - 73.0 %    Lymph% 11.9 (L) 18.0 - 48.0 %    Mono% 8.9 4.0 - 15.0 %    Eosinophil% 0.1 0.0 - 8.0 %    Basophil% 0.1 0.0 - 1.9 %    Differential Method Automated    Comprehensive metabolic panel   Result Value Ref Range    Sodium 123 (L) 136 - 145 mmol/L    Potassium 4.1 3.5 - 5.1 mmol/L    Chloride 79 (L) 95 - 110 mmol/L    CO2 30 (H) 23 - 29 mmol/L    Glucose 158 (H) 70 - 110 mg/dL    BUN, Bld 85 (H) 8 - 23 mg/dL    Creatinine 2.0 (H) 0.5 - 1.4 mg/dL    Calcium 9.8 8.7 - 10.5 mg/dL    Total Protein 7.5 6.0 - 8.4 g/dL    Albumin 2.9 (L) 3.5 - 5.2 g/dL    Total Bilirubin 1.1 (H) 0.1 - 1.0 mg/dL    Alkaline Phosphatase 168 (H) 55 - 135 U/L    AST 51 (H) 10 - 40 U/L    ALT 43 10 - 44 U/L    Anion Gap 14 8 - 16 mmol/L    eGFR if African American 37 (A) >60 mL/min/1.73 m^2    eGFR if non African American 32 (A) >60 mL/min/1.73 m^2   Troponin I   Result Value Ref Range    Troponin I 0.309 (H) 0.000 - 0.026 ng/mL   Brain natriuretic peptide   Result Value Ref Range     (H) 0 - 99 pg/mL   Uric acid   Result Value Ref Range    Uric Acid 11.6 (H) 3.4 - 7.0 mg/dL      All pertinent labs within the past 24 hours have been reviewed.    Significant Imaging:   Imaging Results          X-Ray Chest AP Portable (Final result)  Result time 12/30/17 22:16:26    Final result by Migue Oscar Jr., MD (12/30/17 22:16:26)                 Impression:     Cephalization of flow without lily pulmonary edema.        Electronically signed by: MIGUE OSCAR M.D.  Date:     12/30/17  Time:    22:16               Narrative:    XR CHEST AP PORTABLE    Clinical history: CHF    Comparison: 12/19/2017.    Findings: There has been a prior median sternotomy.  The heart is enlarged. There is cephalization of flow without lily pulmonary edema.  The lungs are better aerated than on the previous study. No pneumothorax.  No pleural effusion.  No acute osseous abnormality.                             I have reviewed all pertinent imaging results/findings within the past 24 hours.     EKG: (personally reviewed)  Atrial fibrillation.  No acute changes from prior tracings.        Assessment/Plan:     * Chronic hyponatremia    - Secondary to HF with worsening cardiac status.  - Baseline Na 120-125.  - Patient with new onset fatigue and generalized weakness.  - Prior treatment with diuretics and metolazone.    - Discussed case with Nephrology in ED.  - Will start tolvaptan therapy.  - Low salt diet, fluid restriction.  - Nephrology to eval patient in AM.        Right lower lobe pneumonia    - Supplemental O2 as needed.  - Duonebs.  - Mucinex.  - Sputum culture.  - Empiric abx with IV Rocephin and azithromycin.        Elevated troponin    - Likely due to demand.  - Chronically elevated troponin (baseline 0.1)  - Initial troponin 0.3; EKG unrevealing.  - No chest pain.  - Trend serial cardiac enzymes, EKG.  - Recent TTE 12/23/17 showed CLVH, EF 60-65%, pulmonary HTN (PAP 46mmHg), stable prosthetic aortic valve.  - Continue medical management with ASA, BB, and high-intensity statin.  - Cardiology consult in AM.        Chronic diastolic congestive heart failure, NYHA class 3    - Clinically compensated, ? mild volume depletion.  Will defer to Cardiology.  - Continue home Bumex, conor, and metolazone.   - Strict I&O's, daily weights, Na/fluid restriction.  - Cardiology consult in AM.  - Followed by Dr. Chew ( Cardiology).        Acute renal failure superimposed on stage 3 chronic kidney disease    - Initial creatinine 2 (baseline cr.  1.5-1.7).  - ? due to over diuresis/IVVD.  - Will defer to Nephrology and Cardiology, consult in AM.  - Strict I&O's.        Aortic valve stenosis with remote bioprosthetic AVR    - Stable.  - Followed by Dr. Chew ( Cardiology).        Type 2 diabetes mellitus    - Recent HbA1c 6.9 on 12/20.  - Accuchecks with SSI.  - Diabetic diet.        Chronic atrial fibrillation    - Rate controlled.  - Continue home beta blocker.  - Continue Coumadin for AC, pharmacy to dose.  - Daily INR.          VTE Risk Mitigation     Coumadin, SCD's             ANGELA Cole  Department of Hospital Medicine   [facility]

## 2017-12-31 NOTE — ED PROVIDER NOTES
SCRIBE #1 NOTE: I, Corinne Mack, am scribing for, and in the presence of, Rubin Thomas MD. I have scribed the entire note.      History      Chief Complaint   Patient presents with    Fatigue       Review of patient's allergies indicates:   Allergen Reactions    Morphine      Other reaction(s): Nausea Only        HPI   HPI    12/30/2017, 10:04 PM   History obtained from the patient      History of Present Illness: Beni Cosby is a 72 y.o. male patient who presents to the Emergency Department for fatigue which onset gradually yesterday. Symptoms are constant and moderate in severity. Pt was seen at this facility last week and Dx with hyponatremia related to his CHF. Pt was Tx and d/c home. Pt was sent to the ED because of his hyponatremia and new onset fatigue. No mitigating or exacerbating factors reported. Associated sxs include weakness. Patient denies any fever, chills, CP, SOB, N/V, neck pain, back pain, HA, dizziness, and all other sxs at this time. Prior Tx includes Bumex and Zeroxin with no relief. Pt has Hx of CHF, Afib, CKD, COPD, and DM. No further complaints or concerns at this time.         Arrival mode: Personal vehicle      PCP: Jackson Brandt MD       Past Medical History:  Past Medical History:   Diagnosis Date    Anticoagulant long-term use     Aortic valve stenosis with insufficiency 4/2008    [facility]    Arthritis     CHF (congestive heart failure)     Chronic a-fib     CKD (chronic kidney disease) stage 3, GFR 30-59 ml/min     COPD (chronic obstructive pulmonary disease)     Diabetes mellitus     Encounter for blood transfusion     Primary cancer of right kidney     Surgery, no further treatment       Past Surgical History:  Past Surgical History:   Procedure Laterality Date    Atrial septem device implanted  2005    BONE MARROW BIOPSY      CARDIAC SURGERY  2008    Aotric valve replacement     KIDNEY SURGERY Right 2007    NOSE SURGERY  2008    RENAL  BIOPSY  8/30/         Family History:  History reviewed. No pertinent family history.    Social History:  Social History     Social History Main Topics    Smoking status: Never Smoker    Smokeless tobacco: Never Used    Alcohol use No    Drug use: No    Sexual activity: Not Currently       ROS   Review of Systems   Constitutional: Positive for fatigue. Negative for chills and fever.   Respiratory: Negative for cough and shortness of breath.    Cardiovascular: Negative for chest pain and leg swelling.   Gastrointestinal: Negative for abdominal pain, diarrhea, nausea and vomiting.   Endocrine: Negative for cold intolerance.        (+) hyponatremia   Musculoskeletal: Negative for back pain, neck pain and neck stiffness.   Skin: Negative for rash and wound.   Neurological: Positive for weakness. Negative for dizziness, light-headedness, numbness and headaches.   All other systems reviewed and are negative.    Physical Exam      Initial Vitals [12/30/17 2146]   BP Pulse Resp Temp SpO2   (!) 141/82 79 (!) 24 98.3 °F (36.8 °C) 95 %      MAP       101.67          Physical Exam  Nursing Notes and Vital Signs Reviewed.  Constitutional: Patient is in no apparent distress. Well-developed and well-nourished. Obese.  Head: Atraumatic. Normocephalic.  Eyes: PERRL. EOM intact. Conjunctivae are not pale. No scleral icterus.  ENT: Mucous membranes are moist. Oropharynx is clear and symmetric.    Neck: Supple. Full ROM. No lymphadenopathy.  Cardiovascular: Regular rate. Irregularly irregular rhythm. No murmurs, rubs, or gallops. Distal pulses are 2+ and symmetric. JVD of 6-8 cm.  Pulmonary/Chest: No respiratory distress. Clear to auscultation bilaterally. No wheezing or rales.  Abdominal: Soft and non-distended.  There is no tenderness.  No rebound, guarding, or rigidity.  Musculoskeletal: Moves all extremities. No obvious deformities. 2+ BLE edema. No calf tenderness.  Skin: Warm and dry.  Neurological:  Alert, awake, and  "appropriate.  Normal speech.  No acute focal neurological deficits are appreciated.  Psychiatric: Normal affect. Good eye contact. Appropriate in content.    ED Course    Procedures  ED Vital Signs:  Vitals:    12/30/17 2146 12/30/17 2148 12/30/17 2229 12/30/17 2301   BP: (!) 141/82   (!) 110/59   Pulse: 79 79 79    Resp: (!) 24  (!) 23    Temp: 98.3 °F (36.8 °C)      TempSrc: Oral      SpO2: 95%  (!) 94%    Weight: 110 kg (242 lb 8.1 oz)      Height:        12/30/17 2345 12/31/17 0001 12/31/17 0214 12/31/17 0302   BP:  120/61  115/60   Pulse: 82 81     Resp: (!) 25 (!) 24     Temp:  98.2 °F (36.8 °C)     TempSrc:  Oral     SpO2: 95% (!) 93%  97%   Weight:       Height:   5' 10" (1.778 m)     12/31/17 0307 12/31/17 0401 12/31/17 0548 12/31/17 0600   BP:  (!) 123/58     Pulse: 75 76 78    Resp:  (!) 24 17 (!) 24   Temp:       TempSrc:       SpO2:  (!) 94% 97%    Weight:       Height:        12/31/17 0601 12/31/17 0731 12/31/17 0736   BP: 115/60     Pulse: 80 82 83   Resp:  (!) 21 (!) 22   Temp:      TempSrc:      SpO2:  97%    Weight:      Height:          Abnormal Lab Results:  Labs Reviewed   CBC W/ AUTO DIFFERENTIAL - Abnormal; Notable for the following:        Result Value    WBC 16.44 (*)     RBC 3.99 (*)     Hemoglobin 12.3 (*)     Hematocrit 34.9 (*)     RDW 15.0 (*)     MPV 9.0 (*)     Gran # 13.0 (*)     Mono # 1.5 (*)     Gran% 79.0 (*)     Lymph% 11.9 (*)     All other components within normal limits   COMPREHENSIVE METABOLIC PANEL - Abnormal; Notable for the following:     Sodium 123 (*)     Chloride 79 (*)     CO2 30 (*)     Glucose 158 (*)     BUN, Bld 85 (*)     Creatinine 2.0 (*)     Albumin 2.9 (*)     Total Bilirubin 1.1 (*)     Alkaline Phosphatase 168 (*)     AST 51 (*)     eGFR if  37 (*)     eGFR if non  32 (*)     All other components within normal limits   TROPONIN I - Abnormal; Notable for the following:     Troponin I 0.309 (*)     All other components within " normal limits   B-TYPE NATRIURETIC PEPTIDE - Abnormal; Notable for the following:      (*)     All other components within normal limits   URIC ACID - Abnormal; Notable for the following:     Uric Acid 11.6 (*)     All other components within normal limits   CBC W/ AUTO DIFFERENTIAL - Abnormal; Notable for the following:     WBC 17.17 (*)     RBC 3.99 (*)     Hemoglobin 12.5 (*)     Hematocrit 34.8 (*)     MCH 31.3 (*)     RDW 15.1 (*)     Gran # 14.1 (*)     Mono # 1.5 (*)     Gran% 82.2 (*)     Lymph% 9.0 (*)     All other components within normal limits   BASIC METABOLIC PANEL - Abnormal; Notable for the following:     Sodium 124 (*)     Chloride 80 (*)     Glucose 136 (*)     BUN, Bld 88 (*)     Creatinine 1.8 (*)     Anion Gap 18 (*)     eGFR if  43 (*)     eGFR if non  37 (*)     All other components within normal limits   PROTIME-INR - Abnormal; Notable for the following:     Prothrombin Time >100.0 (*)     INR >10.0 (*)     All other components within normal limits    Narrative:        INR critical result(s) called and verbal readback obtained from   Hien Germain RN, 12/31/2017 07:18   TROPONIN I - Abnormal; Notable for the following:     Troponin I 0.319 (*)     All other components within normal limits   URINALYSIS - Abnormal; Notable for the following:     Occult Blood UA 1+ (*)     All other components within normal limits   SODIUM, URINE, RANDOM   APTT   MAGNESIUM   TSH   PROTIME-INR   URINALYSIS MICROSCOPIC   MAGNESIUM   TSH   APTT   PROTIME-INR   POCT GLUCOSE MONITORING CONTINUOUS        All Lab Results:  Results for orders placed or performed during the hospital encounter of 12/30/17   CBC auto differential   Result Value Ref Range    WBC 16.44 (H) 3.90 - 12.70 K/uL    RBC 3.99 (L) 4.60 - 6.20 M/uL    Hemoglobin 12.3 (L) 14.0 - 18.0 g/dL    Hematocrit 34.9 (L) 40.0 - 54.0 %    MCV 88 82 - 98 fL    MCH 30.8 27.0 - 31.0 pg    MCHC 35.2 32.0 - 36.0 g/dL    RDW 15.0  (H) 11.5 - 14.5 %    Platelets 284 150 - 350 K/uL    MPV 9.0 (L) 9.2 - 12.9 fL    Gran # 13.0 (H) 1.8 - 7.7 K/uL    Lymph # 2.0 1.0 - 4.8 K/uL    Mono # 1.5 (H) 0.3 - 1.0 K/uL    Eos # 0.0 0.0 - 0.5 K/uL    Baso # 0.01 0.00 - 0.20 K/uL    Gran% 79.0 (H) 38.0 - 73.0 %    Lymph% 11.9 (L) 18.0 - 48.0 %    Mono% 8.9 4.0 - 15.0 %    Eosinophil% 0.1 0.0 - 8.0 %    Basophil% 0.1 0.0 - 1.9 %    Differential Method Automated    Comprehensive metabolic panel   Result Value Ref Range    Sodium 123 (L) 136 - 145 mmol/L    Potassium 4.1 3.5 - 5.1 mmol/L    Chloride 79 (L) 95 - 110 mmol/L    CO2 30 (H) 23 - 29 mmol/L    Glucose 158 (H) 70 - 110 mg/dL    BUN, Bld 85 (H) 8 - 23 mg/dL    Creatinine 2.0 (H) 0.5 - 1.4 mg/dL    Calcium 9.8 8.7 - 10.5 mg/dL    Total Protein 7.5 6.0 - 8.4 g/dL    Albumin 2.9 (L) 3.5 - 5.2 g/dL    Total Bilirubin 1.1 (H) 0.1 - 1.0 mg/dL    Alkaline Phosphatase 168 (H) 55 - 135 U/L    AST 51 (H) 10 - 40 U/L    ALT 43 10 - 44 U/L    Anion Gap 14 8 - 16 mmol/L    eGFR if African American 37 (A) >60 mL/min/1.73 m^2    eGFR if non African American 32 (A) >60 mL/min/1.73 m^2   Troponin I   Result Value Ref Range    Troponin I 0.309 (H) 0.000 - 0.026 ng/mL   Brain natriuretic peptide   Result Value Ref Range     (H) 0 - 99 pg/mL   Uric acid   Result Value Ref Range    Uric Acid 11.6 (H) 3.4 - 7.0 mg/dL   Sodium, urine, random   Result Value Ref Range    Sodium Urine Random 47 20 - 250 mmol/L   CBC auto differential   Result Value Ref Range    WBC 17.17 (H) 3.90 - 12.70 K/uL    RBC 3.99 (L) 4.60 - 6.20 M/uL    Hemoglobin 12.5 (L) 14.0 - 18.0 g/dL    Hematocrit 34.8 (L) 40.0 - 54.0 %    MCV 87 82 - 98 fL    MCH 31.3 (H) 27.0 - 31.0 pg    MCHC 35.9 32.0 - 36.0 g/dL    RDW 15.1 (H) 11.5 - 14.5 %    Platelets 293 150 - 350 K/uL    MPV 9.2 9.2 - 12.9 fL    Gran # 14.1 (H) 1.8 - 7.7 K/uL    Lymph # 1.6 1.0 - 4.8 K/uL    Mono # 1.5 (H) 0.3 - 1.0 K/uL    Eos # 0.0 0.0 - 0.5 K/uL    Baso # 0.01 0.00 - 0.20 K/uL     Gran% 82.2 (H) 38.0 - 73.0 %    Lymph% 9.0 (L) 18.0 - 48.0 %    Mono% 8.7 4.0 - 15.0 %    Eosinophil% 0.0 0.0 - 8.0 %    Basophil% 0.1 0.0 - 1.9 %    Differential Method Automated    Basic metabolic panel   Result Value Ref Range    Sodium 124 (L) 136 - 145 mmol/L    Potassium 3.9 3.5 - 5.1 mmol/L    Chloride 80 (L) 95 - 110 mmol/L    CO2 26 23 - 29 mmol/L    Glucose 136 (H) 70 - 110 mg/dL    BUN, Bld 88 (H) 8 - 23 mg/dL    Creatinine 1.8 (H) 0.5 - 1.4 mg/dL    Calcium 9.8 8.7 - 10.5 mg/dL    Anion Gap 18 (H) 8 - 16 mmol/L    eGFR if African American 43 (A) >60 mL/min/1.73 m^2    eGFR if non African American 37 (A) >60 mL/min/1.73 m^2   Protime-INR   Result Value Ref Range    Prothrombin Time >100.0 (H) 9.0 - 12.5 sec    INR >10.0 (HH) 0.8 - 1.2   Troponin I   Result Value Ref Range    Troponin I 0.319 (H) 0.000 - 0.026 ng/mL   Urinalysis   Result Value Ref Range    Specimen UA Urine, Clean Catch     Color, UA Yellow Yellow, Straw, Amaya    Appearance, UA Clear Clear    pH, UA 6.0 5.0 - 8.0    Specific Gravity, UA 1.010 1.005 - 1.030    Protein, UA Negative Negative    Glucose, UA Negative Negative    Ketones, UA Negative Negative    Bilirubin (UA) Negative Negative    Occult Blood UA 1+ (A) Negative    Nitrite, UA Negative Negative    Urobilinogen, UA Negative <2.0 EU/dL    Leukocytes, UA Negative Negative   Urinalysis Microscopic   Result Value Ref Range    RBC, UA 2 0 - 4 /hpf    Microscopic Comment SEE COMMENT    Magnesium   Result Value Ref Range    Magnesium 1.9 1.6 - 2.6 mg/dL   TSH   Result Value Ref Range    TSH 0.986 0.400 - 4.000 uIU/mL       Imaging Results:  Imaging Results          X-Ray Chest AP Portable (Final result)  Result time 12/30/17 22:16:26    Final result by Migue Oscar Jr., MD (12/30/17 22:16:26)                 Impression:     Cephalization of flow without lily pulmonary edema.        Electronically signed by: MIGUE OSCAR M.D.  Date:     12/30/17  Time:    22:16               Narrative:    XR CHEST AP PORTABLE    Clinical history: CHF    Comparison: 12/19/2017.    Findings: There has been a prior median sternotomy.  The heart is enlarged. There is cephalization of flow without lily pulmonary edema.  The lungs are better aerated than on the previous study. No pneumothorax.  No pleural effusion.  No acute osseous abnormality.                             The EKG was ordered, reviewed, and independently interpreted by the ED provider.  Interpretation time: 2154  Rate: 78 BPM  Rhythm: atrial fibrillation  Interpretation: Nonspecific intraventricular block. No STEMI.           The Emergency Provider reviewed the vital signs and test results, which are outlined above.    ED Discussion     10:04 PM: Discussed case with Alayna Link NP ([facility]). Dr. Ibarra agrees with current care and management of pt and accepts admission.   Admitting Service: Hospital medicine   Admitting Physician: Dr. Ibarra  Admit to: In-patient tele    10:12 PM: Re-evaluated pt. I have discussed test results, shared treatment plan, and the need for admission with patient and family at bedside. Pt and family express understanding at this time and agree with all information. All questions answered. Pt and family have no further questions or concerns at this time. Pt is ready for admit.      ED Medication(s):  Medications   tolvaptan tablet 30 mg (30 mg Oral Not Given 12/30/17 2200)   rosuvastatin tablet 10 mg (10 mg Oral Not Given 12/31/17 0145)   ferrous sulfate EC tablet 325 mg (not administered)   potassium chloride SA CR tablet 20 mEq (not administered)   pantoprazole EC tablet 40 mg (not administered)   spironolactone tablet 25 mg (not administered)   metoprolol succinate (TOPROL-XL) 24 hr tablet 50 mg (not administered)   insulin aspart pen 0-5 Units (not administered)   acetaminophen tablet 650 mg (not administered)   ondansetron injection 4 mg (not administered)   albuterol-ipratropium 2.5mg-0.5mg/3mL  nebulizer solution 3 mL (not administered)   arformoterol nebulizer solution 15 mcg (15 mcg Nebulization Given by Other 12/31/17 0736)     And   budesonide nebulizer solution 0.5 mg (0.5 mg Nebulization Given 12/31/17 0731)   cefTRIAXone injection 1 g (1 g Intravenous Given 12/31/17 0622)   azithromycin tablet 500 mg (not administered)   bumetanide tablet 1 mg (not administered)   guaiFENesin 12 hr tablet 600 mg (not administered)   aspirin EC tablet 81 mg (not administered)       New Prescriptions    No medications on file             Medical Decision Making    Medical Decision Making:   Clinical Tests:   Lab Tests: Ordered and Reviewed  Radiological Study: Ordered and Reviewed  Medical Tests: Ordered and Reviewed           Scribe Attestation:   Scribe #1: I performed the above scribed service and the documentation accurately describes the services I performed. I attest to the accuracy of the note.    Attending:   Physician Attestation Statement for Scribe #1: I, Rubin Thomas MD, personally performed the services described in this documentation, as scribed by Corinne Mack, in my presence, and it is both accurate and complete.          Clinical Impression       ICD-10-CM ICD-9-CM   1. Hyponatremia E87.1 276.1   2. Weakness R53.1 780.79   3. Chronic respiratory failure with hypoxia J96.11 518.83     799.02   4. Shortness of breath R06.02 786.05   5. Chronic kidney disease (CKD), stage III (moderate) N18.3 585.3   6. Acute on chronic diastolic congestive heart failure, NYHA class 3 I50.33 428.33     428.0   7. Chronic atrial fibrillation I48.2 427.31       Disposition:   Disposition: Admitted  Condition: Fair           Rubin Thomas MD  12/31/17 5339

## 2018-01-01 LAB
ANION GAP SERPL CALC-SCNC: 17 MMOL/L
ANION GAP SERPL CALC-SCNC: 17 MMOL/L
APTT BLDCRRT: 68.1 SEC
APTT BLDCRRT: 89.9 SEC
BASOPHILS # BLD AUTO: 0.01 K/UL
BASOPHILS NFR BLD: 0.1 %
BNP SERPL-MCNC: 150 PG/ML
BUN SERPL-MCNC: 86 MG/DL
BUN SERPL-MCNC: 93 MG/DL
CALCIUM SERPL-MCNC: 10 MG/DL
CALCIUM SERPL-MCNC: 10.5 MG/DL
CHLORIDE SERPL-SCNC: 77 MMOL/L
CHLORIDE SERPL-SCNC: 78 MMOL/L
CO2 SERPL-SCNC: 29 MMOL/L
CO2 SERPL-SCNC: 29 MMOL/L
CREAT SERPL-MCNC: 1.8 MG/DL
CREAT SERPL-MCNC: 1.9 MG/DL
DIFFERENTIAL METHOD: ABNORMAL
EOSINOPHIL # BLD AUTO: 0 K/UL
EOSINOPHIL NFR BLD: 0.1 %
ERYTHROCYTE [DISTWIDTH] IN BLOOD BY AUTOMATED COUNT: 14.8 %
EST. GFR  (AFRICAN AMERICAN): 40 ML/MIN/1.73 M^2
EST. GFR  (AFRICAN AMERICAN): 43 ML/MIN/1.73 M^2
EST. GFR  (NON AFRICAN AMERICAN): 34 ML/MIN/1.73 M^2
EST. GFR  (NON AFRICAN AMERICAN): 37 ML/MIN/1.73 M^2
ESTIMATED AVG GLUCOSE: 148 MG/DL
GLUCOSE SERPL-MCNC: 158 MG/DL
GLUCOSE SERPL-MCNC: 260 MG/DL
HBA1C MFR BLD HPLC: 6.8 %
HCT VFR BLD AUTO: 38.8 %
HGB BLD-MCNC: 13.4 G/DL
INR PPP: 3.9
INR PPP: 4.3
INR PPP: 5
INR PPP: 5
INR PPP: 6
LYMPHOCYTES # BLD AUTO: 1.8 K/UL
LYMPHOCYTES NFR BLD: 10.9 %
MAGNESIUM SERPL-MCNC: 1.8 MG/DL
MCH RBC QN AUTO: 31.3 PG
MCHC RBC AUTO-ENTMCNC: 34.5 G/DL
MCV RBC AUTO: 91 FL
MONOCYTES # BLD AUTO: 1.5 K/UL
MONOCYTES NFR BLD: 9 %
NEUTROPHILS # BLD AUTO: 13.4 K/UL
NEUTROPHILS NFR BLD: 79.9 %
PHOSPHATE SERPL-MCNC: 3.7 MG/DL
PLATELET # BLD AUTO: 372 K/UL
PMV BLD AUTO: 9.1 FL
POCT GLUCOSE: 160 MG/DL (ref 70–110)
POCT GLUCOSE: 162 MG/DL (ref 70–110)
POCT GLUCOSE: 173 MG/DL (ref 70–110)
POCT GLUCOSE: 184 MG/DL (ref 70–110)
POTASSIUM SERPL-SCNC: 4.2 MMOL/L
POTASSIUM SERPL-SCNC: 4.2 MMOL/L
PROTHROMBIN TIME: 38.7 SEC
PROTHROMBIN TIME: 42 SEC
PROTHROMBIN TIME: 48.4 SEC
PROTHROMBIN TIME: 48.4 SEC
PROTHROMBIN TIME: 58.5 SEC
RBC # BLD AUTO: 4.28 M/UL
SODIUM SERPL-SCNC: 123 MMOL/L
SODIUM SERPL-SCNC: 124 MMOL/L
WBC # BLD AUTO: 16.82 K/UL

## 2018-01-01 PROCEDURE — 99233 SBSQ HOSP IP/OBS HIGH 50: CPT | Mod: ,,, | Performed by: INTERNAL MEDICINE

## 2018-01-01 PROCEDURE — 80048 BASIC METABOLIC PNL TOTAL CA: CPT

## 2018-01-01 PROCEDURE — 94640 AIRWAY INHALATION TREATMENT: CPT

## 2018-01-01 PROCEDURE — 83735 ASSAY OF MAGNESIUM: CPT

## 2018-01-01 PROCEDURE — 83036 HEMOGLOBIN GLYCOSYLATED A1C: CPT

## 2018-01-01 PROCEDURE — 85610 PROTHROMBIN TIME: CPT | Mod: 91

## 2018-01-01 PROCEDURE — 99223 1ST HOSP IP/OBS HIGH 75: CPT | Mod: ,,, | Performed by: INTERNAL MEDICINE

## 2018-01-01 PROCEDURE — 63600175 PHARM REV CODE 636 W HCPCS: Performed by: FAMILY MEDICINE

## 2018-01-01 PROCEDURE — 84100 ASSAY OF PHOSPHORUS: CPT

## 2018-01-01 PROCEDURE — 25000003 PHARM REV CODE 250: Performed by: FAMILY MEDICINE

## 2018-01-01 PROCEDURE — 83880 ASSAY OF NATRIURETIC PEPTIDE: CPT

## 2018-01-01 PROCEDURE — 94761 N-INVAS EAR/PLS OXIMETRY MLT: CPT

## 2018-01-01 PROCEDURE — 25000003 PHARM REV CODE 250: Performed by: INTERNAL MEDICINE

## 2018-01-01 PROCEDURE — 25000003 PHARM REV CODE 250: Performed by: NURSE PRACTITIONER

## 2018-01-01 PROCEDURE — 27000221 HC OXYGEN, UP TO 24 HOURS

## 2018-01-01 PROCEDURE — 80048 BASIC METABOLIC PNL TOTAL CA: CPT | Mod: 91

## 2018-01-01 PROCEDURE — 85730 THROMBOPLASTIN TIME PARTIAL: CPT

## 2018-01-01 PROCEDURE — 36415 COLL VENOUS BLD VENIPUNCTURE: CPT

## 2018-01-01 PROCEDURE — 25000242 PHARM REV CODE 250 ALT 637 W/ HCPCS: Performed by: NURSE PRACTITIONER

## 2018-01-01 PROCEDURE — 25000242 PHARM REV CODE 250 ALT 637 W/ HCPCS: Performed by: FAMILY MEDICINE

## 2018-01-01 PROCEDURE — 21400001 HC TELEMETRY ROOM

## 2018-01-01 PROCEDURE — 85025 COMPLETE CBC W/AUTO DIFF WBC: CPT

## 2018-01-01 RX ORDER — BUMETANIDE 1 MG/1
1 TABLET ORAL 2 TIMES DAILY
Status: DISCONTINUED | OUTPATIENT
Start: 2018-01-01 | End: 2018-01-08 | Stop reason: HOSPADM

## 2018-01-01 RX ORDER — CIPROFLOXACIN 2 MG/ML
400 INJECTION, SOLUTION INTRAVENOUS
Status: DISCONTINUED | OUTPATIENT
Start: 2018-01-01 | End: 2018-01-05

## 2018-01-01 RX ORDER — METOPROLOL SUCCINATE 25 MG/1
25 TABLET, EXTENDED RELEASE ORAL DAILY
Status: DISCONTINUED | OUTPATIENT
Start: 2018-01-02 | End: 2018-01-01

## 2018-01-01 RX ORDER — SODIUM CHLORIDE 9 MG/ML
INJECTION, SOLUTION INTRAVENOUS CONTINUOUS
Status: DISCONTINUED | OUTPATIENT
Start: 2018-01-01 | End: 2018-01-02

## 2018-01-01 RX ORDER — PHYTONADIONE 5 MG/1
5 TABLET ORAL ONCE
Status: COMPLETED | OUTPATIENT
Start: 2018-01-01 | End: 2018-01-01

## 2018-01-01 RX ORDER — TOLVAPTAN 15 MG/1
15 TABLET ORAL DAILY
Status: DISCONTINUED | OUTPATIENT
Start: 2018-01-01 | End: 2018-01-02

## 2018-01-01 RX ORDER — METOPROLOL TARTRATE 50 MG/1
50 TABLET ORAL 2 TIMES DAILY
Status: DISCONTINUED | OUTPATIENT
Start: 2018-01-01 | End: 2018-01-02

## 2018-01-01 RX ADMIN — GUAIFENESIN 600 MG: 600 TABLET, EXTENDED RELEASE ORAL at 09:01

## 2018-01-01 RX ADMIN — BUMETANIDE 1 MG: 1 TABLET ORAL at 08:01

## 2018-01-01 RX ADMIN — ARFORMOTEROL TARTRATE 15 MCG: 15 SOLUTION RESPIRATORY (INHALATION) at 06:01

## 2018-01-01 RX ADMIN — FERROUS SULFATE TAB EC 325 MG (65 MG FE EQUIVALENT) 325 MG: 325 (65 FE) TABLET DELAYED RESPONSE at 08:01

## 2018-01-01 RX ADMIN — IPRATROPIUM BROMIDE AND ALBUTEROL SULFATE 3 ML: .5; 3 SOLUTION RESPIRATORY (INHALATION) at 09:01

## 2018-01-01 RX ADMIN — AZITHROMYCIN 500 MG: 250 TABLET, FILM COATED ORAL at 09:01

## 2018-01-01 RX ADMIN — BUMETANIDE 2 MG: 1 TABLET ORAL at 09:01

## 2018-01-01 RX ADMIN — PIPERACILLIN SODIUM AND TAZOBACTAM SODIUM 4.5 G: 4; .5 INJECTION, POWDER, LYOPHILIZED, FOR SOLUTION INTRAVENOUS at 09:01

## 2018-01-01 RX ADMIN — PHYTONADIONE 5 MG: 5 TABLET ORAL at 02:01

## 2018-01-01 RX ADMIN — ARFORMOTEROL TARTRATE 15 MCG: 15 SOLUTION RESPIRATORY (INHALATION) at 09:01

## 2018-01-01 RX ADMIN — PANTOPRAZOLE SODIUM 40 MG: 40 TABLET, DELAYED RELEASE ORAL at 09:01

## 2018-01-01 RX ADMIN — CEFTRIAXONE SODIUM 1 G: 1 INJECTION, POWDER, FOR SOLUTION INTRAMUSCULAR; INTRAVENOUS at 02:01

## 2018-01-01 RX ADMIN — METOPROLOL SUCCINATE 50 MG: 50 TABLET, EXTENDED RELEASE ORAL at 09:01

## 2018-01-01 RX ADMIN — ROSUVASTATIN CALCIUM 10 MG: 10 TABLET, FILM COATED ORAL at 08:01

## 2018-01-01 RX ADMIN — SPIRONOLACTONE 25 MG: 25 TABLET ORAL at 08:01

## 2018-01-01 RX ADMIN — FERROUS SULFATE TAB EC 325 MG (65 MG FE EQUIVALENT) 325 MG: 325 (65 FE) TABLET DELAYED RESPONSE at 09:01

## 2018-01-01 RX ADMIN — CIPROFLOXACIN 400 MG: 2 INJECTION, SOLUTION INTRAVENOUS at 07:01

## 2018-01-01 RX ADMIN — SPIRONOLACTONE 25 MG: 25 TABLET ORAL at 09:01

## 2018-01-01 RX ADMIN — POTASSIUM CHLORIDE 20 MEQ: 1500 TABLET, EXTENDED RELEASE ORAL at 09:01

## 2018-01-01 RX ADMIN — METOPROLOL TARTRATE 50 MG: 50 TABLET ORAL at 09:01

## 2018-01-01 RX ADMIN — BUDESONIDE 0.5 MG: 0.5 SUSPENSION RESPIRATORY (INHALATION) at 06:01

## 2018-01-01 RX ADMIN — IPRATROPIUM BROMIDE AND ALBUTEROL SULFATE 3 ML: .5; 3 SOLUTION RESPIRATORY (INHALATION) at 01:01

## 2018-01-01 RX ADMIN — SODIUM CHLORIDE: 0.9 INJECTION, SOLUTION INTRAVENOUS at 07:01

## 2018-01-01 RX ADMIN — BUDESONIDE 0.5 MG: 0.5 SUSPENSION RESPIRATORY (INHALATION) at 09:01

## 2018-01-01 RX ADMIN — VANCOMYCIN HYDROCHLORIDE 1500 MG: 1 INJECTION, POWDER, LYOPHILIZED, FOR SOLUTION INTRAVENOUS at 09:01

## 2018-01-01 RX ADMIN — GUAIFENESIN 600 MG: 600 TABLET, EXTENDED RELEASE ORAL at 08:01

## 2018-01-01 RX ADMIN — POTASSIUM CHLORIDE 20 MEQ: 1500 TABLET, EXTENDED RELEASE ORAL at 08:01

## 2018-01-01 RX ADMIN — TOLVAPTAN 15 MG: 15 TABLET ORAL at 12:01

## 2018-01-01 RX ADMIN — IPRATROPIUM BROMIDE AND ALBUTEROL SULFATE 3 ML: .5; 3 SOLUTION RESPIRATORY (INHALATION) at 06:01

## 2018-01-01 NOTE — PLAN OF CARE
Problem: Patient Care Overview  Goal: Plan of Care Review  Outcome: Ongoing (interventions implemented as appropriate)  Pt remained free of injury during shift, stable condition, pain adequately controlled, no acute distress, receiving antibiotics, receiving breathing treatments through respiratory therapy, blood glucose monitoring performed, on cardiac monitoring (SR, HR 70-80s), and will continue to monitor. 24hr chart review performed.

## 2018-01-01 NOTE — PROGRESS NOTES
Clinical Pharmacy: Coumadin Progress Note    Today's INR = 5.0 (supratherapeutic)   Goal INR = 2-3  Indication: Afib     Will continue to hold patient's Coumadin at this time. A warfarin 1 mg by mouth on Saturday placeholder dose only has been entered.   PT/INR will be monitored daily. Dose adjustments will be made accordingly.    Patient needs to be educated.    Thank you for allowing us to participate in this patient's care.   Yris Sahni, PharmD 1/1/2018 5:26 PM

## 2018-01-01 NOTE — SUBJECTIVE & OBJECTIVE
Interval History: Pt was seen and examined. Remained stable last pm, no new c/o's. Pt feels thirsty. Noted he was given Tolvaptan 30 mg po x 1 on arrival. His wife refused tolvaptan for him this am. Pt has managed to stay on low fluid intake. Discussed in details pt's medical condition and treatment with pt's wife. Reviewed also the empiric treatment he has received for suspected pneumonia.     Review of patient's allergies indicates:   Allergen Reactions    Morphine      Other reaction(s): Nausea Only     Current Facility-Administered Medications   Medication Frequency    acetaminophen tablet 650 mg Q6H PRN    albuterol-ipratropium 2.5mg-0.5mg/3mL nebulizer solution 3 mL Q4H PRN    albuterol-ipratropium 2.5mg-0.5mg/3mL nebulizer solution 3 mL Q6H WAKE    arformoterol nebulizer solution 15 mcg BID    And    budesonide nebulizer solution 0.5 mg BID    aspirin EC tablet 81 mg Daily    azithromycin tablet 500 mg Daily    bumetanide tablet 2 mg BID    cefTRIAXone (ROCEPHIN) 1 g in dextrose 5 % 50 mL IVPB Q24H    dextrose 50% injection 12.5 g PRN    dextrose 50% injection 25 g PRN    ferrous sulfate EC tablet 325 mg BID    glucagon (human recombinant) injection 1 mg PRN    glucose chewable tablet 16 g PRN    glucose chewable tablet 24 g PRN    guaiFENesin 12 hr tablet 600 mg BID    insulin aspart pen 0-5 Units QID (AC + HS) PRN    [START ON 1/2/2018] metoprolol succinate (TOPROL-XL) 24 hr tablet 25 mg Daily    ondansetron injection 4 mg Q6H PRN    pantoprazole EC tablet 40 mg Daily    polyethylene glycol packet 17 g BID PRN    potassium chloride SA CR tablet 20 mEq BID    rosuvastatin tablet 10 mg QHS    spironolactone tablet 25 mg BID    tolvaptan tablet 15 mg Daily    [START ON 1/6/2018] warfarin (COUMADIN) tablet 1 mg Every Sat       Objective:     Vital Signs (Most Recent):  Temp: 97.7 °F (36.5 °C) (01/01/18 1213)  Pulse: 90 (01/01/18 1354)  Resp: 20 (01/01/18 1354)  BP: 113/62 (01/01/18  1213)  SpO2: 99 % (01/01/18 1354)  O2 Device (Oxygen Therapy): nasal cannula (01/01/18 1354) Vital Signs (24h Range):  Temp:  [97.6 °F (36.4 °C)-98.4 °F (36.9 °C)] 97.7 °F (36.5 °C)  Pulse:  [70-96] 90  Resp:  [16-20] 20  SpO2:  [95 %-99 %] 99 %  BP: (110-128)/(57-71) 113/62     Weight: 112.1 kg (247 lb 2.2 oz) (01/01/18 0600)  Body mass index is 35.46 kg/m².  Body surface area is 2.35 meters squared.    I/O last 3 completed shifts:  In: 240 [P.O.:240]  Out: 1975 [Urine:1975]    Physical Exam   Constitutional: He is oriented to person, place, and time. He appears well-developed and well-nourished.   HENT:   Head: Normocephalic and atraumatic.   Neck: No JVD present.   Cardiovascular: Normal rate, regular rhythm and normal heart sounds.  Exam reveals no friction rub.    Pulmonary/Chest: Effort normal. He has rales.   Abdominal: Soft. There is no tenderness.   Musculoskeletal: He exhibits edema.   Neurological: He is alert and oriented to person, place, and time.   Skin: Skin is warm and dry.   Psychiatric: He has a normal mood and affect. His behavior is normal.   Nursing note and vitals reviewed.      Significant Labs: reviewed  BMP  Lab Results   Component Value Date     (L) 01/01/2018    K 4.2 01/01/2018    CL 78 (L) 01/01/2018    CO2 29 01/01/2018    BUN 86 (H) 01/01/2018    CREATININE 1.8 (H) 01/01/2018    CALCIUM 10.5 01/01/2018    ANIONGAP 17 (H) 01/01/2018    ESTGFRAFRICA 43 (A) 01/01/2018    EGFRNONAA 37 (A) 01/01/2018     Lab Results   Component Value Date    WBC 16.82 (H) 01/01/2018    HGB 13.4 (L) 01/01/2018    HCT 38.8 (L) 01/01/2018    MCV 91 01/01/2018     (H) 01/01/2018     U Na 35  U osm 357  Significant Imaging: CXR reviewed

## 2018-01-01 NOTE — NURSING
Pt had 3 beat run of [facility], Markus Vasquez NP, notified. He ordered Magnesium and phosphorus labs to be added to AM draw. Will continue to monitor.

## 2018-01-01 NOTE — ASSESSMENT & PLAN NOTE
- Secondary to HF with worsening cardiac status.  - Baseline Na 120-125.  - Patient with new onset fatigue and generalized weakness.  - Prior treatment with diuretics and metolazone.    - Discussed case with Nephrology in ED.  - Will start tolvaptan therapy.  - Low salt diet, fluid restriction.  - Nephrology following

## 2018-01-01 NOTE — SUBJECTIVE & OBJECTIVE
Interval History: No acute events overnight. Pt reports decreased SOB and cough - continue IV abx. Nephrology following - Na stable - fluid restrictions. Continue current plan of care    Review of Systems   Constitutional: Positive for activity change and fatigue. Negative for chills and fever.   HENT: Negative for congestion, rhinorrhea and sore throat.    Eyes: Negative for pain.   Respiratory: Positive for cough (chronic), shortness of breath (mild) and wheezing. Negative for apnea and chest tightness.    Cardiovascular: Positive for leg swelling (chronic). Negative for chest pain and palpitations.   Gastrointestinal: Negative for abdominal pain, constipation, diarrhea, nausea and vomiting.   Endocrine: Negative for polydipsia, polyphagia and polyuria.   Genitourinary: Negative for difficulty urinating, dysuria and hematuria.   Musculoskeletal: Positive for gait problem. Negative for arthralgias and myalgias.   Skin: Negative for color change and wound.   Neurological: Positive for weakness (generalized). Negative for dizziness, seizures, speech difficulty and headaches.   Hematological: Negative.    Psychiatric/Behavioral: Negative for agitation, behavioral problems and confusion. The patient is not nervous/anxious.    All other systems reviewed and are negative.    Objective:     Vital Signs (Most Recent):  Temp: 98 °F (36.7 °C) (01/01/18 1634)  Pulse: 86 (01/01/18 1634)  Resp: 20 (01/01/18 1634)  BP: 119/66 (01/01/18 1634)  SpO2: 98 % (01/01/18 1634) Vital Signs (24h Range):  Temp:  [97.6 °F (36.4 °C)-98.4 °F (36.9 °C)] 98 °F (36.7 °C)  Pulse:  [70-90] 86  Resp:  [16-20] 20  SpO2:  [95 %-99 %] 98 %  BP: (110-128)/(57-67) 119/66     Weight: 112.1 kg (247 lb 2.2 oz)  Body mass index is 35.46 kg/m².    Intake/Output Summary (Last 24 hours) at 01/01/18 1621  Last data filed at 01/01/18 1500   Gross per 24 hour   Intake              540 ml   Output             1300 ml   Net             -760 ml      Physical Exam    Constitutional: He is oriented to person, place, and time. He appears well-developed and well-nourished. No distress.   HENT:   Head: Normocephalic and atraumatic.   Mouth/Throat: Oropharynx is clear and moist.   Eyes: Conjunctivae are normal. Pupils are equal, round, and reactive to light.   Neck: Normal range of motion. Neck supple. JVD present.   Cardiovascular: Normal rate and intact distal pulses.  An irregularly irregular rhythm present.  No extrasystoles are present. Exam reveals no gallop.    No murmur heard.  Normal S1, variable S2.   Pulmonary/Chest: Effort normal and breath sounds normal. No accessory muscle usage. Tachypnea noted. No respiratory distress. He has no decreased breath sounds. He has no wheezes. He has no rhonchi. He has no rales.   Abdominal: Soft. Bowel sounds are normal. He exhibits no distension. There is no tenderness. There is no CVA tenderness.   Musculoskeletal: Normal range of motion. He exhibits edema (2+ BLE). He exhibits no tenderness or deformity.   Neurological: He is alert and oriented to person, place, and time. No cranial nerve deficit or sensory deficit.   Skin: Skin is warm, dry and intact. Capillary refill takes 2 to 3 seconds. He is not diaphoretic. No erythema.   Psychiatric: He has a normal mood and affect. His speech is normal and behavior is normal. Cognition and memory are normal.   Nursing note and vitals reviewed.      Significant Labs:   Recent Lab Results       01/01/18  1558 01/01/18  1212 01/01/18  1200 01/01/18  0940 01/01/18  0934      Anion Gap   17(H)       aPTT     89.9  Comment:  aPTT therapeutic range = 39-69 seconds(H)     Baso #          Basophil%          BNP    150  Comment:  Values of less than 100 pg/ml are consistent with non-CHF populations.(H)      BUN, Bld   93(H)       Calcium   10.0       Chloride   77(L)       CO2   29       Creatinine   1.9(H)       Differential Method          eGFR if    40(A)       eGFR if non   American   34  Comment:  Calculation used to obtain the estimated glomerular filtration  rate (eGFR) is the CKD-EPI equation.   (A)       Eos #          Eosinophil%          Glucose   260(H)       Gran #          Gran%          Hematocrit          Hemoglobin          Coumadin Monitoring INR 4.3  Comment:  Coumadin Therapy:  2.0 - 3.0 for INR for all indicators except mechanical heart valves  and antiphospholipid syndromes which should use 2.5 - 3.5.  (H)    5.0  Comment:  Coumadin Therapy:  2.0 - 3.0 for INR for all indicators except mechanical heart valves  and antiphospholipid syndromes which should use 2.5 - 3.5.  INR   critical result(s) called and verbal readback obtained from   Cecille Palm RN, 01/01/2018 13:46  (HH)          5.0  Comment:  Coumadin Therapy:  2.0 - 3.0 for INR for all indicators except mechanical heart valves  and antiphospholipid syndromes which should use 2.5 - 3.5.  INR   critical result(s) called and verbal readback obtained from   Cecille Palm RN, 01/01/2018 13:46  (HH)     Lymph #          Lymph%          Magnesium          MCH          MCHC          MCV          Mono #          Mono%          MPV          Phosphorus          Platelets          POCT Glucose  173(H)        Potassium   4.2       Protime 42.0(H)    48.4(H)          48.4(H)     RBC          RDW          Sodium   123(L)       Troponin I          WBC                      01/01/18  0656 01/01/18  0411 12/31/17  2112 12/31/17 2002 12/31/17  1823      Anion Gap  17(H)  15      aPTT    @rna  Comment:  aPTT therapeutic range = 39-69 seconds  Notified nurse Salome Camarena.        Baso #  0.01        Basophil%  0.1        BNP          BUN, Bld  86(H)  89(H)      Calcium  10.5  9.9      Chloride  78(L)  78(L)      CO2  29  30(H)      Creatinine  1.8(H)  1.8(H)      Differential Method  Automated        eGFR if   43(A)  43(A)      eGFR if non   37  Comment:  Calculation used to obtain the estimated  glomerular filtration  rate (eGFR) is the CKD-EPI equation.   (A)  37  Comment:  Calculation used to obtain the estimated glomerular filtration  rate (eGFR) is the CKD-EPI equation.   (A)      Eos #  0.0        Eosinophil%  0.1        Glucose  158(H)  230(H)      Gran #  13.4(H)        Gran%  79.9(H)        Hematocrit  38.8(L)        Hemoglobin  13.4(L)        Coumadin Monitoring INR  6.0  Comment:  Coumadin Therapy:  2.0 - 3.0 for INR for all indicators except mechanical heart valves  and antiphospholipid syndromes which should use 2.5 - 3.5.  INR critical result(s) called and verbal readback obtained from Salome Camarena RN, 01/01/2018 05:55  (HH)  9.4  Comment:  Coumadin Therapy:  2.0 - 3.0 for INR for all indicators except mechanical heart valves  and antiphospholipid syndromes which should use 2.5 - 3.5.  INR  critical result(s) called and verbal readback obtained from   Salome Camarena RN, 12/31/2017 21:32  (HH)      Lymph #  1.8        Lymph%  10.9(L)        Magnesium  1.8        MCH  31.3(H)        MCHC  34.5        MCV  91        Mono #  1.5(H)        Mono%  9.0        MPV  9.1(L)        Phosphorus  3.7        Platelets  372(H)        POCT Glucose 160(H)  193(H)  263(H)     Potassium  4.2  4.1      Protime  58.5(H)  90.0(H)      RBC  4.28(L)        RDW  14.8(H)        Sodium  124(L)  123(L)      Troponin I    0.267  Comment:  The reference interval for Troponin I represents the 99th percentile   cutoff   for our facility and is consistent with 3rd generation assay   performance.  (H)      WBC  16.82(H)                      All pertinent labs within the past 24 hours have been reviewed.    Significant Imaging: I have reviewed all pertinent imaging results/findings within the past 24 hours.

## 2018-01-01 NOTE — TELEPHONE ENCOUNTER
----- Message from Edis Grijalva sent at 5/5/2017 11:11 AM CDT -----  Contact: Pt wife Deepthi   Pt is returning Nurse staff call. Pt call back [phone] thanks  
Wife verbalized understanding of appointments for EKG and labs.  
93

## 2018-01-01 NOTE — ASSESSMENT & PLAN NOTE
- Initial creatinine 2 (baseline cr. 1.5-1.7).  --nephrology following  --cardiology following  - Strict I&O's.

## 2018-01-01 NOTE — PROGRESS NOTES
[facility] -   Nephrology  Progress Note    Patient Name: Beni Cosby  MRN: [id number]  Admission Date: 12/30/2017  Hospital Length of Stay: 2 days  Attending Provider: Dane Ruby MD   Primary Care Physician: Jackson Brandt MD  Principal Problem:Hyponatremia.    Subjective:     HPI: Pt was seen and examined. H/o was reviewed. Pt is a 71 y/o male with a pertinent h/o of CKD stage 3 (Cr 1.9 at baseline), diastolic CHF, and DM who presented with ;eg weakness and swqlling. Pt was noted to have s Na was 124, and renal service was consulted for hyponatremia. Pt feels slightly SOB, and has leg swelling. His wife states with certainty that he is on a 1 L fluid restriction at home. Pt takes bumex 2 mg ama nd 1 mg pm at home. He also takes metolazone 2.5 mg qd (2.5 mg), which is a thiazide diuretic. He states his diet is not salty. TSH was normal. No h/o of liver disease but alb is 2.9. Pt denies alcohol use, but noted that AST is slightly higher than ALT. He feels tired and sleepy.    Interval History: Pt was seen and examined. Remained stable last pm, no new c/o's. Pt feels thirsty. Noted he was given Tolvaptan 30 mg po x 1 on arrival. His wife refused tolvaptan for him this am. Pt has managed to stay on low fluid intake. Discussed in details pt's medical condition and treatment with pt's wife. Reviewed also the empiric treatment he has received for suspected pneumonia.     Review of patient's allergies indicates:   Allergen Reactions    Morphine      Other reaction(s): Nausea Only     Current Facility-Administered Medications   Medication Frequency    acetaminophen tablet 650 mg Q6H PRN    albuterol-ipratropium 2.5mg-0.5mg/3mL nebulizer solution 3 mL Q4H PRN    albuterol-ipratropium 2.5mg-0.5mg/3mL nebulizer solution 3 mL Q6H WAKE    arformoterol nebulizer solution 15 mcg BID    And    budesonide nebulizer solution 0.5 mg BID    aspirin EC tablet 81 mg Daily    azithromycin tablet 500 mg  Daily    bumetanide tablet 2 mg BID    cefTRIAXone (ROCEPHIN) 1 g in dextrose 5 % 50 mL IVPB Q24H    dextrose 50% injection 12.5 g PRN    dextrose 50% injection 25 g PRN    ferrous sulfate EC tablet 325 mg BID    glucagon (human recombinant) injection 1 mg PRN    glucose chewable tablet 16 g PRN    glucose chewable tablet 24 g PRN    guaiFENesin 12 hr tablet 600 mg BID    insulin aspart pen 0-5 Units QID (AC + HS) PRN    [START ON 1/2/2018] metoprolol succinate (TOPROL-XL) 24 hr tablet 25 mg Daily    ondansetron injection 4 mg Q6H PRN    pantoprazole EC tablet 40 mg Daily    polyethylene glycol packet 17 g BID PRN    potassium chloride SA CR tablet 20 mEq BID    rosuvastatin tablet 10 mg QHS    spironolactone tablet 25 mg BID    tolvaptan tablet 15 mg Daily    [START ON 1/6/2018] warfarin (COUMADIN) tablet 1 mg Every Sat       Objective:     Vital Signs (Most Recent):  Temp: 97.7 °F (36.5 °C) (01/01/18 1213)  Pulse: 90 (01/01/18 1354)  Resp: 20 (01/01/18 1354)  BP: 113/62 (01/01/18 1213)  SpO2: 99 % (01/01/18 1354)  O2 Device (Oxygen Therapy): nasal cannula (01/01/18 1354) Vital Signs (24h Range):  Temp:  [97.6 °F (36.4 °C)-98.4 °F (36.9 °C)] 97.7 °F (36.5 °C)  Pulse:  [70-96] 90  Resp:  [16-20] 20  SpO2:  [95 %-99 %] 99 %  BP: (110-128)/(57-71) 113/62     Weight: 112.1 kg (247 lb 2.2 oz) (01/01/18 0600)  Body mass index is 35.46 kg/m².  Body surface area is 2.35 meters squared.    I/O last 3 completed shifts:  In: 240 [P.O.:240]  Out: 1975 [Urine:1975]    Physical Exam   Constitutional: He is oriented to person, place, and time. He appears well-developed and well-nourished.   HENT:   Head: Normocephalic and atraumatic.   Neck: No JVD present.   Cardiovascular: Normal rate, regular rhythm and normal heart sounds.  Exam reveals no friction rub.    Pulmonary/Chest: Effort normal. He has rales.   Abdominal: Soft. There is no tenderness.   Musculoskeletal: He exhibits edema.   Neurological: He is  alert and oriented to person, place, and time.   Skin: Skin is warm and dry.   Psychiatric: He has a normal mood and affect. His behavior is normal.   Nursing note and vitals reviewed.      Significant Labs: reviewed  BMP  Lab Results   Component Value Date     (L) 01/01/2018    K 4.2 01/01/2018    CL 78 (L) 01/01/2018    CO2 29 01/01/2018    BUN 86 (H) 01/01/2018    CREATININE 1.8 (H) 01/01/2018    CALCIUM 10.5 01/01/2018    ANIONGAP 17 (H) 01/01/2018    ESTGFRAFRICA 43 (A) 01/01/2018    EGFRNONAA 37 (A) 01/01/2018     Lab Results   Component Value Date    WBC 16.82 (H) 01/01/2018    HGB 13.4 (L) 01/01/2018    HCT 38.8 (L) 01/01/2018    MCV 91 01/01/2018     (H) 01/01/2018     U Na 34  U osm 367    Significant Imaging: CXR reviewed    Assessment/Plan:         71 y/o male with hyponatremia:         * Chronic hyponatremia     Renal: Hyponatremia: is chronic  No change, stable, has not improved  No indications to correct rapidly or treat aggressively  Pt has hypervolemia (leg edema and cephalization on CXR) -3rd spacing  Pt also looks slightly overdiuresed (hypochloremia and alkalosis)    Dilutional hyponatremia due to CHF, thiazide diuretic (metolazone), and CKD  In addition, 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  Has received tolvaptan     Recommendations for treatment:  Reviewed salt (2-3 g/day) and fluid (1-1.5 L/day) with the pt and wife again  Decrease bumex from 2 to 1 mg po bid  No metolazone  Reduce tolvaptan dose from 30 to 15 mg po qd x 1 more doses, then d/c.     s Osm pending  Will repeat BMP this afternoon        Chronic diastolic congestive heart failure, NYHA class 3     Cardiac: pt has mild, acute on chronic diastolic CHF  Mgmt as above with salt and fluid restriction and use of loop diuretics  Noted  on aldactone      Pneumonia: On azythromycin and ceftriaxone                   George Inman MD  Nephrology  [facility]

## 2018-01-01 NOTE — ASSESSMENT & PLAN NOTE
71 y/o male with hyponatremia:         * Chronic hyponatremia     Renal: Hyponatremia: is chronic  No indications to correct rapidly or treat aggressively  Pt has hypervolemia (leg edema and cephalization on CXR)  Dilutional hyponatremia due to CHF.  In addition, the thiazide diuretic (metolazone) making hyponatremia worse  In addition, pt has CKD, and decreased GFR contributed to low s Na.  Fluid intake appears appropriate, per wife  S alb is also low, due to dilutional effects.     Recommendations for treatment:  Reviewed salt (2-3 g/day) and fluid (1-1.5 L/day) with the pt  Increase use of bumex 2 mg bid  Stop metolazone     s Osm pending  Will repeat BMP this afternoon        Chronic diastolic congestive heart failure, NYHA class 3     Cardiac: pt has mild, acute on chronic diastolic CHF  Mgmt as above with salt and fluid restriction and use of loop diuretics       Total time spent 70 minutes including time needed to review the records, the   patient evaluation, documentation, face-to-face discussion with the patient,   more than 50% of the time was spent on coordination of care and counseling.    Level V visit.

## 2018-01-01 NOTE — HOSPITAL COURSE
Patient was admitted to [facility] for hyponatremia and pneumonia under the care of Hospital Medicine. He was given IV abx for PNA and nephrology was consulted for hyponatremia. He was started on Tolvaptan.  1/1: Pt reports decreased SOB and cough - continue IV abx. Nephrology following - Na stable - fluid restrictions. Cardiology consulted.  1/2: Pt slowly improving. Wheezing decreased. Incentive spirometry added. Cardiology following - elevated troponin 2/2 renal insufficiency and demand ischemia.   1/3: Pt is sleeping good - reports decreased cough - no wheezing audible. WBC slowly improving. Na level better today but still low - no mentation changes. Cardiology and nephrology following  1/4: WBC normalized, afebrile. Na stable at 126. De-escalate Abx. Pt/spouse requests SNF  Hyperkalemia- Spironolactone held   1/5/18: Hyperkalemia resolved. Serum Na stable. Very weak. Speech consult for possible aspiration   1/6/18: spouse concerned about severe, generalized weakness. Labs stable. Will check electrolytes. Awaiting SNF placement   1/7/18: No acute changes over night. Na 125. Sitting up in chair  1/8/18: Na trending upward.  LFTs mildly elevated.  FOBT positive with stable H/H noted.  Pt alert and oriented at time of Speech therapy evaluation completed and no evidence of aspiration determined.  Case management assisted with discharge planning and pt accepted to SNF at [facility]. Pt seen and examined on the day of discharge and deemed suitable for discharge to SNF facility.  Current medications resumed with Xaroxalyn and Aldactone stopped per Nephrology recommendation.  Pt instructed to follow up with PCP and GI for further evaluation and repeat labs.

## 2018-01-01 NOTE — ASSESSMENT & PLAN NOTE
- Clinically compensated, ? mild volume depletion.  Will defer to Cardiology.  - Continue home Bumex, conor, and metolazone.   - Strict I&O's, daily weights, Na/fluid restriction.  - Followed by Dr. Chew ( Cardiology).  --cardiology consulted  --continue bumex, KCL, spironolactone

## 2018-01-01 NOTE — PROGRESS NOTES
[facility] Hospital Medicine  Progress Note    Patient Name: Beni Cosby  MRN: [id number]  Patient Class: IP- Inpatient   Admission Date: 12/30/2017  Length of Stay: 2 days  Attending Physician: Dane Ruby MD  Primary Care Provider: Jackson Brandt MD        Subjective:     Principal Problem:Hyponatremia    HPI:  Mr. Cosby is a 73yo male  with a PMHx of chronic diastolic HFpEF (right-sided), CKD stage III, chronic hyponatremia, COPD, DM II, chronic AF on Coumadin, and AS with remote bioprosthetic AVR, who presented to the ED with c/o fatigue that has progressively worsened over the past 1 day.  Associated generalized weakness.  Denies any fever, chills, CP, SOB, worsening cough, worsening edema, ABD pain, N/V/D, dysuria, back pain, HA, AMS, visual disturbances, seizure-like activity, lightheadedness/dizziness syncope, or numbness/tingling.  No aggravating to alleviating factors.  He was admitted to [facility] 12/19 for hyponatremia with Na 119.  He received 0.9 NS IVF's, and metolazone added per Nephrology.  Initial work-up in ED resulted Na 123, cr. 2, BUN 85, WBC 16.4, , troponin 0.3.  CXR showed RLL infiltrate consistent with PNA.  EKG revealed A-Fib with controlled rate, no acute changes from previous tracings.  Case discussed with Nephrology in ED, who recommend admission for initiation of tolvaptan therapy.  Hospital Medicine consulted for admission.    Hospital Course:  Patient was admitted to Med Surg for hyponatremia and pneumonia under the care of Hospital Medicine. He was given IV abx for PNA and nephrology was consulted for hyponatremia. He was started on Tolvaptan.     Interval History: No acute events overnight. Pt reports decreased SOB and cough - continue IV abx. Nephrology following - Na stable - fluid restrictions. Continue current plan of care    Review of Systems   Constitutional: Positive for activity change and fatigue. Negative for chills and fever.   HENT:  Negative for congestion, rhinorrhea and sore throat.    Eyes: Negative for pain.   Respiratory: Positive for cough (chronic), shortness of breath (mild) and wheezing. Negative for apnea and chest tightness.    Cardiovascular: Positive for leg swelling (chronic). Negative for chest pain and palpitations.   Gastrointestinal: Negative for abdominal pain, constipation, diarrhea, nausea and vomiting.   Endocrine: Negative for polydipsia, polyphagia and polyuria.   Genitourinary: Negative for difficulty urinating, dysuria and hematuria.   Musculoskeletal: Positive for gait problem. Negative for arthralgias and myalgias.   Skin: Negative for color change and wound.   Neurological: Positive for weakness (generalized). Negative for dizziness, seizures, speech difficulty and headaches.   Hematological: Negative.    Psychiatric/Behavioral: Negative for agitation, behavioral problems and confusion. The patient is not nervous/anxious.    All other systems reviewed and are negative.    Objective:     Vital Signs (Most Recent):  Temp: 98 °F (36.7 °C) (01/01/18 1634)  Pulse: 86 (01/01/18 1634)  Resp: 20 (01/01/18 1634)  BP: 119/66 (01/01/18 1634)  SpO2: 98 % (01/01/18 1634) Vital Signs (24h Range):  Temp:  [97.6 °F (36.4 °C)-98.4 °F (36.9 °C)] 98 °F (36.7 °C)  Pulse:  [70-90] 86  Resp:  [16-20] 20  SpO2:  [95 %-99 %] 98 %  BP: (110-128)/(57-67) 119/66     Weight: 112.1 kg (247 lb 2.2 oz)  Body mass index is 35.46 kg/m².    Intake/Output Summary (Last 24 hours) at 01/01/18 1732  Last data filed at 01/01/18 1500   Gross per 24 hour   Intake              540 ml   Output             1300 ml   Net             -760 ml      Physical Exam   Constitutional: He is oriented to person, place, and time. He appears well-developed and well-nourished. No distress.   HENT:   Head: Normocephalic and atraumatic.   Mouth/Throat: Oropharynx is clear and moist.   Eyes: Conjunctivae are normal. Pupils are equal, round, and reactive to light.   Neck:  Normal range of motion. Neck supple. JVD present.   Cardiovascular: Normal rate and intact distal pulses.  An irregularly irregular rhythm present.  No extrasystoles are present. Exam reveals no gallop.    No murmur heard.  Normal S1, variable S2.   Pulmonary/Chest: Effort normal and breath sounds normal. No accessory muscle usage. Tachypnea noted. No respiratory distress. He has no decreased breath sounds. He has no wheezes. He has no rhonchi. He has no rales.   Abdominal: Soft. Bowel sounds are normal. He exhibits no distension. There is no tenderness. There is no CVA tenderness.   Musculoskeletal: Normal range of motion. He exhibits edema (2+ BLE). He exhibits no tenderness or deformity.   Neurological: He is alert and oriented to person, place, and time. No cranial nerve deficit or sensory deficit.   Skin: Skin is warm, dry and intact. Capillary refill takes 2 to 3 seconds. He is not diaphoretic. No erythema.   Psychiatric: He has a normal mood and affect. His speech is normal and behavior is normal. Cognition and memory are normal.   Nursing note and vitals reviewed.      Significant Labs:   Recent Lab Results       01/01/18  1558 01/01/18  1212 01/01/18  1200 01/01/18  0940 01/01/18  0934      Anion Gap   17(H)       aPTT     89.9  Comment:  aPTT therapeutic range = 39-69 seconds(H)     Baso #          Basophil%          BNP    150  Comment:  Values of less than 100 pg/ml are consistent with non-CHF populations.(H)      BUN, Bld   93(H)       Calcium   10.0       Chloride   77(L)       CO2   29       Creatinine   1.9(H)       Differential Method          eGFR if    40(A)       eGFR if non    34  Comment:  Calculation used to obtain the estimated glomerular filtration  rate (eGFR) is the CKD-EPI equation.   (A)       Eos #          Eosinophil%          Glucose   260(H)       Gran #          Gran%          Hematocrit          Hemoglobin          Coumadin Monitoring INR  4.3  Comment:  Coumadin Therapy:  2.0 - 3.0 for INR for all indicators except mechanical heart valves  and antiphospholipid syndromes which should use 2.5 - 3.5.  (H)    5.0  Comment:  Coumadin Therapy:  2.0 - 3.0 for INR for all indicators except mechanical heart valves  and antiphospholipid syndromes which should use 2.5 - 3.5.  INR   critical result(s) called and verbal readback obtained from   Cecille Palm RN, 01/01/2018 13:46  (HH)          5.0  Comment:  Coumadin Therapy:  2.0 - 3.0 for INR for all indicators except mechanical heart valves  and antiphospholipid syndromes which should use 2.5 - 3.5.  INR   critical result(s) called and verbal readback obtained from   Cecille Palm RN, 01/01/2018 13:46  (HH)     Lymph #          Lymph%          Magnesium          MCH          MCHC          MCV          Mono #          Mono%          MPV          Phosphorus          Platelets          POCT Glucose  173(H)        Potassium   4.2       Protime 42.0(H)    48.4(H)          48.4(H)     RBC          RDW          Sodium   123(L)       Troponin I          WBC                      01/01/18  0656 01/01/18  0411 12/31/17  2112 12/31/17  2002 12/31/17  1823      Anion Gap  17(H)  15      aPTT    @rna  Comment:  aPTT therapeutic range = 39-69 seconds  Notified nurse Salome Camarena.        Baso #  0.01        Basophil%  0.1        BNP          BUN, Bld  86(H)  89(H)      Calcium  10.5  9.9      Chloride  78(L)  78(L)      CO2  29  30(H)      Creatinine  1.8(H)  1.8(H)      Differential Method  Automated        eGFR if   43(A)  43(A)      eGFR if non   37  Comment:  Calculation used to obtain the estimated glomerular filtration  rate (eGFR) is the CKD-EPI equation.   (A)  37  Comment:  Calculation used to obtain the estimated glomerular filtration  rate (eGFR) is the CKD-EPI equation.   (A)      Eos #  0.0        Eosinophil%  0.1        Glucose  158(H)  230(H)      Gran #  13.4(H)        Gran%   79.9(H)        Hematocrit  38.8(L)        Hemoglobin  13.4(L)        Coumadin Monitoring INR  6.0  Comment:  Coumadin Therapy:  2.0 - 3.0 for INR for all indicators except mechanical heart valves  and antiphospholipid syndromes which should use 2.5 - 3.5.  INR critical result(s) called and verbal readback obtained from Salome Camarena RN, 01/01/2018 05:55  (HH)  9.4  Comment:  Coumadin Therapy:  2.0 - 3.0 for INR for all indicators except mechanical heart valves  and antiphospholipid syndromes which should use 2.5 - 3.5.  INR  critical result(s) called and verbal readback obtained from   Salome Camarena RN, 12/31/2017 21:32  (HH)      Lymph #  1.8        Lymph%  10.9(L)        Magnesium  1.8        MCH  31.3(H)        MCHC  34.5        MCV  91        Mono #  1.5(H)        Mono%  9.0        MPV  9.1(L)        Phosphorus  3.7        Platelets  372(H)        POCT Glucose 160(H)  193(H)  263(H)     Potassium  4.2  4.1      Protime  58.5(H)  90.0(H)      RBC  4.28(L)        RDW  14.8(H)        Sodium  124(L)  123(L)      Troponin I    0.267  Comment:  The reference interval for Troponin I represents the 99th percentile   cutoff   for our facility and is consistent with 3rd generation assay   performance.  (H)      WBC  16.82(H)                      All pertinent labs within the past 24 hours have been reviewed.    Significant Imaging: I have reviewed all pertinent imaging results/findings within the past 24 hours.    Assessment/Plan:      * Chronic hyponatremia    - Secondary to HF with worsening cardiac status.  - Baseline Na 120-125.  - Patient with new onset fatigue and generalized weakness.  - Prior treatment with diuretics and metolazone.    - Discussed case with Nephrology in ED.  - Will start tolvaptan therapy.  - Low salt diet, fluid restriction.  - Nephrology following        Chronic kidney disease (CKD), stage III (moderate)    --nephrology following  --fluid restrictions  --avoid nephrotoxic agents           Elevated troponin    - Likely due to demand.  - Chronically elevated troponin (baseline 0.1)  - Initial troponin 0.3; EKG unrevealing.  - No chest pain.  - Trend serial cardiac enzymes, EKG.  - Recent TTE 12/23/17 showed CLVH, EF 60-65%, pulmonary HTN (PAP 46mmHg), stable prosthetic aortic valve.  - Continue medical management with ASA, BB, and high-intensity statin.  - Cardiology consult in AM.        Right lower lobe pneumonia    - Supplemental O2 as needed.  - Duonebs.  - Mucinex.  - Sputum culture.  - Empiric abx with IV Rocephin and azithromycin.        Acute renal failure superimposed on stage 3 chronic kidney disease    - Initial creatinine 2 (baseline cr. 1.5-1.7).  --nephrology following  --cardiology following  - Strict I&O's.        Chronic diastolic congestive heart failure, NYHA class 3    - Clinically compensated, ? mild volume depletion.  Will defer to Cardiology.  - Continue home Bumex, conor, and metolazone.   - Strict I&O's, daily weights, Na/fluid restriction.  - Followed by Dr. Chew ([facility]).  --cardiology consulted  --continue bumex, KCL, spironolactone          Aortic valve stenosis with remote bioprosthetic AVR    - Stable.  - Followed by Dr. Chew ([facility]).        Type 2 diabetes mellitus    - Recent HbA1c 6.9 on 12/20.  - Accuchecks with SSI.  - Diabetic diet.        Chronic atrial fibrillation    - Rate controlled.  - Continue home beta blocker.  - Continue Coumadin for AC, pharmacy to dose.  - Daily INR.          VTE Risk Mitigation         Ordered     warfarin (COUMADIN) tablet 1 mg  Every Saturday     Route:  Oral        12/31/17 1154     Medium Risk of VTE  Once      12/31/17 1436     Place sequential compression device  Until discontinued      12/31/17 1436              CELIA Correia  Department of Hospital Medicine   [facility]

## 2018-01-01 NOTE — PLAN OF CARE
Problem: Patient Care Overview  Goal: Plan of Care Review  Outcome: Ongoing (interventions implemented as appropriate)  Patient remains free from falls and all safety precautions are still maintained. Pain controlled. Blood glucose monitoring. Bed locked/lowered. Afebrile. Skin is clean dry and intact. No s/s of acute distress. Will continue to monitor. 12 hour chart check.

## 2018-01-02 LAB
ANION GAP SERPL CALC-SCNC: 15 MMOL/L
APTT BLDCRRT: 45.5 SEC
APTT BLDCRRT: 52 SEC
BASOPHILS # BLD AUTO: 0.02 K/UL
BASOPHILS NFR BLD: 0.1 %
BUN SERPL-MCNC: 91 MG/DL
CALCIUM SERPL-MCNC: 10.4 MG/DL
CHLORIDE SERPL-SCNC: 81 MMOL/L
CO2 SERPL-SCNC: 28 MMOL/L
CREAT SERPL-MCNC: 1.9 MG/DL
DIFFERENTIAL METHOD: ABNORMAL
EOSINOPHIL # BLD AUTO: 0 K/UL
EOSINOPHIL NFR BLD: 0.1 %
ERYTHROCYTE [DISTWIDTH] IN BLOOD BY AUTOMATED COUNT: 15.1 %
EST. GFR  (AFRICAN AMERICAN): 40 ML/MIN/1.73 M^2
EST. GFR  (NON AFRICAN AMERICAN): 34 ML/MIN/1.73 M^2
GLUCOSE SERPL-MCNC: 146 MG/DL
HCT VFR BLD AUTO: 39 %
HGB BLD-MCNC: 13.5 G/DL
INR PPP: 2.3
INR PPP: 2.5
INR PPP: 2.8
INR PPP: 2.8
INR PPP: 3.7
LYMPHOCYTES # BLD AUTO: 2.3 K/UL
LYMPHOCYTES NFR BLD: 14.5 %
MCH RBC QN AUTO: 30.9 PG
MCHC RBC AUTO-ENTMCNC: 34.6 G/DL
MCV RBC AUTO: 89 FL
MONOCYTES # BLD AUTO: 1.9 K/UL
MONOCYTES NFR BLD: 11.9 %
NEUTROPHILS # BLD AUTO: 11.7 K/UL
NEUTROPHILS NFR BLD: 73.4 %
PLATELET # BLD AUTO: 336 K/UL
PMV BLD AUTO: 9.3 FL
POCT GLUCOSE: 130 MG/DL (ref 70–110)
POCT GLUCOSE: 140 MG/DL (ref 70–110)
POCT GLUCOSE: 187 MG/DL (ref 70–110)
POCT GLUCOSE: 193 MG/DL (ref 70–110)
POTASSIUM SERPL-SCNC: 4.4 MMOL/L
PROTHROMBIN TIME: 23.5 SEC
PROTHROMBIN TIME: 25.2 SEC
PROTHROMBIN TIME: 27.7 SEC
PROTHROMBIN TIME: 27.7 SEC
PROTHROMBIN TIME: 36.8 SEC
RBC # BLD AUTO: 4.37 M/UL
SODIUM SERPL-SCNC: 124 MMOL/L
WBC # BLD AUTO: 15.9 K/UL

## 2018-01-02 PROCEDURE — 25000242 PHARM REV CODE 250 ALT 637 W/ HCPCS: Performed by: NURSE PRACTITIONER

## 2018-01-02 PROCEDURE — 85730 THROMBOPLASTIN TIME PARTIAL: CPT | Mod: 91

## 2018-01-02 PROCEDURE — 85610 PROTHROMBIN TIME: CPT

## 2018-01-02 PROCEDURE — 25000003 PHARM REV CODE 250: Performed by: INTERNAL MEDICINE

## 2018-01-02 PROCEDURE — 27000221 HC OXYGEN, UP TO 24 HOURS

## 2018-01-02 PROCEDURE — 99233 SBSQ HOSP IP/OBS HIGH 50: CPT | Mod: ,,, | Performed by: INTERNAL MEDICINE

## 2018-01-02 PROCEDURE — 94640 AIRWAY INHALATION TREATMENT: CPT

## 2018-01-02 PROCEDURE — 99900035 HC TECH TIME PER 15 MIN (STAT)

## 2018-01-02 PROCEDURE — 87040 BLOOD CULTURE FOR BACTERIA: CPT

## 2018-01-02 PROCEDURE — 63600175 PHARM REV CODE 636 W HCPCS: Performed by: INTERNAL MEDICINE

## 2018-01-02 PROCEDURE — 25000242 PHARM REV CODE 250 ALT 637 W/ HCPCS: Performed by: FAMILY MEDICINE

## 2018-01-02 PROCEDURE — 21400001 HC TELEMETRY ROOM

## 2018-01-02 PROCEDURE — 85025 COMPLETE CBC W/AUTO DIFF WBC: CPT

## 2018-01-02 PROCEDURE — 36415 COLL VENOUS BLD VENIPUNCTURE: CPT

## 2018-01-02 PROCEDURE — 80048 BASIC METABOLIC PNL TOTAL CA: CPT

## 2018-01-02 PROCEDURE — 85610 PROTHROMBIN TIME: CPT | Mod: 91

## 2018-01-02 PROCEDURE — 25000003 PHARM REV CODE 250: Performed by: NURSE PRACTITIONER

## 2018-01-02 PROCEDURE — 94799 UNLISTED PULMONARY SVC/PX: CPT

## 2018-01-02 PROCEDURE — 63600175 PHARM REV CODE 636 W HCPCS: Performed by: FAMILY MEDICINE

## 2018-01-02 PROCEDURE — 25000003 PHARM REV CODE 250: Performed by: FAMILY MEDICINE

## 2018-01-02 RX ORDER — LINEZOLID 600 MG/1
600 TABLET, FILM COATED ORAL EVERY 12 HOURS
Status: DISCONTINUED | OUTPATIENT
Start: 2018-01-02 | End: 2018-01-05

## 2018-01-02 RX ORDER — METOPROLOL TARTRATE 25 MG/1
25 TABLET, FILM COATED ORAL 2 TIMES DAILY
Status: DISCONTINUED | OUTPATIENT
Start: 2018-01-02 | End: 2018-01-08 | Stop reason: HOSPADM

## 2018-01-02 RX ADMIN — BUDESONIDE 0.5 MG: 0.5 SUSPENSION RESPIRATORY (INHALATION) at 07:01

## 2018-01-02 RX ADMIN — SPIRONOLACTONE 25 MG: 25 TABLET ORAL at 08:01

## 2018-01-02 RX ADMIN — BUMETANIDE 1 MG: 1 TABLET ORAL at 08:01

## 2018-01-02 RX ADMIN — ARFORMOTEROL TARTRATE 15 MCG: 15 SOLUTION RESPIRATORY (INHALATION) at 07:01

## 2018-01-02 RX ADMIN — PIPERACILLIN SODIUM AND TAZOBACTAM SODIUM 2.25 G: 2; .25 INJECTION, POWDER, FOR SOLUTION INTRAVENOUS at 10:01

## 2018-01-02 RX ADMIN — ROSUVASTATIN CALCIUM 10 MG: 10 TABLET, FILM COATED ORAL at 08:01

## 2018-01-02 RX ADMIN — POTASSIUM CHLORIDE 20 MEQ: 1500 TABLET, EXTENDED RELEASE ORAL at 08:01

## 2018-01-02 RX ADMIN — PIPERACILLIN SODIUM AND TAZOBACTAM SODIUM 4.5 G: 4; .5 INJECTION, POWDER, LYOPHILIZED, FOR SOLUTION INTRAVENOUS at 09:01

## 2018-01-02 RX ADMIN — TOLVAPTAN 15 MG: 15 TABLET ORAL at 08:01

## 2018-01-02 RX ADMIN — FERROUS SULFATE TAB EC 325 MG (65 MG FE EQUIVALENT) 325 MG: 325 (65 FE) TABLET DELAYED RESPONSE at 08:01

## 2018-01-02 RX ADMIN — GUAIFENESIN 600 MG: 600 TABLET, EXTENDED RELEASE ORAL at 08:01

## 2018-01-02 RX ADMIN — IPRATROPIUM BROMIDE AND ALBUTEROL SULFATE 3 ML: .5; 3 SOLUTION RESPIRATORY (INHALATION) at 02:01

## 2018-01-02 RX ADMIN — BUDESONIDE 0.5 MG: 0.5 SUSPENSION RESPIRATORY (INHALATION) at 08:01

## 2018-01-02 RX ADMIN — ASPIRIN 81 MG: 81 TABLET, COATED ORAL at 08:01

## 2018-01-02 RX ADMIN — PIPERACILLIN SODIUM AND TAZOBACTAM SODIUM 2.25 G: 2; .25 INJECTION, POWDER, FOR SOLUTION INTRAVENOUS at 04:01

## 2018-01-02 RX ADMIN — ARFORMOTEROL TARTRATE 15 MCG: 15 SOLUTION RESPIRATORY (INHALATION) at 08:01

## 2018-01-02 RX ADMIN — IPRATROPIUM BROMIDE AND ALBUTEROL SULFATE 3 ML: .5; 3 SOLUTION RESPIRATORY (INHALATION) at 07:01

## 2018-01-02 RX ADMIN — CIPROFLOXACIN 400 MG: 2 INJECTION, SOLUTION INTRAVENOUS at 08:01

## 2018-01-02 RX ADMIN — PANTOPRAZOLE SODIUM 40 MG: 40 TABLET, DELAYED RELEASE ORAL at 08:01

## 2018-01-02 RX ADMIN — LINEZOLID 600 MG: 600 TABLET, FILM COATED ORAL at 08:01

## 2018-01-02 RX ADMIN — METOPROLOL TARTRATE 25 MG: 25 TABLET ORAL at 08:01

## 2018-01-02 RX ADMIN — IPRATROPIUM BROMIDE AND ALBUTEROL SULFATE 3 ML: .5; 3 SOLUTION RESPIRATORY (INHALATION) at 08:01

## 2018-01-02 RX ADMIN — METOPROLOL TARTRATE 50 MG: 50 TABLET ORAL at 08:01

## 2018-01-02 NOTE — PROGRESS NOTES
[facility]   Nephrology  Progress Note    Patient Name: Beni Cosby  MRN: [id number]  Admission Date: 12/30/2017  Hospital Length of Stay: 3 days  Attending Provider: Dane Ruby MD   Primary Care Physician: Jackson Brandt MD  Principal Problem:Hyponatremia    Subjective:     HPI: Pt was seen and examined. H/o was reviewed. Pt is a 73 y/o male with a pertinent h/o of CKD stage 3 (Cr 1.9 at baseline), diastolic CHF, and DM who presented with ;eg weakness and swqlling. Pt was noted to have s Na was 124, and renal service was consulted for hyponatremia. Pt feels slightly SOB, and has leg swelling. His wife states with certainty that he is on a 1 L fluid restriction at home. Pt takes bumex 2 mg ama nd 1 mg pm at home. He also takes metolazone 2.5 mg qd (2.5 mg), which is a thiazide diuretic. He states his diet is not salty. TSH was normal. No h/o of liver disease but alb is 2.9. Pt denies alcohol use, but noted that AST is slightly higher than ALT. He feels tired and sleepy.    Interval History:  Pt was seen and examined. Stable last pm. No new c/o's. Reviewed cardiology note. Noted pt received NS overnight. No change in s Na.    Review of patient's allergies indicates:   Allergen Reactions    Morphine      Other reaction(s): Nausea Only     Current Facility-Administered Medications   Medication Frequency    acetaminophen tablet 650 mg Q6H PRN    albuterol-ipratropium 2.5mg-0.5mg/3mL nebulizer solution 3 mL Q4H PRN    albuterol-ipratropium 2.5mg-0.5mg/3mL nebulizer solution 3 mL Q6H WAKE    arformoterol nebulizer solution 15 mcg BID    And    budesonide nebulizer solution 0.5 mg BID    aspirin EC tablet 81 mg Daily    bumetanide tablet 1 mg BID    ciprofloxacin (CIPRO)400mg/200ml D5W IVPB 400 mg Q12H    dextrose 50% injection 12.5 g PRN    dextrose 50% injection 25 g PRN    ferrous sulfate EC tablet 325 mg BID    glucagon (human recombinant) injection 1 mg PRN    glucose chewable  tablet 16 g PRN    glucose chewable tablet 24 g PRN    guaiFENesin 12 hr tablet 600 mg BID    insulin aspart pen 0-5 Units QID (AC + HS) PRN    metoprolol tartrate (LOPRESSOR) tablet 25 mg BID    ondansetron injection 4 mg Q6H PRN    pantoprazole EC tablet 40 mg Daily    piperacillin-tazobactam 4.5 g in sodium chloride 0.9% 100 mL IVPB (ready to mix system) Q12H    polyethylene glycol packet 17 g BID PRN    potassium chloride SA CR tablet 20 mEq BID    rosuvastatin tablet 10 mg QHS    spironolactone tablet 25 mg BID    [START ON 1/3/2018] vancomycin (VANCOCIN) 1,500 mg in sodium chloride 0.9% 250 mL IVPB Q48H    [START ON 1/6/2018] warfarin (COUMADIN) tablet 1 mg Every Sat       Objective:     Vital Signs (Most Recent):  Temp: 97.4 °F (36.3 °C) (01/02/18 1152)  Pulse: 74 (01/02/18 1152)  Resp: 20 (01/02/18 1152)  BP: (!) 102/51 (01/02/18 1152)  SpO2: 99 % (01/02/18 1152)  O2 Device (Oxygen Therapy): nasal cannula (01/02/18 0759) Vital Signs (24h Range):  Temp:  [97.4 °F (36.3 °C)-98.4 °F (36.9 °C)] 97.4 °F (36.3 °C)  Pulse:  [74-92] 74  Resp:  [16-20] 20  SpO2:  [94 %-99 %] 99 %  BP: (102-126)/(51-66) 102/51     Weight: 112 kg (246 lb 14.6 oz) (01/02/18 0509)  Body mass index is 35.43 kg/m².  Body surface area is 2.35 meters squared.    I/O last 3 completed shifts:  In: 1358.8 [P.O.:740; I.V.:268.8; IV Piggyback:350]  Out: 1875 [Urine:1875]    Physical Exam   Constitutional: He is oriented to person, place, and time. He appears well-developed and well-nourished.   HENT:   Head: Normocephalic and atraumatic.   Neck: No JVD present.   Cardiovascular: Normal rate, regular rhythm and normal heart sounds.  Exam reveals no friction rub.    Pulmonary/Chest: Effort normal. He has rales.   Abdominal: Soft. There is no tenderness.   Musculoskeletal: He exhibits edema.   Neurological: He is alert and oriented to person, place, and time.   Skin: Skin is warm and dry.   Psychiatric: He has a normal mood and affect.  His behavior is normal.   Nursing note and vitals reviewed.      Significant Labs: reviewed  BMP  Lab Results   Component Value Date     (L) 01/02/2018    K 4.4 01/02/2018    CL 81 (L) 01/02/2018    CO2 28 01/02/2018    BUN 91 (H) 01/02/2018    CREATININE 1.9 (H) 01/02/2018    CALCIUM 10.4 01/02/2018    ANIONGAP 15 01/02/2018    ESTGFRAFRICA 40 (A) 01/02/2018    EGFRNONAA 34 (A) 01/02/2018     Lab Results   Component Value Date    WBC 15.90 (H) 01/02/2018    HGB 13.5 (L) 01/02/2018    HCT 39.0 (L) 01/02/2018    MCV 89 01/02/2018     01/02/2018     U Na 34  U osm 367    Significant Imaging: reviewed CXR    Assessment/Plan:     * Chronic hyponatremia    71 y/o male with hyponatremia:           * Chronic hyponatremia     Renal: Hyponatremia: is chronic  The cause is dilutional hyponatremia from CHF + 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)  Worsened chronic hyponatremia  No change, stable, has not improved, nor worsned  Giving NS did not make a difference for the s Na  Giving NS in SIADH will cause desalination, and s Na actually can worsen  Giving NS in CHF may cause further dilution of s Na    Agree that pt has some intravascular fluid depletion (afterall, he is on diuretics)  However, he has 3rd spacing (leg swelling) and cephalization on CXR    No indications to correct rapidly or treat aggressively  Do not treat aggressively  Slow correction is recommended     To review:  Dilutional hyponatremia due to CHF, thiazide diuretic (metolazone), and CKD  In addition, 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  Has received tolvaptan     Recommendations for treatment:  D/c NS IVF's  D/c tolvaptan  Reviewed salt (2-3 g/day) and fluid (1-1.5 L/day) with the pt and wife again  Decrease bumex from 2 to 1 mg po bid  No metolazone  Decrease bumex  from 2 to 1 mg po bid (doenalready yesterday)          Chronic diastolic congestive heart failure, NYHA class 3     Cardiac: diastolic CHF  Mgmt as above with salt and fluid restriction and use of loop diuretics  Noted on aldactone      Pneumonia: On cipro and zosyn.  Adjust zosyn dose for the renal function                 George Inman MD  Nephrology  [facility]

## 2018-01-02 NOTE — ASSESSMENT & PLAN NOTE
Has contraction alkalosis with hyponatremia hupochloremia secondary to metolazone. He whas poor skin turgor with intravascular contraction. Needing ivf ns.  He also due to his pneumonia has a form of siadh. He will need stopping diuretics to equilibrate in addition to gentle fluids an the use tolvaptan in addition to treating the pneumonia with antibiotics

## 2018-01-02 NOTE — CONSULTS
[facility] -   Cardiology  Consult Note    Patient Name: Beni Cosby  MRN: [id number]  Admission Date: 12/30/2017  Hospital Length of Stay: 2 days  Code Status: Full Code   Attending Provider: Sherron Ruby MD   Consulting Provider: Surendra Smallwood MD  Primary Care Physician: Jackson Brandt MD  Principal Problem:Hyponatremia    Patient information was obtained from patient, spouse/SO and ER records.     Inpatient consult to Cardiology  Consult performed by: TIFFANY SMALLWOOD  Consult ordered by: SHERRON RUBY        Subjective:     Chief Complaint:  fatigue     HPI:   Mr. Cosby is a 73yo male  with a PMHx of chronic diastolic HFpEF , CKD stage III, chronic hyponatremia, COPD, DM II, chronic AF on Coumadin, and AS with remote bioprosthetic AVR, who presented to the ED with c/o fatigue that has progressively worsened over the past 1 day.  Associated generalized weakness.  Denies any fever, chills, CP, SOB, worsening cough, worsening edema, ABD pain, N/V/D, dysuria, back pain, HA, AMS, visual disturbances, seizure-like activity, lightheadedness/dizziness syncope, or numbness/tingling.  No aggravating to alleviating factors.  He was admitted to [facility] 12/19 for hyponatremia with Na 119.  He received 0.9 NS IVF's, and metolazone added per Nephrology.  Initial work-up in ED resulted Na 123, cr. 2, BUN 85, WBC 16.4, , troponin 0.3.  CXR showed RLL infiltrate consistent with PNA.  EKG revealed A-Fib with controlled rate, no acute changes from previous tracings.  Case discussed with Nephrology in ED, who recommend admission for initiation of tolvaptan therapy.   he has no evidence of acute decompensation clinically by cxr or labs . He has contraction alkalosis by lab and urine spot sodium studies are suggestive of an element of siadh.his contraction alkalosis are due to his use of metolazone for chf     Past Medical History:   Diagnosis Date    Anticoagulant long-term use     Aortic valve  stenosis with insufficiency 4/2008    Surgery [facility]    Arthritis     CHF (congestive heart failure)     Chronic a-fib     CKD (chronic kidney disease) stage 3, GFR 30-59 ml/min     COPD (chronic obstructive pulmonary disease)     Diabetes mellitus     Encounter for blood transfusion     Primary cancer of right kidney     Surgery, no further treatment       Past Surgical History:   Procedure Laterality Date    Atrial septem device implanted  2005    BONE MARROW BIOPSY      CARDIAC SURGERY  2008    Aotric valve replacement     KIDNEY SURGERY Right 2007    NOSE SURGERY  2008    RENAL BIOPSY  8/30/       Review of patient's allergies indicates:   Allergen Reactions    Morphine      Other reaction(s): Nausea Only       No current facility-administered medications on file prior to encounter.      Current Outpatient Prescriptions on File Prior to Encounter   Medication Sig    bumetanide (BUMEX) 2 MG tablet Take 2 mg by mouth 2 (two) times daily. Take 2mg every  Am and 1 mg every pm    ferrous sulfate 325 (65 FE) MG EC tablet Take 325 mg by mouth 2 (two) times daily.     glimepiride (AMARYL) 1 MG tablet Take 1 mg by mouth before breakfast.    ipratropium (ATROVENT) 0.02 % nebulizer solution Take 500 mcg by nebulization 4 (four) times daily. Rescue    lansoprazole (PREVACID) 30 MG capsule Take 30 mg by mouth 2 (two) times daily. TAKE 1 CAPSULE BY MOUTH ONCE DAILY    metolazone (ZAROXOLYN) 5 MG tablet once daily.     metoprolol succinate (TOPROL-XL) 100 MG 24 hr tablet Take 50 mg by mouth once daily. 1/2 tablet daily    mometasone (NASONEX) 50 mcg/actuation nasal spray 2 sprays by Nasal route daily as needed.     mometasone-formoterol (DULERA) 100-5 mcg/actuation HFAA Inhale into the lungs once daily. Controller     mometasone-formoterol (DULERA) 200-5 mcg/actuation inhaler Inhale 2 puffs into the lungs once daily. Controller     polyethylene glycol (GLYCOLAX) 17 gram PwPk Take by mouth as  needed.    potassium chloride SA (K-DUR,KLOR-CON) 20 MEQ tablet Take 20 mEq by mouth 2 (two) times daily. Take 2 tablets daily    rosuvastatin (CRESTOR) 10 MG tablet qhs    spironolactone (ALDACTONE) 25 MG tablet Take 50 mg by mouth 2 (two) times daily.     warfarin (COUMADIN) 5 MG tablet 5 mg. Sunday, Monday, Wednesday, Thursday, Friday    warfarin (COUMADIN) 7.5 MG tablet Tuesday and Saturday    blood sugar diagnostic (CONTOUR NEXT STRIPS) Strp Use once daily.     Family History     None        Social History Main Topics    Smoking status: Never Smoker    Smokeless tobacco: Never Used    Alcohol use No    Drug use: No    Sexual activity: Not Currently     Review of Systems   Constitution: Positive for weakness and malaise/fatigue.   Eyes: Negative for blurred vision.   Cardiovascular: Positive for leg swelling. Negative for chest pain, claudication, cyanosis, dyspnea on exertion, irregular heartbeat, near-syncope, orthopnea, palpitations and paroxysmal nocturnal dyspnea.   Respiratory: Negative for cough, hemoptysis and shortness of breath.    Hematologic/Lymphatic: Negative for bleeding problem. Does not bruise/bleed easily.   Skin: Negative for dry skin and itching.   Musculoskeletal: Negative for falls, muscle weakness and myalgias.   Gastrointestinal: Negative for abdominal pain, diarrhea, heartburn, hematemesis, hematochezia and melena.   Genitourinary: Negative for flank pain and hematuria.   Neurological: Negative for dizziness, focal weakness, headaches, light-headedness, numbness, paresthesias and seizures.   Psychiatric/Behavioral: Negative for altered mental status and memory loss. The patient is not nervous/anxious.    Allergic/Immunologic: Negative for hives.     Objective:     Vital Signs (Most Recent):  Temp: 97.6 °F (36.4 °C) (01/01/18 1954)  Pulse: 92 (01/01/18 2140)  Resp: 20 (01/01/18 2140)  BP: 126/60 (01/01/18 1954)  SpO2: 99 % (01/01/18 2137) Vital Signs (24h Range):  Temp:  [97.6  °F (36.4 °C)-98.4 °F (36.9 °C)] 97.6 °F (36.4 °C)  Pulse:  [70-92] 92  Resp:  [16-20] 20  SpO2:  [95 %-99 %] 99 %  BP: (113-128)/(57-66) 126/60     Weight: 112.1 kg (247 lb 2.2 oz)  Body mass index is 35.46 kg/m².    SpO2: 99 %  O2 Device (Oxygen Therapy): nasal cannula      Intake/Output Summary (Last 24 hours) at 01/01/18 2154  Last data filed at 01/01/18 2100   Gross per 24 hour   Intake              650 ml   Output             1600 ml   Net             -950 ml       Lines/Drains/Airways     Peripheral Intravenous Line                 Peripheral IV - Single Lumen Left Antecubital -- days         Peripheral IV - Single Lumen 12/30/17 Right Forearm 2 days                Physical Exam   Constitutional: He is oriented to person, place, and time. He appears well-developed and well-nourished. No distress.   He appears dehydrated w/o any skin turgor   HENT:   Head: Normocephalic and atraumatic.   Eyes: EOM are normal. Pupils are equal, round, and reactive to light. Right eye exhibits no discharge. Left eye exhibits no discharge.   Neck: Neck supple. No JVD present. No thyromegaly present.   Cardiovascular: Intact distal pulses.  An irregularly irregular rhythm present. Tachycardia present.  Exam reveals no gallop and no friction rub.    Murmur heard.   Harsh midsystolic murmur is present with a grade of 2/6  at the upper right sternal border radiating to the neck  High-pitched blowing decrescendo early diastolic murmur is present with a grade of 1/6  at the upper right sternal border radiating to the apex  Pulmonary/Chest: Effort normal. No respiratory distress. He has wheezes. He has no rales. He exhibits no tenderness.   Rhonchi diffuse  Scar cabg well healed.   Abdominal: Soft. Bowel sounds are normal. He exhibits no distension. There is no tenderness.   Musculoskeletal: Normal range of motion. He exhibits no edema.   Neurological: He is alert and oriented to person, place, and time. No cranial nerve deficit.   Skin:  Skin is warm and dry. No rash noted. He is not diaphoretic. No erythema.   He has chronic bromny enduration and chronic venous stasis changes    Psychiatric: He has a normal mood and affect. His behavior is normal.   Nursing note and vitals reviewed.      Significant Labs:   ABG: No results for input(s): PH, PCO2, HCO3, POCSATURATED, BE in the last 48 hours., BMP:   Recent Labs  Lab 12/31/17 2002 01/01/18  0411 01/01/18  1200   * 158* 260*   * 124* 123*   K 4.1 4.2 4.2   CL 78* 78* 77*   CO2 30* 29 29   BUN 89* 86* 93*   CREATININE 1.8* 1.8* 1.9*   CALCIUM 9.9 10.5 10.0   MG  --  1.8  --    , CMP   Recent Labs  Lab 12/31/17 2002 01/01/18  0411 01/01/18  1200   * 124* 123*   K 4.1 4.2 4.2   CL 78* 78* 77*   CO2 30* 29 29   * 158* 260*   BUN 89* 86* 93*   CREATININE 1.8* 1.8* 1.9*   CALCIUM 9.9 10.5 10.0   ANIONGAP 15 17* 17*   ESTGFRAFRICA 43* 43* 40*   EGFRNONAA 37* 37* 34*   , CBC   Recent Labs  Lab 12/31/17  0547 01/01/18 0411   WBC 17.17* 16.82*   HGB 12.5* 13.4*   HCT 34.8* 38.8*    372*   , INR   Recent Labs  Lab 01/01/18  0934 01/01/18  1558 01/01/18 2004   INR 5.0*  5.0* 4.3* 3.9*   , Lipid Panel No results for input(s): CHOL, HDL, LDLCALC, TRIG, CHOLHDL in the last 48 hours., Troponin   Recent Labs  Lab 12/31/17  0547 12/31/17  1222 12/31/17 2002   TROPONINI 0.319* 0.304* 0.267*    and   Recent Lab Results       01/01/18  2004 01/01/18  1743 01/01/18  1558 01/01/18  1212 01/01/18  1200      Anion Gap     17(H)     aPTT 68.1  Comment:  aPTT therapeutic range = 39-69 seconds(H)         Baso #          Basophil%          BNP          BUN, Bld     93(H)     Calcium     10.0     Chloride     77(L)     CO2     29     Creatinine     1.9(H)     Differential Method          eGFR if      40(A)     eGFR if non      34  Comment:  Calculation used to obtain the estimated glomerular filtration  rate (eGFR) is the CKD-EPI equation.   (A)     Eos #           Eosinophil%          Estimated Avg Glucose          Glucose     260(H)     Gran #          Gran%          Hematocrit          Hemoglobin          Hemoglobin A1C          Coumadin Monitoring INR 3.9  Comment:  Coumadin Therapy:  2.0 - 3.0 for INR for all indicators except mechanical heart valves  and antiphospholipid syndromes which should use 2.5 - 3.5.  (H)  4.3  Comment:  Coumadin Therapy:  2.0 - 3.0 for INR for all indicators except mechanical heart valves  and antiphospholipid syndromes which should use 2.5 - 3.5.  (H)       Lymph #          Lymph%          Magnesium          MCH          MCHC          MCV          Mono #          Mono%          MPV          Phosphorus          Platelets          POCT Glucose  162(H)  173(H)      Potassium     4.2     Protime 38.7(H)  42.0(H)       RBC          RDW          Sodium     123(L)     WBC                      01/01/18  0940 01/01/18  0934 01/01/18  0656 01/01/18  0411      Anion Gap    17(H)     aPTT  89.9  Comment:  aPTT therapeutic range = 39-69 seconds(H)       Baso #    0.01     Basophil%    0.1       Comment:  Values of less than 100 pg/ml are consistent with non-CHF populations.(H)        BUN, Bld    86(H)     Calcium    10.5     Chloride    78(L)     CO2    29     Creatinine    1.8(H)     Differential Method    Automated     eGFR if     43(A)     eGFR if non     37  Comment:  Calculation used to obtain the estimated glomerular filtration  rate (eGFR) is the CKD-EPI equation.   (A)     Eos #    0.0     Eosinophil%    0.1     Estimated Avg Glucose    148(H)     Glucose    158(H)     Gran #    13.4(H)     Gran%    79.9(H)     Hematocrit    38.8(L)     Hemoglobin    13.4(L)     Hemoglobin A1C    6.8  Comment:  According to ADA guidelines, hemoglobin A1c <7.0% represents  optimal control in non-pregnant diabetic patients. Different  metrics may apply to specific patient populations.   Standards of Medical Care in  Diabetes-2016.  For the purpose of screening for the presence of diabetes:  <5.7%     Consistent with the absence of diabetes  5.7-6.4%  Consistent with increasing risk for diabetes   (prediabetes)  >or=6.5%  Consistent with diabetes  Currently, no consensus exists for use of hemoglobin A1c  for diagnosis of diabetes for children.  This Hemoglobin A1c assay has significant interference with fetal   hemoglobin   (HbF). The results are invalid for patients with abnormal amounts of   HbF,   including those with known Hereditary Persistence   of Fetal Hemoglobin. Heterozygous hemoglobin variants (HbAS, HbAC,   HbAD, HbAE, HbA2) do not significantly interfere with this assay;   however, presence of multiple variants in a sample may impact the %   interference.  (H)     Coumadin Monitoring INR  5.0  Comment:  Coumadin Therapy:  2.0 - 3.0 for INR for all indicators except mechanical heart valves  and antiphospholipid syndromes which should use 2.5 - 3.5.  INR   critical result(s) called and verbal readback obtained from   Cecille Palm RN, 01/01/2018 13:46  (HH)  6.0  Comment:  Coumadin Therapy:  2.0 - 3.0 for INR for all indicators except mechanical heart valves  and antiphospholipid syndromes which should use 2.5 - 3.5.  INR critical result(s) called and verbal readback obtained from Salome Camarena RN, 01/01/2018 05:55  (HH)       5.0  Comment:  Coumadin Therapy:  2.0 - 3.0 for INR for all indicators except mechanical heart valves  and antiphospholipid syndromes which should use 2.5 - 3.5.  INR   critical result(s) called and verbal readback obtained from   Cecille Palm RN, 01/01/2018 13:46  (HH)       Lymph #    1.8     Lymph%    10.9(L)     Magnesium    1.8     MCH    31.3(H)     MCHC    34.5     MCV    91     Mono #    1.5(H)     Mono%    9.0     MPV    9.1(L)     Phosphorus    3.7     Platelets    372(H)     POCT Glucose   160(H)      Potassium    4.2     Protime  48.4(H)  58.5(H)       48.4(H)       RBC     4.28(L)     RDW    14.8(H)     Sodium    124(L)     WBC    16.82(H)           Significant Imaging: reviewed cxr     Assessment and Plan:     * Chronic hyponatremia    Has contraction alkalosis with hyponatremia hupochloremia secondary to metolazone. He whas poor skin turgor with intravascular contraction. Needing ivf ns.  He also due to his pneumonia has a form of siadh. He will need stopping diuretics to equilibrate in addition to gentle fluids an the use tolvaptan in addition to treating the pneumonia with antibiotics        Elevated troponin    Due to renal insufficency and demand ischjemia from tachycardia and pneumonia        Acute renal failure superimposed on stage 3 chronic kidney disease    From volume contraction needing gentle saline        Chronic diastolic congestive heart failure, NYHA class 3    He is on the dehydrated side from use of zaroxolyn.will hold.will continue b blockers         Aortic valve stenosis with remote bioprosthetic AVR    With as/ai stable        Chronic atrial fibrillation    On therapy with coumadin and b blockers.            VTE Risk Mitigation         Ordered     warfarin (COUMADIN) tablet 1 mg  Every Saturday     Route:  Oral        12/31/17 1154     Medium Risk of VTE  Once      12/31/17 1436     Place sequential compression device  Until discontinued      12/31/17 1436          Thank you for your consult. I will follow-up with patient. Please contact us if you have any additional questions.    Surendra Smallwood MD  Cardiology   [facility] -

## 2018-01-02 NOTE — PLAN OF CARE
Problem: Patient Care Overview  Goal: Plan of Care Review  Outcome: Outcome(s) achieved Date Met: 01/02/18  Pt lung sounds are coarse RUL and LOVE. Pt has an occasional productive cough. Thick yellow sputum. Pt is on 1000 ML fluids restriction. Blood glucose is being monitored. IV ABX given per order. No injuries. Will continue to monitor. 12 hour chart check is completed.

## 2018-01-02 NOTE — SUBJECTIVE & OBJECTIVE
Past Medical History:   Diagnosis Date    Anticoagulant long-term use     Aortic valve stenosis with insufficiency 4/2008    [facility]    Arthritis     CHF (congestive heart failure)     Chronic a-fib     CKD (chronic kidney disease) stage 3, GFR 30-59 ml/min     COPD (chronic obstructive pulmonary disease)     Diabetes mellitus     Encounter for blood transfusion     Primary cancer of right kidney     Surgery, no further treatment       Past Surgical History:   Procedure Laterality Date    Atrial septem device implanted  2005    BONE MARROW BIOPSY      CARDIAC SURGERY  2008    Aotric valve replacement     KIDNEY SURGERY Right 2007    NOSE SURGERY  2008    RENAL BIOPSY  8/30/       Review of patient's allergies indicates:   Allergen Reactions    Morphine      Other reaction(s): Nausea Only       No current facility-administered medications on file prior to encounter.      Current Outpatient Prescriptions on File Prior to Encounter   Medication Sig    bumetanide (BUMEX) 2 MG tablet Take 2 mg by mouth 2 (two) times daily. Take 2mg every  Am and 1 mg every pm    ferrous sulfate 325 (65 FE) MG EC tablet Take 325 mg by mouth 2 (two) times daily.     glimepiride (AMARYL) 1 MG tablet Take 1 mg by mouth before breakfast.    ipratropium (ATROVENT) 0.02 % nebulizer solution Take 500 mcg by nebulization 4 (four) times daily. Rescue    lansoprazole (PREVACID) 30 MG capsule Take 30 mg by mouth 2 (two) times daily. TAKE 1 CAPSULE BY MOUTH ONCE DAILY    metolazone (ZAROXOLYN) 5 MG tablet once daily.     metoprolol succinate (TOPROL-XL) 100 MG 24 hr tablet Take 50 mg by mouth once daily. 1/2 tablet daily    mometasone (NASONEX) 50 mcg/actuation nasal spray 2 sprays by Nasal route daily as needed.     mometasone-formoterol (DULERA) 100-5 mcg/actuation HFAA Inhale into the lungs once daily. Controller     mometasone-formoterol (DULERA) 200-5 mcg/actuation inhaler Inhale 2 puffs into the lungs once  daily. Controller     polyethylene glycol (GLYCOLAX) 17 gram PwPk Take by mouth as needed.    potassium chloride SA (K-DUR,KLOR-CON) 20 MEQ tablet Take 20 mEq by mouth 2 (two) times daily. Take 2 tablets daily    rosuvastatin (CRESTOR) 10 MG tablet qhs    spironolactone (ALDACTONE) 25 MG tablet Take 50 mg by mouth 2 (two) times daily.     warfarin (COUMADIN) 5 MG tablet 5 mg. Sunday, Monday, Wednesday, Thursday, Friday    warfarin (COUMADIN) 7.5 MG tablet Tuesday and Saturday    blood sugar diagnostic (CONTOUR NEXT STRIPS) Strp Use once daily.     Family History     None        Social History Main Topics    Smoking status: Never Smoker    Smokeless tobacco: Never Used    Alcohol use No    Drug use: No    Sexual activity: Not Currently     Review of Systems   Constitution: Positive for weakness and malaise/fatigue.   Eyes: Negative for blurred vision.   Cardiovascular: Positive for leg swelling. Negative for chest pain, claudication, cyanosis, dyspnea on exertion, irregular heartbeat, near-syncope, orthopnea, palpitations and paroxysmal nocturnal dyspnea.   Respiratory: Negative for cough, hemoptysis and shortness of breath.    Hematologic/Lymphatic: Negative for bleeding problem. Does not bruise/bleed easily.   Skin: Negative for dry skin and itching.   Musculoskeletal: Negative for falls, muscle weakness and myalgias.   Gastrointestinal: Negative for abdominal pain, diarrhea, heartburn, hematemesis, hematochezia and melena.   Genitourinary: Negative for flank pain and hematuria.   Neurological: Negative for dizziness, focal weakness, headaches, light-headedness, numbness, paresthesias and seizures.   Psychiatric/Behavioral: Negative for altered mental status and memory loss. The patient is not nervous/anxious.    Allergic/Immunologic: Negative for hives.     Objective:     Vital Signs (Most Recent):  Temp: 97.6 °F (36.4 °C) (01/01/18 1954)  Pulse: 92 (01/01/18 2140)  Resp: 20 (01/01/18 2140)  BP: 126/60  (01/01/18 1954)  SpO2: 99 % (01/01/18 2137) Vital Signs (24h Range):  Temp:  [97.6 °F (36.4 °C)-98.4 °F (36.9 °C)] 97.6 °F (36.4 °C)  Pulse:  [70-92] 92  Resp:  [16-20] 20  SpO2:  [95 %-99 %] 99 %  BP: (113-128)/(57-66) 126/60     Weight: 112.1 kg (247 lb 2.2 oz)  Body mass index is 35.46 kg/m².    SpO2: 99 %  O2 Device (Oxygen Therapy): nasal cannula      Intake/Output Summary (Last 24 hours) at 01/01/18 2154  Last data filed at 01/01/18 2100   Gross per 24 hour   Intake              650 ml   Output             1600 ml   Net             -950 ml       Lines/Drains/Airways     Peripheral Intravenous Line                 Peripheral IV - Single Lumen Left Antecubital -- days         Peripheral IV - Single Lumen 12/30/17 Right Forearm 2 days                Physical Exam   Constitutional: He is oriented to person, place, and time. He appears well-developed and well-nourished. No distress.   He appears dehydrated w/o any skin turgor   HENT:   Head: Normocephalic and atraumatic.   Eyes: EOM are normal. Pupils are equal, round, and reactive to light. Right eye exhibits no discharge. Left eye exhibits no discharge.   Neck: Neck supple. No JVD present. No thyromegaly present.   Cardiovascular: Intact distal pulses.  An irregularly irregular rhythm present. Tachycardia present.  Exam reveals no gallop and no friction rub.    Murmur heard.   Harsh midsystolic murmur is present with a grade of 2/6  at the upper right sternal border radiating to the neck  High-pitched blowing decrescendo early diastolic murmur is present with a grade of 1/6  at the upper right sternal border radiating to the apex  Pulmonary/Chest: Effort normal. No respiratory distress. He has wheezes. He has no rales. He exhibits no tenderness.   Rhonchi diffuse  Scar cabg well healed.   Abdominal: Soft. Bowel sounds are normal. He exhibits no distension. There is no tenderness.   Musculoskeletal: Normal range of motion. He exhibits no edema.   Neurological: He  is alert and oriented to person, place, and time. No cranial nerve deficit.   Skin: Skin is warm and dry. No rash noted. He is not diaphoretic. No erythema.   He has chronic bromny enduration and chronic venous stasis changes    Psychiatric: He has a normal mood and affect. His behavior is normal.   Nursing note and vitals reviewed.      Significant Labs:   ABG: No results for input(s): PH, PCO2, HCO3, POCSATURATED, BE in the last 48 hours., BMP:   Recent Labs  Lab 12/31/17 2002 01/01/18  0411 01/01/18  1200   * 158* 260*   * 124* 123*   K 4.1 4.2 4.2   CL 78* 78* 77*   CO2 30* 29 29   BUN 89* 86* 93*   CREATININE 1.8* 1.8* 1.9*   CALCIUM 9.9 10.5 10.0   MG  --  1.8  --    , CMP   Recent Labs  Lab 12/31/17 2002 01/01/18  0411 01/01/18  1200   * 124* 123*   K 4.1 4.2 4.2   CL 78* 78* 77*   CO2 30* 29 29   * 158* 260*   BUN 89* 86* 93*   CREATININE 1.8* 1.8* 1.9*   CALCIUM 9.9 10.5 10.0   ANIONGAP 15 17* 17*   ESTGFRAFRICA 43* 43* 40*   EGFRNONAA 37* 37* 34*   , CBC   Recent Labs  Lab 12/31/17  0547 01/01/18 0411   WBC 17.17* 16.82*   HGB 12.5* 13.4*   HCT 34.8* 38.8*    372*   , INR   Recent Labs  Lab 01/01/18  0934 01/01/18  1558 01/01/18 2004   INR 5.0*  5.0* 4.3* 3.9*   , Lipid Panel No results for input(s): CHOL, HDL, LDLCALC, TRIG, CHOLHDL in the last 48 hours., Troponin   Recent Labs  Lab 12/31/17  0547 12/31/17  1222 12/31/17 2002   TROPONINI 0.319* 0.304* 0.267*    and   Recent Lab Results       01/01/18  2004 01/01/18  1743 01/01/18  1558 01/01/18  1212 01/01/18  1200      Anion Gap     17(H)     aPTT 68.1  Comment:  aPTT therapeutic range = 39-69 seconds(H)         Baso #          Basophil%          BNP          BUN, Bld     93(H)     Calcium     10.0     Chloride     77(L)     CO2     29     Creatinine     1.9(H)     Differential Method          eGFR if      40(A)     eGFR if non      34  Comment:  Calculation used to obtain the  estimated glomerular filtration  rate (eGFR) is the CKD-EPI equation.   (A)     Eos #          Eosinophil%          Estimated Avg Glucose          Glucose     260(H)     Gran #          Gran%          Hematocrit          Hemoglobin          Hemoglobin A1C          Coumadin Monitoring INR 3.9  Comment:  Coumadin Therapy:  2.0 - 3.0 for INR for all indicators except mechanical heart valves  and antiphospholipid syndromes which should use 2.5 - 3.5.  (H)  4.3  Comment:  Coumadin Therapy:  2.0 - 3.0 for INR for all indicators except mechanical heart valves  and antiphospholipid syndromes which should use 2.5 - 3.5.  (H)       Lymph #          Lymph%          Magnesium          MCH          MCHC          MCV          Mono #          Mono%          MPV          Phosphorus          Platelets          POCT Glucose  162(H)  173(H)      Potassium     4.2     Protime 38.7(H)  42.0(H)       RBC          RDW          Sodium     123(L)     WBC                      01/01/18  0940 01/01/18  0934 01/01/18  0656 01/01/18  0411      Anion Gap    17(H)     aPTT  89.9  Comment:  aPTT therapeutic range = 39-69 seconds(H)       Baso #    0.01     Basophil%    0.1       Comment:  Values of less than 100 pg/ml are consistent with non-CHF populations.(H)        BUN, Bld    86(H)     Calcium    10.5     Chloride    78(L)     CO2    29     Creatinine    1.8(H)     Differential Method    Automated     eGFR if     43(A)     eGFR if non     37  Comment:  Calculation used to obtain the estimated glomerular filtration  rate (eGFR) is the CKD-EPI equation.   (A)     Eos #    0.0     Eosinophil%    0.1     Estimated Avg Glucose    148(H)     Glucose    158(H)     Gran #    13.4(H)     Gran%    79.9(H)     Hematocrit    38.8(L)     Hemoglobin    13.4(L)     Hemoglobin A1C    6.8  Comment:  According to ADA guidelines, hemoglobin A1c <7.0% represents  optimal control in non-pregnant diabetic patients.  Different  metrics may apply to specific patient populations.   Standards of Medical Care in Diabetes-2016.  For the purpose of screening for the presence of diabetes:  <5.7%     Consistent with the absence of diabetes  5.7-6.4%  Consistent with increasing risk for diabetes   (prediabetes)  >or=6.5%  Consistent with diabetes  Currently, no consensus exists for use of hemoglobin A1c  for diagnosis of diabetes for children.  This Hemoglobin A1c assay has significant interference with fetal   hemoglobin   (HbF). The results are invalid for patients with abnormal amounts of   HbF,   including those with known Hereditary Persistence   of Fetal Hemoglobin. Heterozygous hemoglobin variants (HbAS, HbAC,   HbAD, HbAE, HbA2) do not significantly interfere with this assay;   however, presence of multiple variants in a sample may impact the %   interference.  (H)     Coumadin Monitoring INR  5.0  Comment:  Coumadin Therapy:  2.0 - 3.0 for INR for all indicators except mechanical heart valves  and antiphospholipid syndromes which should use 2.5 - 3.5.  INR   critical result(s) called and verbal readback obtained from   Cecille Palm RN, 01/01/2018 13:46  (HH)  6.0  Comment:  Coumadin Therapy:  2.0 - 3.0 for INR for all indicators except mechanical heart valves  and antiphospholipid syndromes which should use 2.5 - 3.5.  INR critical result(s) called and verbal readback obtained from Salome Camarena RN, 01/01/2018 05:55  (HH)       5.0  Comment:  Coumadin Therapy:  2.0 - 3.0 for INR for all indicators except mechanical heart valves  and antiphospholipid syndromes which should use 2.5 - 3.5.  INR   critical result(s) called and verbal readback obtained from   Cecille Palm RN, 01/01/2018 13:46  (HH)       Lymph #    1.8     Lymph%    10.9(L)     Magnesium    1.8     MCH    31.3(H)     MCHC    34.5     MCV    91     Mono #    1.5(H)     Mono%    9.0     MPV    9.1(L)     Phosphorus    3.7     Platelets    372(H)     POCT Glucose    160(H)      Potassium    4.2     Protime  48.4(H)  58.5(H)       48.4(H)       RBC    4.28(L)     RDW    14.8(H)     Sodium    124(L)     WBC    16.82(H)           Significant Imaging: reviewed cxr

## 2018-01-02 NOTE — PROGRESS NOTES
[facility] Hospital Medicine  Progress Note    Patient Name: Beni Cosby  MRN: [id number]  Patient Class: IP- Inpatient   Admission Date: 12/30/2017  Length of Stay: 2 days  Attending Physician: Dane Ruby MD  Primary Care Provider: Jackson Brandt MD        Subjective:     Principal Problem:Hyponatremia    HPI:  Mr. Cosby is a 73yo male  with a PMHx of chronic diastolic HFpEF (right-sided), CKD stage III, chronic hyponatremia, COPD, DM II, chronic AF on Coumadin, and AS with remote bioprosthetic AVR, who presented to the ED with c/o fatigue that has progressively worsened over the past 1 day.  Associated generalized weakness.  Denies any fever, chills, CP, SOB, worsening cough, worsening edema, ABD pain, N/V/D, dysuria, back pain, HA, AMS, visual disturbances, seizure-like activity, lightheadedness/dizziness syncope, or numbness/tingling.  No aggravating to alleviating factors.  He was admitted to [facility] 12/19 for hyponatremia with Na 119.  He received 0.9 NS IVF's, and metolazone added per Nephrology.  Initial work-up in ED resulted Na 123, cr. 2, BUN 85, WBC 16.4, , troponin 0.3.  CXR showed RLL infiltrate consistent with PNA.  EKG revealed A-Fib with controlled rate, no acute changes from previous tracings.  Case discussed with Nephrology in ED, who recommend admission for initiation of tolvaptan therapy.  Hospital Medicine consulted for admission.    Hospital Course:  Patient was admitted to [facility] for hyponatremia and pneumonia under the care of Hospital Medicine. He was given IV abx for PNA and nephrology was consulted for hyponatremia. He was started on Tolvaptan.     Interval History: Patient was admitted to [facility] with chronic hyponatremia. Nephrology consulted, fluids restricted, and tolvaptan given. INR elevated - coumadin held and Vit K given. Pt continues to be SOB with cough - o IV rocephin and azithromycin. Continue current plan of care.    Review of Systems    Constitutional: Positive for fatigue. Negative for activity change, chills, diaphoresis, fever and unexpected weight change.   HENT: Negative for congestion, postnasal drip, rhinorrhea, sinus pressure, sore throat and trouble swallowing.    Eyes: Negative for photophobia and visual disturbance.   Respiratory: Positive for cough (chronic) and wheezing. Negative for apnea, chest tightness and shortness of breath.    Cardiovascular: Positive for leg swelling (chronic). Negative for chest pain and palpitations.   Gastrointestinal: Negative for abdominal distention, abdominal pain, blood in stool, constipation, diarrhea, nausea and vomiting.   Endocrine: Negative for polydipsia, polyphagia and polyuria.   Genitourinary: Negative for decreased urine volume, difficulty urinating, dysuria, frequency, hematuria and urgency.   Musculoskeletal: Negative for arthralgias, back pain, gait problem, joint swelling and myalgias.   Skin: Negative for pallor, rash and wound.   Neurological: Positive for weakness (generalized). Negative for dizziness, seizures, syncope, facial asymmetry, speech difficulty, light-headedness, numbness and headaches.   Psychiatric/Behavioral: Negative for confusion. The patient is not nervous/anxious.    All other systems reviewed and are negative.    Objective:     Vital Signs (Most Recent):  Temp: 98 °F (36.7 °C) (01/01/18 1634)  Pulse: 86 (01/01/18 1634)  Resp: 20 (01/01/18 1634)  BP: 119/66 (01/01/18 1634)  SpO2: 98 % (01/01/18 1634) Vital Signs (24h Range):  Temp:  [97.6 °F (36.4 °C)-98.4 °F (36.9 °C)] 98 °F (36.7 °C)  Pulse:  [70-90] 86  Resp:  [16-20] 20  SpO2:  [95 %-99 %] 98 %  BP: (110-128)/(57-67) 119/66     Weight: 112.1 kg (247 lb 2.2 oz)  Body mass index is 35.46 kg/m².    Intake/Output Summary (Last 24 hours) at 01/01/18 1850  Last data filed at 01/01/18 1847   Gross per 24 hour   Intake              710 ml   Output             1500 ml   Net             -790 ml      Physical Exam    Constitutional: He is oriented to person, place, and time. He appears well-developed and well-nourished. No distress.   HENT:   Head: Normocephalic and atraumatic.   Eyes: Conjunctivae are normal.   Neck: Normal range of motion. Neck supple. JVD present.   Cardiovascular: Normal rate and intact distal pulses.  An irregularly irregular rhythm present.  No extrasystoles are present. Exam reveals no gallop.    No murmur heard.  Pulmonary/Chest: Effort normal and breath sounds normal. No accessory muscle usage. Tachypnea noted. No respiratory distress. He has no decreased breath sounds. He has no wheezes. He has no rhonchi. He has no rales.   Abdominal: Soft. Bowel sounds are normal. He exhibits no distension. There is no tenderness. There is no rebound, no guarding and no CVA tenderness.   Musculoskeletal: Normal range of motion. He exhibits edema (2+ BLE). He exhibits no tenderness or deformity.   Neurological: He is alert and oriented to person, place, and time. No cranial nerve deficit or sensory deficit.   Skin: Skin is warm, dry and intact. No rash noted. He is not diaphoretic. No erythema. No pallor.   Psychiatric: He has a normal mood and affect. His speech is normal and behavior is normal. Cognition and memory are normal.   Nursing note and vitals reviewed.      Significant Labs: All pertinent labs within the past 24 hours have been reviewed.    Significant Imaging: I have reviewed all pertinent imaging results/findings within the past 24 hours.    Assessment/Plan:      * Chronic hyponatremia    - Secondary to HF with worsening cardiac status.  - Baseline Na 120-125.  - Patient with new onset fatigue and generalized weakness.  - Prior treatment with diuretics and metolazone.    - Discussed case with Nephrology in ED.  - Will start tolvaptan therapy.  - Low salt diet, fluid restriction.  - Nephrology following        Chronic kidney disease (CKD), stage III (moderate)    --nephrology following  --fluid  restrictions  --avoid nephrotoxic agents          Elevated troponin    - Likely due to demand.  - Chronically elevated troponin (baseline 0.1)  - Initial troponin 0.3; EKG unrevealing.  - No chest pain.  - Trend serial cardiac enzymes, EKG.  - Recent TTE 12/23/17 showed CLVH, EF 60-65%, pulmonary HTN (PAP 46mmHg), stable prosthetic aortic valve.  - Continue medical management with ASA, BB, and high-intensity statin.  - Cardiology consult in AM.        Right lower lobe pneumonia    - Supplemental O2 as needed.  - Duonebs.  - Mucinex.  - Sputum culture.  - Empiric abx with IV Rocephin and azithromycin.        Acute renal failure superimposed on stage 3 chronic kidney disease    - Initial creatinine 2 (baseline cr. 1.5-1.7).  --nephrology following  --cardiology following  - Strict I&O's.        Chronic diastolic congestive heart failure, NYHA class 3    - Clinically compensated, ? mild volume depletion.  Will defer to Cardiology.  - Continue home Bumex, conor, and metolazone.   - Strict I&O's, daily weights, Na/fluid restriction.  - Followed by Dr. Chew ( Cardiology).  --cardiology consulted  --continue bumex, KCL, spironolactone          Aortic valve stenosis with remote bioprosthetic AVR    - Stable.  - Followed by Dr. Chew ( Cardiology).        Type 2 diabetes mellitus    - Recent HbA1c 6.9 on 12/20.  - Accuchecks with SSI.  - Diabetic diet.        Chronic atrial fibrillation    - Rate controlled.  - Continue home beta blocker.  - Continue Coumadin for AC, pharmacy to dose.  - Daily INR.          VTE Risk Mitigation         Ordered     warfarin (COUMADIN) tablet 1 mg  Every Saturday     Route:  Oral        12/31/17 1154     Medium Risk of VTE  Once      12/31/17 1436     Place sequential compression device  Until discontinued      12/31/17 1436              CELIA Correia  Department of Hospital Medicine   [facility]

## 2018-01-02 NOTE — CONSULTS
Beni Cosby [id number] is a 72 y.o. male who has been consulted for vancomycin dosing.  Consult ordered by Dr Dane Ruby    Dx: Pneumonia   Vancomycin trough goal = 15-20 mcg/mL  Concomitant abx tx: Zosyn 4.5 g every 12 hours, Cipro 400 mg IV every 12 hours    Tmax: 98.4 F, Cultures: Ordered this AM 0530  The patient has the following labs:     Date Creatinine (mg/dl)    BUN WBC Count   1/2/2018 Estimated Creatinine Clearance: 44 mL/min (based on SCr of 1.9 mg/dL (H)). Lab Results   Component Value Date    BUN 93 (H) 01/01/2018     Lab Results   Component Value Date    WBC 16.82 (H) 01/01/2018      which calculates to an Estimated Creatinine Clearance: 44 mL/min (based on SCr of 1.9 mg/dL (H))..       Current weight is 112 kg (246 lb 14.6 oz)  IBW: 73 kg,  AdjBW 88.6 kg    The patient will be started on vancomycin at a dose of 1500 mg and will be pulse dosed d/t poor renal function.   A placeholder dose of Vancomycin 1500 mg every 48 hours has been ordered.    Patient will be followed by pharmacy for changes in renal function, toxicity, and efficacy.  The first vancomycin random level has been ordered for 10/02 at 2300.      Thank you for allowing us to participate in this patient's care.     Markus Alejandro

## 2018-01-02 NOTE — PLAN OF CARE
Problem: Patient Care Overview  Goal: Plan of Care Review  Outcome: Ongoing (interventions implemented as appropriate)  Patient remains free from falls and all safety precautions are still maintained. Patient states 0 pain on scale of 0-10. Bed locked/lowered. Afebrile. Skin is clean, dry and intact. Discoloring to bilateral lower legs. Received zosyn. No s/s of acute distress. Will continue to monitor. 12 hour chart check.

## 2018-01-02 NOTE — HPI
Mr. Cosby is a 71yo male  with a PMHx of chronic diastolic HFpEF , CKD stage III, chronic hyponatremia, COPD, DM II, chronic AF on Coumadin, and AS with remote bioprosthetic AVR, who presented to the ED with c/o fatigue that has progressively worsened over the past 1 day.  Associated generalized weakness.  Denies any fever, chills, CP, SOB, worsening cough, worsening edema, ABD pain, N/V/D, dysuria, back pain, HA, AMS, visual disturbances, seizure-like activity, lightheadedness/dizziness syncope, or numbness/tingling.  No aggravating to alleviating factors.  He was admitted to [facility] 12/19 for hyponatremia with Na 119.  He received 0.9 NS IVF's, and metolazone added per Nephrology.  Initial work-up in ED resulted Na 123, cr. 2, BUN 85, WBC 16.4, , troponin 0.3.  CXR showed RLL infiltrate consistent with PNA.  EKG revealed A-Fib with controlled rate, no acute changes from previous tracings.  Case discussed with Nephrology in ED, who recommend admission for initiation of tolvaptan therapy.  he has no evidence of acute decompensation clinically by cxr or labs . He has contraction alkalosis by lab and urine spot sodium studies are suggestive of an element of siadh.his contraction alkalosis are due to his use of metolazone for chf

## 2018-01-02 NOTE — SUBJECTIVE & OBJECTIVE
Interval History: Patient was admitted to [facility] with chronic hyponatremia. Nephrology consulted, fluids restricted, and tolvaptan given. INR elevated - coumadin held and Vit K given. Pt continues to be SOB with cough - o IV rocephin and azithromycin. Continue current plan of care.    Review of Systems   Constitutional: Positive for fatigue. Negative for activity change, chills, diaphoresis, fever and unexpected weight change.   HENT: Negative for congestion, postnasal drip, rhinorrhea, sinus pressure, sore throat and trouble swallowing.    Eyes: Negative for photophobia and visual disturbance.   Respiratory: Positive for cough (chronic) and wheezing. Negative for apnea, chest tightness and shortness of breath.    Cardiovascular: Positive for leg swelling (chronic). Negative for chest pain and palpitations.   Gastrointestinal: Negative for abdominal distention, abdominal pain, blood in stool, constipation, diarrhea, nausea and vomiting.   Endocrine: Negative for polydipsia, polyphagia and polyuria.   Genitourinary: Negative for decreased urine volume, difficulty urinating, dysuria, frequency, hematuria and urgency.   Musculoskeletal: Negative for arthralgias, back pain, gait problem, joint swelling and myalgias.   Skin: Negative for pallor, rash and wound.   Neurological: Positive for weakness (generalized). Negative for dizziness, seizures, syncope, facial asymmetry, speech difficulty, light-headedness, numbness and headaches.   Psychiatric/Behavioral: Negative for confusion. The patient is not nervous/anxious.    All other systems reviewed and are negative.    Objective:     Vital Signs (Most Recent):  Temp: 98 °F (36.7 °C) (01/01/18 1634)  Pulse: 86 (01/01/18 1634)  Resp: 20 (01/01/18 1634)  BP: 119/66 (01/01/18 1634)  SpO2: 98 % (01/01/18 1634) Vital Signs (24h Range):  Temp:  [97.6 °F (36.4 °C)-98.4 °F (36.9 °C)] 98 °F (36.7 °C)  Pulse:  [70-90] 86  Resp:  [16-20] 20  SpO2:  [95 %-99 %] 98 %  BP:  (110-128)/(57-67) 119/66     Weight: 112.1 kg (247 lb 2.2 oz)  Body mass index is 35.46 kg/m².    Intake/Output Summary (Last 24 hours) at 01/01/18 1850  Last data filed at 01/01/18 1847   Gross per 24 hour   Intake              710 ml   Output             1500 ml   Net             -790 ml      Physical Exam   Constitutional: He is oriented to person, place, and time. He appears well-developed and well-nourished. No distress.   HENT:   Head: Normocephalic and atraumatic.   Eyes: Conjunctivae are normal.   Neck: Normal range of motion. Neck supple. JVD present.   Cardiovascular: Normal rate and intact distal pulses.  An irregularly irregular rhythm present.  No extrasystoles are present. Exam reveals no gallop.    No murmur heard.  Pulses:       Radial pulses are 2+ on the right side, and 2+ on the left side.        Dorsalis pedis pulses are 2+ on the right side, and 2+ on the left side.        Posterior tibial pulses are 2+ on the right side, and 2+ on the left side.   Normal S1, variable S2.   Pulmonary/Chest: Effort normal and breath sounds normal. No accessory muscle usage. Tachypnea noted. No respiratory distress. He has no decreased breath sounds. He has no wheezes. He has no rhonchi. He has no rales.   Abdominal: Soft. Bowel sounds are normal. He exhibits no distension. There is no tenderness. There is no rebound, no guarding and no CVA tenderness.   Musculoskeletal: Normal range of motion. He exhibits edema (2+ BLE). He exhibits no tenderness or deformity.   Neurological: He is alert and oriented to person, place, and time. No cranial nerve deficit or sensory deficit.   Skin: Skin is warm, dry and intact. No rash noted. He is not diaphoretic. No erythema. No pallor.   Psychiatric: He has a normal mood and affect. His speech is normal and behavior is normal. Cognition and memory are normal.   Nursing note and vitals reviewed.      Significant Labs: All pertinent labs within the past 24 hours have been  reviewed.    Significant Imaging: I have reviewed all pertinent imaging results/findings within the past 24 hours.

## 2018-01-02 NOTE — SUBJECTIVE & OBJECTIVE
Interval History:  Pt was seen and examined. Stable last pm. No new c/o's. Reviewed cardiology note. Noted pt received NS overnight. No change in s Na.    Review of patient's allergies indicates:   Allergen Reactions    Morphine      Other reaction(s): Nausea Only     Current Facility-Administered Medications   Medication Frequency    acetaminophen tablet 650 mg Q6H PRN    albuterol-ipratropium 2.5mg-0.5mg/3mL nebulizer solution 3 mL Q4H PRN    albuterol-ipratropium 2.5mg-0.5mg/3mL nebulizer solution 3 mL Q6H WAKE    arformoterol nebulizer solution 15 mcg BID    And    budesonide nebulizer solution 0.5 mg BID    aspirin EC tablet 81 mg Daily    bumetanide tablet 1 mg BID    ciprofloxacin (CIPRO)400mg/200ml D5W IVPB 400 mg Q12H    dextrose 50% injection 12.5 g PRN    dextrose 50% injection 25 g PRN    ferrous sulfate EC tablet 325 mg BID    glucagon (human recombinant) injection 1 mg PRN    glucose chewable tablet 16 g PRN    glucose chewable tablet 24 g PRN    guaiFENesin 12 hr tablet 600 mg BID    insulin aspart pen 0-5 Units QID (AC + HS) PRN    metoprolol tartrate (LOPRESSOR) tablet 25 mg BID    ondansetron injection 4 mg Q6H PRN    pantoprazole EC tablet 40 mg Daily    piperacillin-tazobactam 4.5 g in sodium chloride 0.9% 100 mL IVPB (ready to mix system) Q12H    polyethylene glycol packet 17 g BID PRN    potassium chloride SA CR tablet 20 mEq BID    rosuvastatin tablet 10 mg QHS    spironolactone tablet 25 mg BID    [START ON 1/3/2018] vancomycin (VANCOCIN) 1,500 mg in sodium chloride 0.9% 250 mL IVPB Q48H    [START ON 1/6/2018] warfarin (COUMADIN) tablet 1 mg Every Sat       Objective:     Vital Signs (Most Recent):  Temp: 97.4 °F (36.3 °C) (01/02/18 1152)  Pulse: 74 (01/02/18 1152)  Resp: 20 (01/02/18 1152)  BP: (!) 102/51 (01/02/18 1152)  SpO2: 99 % (01/02/18 1152)  O2 Device (Oxygen Therapy): nasal cannula (01/02/18 0759) Vital Signs (24h Range):  Temp:  [97.4 °F (36.3 °C)-98.4 °F  (36.9 °C)] 97.4 °F (36.3 °C)  Pulse:  [74-92] 74  Resp:  [16-20] 20  SpO2:  [94 %-99 %] 99 %  BP: (102-126)/(51-66) 102/51     Weight: 112 kg (246 lb 14.6 oz) (01/02/18 0509)  Body mass index is 35.43 kg/m².  Body surface area is 2.35 meters squared.    I/O last 3 completed shifts:  In: 1358.8 [P.O.:740; I.V.:268.8; IV Piggyback:350]  Out: 1875 [Urine:1875]    Physical Exam   Constitutional: He is oriented to person, place, and time. He appears well-developed and well-nourished.   HENT:   Head: Normocephalic and atraumatic.   Neck: No JVD present.   Cardiovascular: Normal rate, regular rhythm and normal heart sounds.  Exam reveals no friction rub.    Pulmonary/Chest: Effort normal. He has rales.   Abdominal: Soft. There is no tenderness.   Musculoskeletal: He exhibits edema.   Neurological: He is alert and oriented to person, place, and time.   Skin: Skin is warm and dry.   Psychiatric: He has a normal mood and affect. His behavior is normal.   Nursing note and vitals reviewed.      Significant Labs: reviewed  BMP  Lab Results   Component Value Date     (L) 01/02/2018    K 4.4 01/02/2018    CL 81 (L) 01/02/2018    CO2 28 01/02/2018    BUN 91 (H) 01/02/2018    CREATININE 1.9 (H) 01/02/2018    CALCIUM 10.4 01/02/2018    ANIONGAP 15 01/02/2018    ESTGFRAFRICA 40 (A) 01/02/2018    EGFRNONAA 34 (A) 01/02/2018     Lab Results   Component Value Date    WBC 15.90 (H) 01/02/2018    HGB 13.5 (L) 01/02/2018    HCT 39.0 (L) 01/02/2018    MCV 89 01/02/2018     01/02/2018       Significant Imaging: reviewed CXR

## 2018-01-02 NOTE — NURSING
Notified Dr. Smallwood that patient had 2 runs of v-tach per monitor.  One was 14 beats, then followed by 11 beats.  He adjusted patient's dose of metoprolol.

## 2018-01-02 NOTE — PROGRESS NOTES
Clinical Pharmacy: Coumadin Progress Note    Today's INR = 3.7 (down from 5.0 yesterday)   Goal INR = 2-3  Indication: Afib      Will continue to hold patient's Coumadin at this time due to supra therapeutic INR. A warfarin 1 mg by mouth on Saturday placeholder dose only has been entered.   PT/INR will be monitored daily. Dose adjustments will be made accordingly.    Patient needs to be educated.    Thank you for allowing us to participate in this patient's care.   Yris Sahni, PharmD 1/2/2018 8:07 AM

## 2018-01-02 NOTE — PLAN OF CARE
Problem: Patient Care Overview  Goal: Plan of Care Review  Outcome: Ongoing (interventions implemented as appropriate)  Fall precautions maintained, pt free from injuries/fall.  Repositions with encouragement  Ambulates with assist; sits up in chair  Discoloration noted on all extremities  C/o generalized weakness and neuropathy pain on BLE  IV abx given as prescribed  Dark, tarry stools noted  NSR, A fib on the monitor, 80's  INR monitored closely, last INR was 5, Vit K given per order  POCT monitored, coverage given as needed.  POC and meds discussed with pt and spouse, both verbalized understanding.  Side rails x 2, bed locked and low, phone and call light w/in reach.  Chart check done. Will cont to monitor.

## 2018-01-02 NOTE — NURSING
"Chart reviewed for diabetes educational needs  Info obtained from wife at bedside  She reports he has been a diabetic for "years" and has received diabetes education in the past  He has a home glucose monitor and chekcs daily with averages 100-150 on logged results  He is consistent with taking his diabetes oral agents  Reviewed info on target glucose values/hyper/hypoglycemia  Verbalizes understanding  "

## 2018-01-02 NOTE — PROGRESS NOTES
Clinical Pharmacy: Coumadin Education Note    Patient/caregiver educated on warfarin indication, side effects, and drug interactions. Discussed importance of medication compliance and INR monitoring and reviewed signs of abnormal bleeding. Patient/caregiver given warfarin educational handout. Patient/caregiver expressed understanding and had no further questions.    Patient's wife said that his INR usually runs between 2.1-2.3 and she does not know how it got so high. She said he has not had any recent medication changes. She confirmed his home dosing as reflected in Epic.     Thank you for allowing us to participate in this patient's care.     Yris Sahni 1/2/2018 3:13 PM

## 2018-01-02 NOTE — ASSESSMENT & PLAN NOTE
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  Has received tolvaptan     Recommendations for treatment:  Reviewed salt (2-3 g/day) and fluid (1-1.5 L/day) with the pt and wife again  Decrease bumex from 2 to 1 mg po bid  No metolazone  Reduce tolvaptan dose from 30 to 15 mg po qd x 1 more doses, then d/c.     s Osm pending  Will repeat BMP this afternoon        Chronic diastolic congestive heart failure, NYHA class 3     Cardiac: pt has mild, acute on chronic diastolic CHF  Mgmt as above with salt and fluid restriction and use of loop diuretics  Noted on aldactone      Pneumonia: On azythromycin and ceftriaxone

## 2018-01-02 NOTE — PLAN OF CARE
CM met with patient/spouse at the bedside for discharge planning assessment and readmission questions.  Patient was recently hospitalized here at [facility].  According to his wife, his sodium level is low and his INR was high.  Patient's son drives patient to MD appointments.  He uses a rolling walker with ambulation.  He does not currently have home health services.  He is unsure of needs at this time.  CM provided a transitional care folder, information on advanced directives, information on pharmacy bedside delivery, and discharge planning begins on admission with contact information for any needs/questions.     01/02/18 2298   Discharge Assessment   Assessment Type Discharge Planning Assessment   Confirmed/corrected address and phone number on facesheet? Yes   Assessment information obtained from? Patient;Caregiver;Medical Record   Expected Length of Stay (days) (TBD)   Communicated expected length of stay with patient/caregiver yes   Prior to hospitilization cognitive status: Alert/Oriented   Prior to hospitalization functional status: Independent;Assistive Equipment   Current cognitive status: Alert/Oriented   Current Functional Status: Independent;Assistive Equipment   Facility Arrived From: home   Lives With spouse   Able to Return to Prior Arrangements yes   Is patient able to care for self after discharge? Yes  (with assistance)   Who are your caregiver(s) and their phone number(s)? Linda Cosby, spouse [phone]   Patient's perception of discharge disposition home or selfcare   Readmission Within The Last 30 Days previous discharge plan unsuccessful   If yes, most recent facility name: [facility]   Patient currently being followed by outpatient case management? No   Patient currently receives any other outside agency services? No   Equipment Currently Used at Home walker, rolling;glucometer   Do you have any problems affording any of your prescribed medications? No   Is the  patient taking medications as prescribed? yes   Does the patient have transportation home? Yes   Transportation Available family or friend will provide   Dialysis Name and Scheduled days NA   Does the patient receive services at the [facility]madin Clinic? No  (Dr Chew monitors)   Discharge Plan A Home with family   Discharge Plan B Home with family   Patient/Family In Agreement With Plan yes   Readmission Questionnaire   At the time of your discharge, did someone talk to you about what your health problems were? Yes   At the time of discharge, did someone talk to you about what to watch out for regarding worsening of your health problem? Yes   At the time of discharge, did someone talk to you about what to do if you experienced worsening of your health problem? Yes   At the time of discharge, did someone talk to you about which medication to take when you left the hospital and which ones to stop taking? Yes   At the time of discharge, did someone talk to you about when and where to follow up with a doctor after you left the hospital? Yes   What do you believe caused you to be sick enough to be re-admitted? low NA   How often do you need to have someone help you when you read instructions, pamphlets, or other written material from your doctor or pharmacy? Sometimes   Do you have problems taking your medications as prescribed? No   Do you have any problems affording any of  your prescribed medications? No   Do you have problems obtaining/receiving your medications? No   Does the patient have transportation to healthcare appointments? Yes   Living Arrangements house   Does the patient have family/friends to help with healtcare needs after discharge? yes   Does your caregiver provide all the help you need? Yes   Are you currently feeling confused? No   Are you currently having problems thinking? No   Are you currently having memory problems? No   Have you felt down, depressed, or hopeless? 0   Have you felt little interest  or pleasure in doing things? 0   In the last 7 days, my sleep quality was: good

## 2018-01-03 LAB
ANION GAP SERPL CALC-SCNC: 14 MMOL/L
APTT BLDCRRT: 40.5 SEC
APTT BLDCRRT: 41.5 SEC
BASOPHILS # BLD AUTO: 0.01 K/UL
BASOPHILS NFR BLD: 0.1 %
BUN SERPL-MCNC: 88 MG/DL
CALCIUM SERPL-MCNC: 10.1 MG/DL
CHLORIDE SERPL-SCNC: 82 MMOL/L
CO2 SERPL-SCNC: 30 MMOL/L
CREAT SERPL-MCNC: 1.9 MG/DL
DIFFERENTIAL METHOD: ABNORMAL
EOSINOPHIL # BLD AUTO: 0 K/UL
EOSINOPHIL NFR BLD: 0.3 %
ERYTHROCYTE [DISTWIDTH] IN BLOOD BY AUTOMATED COUNT: 15.4 %
EST. GFR  (AFRICAN AMERICAN): 40 ML/MIN/1.73 M^2
EST. GFR  (NON AFRICAN AMERICAN): 34 ML/MIN/1.73 M^2
GLUCOSE SERPL-MCNC: 169 MG/DL
HCT VFR BLD AUTO: 38 %
HGB BLD-MCNC: 13.4 G/DL
INR PPP: 1.9
INR PPP: 2
INR PPP: 2.1
LYMPHOCYTES # BLD AUTO: 1.8 K/UL
LYMPHOCYTES NFR BLD: 11.7 %
MCH RBC QN AUTO: 31.6 PG
MCHC RBC AUTO-ENTMCNC: 35.3 G/DL
MCV RBC AUTO: 90 FL
MONOCYTES # BLD AUTO: 1.6 K/UL
MONOCYTES NFR BLD: 10.2 %
NEUTROPHILS # BLD AUTO: 12.2 K/UL
NEUTROPHILS NFR BLD: 77.7 %
PLATELET # BLD AUTO: 384 K/UL
PMV BLD AUTO: 9.1 FL
POCT GLUCOSE: 140 MG/DL (ref 70–110)
POCT GLUCOSE: 154 MG/DL (ref 70–110)
POCT GLUCOSE: 214 MG/DL (ref 70–110)
POCT GLUCOSE: 232 MG/DL (ref 70–110)
POTASSIUM SERPL-SCNC: 4.7 MMOL/L
PROTHROMBIN TIME: 19.5 SEC
PROTHROMBIN TIME: 20.1 SEC
PROTHROMBIN TIME: 20.1 SEC
PROTHROMBIN TIME: 20.3 SEC
PROTHROMBIN TIME: 21.1 SEC
RBC # BLD AUTO: 4.24 M/UL
SODIUM SERPL-SCNC: 126 MMOL/L
VANCOMYCIN SERPL-MCNC: 12.4 UG/ML
WBC # BLD AUTO: 15.72 K/UL

## 2018-01-03 PROCEDURE — 25000003 PHARM REV CODE 250: Performed by: NURSE PRACTITIONER

## 2018-01-03 PROCEDURE — 25000003 PHARM REV CODE 250: Performed by: FAMILY MEDICINE

## 2018-01-03 PROCEDURE — 85730 THROMBOPLASTIN TIME PARTIAL: CPT | Mod: 91

## 2018-01-03 PROCEDURE — 94799 UNLISTED PULMONARY SVC/PX: CPT

## 2018-01-03 PROCEDURE — 99900035 HC TECH TIME PER 15 MIN (STAT)

## 2018-01-03 PROCEDURE — 94640 AIRWAY INHALATION TREATMENT: CPT

## 2018-01-03 PROCEDURE — 63600175 PHARM REV CODE 636 W HCPCS: Performed by: FAMILY MEDICINE

## 2018-01-03 PROCEDURE — G8988 SELF CARE GOAL STATUS: HCPCS | Mod: CM

## 2018-01-03 PROCEDURE — 80202 ASSAY OF VANCOMYCIN: CPT

## 2018-01-03 PROCEDURE — 97165 OT EVAL LOW COMPLEX 30 MIN: CPT

## 2018-01-03 PROCEDURE — 63600175 PHARM REV CODE 636 W HCPCS: Performed by: INTERNAL MEDICINE

## 2018-01-03 PROCEDURE — 27000221 HC OXYGEN, UP TO 24 HOURS

## 2018-01-03 PROCEDURE — 85025 COMPLETE CBC W/AUTO DIFF WBC: CPT

## 2018-01-03 PROCEDURE — 21400001 HC TELEMETRY ROOM

## 2018-01-03 PROCEDURE — 36415 COLL VENOUS BLD VENIPUNCTURE: CPT

## 2018-01-03 PROCEDURE — 97162 PT EVAL MOD COMPLEX 30 MIN: CPT

## 2018-01-03 PROCEDURE — 96372 THER/PROPH/DIAG INJ SC/IM: CPT

## 2018-01-03 PROCEDURE — 80048 BASIC METABOLIC PNL TOTAL CA: CPT

## 2018-01-03 PROCEDURE — 25000242 PHARM REV CODE 250 ALT 637 W/ HCPCS: Performed by: FAMILY MEDICINE

## 2018-01-03 PROCEDURE — 97535 SELF CARE MNGMENT TRAINING: CPT

## 2018-01-03 PROCEDURE — G8989 SELF CARE D/C STATUS: HCPCS | Mod: CL

## 2018-01-03 PROCEDURE — 99233 SBSQ HOSP IP/OBS HIGH 50: CPT | Mod: ,,, | Performed by: INTERNAL MEDICINE

## 2018-01-03 PROCEDURE — 85610 PROTHROMBIN TIME: CPT | Mod: 91

## 2018-01-03 PROCEDURE — 94761 N-INVAS EAR/PLS OXIMETRY MLT: CPT

## 2018-01-03 PROCEDURE — 25000003 PHARM REV CODE 250: Performed by: INTERNAL MEDICINE

## 2018-01-03 PROCEDURE — 85610 PROTHROMBIN TIME: CPT

## 2018-01-03 PROCEDURE — 97116 GAIT TRAINING THERAPY: CPT

## 2018-01-03 PROCEDURE — G8978 MOBILITY CURRENT STATUS: HCPCS | Mod: CK

## 2018-01-03 PROCEDURE — G8979 MOBILITY GOAL STATUS: HCPCS | Mod: CJ

## 2018-01-03 PROCEDURE — G8987 SELF CARE CURRENT STATUS: HCPCS | Mod: CN

## 2018-01-03 PROCEDURE — 25000242 PHARM REV CODE 250 ALT 637 W/ HCPCS: Performed by: NURSE PRACTITIONER

## 2018-01-03 RX ORDER — WARFARIN SODIUM 5 MG/1
5 TABLET ORAL
Status: DISCONTINUED | OUTPATIENT
Start: 2018-01-03 | End: 2018-01-05

## 2018-01-03 RX ORDER — WARFARIN SODIUM 5 MG/1
5 TABLET ORAL
Status: DISCONTINUED | OUTPATIENT
Start: 2018-01-04 | End: 2018-01-08 | Stop reason: HOSPADM

## 2018-01-03 RX ADMIN — IPRATROPIUM BROMIDE AND ALBUTEROL SULFATE 3 ML: .5; 3 SOLUTION RESPIRATORY (INHALATION) at 07:01

## 2018-01-03 RX ADMIN — GUAIFENESIN 600 MG: 600 TABLET, EXTENDED RELEASE ORAL at 08:01

## 2018-01-03 RX ADMIN — ARFORMOTEROL TARTRATE 15 MCG: 15 SOLUTION RESPIRATORY (INHALATION) at 08:01

## 2018-01-03 RX ADMIN — INSULIN ASPART 1 UNITS: 100 INJECTION, SOLUTION INTRAVENOUS; SUBCUTANEOUS at 10:01

## 2018-01-03 RX ADMIN — PIPERACILLIN SODIUM AND TAZOBACTAM SODIUM 2.25 G: 2; .25 INJECTION, POWDER, FOR SOLUTION INTRAVENOUS at 10:01

## 2018-01-03 RX ADMIN — INSULIN ASPART 2 UNITS: 100 INJECTION, SOLUTION INTRAVENOUS; SUBCUTANEOUS at 11:01

## 2018-01-03 RX ADMIN — SPIRONOLACTONE 25 MG: 25 TABLET ORAL at 10:01

## 2018-01-03 RX ADMIN — METOPROLOL TARTRATE 25 MG: 25 TABLET ORAL at 10:01

## 2018-01-03 RX ADMIN — ASPIRIN 81 MG: 81 TABLET, COATED ORAL at 08:01

## 2018-01-03 RX ADMIN — ARFORMOTEROL TARTRATE 15 MCG: 15 SOLUTION RESPIRATORY (INHALATION) at 07:01

## 2018-01-03 RX ADMIN — WARFARIN SODIUM 5 MG: 5 TABLET ORAL at 04:01

## 2018-01-03 RX ADMIN — SPIRONOLACTONE 25 MG: 25 TABLET ORAL at 08:01

## 2018-01-03 RX ADMIN — FERROUS SULFATE TAB EC 325 MG (65 MG FE EQUIVALENT) 325 MG: 325 (65 FE) TABLET DELAYED RESPONSE at 10:01

## 2018-01-03 RX ADMIN — PANTOPRAZOLE SODIUM 40 MG: 40 TABLET, DELAYED RELEASE ORAL at 08:01

## 2018-01-03 RX ADMIN — BUMETANIDE 1 MG: 1 TABLET ORAL at 10:01

## 2018-01-03 RX ADMIN — CIPROFLOXACIN 400 MG: 2 INJECTION, SOLUTION INTRAVENOUS at 08:01

## 2018-01-03 RX ADMIN — ROSUVASTATIN CALCIUM 10 MG: 10 TABLET, FILM COATED ORAL at 10:01

## 2018-01-03 RX ADMIN — FERROUS SULFATE TAB EC 325 MG (65 MG FE EQUIVALENT) 325 MG: 325 (65 FE) TABLET DELAYED RESPONSE at 08:01

## 2018-01-03 RX ADMIN — PIPERACILLIN SODIUM AND TAZOBACTAM SODIUM 2.25 G: 2; .25 INJECTION, POWDER, FOR SOLUTION INTRAVENOUS at 06:01

## 2018-01-03 RX ADMIN — LINEZOLID 600 MG: 600 TABLET, FILM COATED ORAL at 10:01

## 2018-01-03 RX ADMIN — METOPROLOL TARTRATE 25 MG: 25 TABLET ORAL at 08:01

## 2018-01-03 RX ADMIN — LINEZOLID 600 MG: 600 TABLET, FILM COATED ORAL at 08:01

## 2018-01-03 RX ADMIN — IPRATROPIUM BROMIDE AND ALBUTEROL SULFATE 3 ML: .5; 3 SOLUTION RESPIRATORY (INHALATION) at 08:01

## 2018-01-03 RX ADMIN — GUAIFENESIN 600 MG: 600 TABLET, EXTENDED RELEASE ORAL at 10:01

## 2018-01-03 RX ADMIN — PIPERACILLIN SODIUM AND TAZOBACTAM SODIUM 2.25 G: 2; .25 INJECTION, POWDER, FOR SOLUTION INTRAVENOUS at 03:01

## 2018-01-03 RX ADMIN — IPRATROPIUM BROMIDE AND ALBUTEROL SULFATE 3 ML: .5; 3 SOLUTION RESPIRATORY (INHALATION) at 02:01

## 2018-01-03 RX ADMIN — BUDESONIDE 0.5 MG: 0.5 SUSPENSION RESPIRATORY (INHALATION) at 07:01

## 2018-01-03 RX ADMIN — POTASSIUM CHLORIDE 20 MEQ: 1500 TABLET, EXTENDED RELEASE ORAL at 08:01

## 2018-01-03 RX ADMIN — BUDESONIDE 0.5 MG: 0.5 SUSPENSION RESPIRATORY (INHALATION) at 08:01

## 2018-01-03 RX ADMIN — POTASSIUM CHLORIDE 20 MEQ: 1500 TABLET, EXTENDED RELEASE ORAL at 10:01

## 2018-01-03 RX ADMIN — BUMETANIDE 1 MG: 1 TABLET ORAL at 08:01

## 2018-01-03 NOTE — SUBJECTIVE & OBJECTIVE
"Interval History: Pt was seen and examined. No new events last pm, remained stable, no c/o's this am, no SOB. Pt overall "feel better."    Review of patient's allergies indicates:   Allergen Reactions    Morphine      Other reaction(s): Nausea Only     Current Facility-Administered Medications   Medication Frequency    acetaminophen tablet 650 mg Q6H PRN    albuterol-ipratropium 2.5mg-0.5mg/3mL nebulizer solution 3 mL Q4H PRN    albuterol-ipratropium 2.5mg-0.5mg/3mL nebulizer solution 3 mL Q6H WAKE    arformoterol nebulizer solution 15 mcg BID    And    budesonide nebulizer solution 0.5 mg BID    aspirin EC tablet 81 mg Daily    bumetanide tablet 1 mg BID    ciprofloxacin (CIPRO)400mg/200ml D5W IVPB 400 mg Q12H    dextrose 50% injection 12.5 g PRN    dextrose 50% injection 25 g PRN    ferrous sulfate EC tablet 325 mg BID    glucagon (human recombinant) injection 1 mg PRN    glucose chewable tablet 16 g PRN    glucose chewable tablet 24 g PRN    guaiFENesin 12 hr tablet 600 mg BID    insulin aspart pen 0-5 Units QID (AC + HS) PRN    linezolid tablet 600 mg Q12H    metoprolol tartrate (LOPRESSOR) tablet 25 mg BID    ondansetron injection 4 mg Q6H PRN    pantoprazole EC tablet 40 mg Daily    piperacillin-tazobactam 2.25 g in sodium chloride 5 % 50 mL IVPB (ready to mix system) Q8H    polyethylene glycol packet 17 g BID PRN    potassium chloride SA CR tablet 20 mEq BID    rosuvastatin tablet 10 mg QHS    spironolactone tablet 25 mg BID    [START ON 1/6/2018] warfarin (COUMADIN) tablet 1 mg Every Sat       Objective:     Vital Signs (Most Recent):  Temp: 97.1 °F (36.2 °C) (01/03/18 0813)  Pulse: 97 (01/03/18 0813)  Resp: 19 (01/03/18 0813)  BP: (!) 106/56 (01/03/18 0813)  SpO2: 99 % (01/03/18 0813)  O2 Device (Oxygen Therapy): nasal cannula (01/03/18 0813) Vital Signs (24h Range):  Temp:  [97.1 °F (36.2 °C)-98.3 °F (36.8 °C)] 97.1 °F (36.2 °C)  Pulse:  [77-97] 97  Resp:  [15-20] 19  SpO2:  [97 " %-99 %] 99 %  BP: (106-141)/(56-73) 106/56     Weight: 112.5 kg (248 lb 0.3 oz) (01/03/18 0453)  Body mass index is 35.59 kg/m².  Body surface area is 2.36 meters squared.    I/O last 3 completed shifts:  In: 2061.3 [P.O.:980; I.V.:531.3; IV Piggyback:550]  Out: 2025 [Urine:2025]    Physical Exam   Constitutional: He is oriented to person, place, and time. He appears well-developed and well-nourished.   HENT:   Head: Normocephalic and atraumatic.   Neck: No JVD present.   Cardiovascular: Normal rate, regular rhythm and normal heart sounds.  Exam reveals no friction rub.    Pulmonary/Chest: Effort normal. He has rales.   Abdominal: Soft. There is no tenderness.   Musculoskeletal: He exhibits edema.   Neurological: He is alert and oriented to person, place, and time.   Skin: Skin is warm and dry.   Psychiatric: He has a normal mood and affect. His behavior is normal.   Nursing note and vitals reviewed.      Significant Labs:  Reviewed  BMP  Lab Results   Component Value Date     (L) 01/03/2018    K 4.7 01/03/2018    CL 82 (L) 01/03/2018    CO2 30 (H) 01/03/2018    BUN 88 (H) 01/03/2018    CREATININE 1.9 (H) 01/03/2018    CALCIUM 10.1 01/03/2018    ANIONGAP 14 01/03/2018    ESTGFRAFRICA 40 (A) 01/03/2018    EGFRNONAA 34 (A) 01/03/2018         Significant Imaging:  reviewed

## 2018-01-03 NOTE — PT/OT/SLP EVAL
Occupational Therapy   Evaluation    Name: Beni Cosby  MRN: [id number]  Admitting Diagnosis:  Hyponatremia      Recommendations:     Discharge Recommendations: nursing facility, skilled  Discharge Equipment Recommendations:  none  Barriers to discharge:  None    History:     Occupational Profile:  Living Environment: lives one story home, 3 steps with HR  Previous level of function: requires assistance from spouse with LB dressing/bathing: otherwise, mod indep, fx ambulation with RW   Roles and Routines: occupational therapy  Equipment Owned:  walker, rolling  Assistance upon Discharge: family    Past Medical History:   Diagnosis Date    Anticoagulant long-term use     Aortic valve stenosis with insufficiency 4/2008    Surgery [facility]    Arthritis     CHF (congestive heart failure)     Chronic a-fib     CKD (chronic kidney disease) stage 3, GFR 30-59 ml/min     COPD (chronic obstructive pulmonary disease)     Diabetes mellitus     Encounter for blood transfusion     Primary cancer of right kidney     Surgery, no further treatment       Past Surgical History:   Procedure Laterality Date    Atrial septem device implanted  2005    BONE MARROW BIOPSY      CARDIAC SURGERY  2008    Aotric valve replacement     KIDNEY SURGERY Right 2007    NOSE SURGERY  2008    RENAL BIOPSY  8/30/       Subjective     Chief Complaint: none  Patient/Family stated goals: to return home  Communicated with: pt, spouse, and nurse Pippa prior to session.  Pain/Comfort:  · Pain Rating 1: 0/10    Objective:     Patient found with: peripheral IV, oxygen, telemetry    General Precautions: Standard, fall   Orthopedic Precautions:N/A   Braces: N/A     Occupational Performance:    Bed Mobility:    · Patient completed Rolling/Turning to Right with contact guard assistance  · Patient completed Scooting/Bridging with minimum assistance  · Patient completed Supine to Sit with minimum assistance  · Patient completed Sit to Supine with  "contact guard assistance    Functional Mobility/Transfers:  ·     Activities of Daily Living:  · Feeding:  modified independence -  · Grooming: minimum assistance -  · UB Dressing: moderate assistance -  · LB Dressing: dependence -  · Toileting: dependence -    Cognitive/Visual Perceptual:  Cognitive/Psychosocial Skills:     -       Oriented to: Person, Place, Time and Situation   -       Follows Commands/attention:Follows one-step commands  -       Communication: clear/fluent  -       Memory: No Deficits noted  -       Safety awareness/insight to disability: intact   -       Mood/Affect/Coping skills/emotional control: Appropriate to situation    Physical Exam:  Upper Extremity Range of Motion:     -       Right Upper Extremity: WFL  -       Left Upper Extremity: WFL  Upper Extremity Strength:    -       Right Upper Extremity: grossly 4/5  -       Left Upper Extremity: grossly 4/5    Patient left supine with all lines intact, call button in reach and spouse present    AMPA 6 Click:  AMPA Total Score: 12    Treatment & Education:    Education:    Assessment:     Beni Cosby is a 72 y.o. male with a medical diagnosis of Hyponatremia.  He presents with impaired self care and fx mobility.  Performance deficits affecting function are weakness, impaired endurance, impaired self care skills, impaired functional mobilty, gait instability, impaired balance, decreased coordination.      Rehab Prognosis:  good; patient would benefit from acute skilled OT services to address these deficits and reach maximum level of function.         Clinical Decision Makin.  OT Low:  "Pt evaluation falls under low complexity for evaluation coding due to performance deficits noted in 1-3 areas as stated above and 0 co-morbities affecting current functional status. Data obtained from problem focused assessments. No modifications or assistance was required for completion of evaluation. Only brief occupational profile and history " "review completed."     Plan:     Patient to be seen 3 x/week to address the above listed problems via self-care/home management, therapeutic activities, therapeutic exercises  · Plan of Care Expires: 01/10/18  · Plan of Care Reviewed with: patient, spouse    This Plan of care has been discussed with the patient who was involved in its development and understands and is in agreement with the identified goals and treatment plan    GOALS:    Occupational Therapy Goals        Problem: Occupational Therapy Goal    Goal Priority Disciplines Outcome Interventions   Occupational Therapy Goal     OT, PT/OT     Description:  Goals to be met by: 1/10/18     Patient will increase functional independence with ADLs by performing:    UE Dressing with Minimal Assistance.  LE Dressing with Maximum Assistance.  Toileting from toilet with Moderate Assistance for hygiene and clothing management.   Toilet transfer to toilet with Contact Guard Assistance.  Upper extremity exercise program x10 reps per handout, with supervision.                      Time Tracking:     OT Date of Treatment: 01/03/18  OT Start Time: 1519  OT Stop Time: 1539  OT Total Time (min): 20 min    Billable Minutes:Evaluation  x  20 min    Nader Frias OT  1/3/2018    "

## 2018-01-03 NOTE — ASSESSMENT & PLAN NOTE
71 y/o male with hyponatremia:           * Chronic hyponatremia     Renal: Hyponatremia: is chronic  The cause is dilutional hyponatremia from CHF + 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)  Worsened chronic hyponatremia  No change, stable, has not improved, nor worsned  Giving NS did not make a difference for the s Na  Giving NS in SIADH will cause desalination, and s Na actually can worsen  Giving NS in CHF may cause further dilution of s Na     Agree that pt has some intravascular fluid depletion (afterall, he is on diuretics)  However, he has 3rd spacing (leg swelling) and cephalization on CXR     No indications to correct rapidly or treat aggressively  Do not treat aggressively  Slow correction is recommended     To review:  Dilutional hyponatremia due to CHF, thiazide diuretic (metolazone), and CKD  In addition, 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  Has received tolvaptan     Recommendations for treatment:  D/c NS IVF's  D/c tolvaptan  Reviewed salt (2-3 g/day) and fluid (1-1.5 L/day) with the pt and wife again  Decrease bumex from 2 to 1 mg po bid  No metolazone  Decrease bumex from 2 to 1 mg po bid (doenalready yesterday)          Chronic diastolic congestive heart failure, NYHA class 3     Cardiac: diastolic CHF  Mgmt as above with salt and fluid restriction and use of loop diuretics  Noted on aldactone      Pneumonia: On cipro and zosyn.  Adjust zosyn dose for the renal function

## 2018-01-03 NOTE — PROGRESS NOTES
[facility] Hospital Medicine  Progress Note    Patient Name: Beni Cosby  MRN: [id number]  Patient Class: IP- Inpatient   Admission Date: 12/30/2017  Length of Stay: 4 days  Attending Physician: Dane Ruby MD  Primary Care Provider: Jackson Brandt MD        Subjective:     Principal Problem:Hyponatremia    HPI:  Mr. Cosby is a 71yo male  with a PMHx of chronic diastolic HFpEF (right-sided), CKD stage III, chronic hyponatremia, COPD, DM II, chronic AF on Coumadin, and AS with remote bioprosthetic AVR, who presented to the ED with c/o fatigue that has progressively worsened over the past 1 day.  Associated generalized weakness.  Denies any fever, chills, CP, SOB, worsening cough, worsening edema, ABD pain, N/V/D, dysuria, back pain, HA, AMS, visual disturbances, seizure-like activity, lightheadedness/dizziness syncope, or numbness/tingling.  No aggravating to alleviating factors.  He was admitted to [facility] 12/19 for hyponatremia with Na 119.  He received 0.9 NS IVF's, and metolazone added per Nephrology.  Initial work-up in ED resulted Na 123, cr. 2, BUN 85, WBC 16.4, , troponin 0.3.  CXR showed RLL infiltrate consistent with PNA.  EKG revealed A-Fib with controlled rate, no acute changes from previous tracings.  Case discussed with Nephrology in ED, who recommend admission for initiation of tolvaptan therapy.  Hospital Medicine consulted for admission.    Hospital Course:  Patient was admitted to Med Surg for hyponatremia and pneumonia under the care of Hospital Medicine. He was given IV abx for PNA and nephrology was consulted for hyponatremia. He was started on Tolvaptan.  1/1: Pt reports decreased SOB and cough - continue IV abx. Nephrology following - Na stable - fluid restrictions. Cardiology consulted.  1/2: Pt slowly improving. Wheezing decreased. Incentive spirometry added. Cardiology following - elevated troponin 2/2 renal insufficiency and demand ischemia.     Interval  History: No acute events overnight. Pt is sleeping good - reports decreased cough - no wheezing audible. WBC slowly improving. Na level better today but still low - no mentation changes. Cardiology and nephrology following. Continue current plan of care.    Review of Systems   Constitutional: Positive for fatigue. Negative for activity change, chills, diaphoresis, fever and unexpected weight change.   HENT: Negative for congestion, postnasal drip, rhinorrhea, sinus pressure, sore throat and trouble swallowing.    Eyes: Negative for photophobia and visual disturbance.   Respiratory: Positive for cough (chronic). Negative for apnea, chest tightness, shortness of breath and wheezing.    Cardiovascular: Positive for leg swelling (chronic). Negative for chest pain and palpitations.   Gastrointestinal: Negative for abdominal distention, abdominal pain, blood in stool, constipation, diarrhea, nausea and vomiting.   Endocrine: Negative for polydipsia, polyphagia and polyuria.   Genitourinary: Negative for decreased urine volume, difficulty urinating, dysuria, frequency, hematuria and urgency.   Musculoskeletal: Negative for arthralgias, back pain, gait problem, joint swelling and myalgias.   Skin: Negative for pallor, rash and wound.   Neurological: Positive for weakness (generalized). Negative for dizziness, seizures, syncope, facial asymmetry, speech difficulty, light-headedness, numbness and headaches.   Psychiatric/Behavioral: Negative for confusion. The patient is not nervous/anxious.    All other systems reviewed and are negative.    Objective:     Vital Signs (Most Recent):  Temp: 97.1 °F (36.2 °C) (01/03/18 0813)  Pulse: 97 (01/03/18 0813)  Resp: 19 (01/03/18 0813)  BP: (!) 106/56 (01/03/18 0813)  SpO2: 99 % (01/03/18 0813) Vital Signs (24h Range):  Temp:  [97.1 °F (36.2 °C)-98.3 °F (36.8 °C)] 97.1 °F (36.2 °C)  Pulse:  [74-97] 97  Resp:  [15-20] 19  SpO2:  [97 %-99 %] 99 %  BP: (102-141)/(51-73) 106/56     Weight:  112.5 kg (248 lb 0.3 oz)  Body mass index is 35.59 kg/m².    Intake/Output Summary (Last 24 hours) at 01/03/18 1020  Last data filed at 01/03/18 0816   Gross per 24 hour   Intake             1190 ml   Output             1300 ml   Net             -110 ml      Physical Exam   Constitutional: He is oriented to person, place, and time. He appears well-developed and well-nourished. No distress.   HENT:   Head: Normocephalic and atraumatic.   Mouth/Throat: Oropharynx is clear and moist.   Eyes: Conjunctivae are normal.   Neck: Normal range of motion. Neck supple. JVD present.   Cardiovascular: Normal rate and intact distal pulses.  An irregularly irregular rhythm present.  No extrasystoles are present. Exam reveals no gallop.    Murmur heard.  Harsh midsystolic murmur is present with a grade of 2/6  at the upper right sternal border radiating to the neck  High-pitched blowing decrescendo early diastolic murmur is present with a grade of 1/6  at the upper right sternal border radiating to the apex   Pulmonary/Chest: Effort normal. No accessory muscle usage. Tachypnea noted. No respiratory distress. He has no decreased breath sounds. He has wheezes. He has no rhonchi. He has no rales.   Abdominal: Soft. Bowel sounds are normal. He exhibits no distension. There is no tenderness. There is no rebound and no CVA tenderness.   Musculoskeletal: Normal range of motion. He exhibits edema (1+ BLE ). He exhibits no tenderness or deformity.   Neurological: He is alert and oriented to person, place, and time. No cranial nerve deficit or sensory deficit.   Skin: Skin is warm, dry and intact. Capillary refill takes 2 to 3 seconds. He is not diaphoretic. No erythema.   Psychiatric: He has a normal mood and affect. His speech is normal and behavior is normal. Cognition and memory are normal.   Nursing note and vitals reviewed.      Significant Labs:   Recent Lab Results       01/03/18  0743 01/03/18  0504 01/03/18  0445 01/03/18  0057  01/02/18  2106      Anion Gap  14        aPTT 41.5  Comment:  aPTT therapeutic range = 39-69 seconds(H)    45.5  Comment:  aPTT therapeutic range = 39-69 seconds(H)     Baso #  0.01        Basophil%  0.1        BUN, Bld  88(H)        Calcium  10.1        Chloride  82(L)        CO2  30(H)        Creatinine  1.9(H)        Differential Method  Automated        eGFR if   40(A)        eGFR if non   34  Comment:  Calculation used to obtain the estimated glomerular filtration  rate (eGFR) is the CKD-EPI equation.   (A)        Eos #  0.0        Eosinophil%  0.3        Glucose  169(H)        Gran #  12.2(H)        Gran%  77.7(H)        Hematocrit  38.0(L)        Hemoglobin  13.4(L)        Coumadin Monitoring INR 2.0  Comment:  Coumadin Therapy:  2.0 - 3.0 for INR for all indicators except mechanical heart valves  and antiphospholipid syndromes which should use 2.5 - 3.5.  (H)   2.1  Comment:  Coumadin Therapy:  2.0 - 3.0 for INR for all indicators except mechanical heart valves  and antiphospholipid syndromes which should use 2.5 - 3.5.  (H) 2.3  Comment:  Coumadin Therapy:  2.0 - 3.0 for INR for all indicators except mechanical heart valves  and antiphospholipid syndromes which should use 2.5 - 3.5.  (H)      2.0  Comment:  Coumadin Therapy:  2.0 - 3.0 for INR for all indicators except mechanical heart valves  and antiphospholipid syndromes which should use 2.5 - 3.5.  (H)         Lymph #  1.8        Lymph%  11.7(L)        MCH  31.6(H)        MCHC  35.3        MCV  90        Mono #  1.6(H)        Mono%  10.2        MPV  9.1(L)        Platelets  384(H)        POCT Glucose   154(H)       Potassium  4.7        Protime 20.1(H)   21.1(H) 23.5(H)      20.1(H)         RBC  4.24(L)        RDW  15.4(H)        Sodium  126(L)        Vancomycin, Random    12.4      WBC  15.72(H)                    01/02/18  2038 01/02/18  1641 01/02/18  1610 01/02/18  1110      Anion Gap         aPTT         Baso #          Basophil%         BUN, Bld         Calcium         Chloride         CO2         Creatinine         Differential Method         eGFR if          eGFR if non          Eos #         Eosinophil%         Glucose         Gran #         Gran%         Hematocrit         Hemoglobin         Coumadin Monitoring INR   2.5  Comment:  Coumadin Therapy:  2.0 - 3.0 for INR for all indicators except mechanical heart valves  and antiphospholipid syndromes which should use 2.5 - 3.5.  (H)      Lymph #         Lymph%         MCH         MCHC         MCV         Mono #         Mono%         MPV         Platelets         POCT Glucose 193(H) 140(H)  187(H)     Potassium         Protime   25.2(H)      RBC         RDW         Sodium         Vancomycin, Random         WBC             All pertinent labs within the past 24 hours have been reviewed.    Significant Imaging: I have reviewed all pertinent imaging results/findings within the past 24 hours.    Assessment/Plan:      * Chronic hyponatremia    - Secondary to HF with worsening cardiac status.  - Baseline Na 120-125.  - Patient with new onset fatigue and generalized weakness.  - Prior treatment with diuretics and metolazone.    - Discussed case with Nephrology in ED.  - Will start tolvaptan therapy.  - Low salt diet, fluid restriction.  - Nephrology following        Chronic kidney disease (CKD), stage III (moderate)    --nephrology following  --fluid restrictions  --avoid nephrotoxic agents          Elevated troponin    - Likely due to demand.  - Chronically elevated troponin (baseline 0.1)  - Initial troponin 0.3; EKG unrevealing.  - No chest pain.  - Trend serial cardiac enzymes, EKG.  - Recent TTE 12/23/17 showed CLVH, EF 60-65%, pulmonary HTN (PAP 46mmHg), stable prosthetic aortic valve.  - Continue medical management with ASA, BB, and high-intensity statin.  - Cardiology following        Right lower lobe pneumonia    - Supplemental O2 as needed.  -  Duonebs.  - Mucinex.  - Sputum culture.  - Empiric abx with IV Rocephin and azithromycin.        Acute renal failure superimposed on stage 3 chronic kidney disease    - Initial creatinine 2 (baseline cr. 1.5-1.7).  --nephrology following  --cardiology following  - Strict I&O's.        Chronic diastolic congestive heart failure, NYHA class 3    - Clinically compensated, ? mild volume depletion.  Will defer to Cardiology.  - Continue home Bumex, conor, and metolazone.   - Strict I&O's, daily weights, Na/fluid restriction.  - Followed by Dr. Chew ( Cardiology).  --cardiology following  --continue bumex, KCL, spironolactone          Aortic valve stenosis with remote bioprosthetic AVR    - Stable.  - Followed by Dr. Chew ( Cardiology).        Type 2 diabetes mellitus    - Recent HbA1c 6.9 on 12/20.  - Accuchecks with SSI.  - Diabetic diet.        Chronic atrial fibrillation    - Rate controlled.  - Continue home beta blocker.  - Continue Coumadin for AC, pharmacy to dose.  - Daily INR.          VTE Risk Mitigation         Ordered     warfarin (COUMADIN) tablet 1 mg  Every Saturday     Route:  Oral        12/31/17 1154     Medium Risk of VTE  Once      12/31/17 1436     Place sequential compression device  Until discontinued      12/31/17 1436              CELIA Correia  Department of Hospital Medicine   [facility]

## 2018-01-03 NOTE — SUBJECTIVE & OBJECTIVE
Interval History: No acute events overnight. Pt is sleeping good - reports decreased cough - no wheezing audible. WBC slowly improving. Na level better today but still low - no mentation changes. Cardiology and nephrology following. Continue current plan of care.    Review of Systems   Constitutional: Positive for fatigue. Negative for activity change, chills, diaphoresis, fever and unexpected weight change.   HENT: Negative for congestion, postnasal drip, rhinorrhea, sinus pressure, sore throat and trouble swallowing.    Eyes: Negative for photophobia and visual disturbance.   Respiratory: Positive for cough (chronic). Negative for apnea, chest tightness, shortness of breath and wheezing.    Cardiovascular: Positive for leg swelling (chronic). Negative for chest pain and palpitations.   Gastrointestinal: Negative for abdominal distention, abdominal pain, blood in stool, constipation, diarrhea, nausea and vomiting.   Endocrine: Negative for polydipsia, polyphagia and polyuria.   Genitourinary: Negative for decreased urine volume, difficulty urinating, dysuria, frequency, hematuria and urgency.   Musculoskeletal: Negative for arthralgias, back pain, gait problem, joint swelling and myalgias.   Skin: Negative for pallor, rash and wound.   Neurological: Positive for weakness (generalized). Negative for dizziness, seizures, syncope, facial asymmetry, speech difficulty, light-headedness, numbness and headaches.   Psychiatric/Behavioral: Negative for confusion. The patient is not nervous/anxious.    All other systems reviewed and are negative.    Objective:     Vital Signs (Most Recent):  Temp: 97.1 °F (36.2 °C) (01/03/18 0813)  Pulse: 97 (01/03/18 0813)  Resp: 19 (01/03/18 0813)  BP: (!) 106/56 (01/03/18 0813)  SpO2: 99 % (01/03/18 0813) Vital Signs (24h Range):  Temp:  [97.1 °F (36.2 °C)-98.3 °F (36.8 °C)] 97.1 °F (36.2 °C)  Pulse:  [74-97] 97  Resp:  [15-20] 19  SpO2:  [97 %-99 %] 99 %  BP: (102-141)/(51-73) 106/56      Weight: 112.5 kg (248 lb 0.3 oz)  Body mass index is 35.59 kg/m².    Intake/Output Summary (Last 24 hours) at 01/03/18 1020  Last data filed at 01/03/18 0816   Gross per 24 hour   Intake             1190 ml   Output             1300 ml   Net             -110 ml      Physical Exam   Constitutional: He is oriented to person, place, and time. He appears well-developed and well-nourished. No distress.   HENT:   Head: Normocephalic and atraumatic.   Mouth/Throat: Oropharynx is clear and moist.   Eyes: Conjunctivae are normal.   Neck: Normal range of motion. Neck supple. JVD present.   Cardiovascular: Normal rate and intact distal pulses.  An irregularly irregular rhythm present.  No extrasystoles are present. Exam reveals no gallop.    Murmur heard.  Harsh midsystolic murmur is present with a grade of 2/6  at the upper right sternal border radiating to the neck  High-pitched blowing decrescendo early diastolic murmur is present with a grade of 1/6  at the upper right sternal border radiating to the apex   Pulmonary/Chest: Effort normal. No accessory muscle usage. Tachypnea noted. No respiratory distress. He has no decreased breath sounds. He has wheezes. He has no rhonchi. He has no rales.   Abdominal: Soft. Bowel sounds are normal. He exhibits no distension. There is no tenderness. There is no rebound and no CVA tenderness.   Musculoskeletal: Normal range of motion. He exhibits edema (1+ BLE ). He exhibits no tenderness or deformity.   Neurological: He is alert and oriented to person, place, and time. No cranial nerve deficit or sensory deficit.   Skin: Skin is warm, dry and intact. Capillary refill takes 2 to 3 seconds. He is not diaphoretic. No erythema.   Psychiatric: He has a normal mood and affect. His speech is normal and behavior is normal. Cognition and memory are normal.   Nursing note and vitals reviewed.      Significant Labs:   Recent Lab Results       01/03/18  0743 01/03/18  0504 01/03/18  4913  01/03/18  0057 01/02/18  2106      Anion Gap  14        aPTT 41.5  Comment:  aPTT therapeutic range = 39-69 seconds(H)    45.5  Comment:  aPTT therapeutic range = 39-69 seconds(H)     Baso #  0.01        Basophil%  0.1        BUN, Bld  88(H)        Calcium  10.1        Chloride  82(L)        CO2  30(H)        Creatinine  1.9(H)        Differential Method  Automated        eGFR if   40(A)        eGFR if non   34  Comment:  Calculation used to obtain the estimated glomerular filtration  rate (eGFR) is the CKD-EPI equation.   (A)        Eos #  0.0        Eosinophil%  0.3        Glucose  169(H)        Gran #  12.2(H)        Gran%  77.7(H)        Hematocrit  38.0(L)        Hemoglobin  13.4(L)        Coumadin Monitoring INR 2.0  Comment:  Coumadin Therapy:  2.0 - 3.0 for INR for all indicators except mechanical heart valves  and antiphospholipid syndromes which should use 2.5 - 3.5.  (H)   2.1  Comment:  Coumadin Therapy:  2.0 - 3.0 for INR for all indicators except mechanical heart valves  and antiphospholipid syndromes which should use 2.5 - 3.5.  (H) 2.3  Comment:  Coumadin Therapy:  2.0 - 3.0 for INR for all indicators except mechanical heart valves  and antiphospholipid syndromes which should use 2.5 - 3.5.  (H)      2.0  Comment:  Coumadin Therapy:  2.0 - 3.0 for INR for all indicators except mechanical heart valves  and antiphospholipid syndromes which should use 2.5 - 3.5.  (H)         Lymph #  1.8        Lymph%  11.7(L)        MCH  31.6(H)        MCHC  35.3        MCV  90        Mono #  1.6(H)        Mono%  10.2        MPV  9.1(L)        Platelets  384(H)        POCT Glucose   154(H)       Potassium  4.7        Protime 20.1(H)   21.1(H) 23.5(H)      20.1(H)         RBC  4.24(L)        RDW  15.4(H)        Sodium  126(L)        Vancomycin, Random    12.4      WBC  15.72(H)                    01/02/18  2038 01/02/18  1641 01/02/18  1610 01/02/18  1110      Anion Gap         aPTT          Baso #         Basophil%         BUN, Bld         Calcium         Chloride         CO2         Creatinine         Differential Method         eGFR if          eGFR if non          Eos #         Eosinophil%         Glucose         Gran #         Gran%         Hematocrit         Hemoglobin         Coumadin Monitoring INR   2.5  Comment:  Coumadin Therapy:  2.0 - 3.0 for INR for all indicators except mechanical heart valves  and antiphospholipid syndromes which should use 2.5 - 3.5.  (H)      Lymph #         Lymph%         MCH         MCHC         MCV         Mono #         Mono%         MPV         Platelets         POCT Glucose 193(H) 140(H)  187(H)     Potassium         Protime   25.2(H)      RBC         RDW         Sodium         Vancomycin, Random         WBC             All pertinent labs within the past 24 hours have been reviewed.    Significant Imaging: I have reviewed all pertinent imaging results/findings within the past 24 hours.

## 2018-01-03 NOTE — PLAN OF CARE
Problem: Patient Care Overview  Goal: Plan of Care Review  Outcome: Ongoing (interventions implemented as appropriate)  Pt lung sounds are coarse RUL and LOVE. Pt has an occasional productive cough. Thick yellow sputum. Pt is on 1000 ML fluids restriction. Blood glucose is being monitored.  Pt denies any pain. IV ABX given per order. No injuries. Will continue to monitor. 12 hour chart check is completed.

## 2018-01-03 NOTE — PT/OT/SLP EVAL
Physical Therapy Evaluation    Patient Name:  Beni Cosby   MRN:  [id number]    Recommendations:     Discharge Recommendations:  nursing facility, skilled   Discharge Equipment Recommendations: none   Barriers to discharge:NONE    Assessment:     Beni Cosby is a 72 y.o. male admitted with a medical diagnosis of Hyponatremia.  He presents with the following impairments/functional limitations:  weakness, impaired self care skills, impaired functional mobilty, impaired endurance, gait instability, decreased lower extremity function, pain, decreased safety awareness, impaired balance .    Rehab Prognosis:  GOOD; patient would benefit from acute skilled PT services to address these deficits and reach maximum level of function.      Recent Surgery: * No surgery found *      Plan:     During this hospitalization, patient to be seen   to address the above listed problems via gait training, therapeutic activities, therapeutic exercises  · Plan of Care Expires:  01/10/18   Plan of Care Reviewed with: patient, spouse    Subjective     Communicated with NURSE AND EPIC CHART REVIEW prior to session.  Patient found SUP IN BED WITH WIFE PRESENT  upon PT entry to room, agreeable to evaluation.      Chief Complaint: PAIN   Patient comments/goals: NONE STATED   Pain/Comfort:  · Pain Rating 1: 2/10  · Location - Side 1: Right  · Location 1: leg  · Pain Addressed 1: Reposition  · Pain Rating Post-Intervention 1: 2/10    Patients cultural, spiritual, Denominational conflicts given the current situation:      Living Environment:  PT LIVES AT HOME WITH WIFE AND HAS 3 STEPS WITH RAILING   Prior to admission, patients level of function was BOBY WITH RW AND REQUIRES ASSIST FROM WIFE WITH ADL'S.  Patient has the following equipment: walker, rolling.  DME owned (not currently used): none.  Upon discharge, patient will have assistance from WIFE  .    Objective:     Patient found with: peripheral IV, telemetry, oxygen     General  Precautions: Standard, fall   Orthopedic Precautions:    Braces: N/A     Exams:  · RLE ROM: LIMITED   · RLE Strength: LIMITED  · LLE ROM: LIMITED  · LLE Strength: LIMITED    Functional Mobility:  · PT LOG ROLLED AND SEATED EOB TO LEFT WITH MIN A AND INC TIME. PT SCOOTED TO EOB WITH CGA. PT STOOD WITH RW AND MIN A FOR GT X 15' WITH SLOW PACE GT AND CHAIR IN TOW. PT RETURNED TO RM T/F TO BEDSIDE WITH MIN A. PT SUP IN BED AS PT DECLINED SITTING UP IN CHAIR. PT SUP IN BED WITH SBA. PT LEFT SUP WITH ALL NEEDS MET. P.T. DISCUSSED POC AND REC WITH WIFE AND PT.     AM-PAC 6 CLICK MOBILITY  Total Score:16     Patient left supine with call button in reach and WIFE present.    GOALS:    Physical Therapy Goals        Problem: Physical Therapy Goal    Goal Priority Disciplines Outcome Goal Variances Interventions   Physical Therapy Goal     PT/OT, PT      Description:  PT WILL BE SEEN FORP.T. FOR A MIN OF 5 OUT OF 7 DAYS A WEEK  LT/10/18  1. PT WILL COMPLETE BED MOBILITY BOBY   2. PT WILL T/F TO CHAIR WITH RW MOD I  3. PT WILL GT TRAIN X 100' WITH RW AND SBA.  4. PT WILL COMPLETE B LE TE X 20 REPS FOR STRENGTHENING.                    History:     Past Medical History:   Diagnosis Date    Anticoagulant long-term use     Aortic valve stenosis with insufficiency 2008    [facility]    Arthritis     CHF (congestive heart failure)     Chronic a-fib     CKD (chronic kidney disease) stage 3, GFR 30-59 ml/min     COPD (chronic obstructive pulmonary disease)     Diabetes mellitus     Encounter for blood transfusion     Primary cancer of right kidney     Surgery, no further treatment       Past Surgical History:   Procedure Laterality Date    Atrial septem device implanted      BONE MARROW BIOPSY      CARDIAC SURGERY      Aotric valve replacement     KIDNEY SURGERY Right 2007    NOSE SURGERY  2008    RENAL BIOPSY  /       Clinical Decision Making:     History  Co-morbidities and personal factors  that may impact the plan of care Examination  Body Structures and Functions, activity limitations and participation restrictions that may impact the plan of care Clinical Presentation   Decision Making/ Complexity Score   Co-morbidities:   [] Time since onset of injury / illness / exacerbation  [] Status of current condition  []Patient's cognitive status and safety concerns    [] Multiple Medical Problems (see med hx)  Personal Factors:   [] Patient's age  [] Prior Level of function   [] Patient's home situation (environment and family support)  [] Patient's level of motivation  [] Expected progression of patient      HISTORY:(criteria)    [] 91798 - no personal factors/history    [x] 66931 - has 1-2 personal factor/comorbidity     [] 71244 - has >3 personal factor/comorbidity     Body Regions:  [] Objective examination findings  [] Head     []  Neck  [] Trunk   [] Upper Extremity  [] Lower Extremity    Body Systems:  [] For communication ability, affect, cognition, language, and learning style: the assessment of the ability to make needs known, consciousness, orientation (person, place, and time), expected emotional /behavioral responses, and learning preferences (eg, learning barriers, education  needs)  [] For the neuromuscular system: a general assessment of gross coordinated movement (eg, balance, gait, locomotion, transfers, and transitions) and motor function  (motor control and motor learning)  [] For the musculoskeletal system: the assessment of gross symmetry, gross range of motion, gross strength, height, and weight  [] For the integumentary system: the assessment of pliability(texture), presence of scar formation, skin color, and skin integrity  [] For cardiovascular/pulmonary system: the assessment of heart rate, respiratory rate, blood pressure, and edema     Activity limitations:    [] Patient's cognitive status and saf ety concerns          [] Status of current condition      [] Weight bearing  restriction  [] Cardiopulmunary Restriction    Participation Restrictions:   [] Goals and goal agreement with the patient     [] Rehab potential (prognosis) and probable outcome      Examination of Body System: (criteria)    [x] 30775 - addressing 1-2 elements    [] 39294 - addressing a total of 3 or more elements     [] 22895 -  Addressing a total of 4 or more elements         Clinical Presentation: (criteria)  Evolving - 45606     On examination of body system using standardized tests and measures patient presents with (CHOOSE ONE) elements from any of the following: body structures and functions, activity limitations, and/or participation restrictions.  Leading to a clinical presentation that is considered (CHOOSE ONE)                              Clinical Decision Making  (Eval Complexity):  Moderate - 65314     Time Tracking:     PT Received On: 01/03/18  PT Start Time: 1130     PT Stop Time: 1154  PT Total Time (min): 24 min     Billable Minutes: Evaluation 14 and Gait Training 10      Gauri South, PT  01/03/2018

## 2018-01-03 NOTE — ASSESSMENT & PLAN NOTE
- Likely due to demand.  - Chronically elevated troponin (baseline 0.1)  - Initial troponin 0.3; EKG unrevealing.  - No chest pain.  - Trend serial cardiac enzymes, EKG.  - Recent TTE 12/23/17 showed CLVH, EF 60-65%, pulmonary HTN (PAP 46mmHg), stable prosthetic aortic valve.  - Continue medical management with ASA, BB, and high-intensity statin.  - Cardiology following

## 2018-01-03 NOTE — PHYSICIAN QUERY
"PT Name: Beni Cosby  MR #: [id number]    Physician Query Form - Heart  Condition Clarification     CDS/: Olga Barker               Contact information:[email or web address]  This form is a permanent document in the medical record.     Query Date: January 3, 2018    By submitting this query, we are merely seeking further clarification of documentation. Please utilize your independent clinical judgment when addressing the question(s) below.    The medical record contains the following   Indicators     Supporting Clinical Findings Location in Medical Record    x BNP   12/23    x EF   60-65%  ECHO 12/23    Radiology findings      x Echo Results CONCLUSIONS     1 - Concentric hypertrophy.     2 - No wall motion abnormalities.     3 - Normal left ventricular systolic function (EF 60-65%).     4 - Normal right ventricular systolic function .     5 - Pulmonary hypertension. The estimated PA systolic pressure is greater than 46 mmHg.     6 - Aortic valve prosthesis, SEAN = 1.26 cm2, AVAi = 0.5 cm2/m2, peak velocity = 3.83 m/s, mean gradient = 39 mmHg.     7 - Mild tricuspid regurgitation.               ECHO 12/23    "Ascites" documented      "SOB" or "LERMA" documented      "Hypoxia" documented      x Heart Failure documented  CHF   H&P /d/c summary    "Edema" documented      x Diuretics/Meds  Lasix 60mg IV tid    Toprol -xl 100mg po qd   MAR 12/22-12/23    Treatment:      Other:          Provider, please specify diagnosis or diagnoses associated with above clinical findings.                                 [ X ] Acute Diastolic Heart Failure ( EF > 40)*  [  ] Acute on Chronic Diastolic Heart Failure( EF > 40)*  [  ] Chronic Diastolic Heart Failure (EF > 40)*  [  ] Acute Combined Systolic and Diastolic Heart Failure  [  ] Acute on Chronic Combined Systolic and Diastolic Heart Failure  [  ] Chronic Combined Systolic and Diastolic Heart Failure  [  ] Other (please specify): ___________________________________  [ "  ] Clinically Undetermined            *American Heart Association                                                                                                          Please document in your progress notes daily for the duration of treatment until resolved and include in your discharge summary.

## 2018-01-03 NOTE — ASSESSMENT & PLAN NOTE
- Clinically compensated, ? mild volume depletion.  Will defer to Cardiology.  - Continue home Bumex, conor, and metolazone.   - Strict I&O's, daily weights, Na/fluid restriction.  - Followed by Dr. Chew ( Cardiology).  --cardiology following  --continue bumex, KCL, spironolactone

## 2018-01-03 NOTE — PROGRESS NOTES
[facility] Hospital Medicine  Progress Note    Patient Name: Beni Cosby  MRN: [id number]  Patient Class: IP- Inpatient   Admission Date: 12/30/2017  Length of Stay: 3 days  Attending Physician: Dane Ruby MD  Primary Care Provider: Jackson Brandt MD        Subjective:     Principal Problem:Hyponatremia    HPI:  Mr. Cosby is a 73yo male  with a PMHx of chronic diastolic HFpEF (right-sided), CKD stage III, chronic hyponatremia, COPD, DM II, chronic AF on Coumadin, and AS with remote bioprosthetic AVR, who presented to the ED with c/o fatigue that has progressively worsened over the past 1 day.  Associated generalized weakness.  Denies any fever, chills, CP, SOB, worsening cough, worsening edema, ABD pain, N/V/D, dysuria, back pain, HA, AMS, visual disturbances, seizure-like activity, lightheadedness/dizziness syncope, or numbness/tingling.  No aggravating to alleviating factors.  He was admitted to [facility] 12/19 for hyponatremia with Na 119.  He received 0.9 NS IVF's, and metolazone added per Nephrology.  Initial work-up in ED resulted Na 123, cr. 2, BUN 85, WBC 16.4, , troponin 0.3.  CXR showed RLL infiltrate consistent with PNA.  EKG revealed A-Fib with controlled rate, no acute changes from previous tracings.  Case discussed with Nephrology in ED, who recommend admission for initiation of tolvaptan therapy.  Hospital Medicine consulted for admission.    Hospital Course:  Patient was admitted to Med Surg for hyponatremia and pneumonia under the care of Hospital Medicine. He was given IV abx for PNA and nephrology was consulted for hyponatremia. He was started on Tolvaptan.  1/1: Pt reports decreased SOB and cough - continue IV abx. Nephrology following - Na stable - fluid restrictions. Cardiology consulted.    Interval History: Pt slowly improving. Wheezing decreased. Incentive spirometry added. Cardiology following - elevated troponin 2/2 renal insufficiency and demand  ischemia. Continue current plan of care.    Review of Systems   Constitutional: Positive for fatigue. Negative for activity change, chills, diaphoresis, fever and unexpected weight change.   HENT: Negative for congestion, postnasal drip, rhinorrhea, sinus pressure, sore throat and trouble swallowing.    Eyes: Negative for photophobia and visual disturbance.   Respiratory: Positive for cough (chronic) and wheezing. Negative for apnea, chest tightness and shortness of breath.    Cardiovascular: Positive for leg swelling (chronic). Negative for chest pain and palpitations.   Gastrointestinal: Negative for abdominal distention, abdominal pain, blood in stool, constipation, diarrhea, nausea and vomiting.   Endocrine: Negative for polydipsia, polyphagia and polyuria.   Genitourinary: Negative for decreased urine volume, difficulty urinating, dysuria, frequency, hematuria and urgency.   Musculoskeletal: Negative for arthralgias, back pain, gait problem, joint swelling and myalgias.   Skin: Negative for pallor, rash and wound.   Neurological: Positive for weakness (generalized). Negative for dizziness, seizures, syncope, facial asymmetry, speech difficulty, light-headedness, numbness and headaches.   Psychiatric/Behavioral: Negative for confusion. The patient is not nervous/anxious.    All other systems reviewed and are negative.    Objective:     Vital Signs (Most Recent):  Temp: 97.6 °F (36.4 °C) (01/02/18 1607)  Pulse: 79 (01/02/18 1607)  Resp: 20 (01/02/18 1607)  BP: (!) 140/67 (01/02/18 1607)  SpO2: 97 % (01/02/18 1607) Vital Signs (24h Range):  Temp:  [97.4 °F (36.3 °C)-98.4 °F (36.9 °C)] 97.6 °F (36.4 °C)  Pulse:  [74-92] 79  Resp:  [16-20] 20  SpO2:  [94 %-99 %] 97 %  BP: (102-140)/(51-67) 140/67     Weight: 112 kg (246 lb 14.6 oz)  Body mass index is 35.43 kg/m².    Intake/Output Summary (Last 24 hours) at 01/02/18 1846  Last data filed at 01/02/18 1800   Gross per 24 hour   Intake          1961.25 ml   Output              1525 ml   Net           436.25 ml      Physical Exam   Constitutional: He is oriented to person, place, and time. He appears well-developed and well-nourished. No distress.   HENT:   Head: Normocephalic and atraumatic.   Eyes: Conjunctivae are normal.   Neck: Normal range of motion. Neck supple. JVD present.   Cardiovascular: Normal rate and intact distal pulses.  An irregularly irregular rhythm present.  No extrasystoles are present. Exam reveals no gallop.    Murmur heard.  Harsh midsystolic murmur is present with a grade of 2/6  at the upper right sternal border radiating to the neck  High-pitched blowing decrescendo early diastolic murmur is present with a grade of 1/6  at the upper right sternal border radiating to the apex   Pulmonary/Chest: Effort normal. No accessory muscle usage. Tachypnea noted. No respiratory distress. He has no decreased breath sounds. He has wheezes. He has no rhonchi. He has no rales.   Abdominal: Soft. Bowel sounds are normal. He exhibits no distension. There is no tenderness. There is no rebound, no guarding and no CVA tenderness.   Musculoskeletal: Normal range of motion. He exhibits edema (1+ BLE ). He exhibits no tenderness or deformity.   Neurological: He is alert and oriented to person, place, and time. No cranial nerve deficit or sensory deficit.   Skin: Skin is warm, dry and intact. Capillary refill takes 2 to 3 seconds. No rash noted. He is not diaphoretic. No erythema. No pallor.   Psychiatric: He has a normal mood and affect. His speech is normal and behavior is normal. Cognition and memory are normal.   Nursing note and vitals reviewed.      Significant Labs:   Recent Lab Results       01/02/18  1641 01/02/18  1610 01/02/18  1110 01/02/18  0819 01/02/18  0508      Anion Gap    15      aPTT    52.0  Comment:  aPTT therapeutic range = 39-69 seconds(H)      Baso #    0.02      Basophil%    0.1      BUN, Bld    91(H)      Calcium    10.4      Chloride    81(L)      CO2     28      Creatinine    1.9(H)      Differential Method    Automated      eGFR if     40(A)      eGFR if non     34  Comment:  Calculation used to obtain the estimated glomerular filtration  rate (eGFR) is the CKD-EPI equation.   (A)      Eos #    0.0      Eosinophil%    0.1      Glucose    146(H)      Gran #    11.7(H)      Gran%    73.4(H)      Hematocrit    39.0(L)      Hemoglobin    13.5(L)      Coumadin Monitoring INR  2.5  Comment:  Coumadin Therapy:  2.0 - 3.0 for INR for all indicators except mechanical heart valves  and antiphospholipid syndromes which should use 2.5 - 3.5.  (H)  2.8  Comment:  Coumadin Therapy:  2.0 - 3.0 for INR for all indicators except mechanical heart valves  and antiphospholipid syndromes which should use 2.5 - 3.5.  (H)          2.8  Comment:  Coumadin Therapy:  2.0 - 3.0 for INR for all indicators except mechanical heart valves  and antiphospholipid syndromes which should use 2.5 - 3.5.  (H)      Lymph #    2.3      Lymph%    14.5(L)      MCH    30.9      MCHC    34.6      MCV    89      Mono #    1.9(H)      Mono%    11.9      MPV    9.3      Platelets    336      POCT Glucose 140(H)  187(H)  130(H)     Potassium    4.4      Protime  25.2(H)  27.7(H)          27.7(H)      RBC    4.37(L)      RDW    15.1(H)      Sodium    124(L)      WBC    15.90(H)                  01/02/18  0004 01/01/18  2004 01/01/18 1957      Anion Gap        aPTT  68.1  Comment:  aPTT therapeutic range = 39-69 seconds(H)      Baso #        Basophil%        BUN, Bld        Calcium        Chloride        CO2        Creatinine        Differential Method        eGFR if         eGFR if non         Eos #        Eosinophil%        Glucose        Gran #        Gran%        Hematocrit        Hemoglobin        Coumadin Monitoring INR 3.7  Comment:  Coumadin Therapy:  2.0 - 3.0 for INR for all indicators except mechanical heart valves  and antiphospholipid  syndromes which should use 2.5 - 3.5.  (H) 3.9  Comment:  Coumadin Therapy:  2.0 - 3.0 for INR for all indicators except mechanical heart valves  and antiphospholipid syndromes which should use 2.5 - 3.5.  (H)      Lymph #        Lymph%        MCH        MCHC        MCV        Mono #        Mono%        MPV        Platelets        POCT Glucose   184(H)     Potassium        Protime 36.8(H) 38.7(H)      RBC        RDW        Sodium        WBC            All pertinent labs within the past 24 hours have been reviewed.    Significant Imaging: I have reviewed all pertinent imaging results/findings within the past 24 hours.    Assessment/Plan:      * Chronic hyponatremia    - Secondary to HF with worsening cardiac status.  - Baseline Na 120-125.  - Patient with new onset fatigue and generalized weakness.  - Prior treatment with diuretics and metolazone.    - Discussed case with Nephrology in ED.  - Will start tolvaptan therapy.  - Low salt diet, fluid restriction.  - Nephrology following        Chronic kidney disease (CKD), stage III (moderate)    --nephrology following  --fluid restrictions  --avoid nephrotoxic agents          Elevated troponin    - Likely due to demand.  - Chronically elevated troponin (baseline 0.1)  - Initial troponin 0.3; EKG unrevealing.  - No chest pain.  - Trend serial cardiac enzymes, EKG.  - Recent TTE 12/23/17 showed CLVH, EF 60-65%, pulmonary HTN (PAP 46mmHg), stable prosthetic aortic valve.  - Continue medical management with ASA, BB, and high-intensity statin.  - Cardiology following        Right lower lobe pneumonia    - Supplemental O2 as needed.  - Duonebs.  - Mucinex.  - Sputum culture.  - Empiric abx with IV Rocephin and azithromycin.        Acute renal failure superimposed on stage 3 chronic kidney disease    - Initial creatinine 2 (baseline cr. 1.5-1.7).  --nephrology following  --cardiology following  - Strict I&O's.        Chronic diastolic congestive heart failure, NYHA class 3    -  Clinically compensated, ? mild volume depletion.  Will defer to Cardiology.  - Continue home Bumex, conor, and metolazone.   - Strict I&O's, daily weights, Na/fluid restriction.  - Followed by Dr. Chew ( Cardiology).  --cardiology following  --continue bumex, KCL, spironolactone          Aortic valve stenosis with remote bioprosthetic AVR    - Stable.  - Followed by Dr. Chew ( Cardiology).        Type 2 diabetes mellitus    - Recent HbA1c 6.9 on 12/20.  - Accuchecks with SSI.  - Diabetic diet.        Chronic atrial fibrillation    - Rate controlled.  - Continue home beta blocker.  - Continue Coumadin for AC, pharmacy to dose.  - Daily INR.          VTE Risk Mitigation         Ordered     warfarin (COUMADIN) tablet 1 mg  Every Saturday     Route:  Oral        12/31/17 1154     Medium Risk of VTE  Once      12/31/17 1436     Place sequential compression device  Until discontinued      12/31/17 1436              ANGELA Correia-C  Department of Hospital Medicine   [facility]

## 2018-01-03 NOTE — SUBJECTIVE & OBJECTIVE
Interval History: Pt slowly improving. Wheezing decreased. Incentive spirometry added. Cardiology following - elevated troponin 2/2 renal insufficiency and demand ischemia. Continue current plan of care.    Review of Systems   Constitutional: Positive for fatigue. Negative for activity change, chills, diaphoresis, fever and unexpected weight change.   HENT: Negative for congestion, postnasal drip, rhinorrhea, sinus pressure, sore throat and trouble swallowing.    Eyes: Negative for photophobia and visual disturbance.   Respiratory: Positive for cough (chronic) and wheezing. Negative for apnea, chest tightness and shortness of breath.    Cardiovascular: Positive for leg swelling (chronic). Negative for chest pain and palpitations.   Gastrointestinal: Negative for abdominal distention, abdominal pain, blood in stool, constipation, diarrhea, nausea and vomiting.   Endocrine: Negative for polydipsia, polyphagia and polyuria.   Genitourinary: Negative for decreased urine volume, difficulty urinating, dysuria, frequency, hematuria and urgency.   Musculoskeletal: Negative for arthralgias, back pain, gait problem, joint swelling and myalgias.   Skin: Negative for pallor, rash and wound.   Neurological: Positive for weakness (generalized). Negative for dizziness, seizures, syncope, facial asymmetry, speech difficulty, light-headedness, numbness and headaches.   Psychiatric/Behavioral: Negative for confusion. The patient is not nervous/anxious.    All other systems reviewed and are negative.    Objective:     Vital Signs (Most Recent):  Temp: 97.6 °F (36.4 °C) (01/02/18 1607)  Pulse: 79 (01/02/18 1607)  Resp: 20 (01/02/18 1607)  BP: (!) 140/67 (01/02/18 1607)  SpO2: 97 % (01/02/18 1607) Vital Signs (24h Range):  Temp:  [97.4 °F (36.3 °C)-98.4 °F (36.9 °C)] 97.6 °F (36.4 °C)  Pulse:  [74-92] 79  Resp:  [16-20] 20  SpO2:  [94 %-99 %] 97 %  BP: (102-140)/(51-67) 140/67     Weight: 112 kg (246 lb 14.6 oz)  Body mass index is  35.43 kg/m².    Intake/Output Summary (Last 24 hours) at 01/02/18 1846  Last data filed at 01/02/18 1800   Gross per 24 hour   Intake          1961.25 ml   Output             1525 ml   Net           436.25 ml      Physical Exam   Constitutional: He is oriented to person, place, and time. He appears well-developed and well-nourished. No distress.   HENT:   Head: Normocephalic and atraumatic.   Eyes: Conjunctivae are normal.   Neck: Normal range of motion. Neck supple. JVD present.   Cardiovascular: Normal rate and intact distal pulses.  An irregularly irregular rhythm present.  No extrasystoles are present. Exam reveals no gallop.    Murmur heard.  Harsh midsystolic murmur is present with a grade of 2/6  at the upper right sternal border radiating to the neck  High-pitched blowing decrescendo early diastolic murmur is present with a grade of 1/6  at the upper right sternal border radiating to the apex   Pulmonary/Chest: Effort normal. No accessory muscle usage. Tachypnea noted. No respiratory distress. He has no decreased breath sounds. He has wheezes. He has no rhonchi. He has no rales.   Abdominal: Soft. Bowel sounds are normal. He exhibits no distension. There is no tenderness. There is no rebound, no guarding and no CVA tenderness.   Musculoskeletal: Normal range of motion. He exhibits edema (1+ BLE ). He exhibits no tenderness or deformity.   Neurological: He is alert and oriented to person, place, and time. No cranial nerve deficit or sensory deficit.   Skin: Skin is warm, dry and intact. Capillary refill takes 2 to 3 seconds. No rash noted. He is not diaphoretic. No erythema. No pallor.   Psychiatric: He has a normal mood and affect. His speech is normal and behavior is normal. Cognition and memory are normal.   Nursing note and vitals reviewed.      Significant Labs:   Recent Lab Results       01/02/18  1641 01/02/18  1610 01/02/18  1110 01/02/18  0819 01/02/18  0508      Anion Gap    15      aPTT     52.0  Comment:  aPTT therapeutic range = 39-69 seconds(H)      Baso #    0.02      Basophil%    0.1      BUN, Bld    91(H)      Calcium    10.4      Chloride    81(L)      CO2    28      Creatinine    1.9(H)      Differential Method    Automated      eGFR if     40(A)      eGFR if non     34  Comment:  Calculation used to obtain the estimated glomerular filtration  rate (eGFR) is the CKD-EPI equation.   (A)      Eos #    0.0      Eosinophil%    0.1      Glucose    146(H)      Gran #    11.7(H)      Gran%    73.4(H)      Hematocrit    39.0(L)      Hemoglobin    13.5(L)      Coumadin Monitoring INR  2.5  Comment:  Coumadin Therapy:  2.0 - 3.0 for INR for all indicators except mechanical heart valves  and antiphospholipid syndromes which should use 2.5 - 3.5.  (H)  2.8  Comment:  Coumadin Therapy:  2.0 - 3.0 for INR for all indicators except mechanical heart valves  and antiphospholipid syndromes which should use 2.5 - 3.5.  (H)          2.8  Comment:  Coumadin Therapy:  2.0 - 3.0 for INR for all indicators except mechanical heart valves  and antiphospholipid syndromes which should use 2.5 - 3.5.  (H)      Lymph #    2.3      Lymph%    14.5(L)      MCH    30.9      MCHC    34.6      MCV    89      Mono #    1.9(H)      Mono%    11.9      MPV    9.3      Platelets    336      POCT Glucose 140(H)  187(H)  130(H)     Potassium    4.4      Protime  25.2(H)  27.7(H)          27.7(H)      RBC    4.37(L)      RDW    15.1(H)      Sodium    124(L)      WBC    15.90(H)                  01/02/18  0004 01/01/18  2004 01/01/18 1957      Anion Gap        aPTT  68.1  Comment:  aPTT therapeutic range = 39-69 seconds(H)      Baso #        Basophil%        BUN, Bld        Calcium        Chloride        CO2        Creatinine        Differential Method        eGFR if         eGFR if non         Eos #        Eosinophil%        Glucose        Gran #        Gran%         Hematocrit        Hemoglobin        Coumadin Monitoring INR 3.7  Comment:  Coumadin Therapy:  2.0 - 3.0 for INR for all indicators except mechanical heart valves  and antiphospholipid syndromes which should use 2.5 - 3.5.  (H) 3.9  Comment:  Coumadin Therapy:  2.0 - 3.0 for INR for all indicators except mechanical heart valves  and antiphospholipid syndromes which should use 2.5 - 3.5.  (H)      Lymph #        Lymph%        MCH        MCHC        MCV        Mono #        Mono%        MPV        Platelets        POCT Glucose   184(H)     Potassium        Protime 36.8(H) 38.7(H)      RBC        RDW        Sodium        WBC            All pertinent labs within the past 24 hours have been reviewed.    Significant Imaging: I have reviewed all pertinent imaging results/findings within the past 24 hours.

## 2018-01-03 NOTE — PLAN OF CARE
Problem: Patient Care Overview  Goal: Plan of Care Review  Outcome: Ongoing (interventions implemented as appropriate)  Patient remains free from falls and all safety precautions are still maintained. Patient states 0 pain on scale of 0-10. Afebrile. Water restriction only 1000 ml/day. Bed locked/lowered. Skin is clean, dry and intact. No s/s of acute distress. Will continue to monitor. 12 hour chart check.

## 2018-01-03 NOTE — PROGRESS NOTES
"[facility] -   Nephrology  Progress Note    Patient Name: Beni Cosby  MRN: [id number]  Admission Date: 12/30/2017  Hospital Length of Stay: 4 days  Attending Provider: Dane Ruby MD   Primary Care Physician: Jackson Brandt MD  Principal Problem:Hyponatremia    Subjective:     HPI: Pt was seen and examined. H/o was reviewed. Pt is a 73 y/o male with a pertinent h/o of CKD stage 3 (Cr 1.9 at baseline), diastolic CHF, and DM who presented with ;eg weakness and swqlling. Pt was noted to have s Na was 124, and renal service was consulted for hyponatremia. Pt feels slightly SOB, and has leg swelling. His wife states with certainty that he is on a 1 L fluid restriction at home. Pt takes bumex 2 mg ama nd 1 mg pm at home. He also takes metolazone 2.5 mg qd (2.5 mg), which is a thiazide diuretic. He states his diet is not salty. TSH was normal. No h/o of liver disease but alb is 2.9. Pt denies alcohol use, but noted that AST is slightly higher than ALT. He feels tired and sleepy.    Interval History: Pt was seen and examined. No new events last pm, remained stable, no c/o's this am, no SOB. Pt overall "feel better." Discussed with pt and his wife. Has been applying fluid restriction.    Review of patient's allergies indicates:   Allergen Reactions    Morphine      Other reaction(s): Nausea Only     Current Facility-Administered Medications   Medication Frequency    acetaminophen tablet 650 mg Q6H PRN    albuterol-ipratropium 2.5mg-0.5mg/3mL nebulizer solution 3 mL Q4H PRN    albuterol-ipratropium 2.5mg-0.5mg/3mL nebulizer solution 3 mL Q6H WAKE    arformoterol nebulizer solution 15 mcg BID    And    budesonide nebulizer solution 0.5 mg BID    aspirin EC tablet 81 mg Daily    bumetanide tablet 1 mg BID    ciprofloxacin (CIPRO)400mg/200ml D5W IVPB 400 mg Q12H    dextrose 50% injection 12.5 g PRN    dextrose 50% injection 25 g PRN    ferrous sulfate EC tablet 325 mg BID    glucagon (human " recombinant) injection 1 mg PRN    glucose chewable tablet 16 g PRN    glucose chewable tablet 24 g PRN    guaiFENesin 12 hr tablet 600 mg BID    insulin aspart pen 0-5 Units QID (AC + HS) PRN    linezolid tablet 600 mg Q12H    metoprolol tartrate (LOPRESSOR) tablet 25 mg BID    ondansetron injection 4 mg Q6H PRN    pantoprazole EC tablet 40 mg Daily    piperacillin-tazobactam 2.25 g in sodium chloride 5 % 50 mL IVPB (ready to mix system) Q8H    polyethylene glycol packet 17 g BID PRN    potassium chloride SA CR tablet 20 mEq BID    rosuvastatin tablet 10 mg QHS    spironolactone tablet 25 mg BID    [START ON 1/6/2018] warfarin (COUMADIN) tablet 1 mg Every Sat       Objective:     Vital Signs (Most Recent):  Temp: 97.1 °F (36.2 °C) (01/03/18 0813)  Pulse: 97 (01/03/18 0813)  Resp: 19 (01/03/18 0813)  BP: (!) 106/56 (01/03/18 0813)  SpO2: 99 % (01/03/18 0813)  O2 Device (Oxygen Therapy): nasal cannula (01/03/18 0813) Vital Signs (24h Range):  Temp:  [97.1 °F (36.2 °C)-98.3 °F (36.8 °C)] 97.1 °F (36.2 °C)  Pulse:  [77-97] 97  Resp:  [15-20] 19  SpO2:  [97 %-99 %] 99 %  BP: (106-141)/(56-73) 106/56     Weight: 112.5 kg (248 lb 0.3 oz) (01/03/18 0453)  Body mass index is 35.59 kg/m².  Body surface area is 2.36 meters squared.    I/O last 3 completed shifts:  In: 2061.3 [P.O.:980; I.V.:531.3; IV Piggyback:550]  Out: 2025 [Urine:2025]    Physical Exam   Constitutional: He is oriented to person, place, and time. He appears well-developed and well-nourished.   HENT:   Head: Normocephalic and atraumatic.   Neck: No JVD present.   Cardiovascular: Normal rate, regular rhythm and normal heart sounds.  Exam reveals no friction rub.    Pulmonary/Chest: Effort normal. He has rales.   Abdominal: Soft. There is no tenderness.   Musculoskeletal: He exhibits edema.   Neurological: He is alert and oriented to person, place, and time.   Skin: Skin is warm and dry.   Psychiatric: He has a normal mood and affect. His  behavior is normal.   Nursing note and vitals reviewed.      Significant Labs:  Reviewed  BMP  Lab Results   Component Value Date     (L) 01/03/2018    K 4.7 01/03/2018    CL 82 (L) 01/03/2018    CO2 30 (H) 01/03/2018    BUN 88 (H) 01/03/2018    CREATININE 1.9 (H) 01/03/2018    CALCIUM 10.1 01/03/2018    ANIONGAP 14 01/03/2018    ESTGFRAFRICA 40 (A) 01/03/2018    EGFRNONAA 34 (A) 01/03/2018         Significant Imaging:  reviewed    Assessment/Plan:         71 y/o male with hyponatremia:           * Chronic hyponatremia     Renal: Hyponatremia: is chronic  Slowly improving, pt doing better clinically  Clinically stable  Chronic: no reason to treat fast or aggressively  Slow correction recommended    The cause is multifactorial:  Dilutional hyponatremia from CHF   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)  Decreased GFR due to CKD and hypotension  Due to the thiazide diuretic metolazone    Giving NS did not make a difference for the s Na  Giving NS in SIADH will cause desalination, and s Na actually can worsen  Giving NS in CHF may cause further dilution of s Na     Agree that pt has some intravascular fluid depletion (afterall, he is on diuretics)  However, he has 3rd spacing (leg swelling) and cephalization on CXR       Recommendations for treatment:  Suggest no NS IVF's at this time  Tolvaptan was stopped  Reviewed salt (2-3 g/day) and fluid (1-1.5 L/day) with the pt and wife again  Decreased bumex from 2 to 1 mg po bid yesterday  No metolazone        Chronic diastolic congestive heart failure, NYHA class 3     Cardiac: diastolic CHF  Mgmt as above with salt and fluid restriction and use of loop diuretics  Noted on aldactone      Pneumonia: On cipro and zosyn.  Adjust zosyn dose for the renal function  Doing better by sx's                 George Inman MD  Nephrology  [facility] [place]

## 2018-01-04 LAB
ANION GAP SERPL CALC-SCNC: 15 MMOL/L
APTT BLDCRRT: 37.1 SEC
APTT BLDCRRT: 38 SEC
BASOPHILS # BLD AUTO: 0.01 K/UL
BASOPHILS NFR BLD: 0.1 %
BUN SERPL-MCNC: 89 MG/DL
CALCIUM SERPL-MCNC: 10.3 MG/DL
CHLORIDE SERPL-SCNC: 84 MMOL/L
CO2 SERPL-SCNC: 27 MMOL/L
CREAT SERPL-MCNC: 2 MG/DL
DIFFERENTIAL METHOD: ABNORMAL
EOSINOPHIL # BLD AUTO: 0 K/UL
EOSINOPHIL NFR BLD: 0.3 %
ERYTHROCYTE [DISTWIDTH] IN BLOOD BY AUTOMATED COUNT: 15.2 %
EST. GFR  (AFRICAN AMERICAN): 37 ML/MIN/1.73 M^2
EST. GFR  (NON AFRICAN AMERICAN): 32 ML/MIN/1.73 M^2
GLUCOSE SERPL-MCNC: 153 MG/DL
HCT VFR BLD AUTO: 38.4 %
HGB BLD-MCNC: 13.3 G/DL
INR PPP: 2
INR PPP: 2.1
INR PPP: 2.1
LYMPHOCYTES # BLD AUTO: 1.7 K/UL
LYMPHOCYTES NFR BLD: 12.5 %
MCH RBC QN AUTO: 31.1 PG
MCHC RBC AUTO-ENTMCNC: 34.6 G/DL
MCV RBC AUTO: 90 FL
MONOCYTES # BLD AUTO: 1.5 K/UL
MONOCYTES NFR BLD: 10.7 %
NEUTROPHILS # BLD AUTO: 10.3 K/UL
NEUTROPHILS NFR BLD: 76.4 %
OB PNL STL: POSITIVE
PLATELET # BLD AUTO: 373 K/UL
PMV BLD AUTO: 9.2 FL
POCT GLUCOSE: 176 MG/DL (ref 70–110)
POCT GLUCOSE: 242 MG/DL (ref 70–110)
POTASSIUM SERPL-SCNC: 4.6 MMOL/L
PROTHROMBIN TIME: 20 SEC
PROTHROMBIN TIME: 21.6 SEC
PROTHROMBIN TIME: 21.6 SEC
RBC # BLD AUTO: 4.28 M/UL
SODIUM SERPL-SCNC: 126 MMOL/L
WBC # BLD AUTO: 13.53 K/UL

## 2018-01-04 PROCEDURE — 87205 SMEAR GRAM STAIN: CPT

## 2018-01-04 PROCEDURE — 21400001 HC TELEMETRY ROOM

## 2018-01-04 PROCEDURE — 96372 THER/PROPH/DIAG INJ SC/IM: CPT

## 2018-01-04 PROCEDURE — 25000003 PHARM REV CODE 250: Performed by: INTERNAL MEDICINE

## 2018-01-04 PROCEDURE — 27000221 HC OXYGEN, UP TO 24 HOURS

## 2018-01-04 PROCEDURE — 25000003 PHARM REV CODE 250: Performed by: NURSE PRACTITIONER

## 2018-01-04 PROCEDURE — 85610 PROTHROMBIN TIME: CPT

## 2018-01-04 PROCEDURE — 63600175 PHARM REV CODE 636 W HCPCS: Performed by: INTERNAL MEDICINE

## 2018-01-04 PROCEDURE — 63600175 PHARM REV CODE 636 W HCPCS: Performed by: FAMILY MEDICINE

## 2018-01-04 PROCEDURE — 94761 N-INVAS EAR/PLS OXIMETRY MLT: CPT

## 2018-01-04 PROCEDURE — 97116 GAIT TRAINING THERAPY: CPT

## 2018-01-04 PROCEDURE — 25000003 PHARM REV CODE 250: Performed by: FAMILY MEDICINE

## 2018-01-04 PROCEDURE — 25000242 PHARM REV CODE 250 ALT 637 W/ HCPCS: Performed by: FAMILY MEDICINE

## 2018-01-04 PROCEDURE — 94640 AIRWAY INHALATION TREATMENT: CPT

## 2018-01-04 PROCEDURE — 82272 OCCULT BLD FECES 1-3 TESTS: CPT

## 2018-01-04 PROCEDURE — 25000242 PHARM REV CODE 250 ALT 637 W/ HCPCS: Performed by: NURSE PRACTITIONER

## 2018-01-04 PROCEDURE — 87070 CULTURE OTHR SPECIMN AEROBIC: CPT

## 2018-01-04 PROCEDURE — 94799 UNLISTED PULMONARY SVC/PX: CPT

## 2018-01-04 PROCEDURE — 99233 SBSQ HOSP IP/OBS HIGH 50: CPT | Mod: ,,, | Performed by: INTERNAL MEDICINE

## 2018-01-04 PROCEDURE — 99900035 HC TECH TIME PER 15 MIN (STAT)

## 2018-01-04 PROCEDURE — 97110 THERAPEUTIC EXERCISES: CPT

## 2018-01-04 PROCEDURE — 36415 COLL VENOUS BLD VENIPUNCTURE: CPT

## 2018-01-04 PROCEDURE — 85025 COMPLETE CBC W/AUTO DIFF WBC: CPT

## 2018-01-04 PROCEDURE — 85730 THROMBOPLASTIN TIME PARTIAL: CPT | Mod: 91

## 2018-01-04 PROCEDURE — 80048 BASIC METABOLIC PNL TOTAL CA: CPT

## 2018-01-04 RX ADMIN — SPIRONOLACTONE 25 MG: 25 TABLET ORAL at 08:01

## 2018-01-04 RX ADMIN — LINEZOLID 600 MG: 600 TABLET, FILM COATED ORAL at 08:01

## 2018-01-04 RX ADMIN — IPRATROPIUM BROMIDE AND ALBUTEROL SULFATE 3 ML: .5; 3 SOLUTION RESPIRATORY (INHALATION) at 07:01

## 2018-01-04 RX ADMIN — ARFORMOTEROL TARTRATE 15 MCG: 15 SOLUTION RESPIRATORY (INHALATION) at 07:01

## 2018-01-04 RX ADMIN — INSULIN ASPART 1 UNITS: 100 INJECTION, SOLUTION INTRAVENOUS; SUBCUTANEOUS at 10:01

## 2018-01-04 RX ADMIN — ASPIRIN 81 MG: 81 TABLET, COATED ORAL at 08:01

## 2018-01-04 RX ADMIN — BUDESONIDE 0.5 MG: 0.5 SUSPENSION RESPIRATORY (INHALATION) at 08:01

## 2018-01-04 RX ADMIN — INSULIN ASPART 2 UNITS: 100 INJECTION, SOLUTION INTRAVENOUS; SUBCUTANEOUS at 11:01

## 2018-01-04 RX ADMIN — GUAIFENESIN 600 MG: 600 TABLET, EXTENDED RELEASE ORAL at 08:01

## 2018-01-04 RX ADMIN — METOPROLOL TARTRATE 25 MG: 25 TABLET ORAL at 08:01

## 2018-01-04 RX ADMIN — BUMETANIDE 1 MG: 1 TABLET ORAL at 08:01

## 2018-01-04 RX ADMIN — PANTOPRAZOLE SODIUM 40 MG: 40 TABLET, DELAYED RELEASE ORAL at 08:01

## 2018-01-04 RX ADMIN — POTASSIUM CHLORIDE 20 MEQ: 1500 TABLET, EXTENDED RELEASE ORAL at 08:01

## 2018-01-04 RX ADMIN — ACETAMINOPHEN 650 MG: 325 TABLET ORAL at 08:01

## 2018-01-04 RX ADMIN — ROSUVASTATIN CALCIUM 10 MG: 10 TABLET, FILM COATED ORAL at 08:01

## 2018-01-04 RX ADMIN — CIPROFLOXACIN 400 MG: 2 INJECTION, SOLUTION INTRAVENOUS at 08:01

## 2018-01-04 RX ADMIN — IPRATROPIUM BROMIDE AND ALBUTEROL SULFATE 3 ML: .5; 3 SOLUTION RESPIRATORY (INHALATION) at 08:01

## 2018-01-04 RX ADMIN — PIPERACILLIN SODIUM AND TAZOBACTAM SODIUM 2.25 G: 2; .25 INJECTION, POWDER, FOR SOLUTION INTRAVENOUS at 03:01

## 2018-01-04 RX ADMIN — WARFARIN SODIUM 5 MG: 5 TABLET ORAL at 06:01

## 2018-01-04 RX ADMIN — FERROUS SULFATE TAB EC 325 MG (65 MG FE EQUIVALENT) 325 MG: 325 (65 FE) TABLET DELAYED RESPONSE at 08:01

## 2018-01-04 RX ADMIN — BUDESONIDE 0.5 MG: 0.5 SUSPENSION RESPIRATORY (INHALATION) at 07:01

## 2018-01-04 RX ADMIN — PIPERACILLIN SODIUM AND TAZOBACTAM SODIUM 2.25 G: 2; .25 INJECTION, POWDER, FOR SOLUTION INTRAVENOUS at 06:01

## 2018-01-04 RX ADMIN — ARFORMOTEROL TARTRATE 15 MCG: 15 SOLUTION RESPIRATORY (INHALATION) at 08:01

## 2018-01-04 RX ADMIN — ACETAMINOPHEN 650 MG: 325 TABLET ORAL at 05:01

## 2018-01-04 RX ADMIN — IPRATROPIUM BROMIDE AND ALBUTEROL SULFATE 3 ML: .5; 3 SOLUTION RESPIRATORY (INHALATION) at 02:01

## 2018-01-04 NOTE — SUBJECTIVE & OBJECTIVE
"Interval History: Pt was seen and examined. No c/o's this am, remained stable last pm, no SOB, Pt feels "better." Met with pt's wife, reviewed the medical issues and the causes of hyponatremia in the pt in layman's language.    Review of patient's allergies indicates:   Allergen Reactions    Morphine      Other reaction(s): Nausea Only     Current Facility-Administered Medications   Medication Frequency    acetaminophen tablet 650 mg Q6H PRN    albuterol-ipratropium 2.5mg-0.5mg/3mL nebulizer solution 3 mL Q4H PRN    albuterol-ipratropium 2.5mg-0.5mg/3mL nebulizer solution 3 mL Q6H WAKE    arformoterol nebulizer solution 15 mcg BID    And    budesonide nebulizer solution 0.5 mg BID    aspirin EC tablet 81 mg Daily    bumetanide tablet 1 mg BID    ciprofloxacin (CIPRO)400mg/200ml D5W IVPB 400 mg Q12H    dextrose 50% injection 12.5 g PRN    dextrose 50% injection 25 g PRN    ferrous sulfate EC tablet 325 mg BID    glucagon (human recombinant) injection 1 mg PRN    glucose chewable tablet 16 g PRN    glucose chewable tablet 24 g PRN    guaiFENesin 12 hr tablet 600 mg BID    insulin aspart pen 0-5 Units QID (AC + HS) PRN    linezolid tablet 600 mg Q12H    metoprolol tartrate (LOPRESSOR) tablet 25 mg BID    ondansetron injection 4 mg Q6H PRN    pantoprazole EC tablet 40 mg Daily    piperacillin-tazobactam 2.25 g in sodium chloride 5 % 50 mL IVPB (ready to mix system) Q8H    polyethylene glycol packet 17 g BID PRN    potassium chloride SA CR tablet 20 mEq BID    rosuvastatin tablet 10 mg QHS    spironolactone tablet 25 mg BID    warfarin (COUMADIN) tablet 5 mg Every Mon, Wed, Fri, Sun    warfarin (COUMADIN) tablet 5 mg Every Thurs    [START ON 1/9/2018] warfarin tablet 7.5 mg Every Tues    [START ON 1/6/2018] warfarin tablet 7.5 mg Every Sat       Objective:     Vital Signs (Most Recent):  Temp: 97.4 °F (36.3 °C) (01/04/18 1116)  Pulse: 92 (01/04/18 1116)  Resp: 18 (01/04/18 1116)  BP: 120/71 " (01/04/18 1116)  SpO2: 99 % (01/04/18 1116)  O2 Device (Oxygen Therapy): nasal cannula (01/04/18 1116) Vital Signs (24h Range):  Temp:  [96.8 °F (36 °C)-98.4 °F (36.9 °C)] 97.4 °F (36.3 °C)  Pulse:  [74-95] 92  Resp:  [17-18] 18  SpO2:  [97 %-100 %] 99 %  BP: (107-129)/(58-71) 120/71     Weight: 109.2 kg (240 lb 11.9 oz) (01/04/18 0509)  Body mass index is 34.54 kg/m².  Body surface area is 2.32 meters squared.    I/O last 3 completed shifts:  In: 2120 [P.O.:1420; IV Piggyback:700]  Out: 1450 [Urine:1450]    Physical Exam   Constitutional: He is oriented to person, place, and time. He appears well-developed and well-nourished.   HENT:   Head: Normocephalic and atraumatic.   Neck: No JVD present.   Cardiovascular: Normal rate, regular rhythm and normal heart sounds.  Exam reveals no friction rub.    Pulmonary/Chest: Effort normal. He has rales.   Abdominal: Soft. There is no tenderness.   Musculoskeletal: He exhibits edema.   Neurological: He is alert and oriented to person, place, and time.   Skin: Skin is warm and dry.   Psychiatric: He has a normal mood and affect. His behavior is normal.   Nursing note and vitals reviewed.      Significant Labs: reviewed  BMP  Lab Results   Component Value Date     (L) 01/04/2018    K 4.6 01/04/2018    CL 84 (L) 01/04/2018    CO2 27 01/04/2018    BUN 89 (H) 01/04/2018    CREATININE 2.0 (H) 01/04/2018    CALCIUM 10.3 01/04/2018    ANIONGAP 15 01/04/2018    ESTGFRAFRICA 37 (A) 01/04/2018    EGFRNONAA 32 (A) 01/04/2018

## 2018-01-04 NOTE — PROGRESS NOTES
Coumadin Progress Notes:    INR = 2.1 today at 0827; trended up from 2 yesterday  Dx: Afib  Continue current home dosing  Will continue to monitor and follow INR's/Radha Huitron [facility] 1/4/2018 9:16 AM

## 2018-01-04 NOTE — ASSESSMENT & PLAN NOTE
- Supplemental O2 as needed.  - Duonebs.  - Mucinex.  --BC with NGTD  - Empiric abx with IV Rocephin and azithromycin.  --WBC decreased to 13.5

## 2018-01-04 NOTE — ASSESSMENT & PLAN NOTE
- Secondary to HF with worsening cardiac status.  - Baseline Na 120-125.  - Patient with new onset fatigue and generalized weakness.  - Prior treatment with diuretics and metolazone.    - Discussed case with Nephrology in ED.  - Will start tolvaptan therapy.  - Low salt diet, fluid restriction.  - Nephrology following  --Na stable at 126 X 2 days

## 2018-01-04 NOTE — ASSESSMENT & PLAN NOTE
71 y/o male with hyponatremia:           * Chronic hyponatremia     Renal: Hyponatremia: is chronic  Slowly improving, pt doing better clinically  Clinically stable  Chronic: no reason to treat fast or aggressively  Slow correction recommended     The cause is multifactorial:  Dilutional hyponatremia from CHF   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)  Decreased GFR due to CKD and hypotension  Due to the thiazide diuretic metolazone     Giving NS did not make a difference for the s Na  Giving NS in SIADH will cause desalination, and s Na actually can worsen  Giving NS in CHF may cause further dilution of s Na     Agree that pt has some intravascular fluid depletion (afterall, he is on diuretics)  However, he has 3rd spacing (leg swelling) and cephalization on CXR       Recommendations for treatment:  Suggest no NS IVF's at this time  Tolvaptan was stopped  Reviewed salt (2-3 g/day) and fluid (1-1.5 L/day) with the pt and wife again  Decreased bumex from 2 to 1 mg po bid yesterday  No metolazone        Chronic diastolic congestive heart failure, NYHA class 3     Cardiac: diastolic CHF  Mgmt as above with salt and fluid restriction and use of loop diuretics  Noted on aldactone      Pneumonia: On cipro and zosyn.  Adjust zosyn dose for the renal function  Doing better by sx's

## 2018-01-04 NOTE — SUBJECTIVE & OBJECTIVE
Interval History: No acute events overnight. Na stable at 126. WBC decreasing with ABX. PT/OT recommend SNF -  to help with placement. Pt/spouse want to go to [place] if possible. Cough improving. Continue current plan of care.    Review of Systems   Constitutional: Positive for fatigue. Negative for activity change, chills, diaphoresis, fever and unexpected weight change.   HENT: Negative for congestion, postnasal drip, rhinorrhea, sinus pressure, sore throat and trouble swallowing.    Eyes: Negative for photophobia and visual disturbance.   Respiratory: Positive for cough (chronic). Negative for apnea, chest tightness, shortness of breath and wheezing.    Cardiovascular: Positive for leg swelling (chronic). Negative for chest pain and palpitations.   Gastrointestinal: Negative for abdominal distention, abdominal pain, blood in stool, constipation, diarrhea, nausea and vomiting.   Endocrine: Negative for polydipsia, polyphagia and polyuria.   Genitourinary: Negative for decreased urine volume, difficulty urinating, dysuria, frequency, hematuria and urgency.   Musculoskeletal: Negative for arthralgias, back pain, gait problem, joint swelling and myalgias.   Skin: Negative for pallor, rash and wound.   Neurological: Positive for weakness (generalized). Negative for dizziness, seizures, syncope, facial asymmetry, speech difficulty, light-headedness, numbness and headaches.   Psychiatric/Behavioral: Negative for confusion. The patient is not nervous/anxious.    All other systems reviewed and are negative.    Objective:     Vital Signs (Most Recent):  Temp: 97.1 °F (36.2 °C) (01/04/18 1600)  Pulse: 79 (01/04/18 1600)  Resp: 18 (01/04/18 1600)  BP: 121/79 (01/04/18 1600)  SpO2: 100 % (01/04/18 1600) Vital Signs (24h Range):  Temp:  [96.8 °F (36 °C)-98.4 °F (36.9 °C)] 97.1 °F (36.2 °C)  Pulse:  [76-95] 79  Resp:  [16-18] 18  SpO2:  [95 %-100 %] 100 %  BP: (119-129)/(58-79) 121/79     Weight: 109.2 kg (240  lb 11.9 oz)  Body mass index is 34.54 kg/m².    Intake/Output Summary (Last 24 hours) at 01/04/18 1724  Last data filed at 01/04/18 1504   Gross per 24 hour   Intake             1450 ml   Output              600 ml   Net              850 ml      Physical Exam   Constitutional: He is oriented to person, place, and time. He appears well-developed and well-nourished. No distress.   HENT:   Head: Normocephalic and atraumatic.   Mouth/Throat: Oropharynx is clear and moist.   Eyes: Conjunctivae are normal.   Neck: Normal range of motion. Neck supple. No JVD present.   Cardiovascular: Normal rate and intact distal pulses.  An irregularly irregular rhythm present.  No extrasystoles are present. Exam reveals no gallop.    Murmur heard.  Harsh midsystolic murmur is present with a grade of 2/6  at the upper right sternal border radiating to the neck  High-pitched blowing decrescendo early diastolic murmur is present with a grade of 1/6  at the upper right sternal border radiating to the apex   Pulmonary/Chest: Effort normal. No accessory muscle usage. Tachypnea noted. No respiratory distress. He has no decreased breath sounds. He has wheezes. He has no rhonchi. He has no rales.   Abdominal: Soft. Bowel sounds are normal. He exhibits no distension. There is no tenderness. There is no rebound and no CVA tenderness.   Musculoskeletal: Normal range of motion. He exhibits edema (1+ BLE ). He exhibits no tenderness or deformity.   Neurological: He is alert and oriented to person, place, and time. No cranial nerve deficit or sensory deficit.   Skin: Skin is warm, dry and intact. Capillary refill takes 2 to 3 seconds. He is not diaphoretic. No erythema.   Psychiatric: He has a normal mood and affect. His speech is normal and behavior is normal. Cognition and memory are normal.   Nursing note and vitals reviewed.      Significant Labs:   Recent Lab Results       01/04/18  1422 01/04/18  1116 01/04/18  0827 01/04/18  0623 01/04/18  4139       Anion Gap     15     aPTT   38.0  Comment:  aPTT therapeutic range = 39-69 seconds(H)       Baso #     0.01     Basophil%     0.1     BUN, Bld     89(H)     Calcium     10.3     Chloride     84(L)     CO2     27     Creatinine     2.0(H)     Differential Method     Automated     eGFR if      37(A)     eGFR if non      32  Comment:  Calculation used to obtain the estimated glomerular filtration  rate (eGFR) is the CKD-EPI equation.   (A)     Eos #     0.0     Eosinophil%     0.3     Glucose     153(H)     Gran #     10.3(H)     Gran%     76.4(H)     Hematocrit     38.4(L)     Hemoglobin     13.3(L)     Coumadin Monitoring INR   2.1  Comment:  Coumadin Therapy:  2.0 - 3.0 for INR for all indicators except mechanical heart valves  and antiphospholipid syndromes which should use 2.5 - 3.5.  (H)          2.1  Comment:  Coumadin Therapy:  2.0 - 3.0 for INR for all indicators except mechanical heart valves  and antiphospholipid syndromes which should use 2.5 - 3.5.  (H)       Lymph #     1.7     Lymph%     12.5(L)     MCH     31.1(H)     MCHC     34.6     MCV     90     Mono #     1.5(H)     Mono%     10.7     MPV     9.2     Occult Blood Positive(A)         Platelets     373(H)     POCT Glucose  242(H)  176(H)      Potassium     4.6     Protime   21.6(H)          21.6(H)       RBC     4.28(L)     RDW     15.2(H)     Sodium     126(L)     WBC     13.53(H)                 01/03/18  2343 01/03/18  2210 01/03/18  1952      Anion Gap        aPTT   40.5  Comment:  aPTT therapeutic range = 39-69 seconds(H)     Baso #        Basophil%        BUN, Bld        Calcium        Chloride        CO2        Creatinine        Differential Method        eGFR if         eGFR if non         Eos #        Eosinophil%        Glucose        Gran #        Gran%        Hematocrit        Hemoglobin        Coumadin Monitoring INR 2.0  Comment:  Coumadin Therapy:  2.0 - 3.0 for INR  for all indicators except mechanical heart valves  and antiphospholipid syndromes which should use 2.5 - 3.5.  (H)  2.0  Comment:  Coumadin Therapy:  2.0 - 3.0 for INR for all indicators except mechanical heart valves  and antiphospholipid syndromes which should use 2.5 - 3.5.  (H)     Lymph #        Lymph%        MCH        MCHC        MCV        Mono #        Mono%        MPV        Occult Blood        Platelets        POCT Glucose  214(H)      Potassium        Protime 20.0(H)  20.3(H)     RBC        RDW        Sodium        WBC            All pertinent labs within the past 24 hours have been reviewed.    Significant Imaging: I have reviewed all pertinent imaging results/findings within the past 24 hours.

## 2018-01-04 NOTE — ASSESSMENT & PLAN NOTE
- Initial creatinine 2 (baseline cr. 1.5-1.7).  --nephrology following  --cardiology following  - Strict I&O's.  --BUN & Creatinine not improving - possible new baseline? Previous BUN in 40's, Cr 1.5

## 2018-01-04 NOTE — PLAN OF CARE
Problem: Patient Care Overview  Goal: Plan of Care Review  PT REQUIRES MIN A FOR GT X 15' WITH RW AND SLOW PACE.    Outcome: Ongoing (interventions implemented as appropriate)  PT GT TRAINED X 60' WITH RW AND MIN A

## 2018-01-04 NOTE — PROGRESS NOTES
[facility] Hospital Medicine  Progress Note    Patient Name: Beni Cosby  MRN: [id number]  Patient Class: IP- Inpatient   Admission Date: 12/30/2017  Length of Stay: 5 days  Attending Physician: Dane Ruby MD  Primary Care Provider: Jackson Brandt MD        Subjective:     Principal Problem:Hyponatremia    HPI:  Mr. Cosby is a 71yo male  with a PMHx of chronic diastolic HFpEF (right-sided), CKD stage III, chronic hyponatremia, COPD, DM II, chronic AF on Coumadin, and AS with remote bioprosthetic AVR, who presented to the ED with c/o fatigue that has progressively worsened over the past 1 day.  Associated generalized weakness.  Denies any fever, chills, CP, SOB, worsening cough, worsening edema, ABD pain, N/V/D, dysuria, back pain, HA, AMS, visual disturbances, seizure-like activity, lightheadedness/dizziness syncope, or numbness/tingling.  No aggravating to alleviating factors.  He was admitted to [facility] 12/19 for hyponatremia with Na 119.  He received 0.9 NS IVF's, and metolazone added per Nephrology.  Initial work-up in ED resulted Na 123, cr. 2, BUN 85, WBC 16.4, , troponin 0.3.  CXR showed RLL infiltrate consistent with PNA.  EKG revealed A-Fib with controlled rate, no acute changes from previous tracings.  Case discussed with Nephrology in ED, who recommend admission for initiation of tolvaptan therapy.  Hospital Medicine consulted for admission.    Hospital Course:  Patient was admitted to Med Surg for hyponatremia and pneumonia under the care of Hospital Medicine. He was given IV abx for PNA and nephrology was consulted for hyponatremia. He was started on Tolvaptan.  1/1: Pt reports decreased SOB and cough - continue IV abx. Nephrology following - Na stable - fluid restrictions. Cardiology consulted.  1/2: Pt slowly improving. Wheezing decreased. Incentive spirometry added. Cardiology following - elevated troponin 2/2 renal insufficiency and demand ischemia.   1/3: Pt is  sleeping good - reports decreased cough - no wheezing audible. WBC slowly improving. Na level better today but still low - no mentation changes. Cardiology and nephrology following    Interval History: No acute events overnight. Na stable at 126. WBC decreasing with ABX. PT/OT recommend SNF -  to help with placement. Pt/spouse want to go to [place] if possible. Cough improving. Continue current plan of care.    Review of Systems   Constitutional: Positive for fatigue. Negative for activity change, chills, diaphoresis, fever and unexpected weight change.   HENT: Negative for congestion, postnasal drip, rhinorrhea, sinus pressure, sore throat and trouble swallowing.    Eyes: Negative for photophobia and visual disturbance.   Respiratory: Positive for cough (chronic). Negative for apnea, chest tightness, shortness of breath and wheezing.    Cardiovascular: Positive for leg swelling (chronic). Negative for chest pain and palpitations.   Gastrointestinal: Negative for abdominal distention, abdominal pain, blood in stool, constipation, diarrhea, nausea and vomiting.   Endocrine: Negative for polydipsia, polyphagia and polyuria.   Genitourinary: Negative for decreased urine volume, difficulty urinating, dysuria, frequency, hematuria and urgency.   Musculoskeletal: Negative for arthralgias, back pain, gait problem, joint swelling and myalgias.   Skin: Negative for pallor, rash and wound.   Neurological: Positive for weakness (generalized). Negative for dizziness, seizures, syncope, facial asymmetry, speech difficulty, light-headedness, numbness and headaches.   Psychiatric/Behavioral: Negative for confusion. The patient is not nervous/anxious.    All other systems reviewed and are negative.    Objective:     Vital Signs (Most Recent):  Temp: 97.1 °F (36.2 °C) (01/04/18 1600)  Pulse: 79 (01/04/18 1600)  Resp: 18 (01/04/18 1600)  BP: 121/79 (01/04/18 1600)  SpO2: 100 % (01/04/18 1600) Vital Signs (24h  Range):  Temp:  [96.8 °F (36 °C)-98.4 °F (36.9 °C)] 97.1 °F (36.2 °C)  Pulse:  [76-95] 79  Resp:  [16-18] 18  SpO2:  [95 %-100 %] 100 %  BP: (119-129)/(58-79) 121/79     Weight: 109.2 kg (240 lb 11.9 oz)  Body mass index is 34.54 kg/m².    Intake/Output Summary (Last 24 hours) at 01/04/18 1724  Last data filed at 01/04/18 1504   Gross per 24 hour   Intake             1450 ml   Output              600 ml   Net              850 ml      Physical Exam   Constitutional: He is oriented to person, place, and time. He appears well-developed and well-nourished. No distress.   HENT:   Head: Normocephalic and atraumatic.   Mouth/Throat: Oropharynx is clear and moist.   Eyes: Conjunctivae are normal.   Neck: Normal range of motion. Neck supple. No JVD present.   Cardiovascular: Normal rate and intact distal pulses.  An irregularly irregular rhythm present.  No extrasystoles are present. Exam reveals no gallop.    Murmur heard.  Harsh midsystolic murmur is present with a grade of 2/6  at the upper right sternal border radiating to the neck  High-pitched blowing decrescendo early diastolic murmur is present with a grade of 1/6  at the upper right sternal border radiating to the apex   Pulmonary/Chest: Effort normal. No accessory muscle usage. Tachypnea noted. No respiratory distress. He has no decreased breath sounds. He has wheezes. He has no rhonchi. He has no rales.   Abdominal: Soft. Bowel sounds are normal. He exhibits no distension. There is no tenderness. There is no rebound and no CVA tenderness.   Musculoskeletal: Normal range of motion. He exhibits edema (1+ BLE ). He exhibits no tenderness or deformity.   Neurological: He is alert and oriented to person, place, and time. No cranial nerve deficit or sensory deficit.   Skin: Skin is warm, dry and intact. Capillary refill takes 2 to 3 seconds. He is not diaphoretic. No erythema.   Psychiatric: He has a normal mood and affect. His speech is normal and behavior is normal.  Cognition and memory are normal.   Nursing note and vitals reviewed.      Significant Labs:   Recent Lab Results       01/04/18  1422 01/04/18  1116 01/04/18  0827 01/04/18  0623 01/04/18  0527      Anion Gap     15     aPTT   38.0  Comment:  aPTT therapeutic range = 39-69 seconds(H)       Baso #     0.01     Basophil%     0.1     BUN, Bld     89(H)     Calcium     10.3     Chloride     84(L)     CO2     27     Creatinine     2.0(H)     Differential Method     Automated     eGFR if      37(A)     eGFR if non      32  Comment:  Calculation used to obtain the estimated glomerular filtration  rate (eGFR) is the CKD-EPI equation.   (A)     Eos #     0.0     Eosinophil%     0.3     Glucose     153(H)     Gran #     10.3(H)     Gran%     76.4(H)     Hematocrit     38.4(L)     Hemoglobin     13.3(L)     Coumadin Monitoring INR   2.1  Comment:  Coumadin Therapy:  2.0 - 3.0 for INR for all indicators except mechanical heart valves  and antiphospholipid syndromes which should use 2.5 - 3.5.  (H)          2.1  Comment:  Coumadin Therapy:  2.0 - 3.0 for INR for all indicators except mechanical heart valves  and antiphospholipid syndromes which should use 2.5 - 3.5.  (H)       Lymph #     1.7     Lymph%     12.5(L)     MCH     31.1(H)     MCHC     34.6     MCV     90     Mono #     1.5(H)     Mono%     10.7     MPV     9.2     Occult Blood Positive(A)         Platelets     373(H)     POCT Glucose  242(H)  176(H)      Potassium     4.6     Protime   21.6(H)          21.6(H)       RBC     4.28(L)     RDW     15.2(H)     Sodium     126(L)     WBC     13.53(H)                 01/03/18  2343 01/03/18  2210 01/03/18  1952      Anion Gap        aPTT   40.5  Comment:  aPTT therapeutic range = 39-69 seconds(H)     Baso #        Basophil%        BUN, Bld        Calcium        Chloride        CO2        Creatinine        Differential Method        eGFR if         eGFR if non          Eos #        Eosinophil%        Glucose        Gran #        Gran%        Hematocrit        Hemoglobin        Coumadin Monitoring INR 2.0  Comment:  Coumadin Therapy:  2.0 - 3.0 for INR for all indicators except mechanical heart valves  and antiphospholipid syndromes which should use 2.5 - 3.5.  (H)  2.0  Comment:  Coumadin Therapy:  2.0 - 3.0 for INR for all indicators except mechanical heart valves  and antiphospholipid syndromes which should use 2.5 - 3.5.  (H)     Lymph #        Lymph%        MCH        MCHC        MCV        Mono #        Mono%        MPV        Occult Blood        Platelets        POCT Glucose  214(H)      Potassium        Protime 20.0(H)  20.3(H)     RBC        RDW        Sodium        WBC            All pertinent labs within the past 24 hours have been reviewed.    Significant Imaging: I have reviewed all pertinent imaging results/findings within the past 24 hours.    Assessment/Plan:      * Chronic hyponatremia    - Secondary to HF with worsening cardiac status.  - Baseline Na 120-125.  - Patient with new onset fatigue and generalized weakness.  - Prior treatment with diuretics and metolazone.    - Discussed case with Nephrology in ED.  - Will start tolvaptan therapy.  - Low salt diet, fluid restriction.  - Nephrology following  --Na stable at 126 X 2 days        Chronic kidney disease (CKD), stage III (moderate)    --nephrology following  --fluid restrictions  --avoid nephrotoxic agents          Elevated troponin    - Likely due to demand.  - Chronically elevated troponin (baseline 0.1)  - Initial troponin 0.3; EKG unrevealing.  - No chest pain.  - Trend serial cardiac enzymes, EKG.  - Recent TTE 12/23/17 showed CLVH, EF 60-65%, pulmonary HTN (PAP 46mmHg), stable prosthetic aortic valve.  - Continue medical management with ASA, BB, and high-intensity statin.  - Cardiology following        Right lower lobe pneumonia    - Supplemental O2 as needed.  - Duonebs.  - Mucinex.  --BC with  NGTD  - Empiric abx with IV Rocephin and azithromycin.  --WBC decreased to 13.5        Acute renal failure superimposed on stage 3 chronic kidney disease    - Initial creatinine 2 (baseline cr. 1.5-1.7).  --nephrology following  --cardiology following  - Strict I&O's.  --BUN & Creatinine not improving - possible new baseline? Previous BUN in 40's, Cr 1.5        Chronic diastolic congestive heart failure, NYHA class 3    - Clinically compensated, ? mild volume depletion.  Will defer to Cardiology.  - Continue home Bumex, conor, and metolazone.   - Strict I&O's, daily weights, Na/fluid restriction.  - Followed by Dr. Chew ( Cardiology).  --cardiology following  --continue bumex, KCL, spironolactone          Aortic valve stenosis with remote bioprosthetic AVR    - Stable.  - Followed by Dr. Chew ( Cardiology).        Type 2 diabetes mellitus    - Recent HbA1c 6.9 on 12/20.  - Accuchecks with SSI.  - Diabetic diet.        Chronic atrial fibrillation    - Rate controlled.  - Continue home beta blocker.  - Continue Coumadin for AC, pharmacy to dose.  - Daily INR.          VTE Risk Mitigation         Ordered     warfarin tablet 7.5 mg  Every Tuesday     Route:  Oral        01/03/18 1505     warfarin tablet 7.5 mg  Every Saturday     Route:  Oral        01/03/18 1505     warfarin (COUMADIN) tablet 5 mg  Every Thursday     Route:  Oral        01/03/18 1505     warfarin (COUMADIN) tablet 5 mg  Every Mon, Wed, Fri, Sun     Route:  Oral        01/03/18 1505     Medium Risk of VTE  Once      12/31/17 1436     Place sequential compression device  Until discontinued      12/31/17 1436              CELIA Correia  Department of Hospital Medicine   [facility]

## 2018-01-04 NOTE — PLAN OF CARE
Problem: Patient Care Overview  Goal: Plan of Care Review  PT REQUIRES MIN A FOR GT X 15' WITH RW AND SLOW PACE.    Outcome: Ongoing (interventions implemented as appropriate)  Respirations even and unlabored, no distress noted on assessment, wife at bedside, bilateral lower leg discoloration, pain, no nausea or shortness of breath, refused bed alarm, scd's, all extremities cool

## 2018-01-04 NOTE — PROGRESS NOTES
"[facility] -   Nephrology  Progress Note    Patient Name: Beni Cosby  MRN: [id number]  Admission Date: 12/30/2017  Hospital Length of Stay: 5 days  Attending Provider: Dane Ruby MD   Primary Care Physician: Jackson Brandt MD  Principal Problem:Hyponatremia    Subjective:     HPI: Pt was seen and examined. H/o was reviewed. Pt is a 71 y/o male with a pertinent h/o of CKD stage 3 (Cr 1.9 at baseline), diastolic CHF, and DM who presented with ;eg weakness and swqlling. Pt was noted to have s Na was 124, and renal service was consulted for hyponatremia. Pt feels slightly SOB, and has leg swelling. His wife states with certainty that he is on a 1 L fluid restriction at home. Pt takes bumex 2 mg ama nd 1 mg pm at home. He also takes metolazone 2.5 mg qd (2.5 mg), which is a thiazide diuretic. He states his diet is not salty. TSH was normal. No h/o of liver disease but alb is 2.9. Pt denies alcohol use, but noted that AST is slightly higher than ALT. He feels tired and sleepy.    Interval History: Pt was seen and examined. No c/o's this am, remained stable last pm, no SOB, Pt feels "better." Met with pt's wife, reviewed the medical issues and the causes of hyponatremia in the pt in layman's language.    Review of patient's allergies indicates:   Allergen Reactions    Morphine      Other reaction(s): Nausea Only     Current Facility-Administered Medications   Medication Frequency    acetaminophen tablet 650 mg Q6H PRN    albuterol-ipratropium 2.5mg-0.5mg/3mL nebulizer solution 3 mL Q4H PRN    albuterol-ipratropium 2.5mg-0.5mg/3mL nebulizer solution 3 mL Q6H WAKE    arformoterol nebulizer solution 15 mcg BID    And    budesonide nebulizer solution 0.5 mg BID    aspirin EC tablet 81 mg Daily    bumetanide tablet 1 mg BID    ciprofloxacin (CIPRO)400mg/200ml D5W IVPB 400 mg Q12H    dextrose 50% injection 12.5 g PRN    dextrose 50% injection 25 g PRN    ferrous sulfate EC tablet 325 mg BID "    glucagon (human recombinant) injection 1 mg PRN    glucose chewable tablet 16 g PRN    glucose chewable tablet 24 g PRN    guaiFENesin 12 hr tablet 600 mg BID    insulin aspart pen 0-5 Units QID (AC + HS) PRN    linezolid tablet 600 mg Q12H    metoprolol tartrate (LOPRESSOR) tablet 25 mg BID    ondansetron injection 4 mg Q6H PRN    pantoprazole EC tablet 40 mg Daily    piperacillin-tazobactam 2.25 g in sodium chloride 5 % 50 mL IVPB (ready to mix system) Q8H    polyethylene glycol packet 17 g BID PRN    potassium chloride SA CR tablet 20 mEq BID    rosuvastatin tablet 10 mg QHS    spironolactone tablet 25 mg BID    warfarin (COUMADIN) tablet 5 mg Every Mon, Wed, Fri, Sun    warfarin (COUMADIN) tablet 5 mg Every Thurs    [START ON 1/9/2018] warfarin tablet 7.5 mg Every Tues    [START ON 1/6/2018] warfarin tablet 7.5 mg Every Sat       Objective:     Vital Signs (Most Recent):  Temp: 97.4 °F (36.3 °C) (01/04/18 1116)  Pulse: 92 (01/04/18 1116)  Resp: 18 (01/04/18 1116)  BP: 120/71 (01/04/18 1116)  SpO2: 99 % (01/04/18 1116)  O2 Device (Oxygen Therapy): nasal cannula (01/04/18 1116) Vital Signs (24h Range):  Temp:  [96.8 °F (36 °C)-98.4 °F (36.9 °C)] 97.4 °F (36.3 °C)  Pulse:  [74-95] 92  Resp:  [17-18] 18  SpO2:  [97 %-100 %] 99 %  BP: (107-129)/(58-71) 120/71     Weight: 109.2 kg (240 lb 11.9 oz) (01/04/18 0509)  Body mass index is 34.54 kg/m².  Body surface area is 2.32 meters squared.    I/O last 3 completed shifts:  In: 2120 [P.O.:1420; IV Piggyback:700]  Out: 1450 [Urine:1450]    Physical Exam   Constitutional: He is oriented to person, place, and time. He appears well-developed and well-nourished.   HENT:   Head: Normocephalic and atraumatic.   Neck: No JVD present.   Cardiovascular: Normal rate, regular rhythm and normal heart sounds.  Exam reveals no friction rub.    Pulmonary/Chest: Effort normal. He has rales.   Abdominal: Soft. There is no tenderness.   Musculoskeletal: He exhibits  edema.   Neurological: He is alert and oriented to person, place, and time.   Skin: Skin is warm and dry.   Psychiatric: He has a normal mood and affect. His behavior is normal.   Nursing note and vitals reviewed.      Significant Labs: reviewed  BMP  Lab Results   Component Value Date     (L) 01/04/2018    K 4.6 01/04/2018    CL 84 (L) 01/04/2018    CO2 27 01/04/2018    BUN 89 (H) 01/04/2018    CREATININE 2.0 (H) 01/04/2018    CALCIUM 10.3 01/04/2018    ANIONGAP 15 01/04/2018    ESTGFRAFRICA 37 (A) 01/04/2018    EGFRNONAA 32 (A) 01/04/2018         Assessment/Plan:         71 y/o male with hyponatremia:           * Chronic hyponatremia     Renal: Hyponatremia: chronic  Slowly improving, pt doing better and is stable clinically  Chronic: no reason to treat fast or aggressively  Slow correction recommended, as it is being done     To review:  The cause is multifactorial:  Dilutional hyponatremia from CHF   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)  Decreased GFR due to CKD and hypotension  Due to the thiazide diuretic metolazone        Recommendations for treatment:  Suggest no NS IVF's at this time  Tolvaptan was stopped  Reviewed salt (2-3 g/day) and fluid (1 L/day) with the pt and wife again  Continue bumex 1 mg po bid yesterday  No metolazone        Chronic diastolic congestive heart failure, NYHA class 3     Cardiac: diastolic CHF  Mgmt as above with salt and fluid restriction and use of loop diuretics  Noted on aldactone      Pneumonia: On cipro and zosyn.  Adjusted zosyn dose for the renal function  Doing better by sx's  Appears much improved today                 George Inman MD  Nephrology  [facility]

## 2018-01-04 NOTE — PLAN OF CARE
Pt remains free of injuries.   C/o lt heel pain, soreness, 2/10. Moderately controlled c repositioning and floating heels.  Encouraged q2h repositioning.   IV antibiotics per MD orders.   Strict I/O.  12 hr chart check completed. Will continue to monitor.

## 2018-01-04 NOTE — PT/OT/SLP PROGRESS
Physical Therapy  Treatment    Beni Cosby   MRN: [id number]   Admitting Diagnosis: Hyponatremia    PT Received On: 01/04/18  PT Start Time: 0935     PT Stop Time: 1000    PT Total Time (min): 25 min       Billable Minutes:  Gait Training 15 and Therapeutic Exercise 10    Treatment Type: Treatment  PT/PTA: PT             General Precautions: Standard, fall  Orthopedic Precautions: N/A   Braces: N/A         Subjective:  Communicated with NURSE AND EPIC CHART REVIEW prior to session.   PT AGREED TO TX     Pain/Comfort  Pain Rating 1: 4/10  Location 1: neck  Pain Rating Post-Intervention 1: 4/10    Objective:   Patient found with: peripheral IV, telemetry, oxygen    Functional Mobility:  PT LOG ROLLED TO LEFT WITH MIN A AND SEATED EOB WITH MIN A. PT STOOD WITH RW AND MIN A FOR GT X 60' WITH STEP TO GT AND FF POSTURE. PT RETURNED TO RM T.F TO CHAIR WITH RW AND MIN A. PT EDUCATED ON POSTURE AWARENESS AND TRUNK EXTENSION. PT COMPLETED B LE TE 2X10 REPS OF AP, TKE, AND MIP. PT LEFT SEATED INCHAIR WITH WIFE PRESENT AND ALL NEEDS MET.     AM-PAC 6 CLICK MOBILITY  How much help from another person does this patient currently need?   1 = Unable, Total/Dependent Assistance  2 = A lot, Maximum/Moderate Assistance  3 = A little, Minimum/Contact Guard/Supervision  4 = None, Modified [place]/Independent    Turning over in bed (including adjusting bedclothes, sheets and blankets)?: 3  Sitting down on and standing up from a chair with arms (e.g., wheelchair, bedside commode, etc.): 3  Moving from lying on back to sitting on the side of the bed?: 3  Moving to and from a bed to a chair (including a wheelchair)?: 3  Need to walk in hospital room?: 3  Climbing 3-5 steps with a railing?: 1  Total Score: 16    AM-PAC Raw Score CMS G-Code Modifier Level of Impairment Assistance   6 % Total / Unable   7 - 9 CM 80 - 100% Maximal Assist   10 - 14 CL 60 - 80% Moderate Assist   15 - 19 CK 40 - 60% Moderate Assist   20 - 22 CJ 20  - 40% Minimal Assist   23 CI 1-20% SBA / CGA   24 CH 0% Independent/ Mod I     Patient left up in chair with call button in reach.    Assessment:  PT TAD TX WITH INC PAIN REPORTED. PT REQUIRES INC ENCOURAGEMENT TO PARTICIPATE     Rehab identified problem list/impairments: Rehab identified problem list/impairments: weakness, impaired endurance, impaired functional mobilty, gait instability, decreased lower extremity function, decreased upper extremity function, pain, impaired balance, impaired self care skills, decreased safety awareness, decreased ROM, impaired skin, edema    Rehab potential is good.    Activity tolerance: Fair    Discharge recommendations: Discharge Facility/Level Of Care Needs: nursing facility, skilled     Barriers to discharge:      Equipment recommendations: Equipment Needed After Discharge: none     GOALS:    Physical Therapy Goals        Problem: Physical Therapy Goal    Goal Priority Disciplines Outcome Goal Variances Interventions   Physical Therapy Goal     PT/OT, PT      Description:  PT WILL BE SEEN FORP.T. FOR A MIN OF 5 OUT OF 7 DAYS A WEEK  LT/10/18  1. PT WILL COMPLETE BED MOBILITY BOBY   2. PT WILL T/F TO CHAIR WITH RW MOD I  3. PT WILL GT TRAIN X 100' WITH RW AND SBA.  4. PT WILL COMPLETE B LE TE X 20 REPS FOR STRENGTHENING.                    PLAN:    Patient to be seen    to address the above listed problems via gait training, therapeutic activities, therapeutic exercises  Plan of Care expires: 01/10/18  Plan of Care reviewed with: patient, spouse         Gauri South, PT  2018

## 2018-01-05 LAB
ANION GAP SERPL CALC-SCNC: 14 MMOL/L
APTT BLDCRRT: 38 SEC
APTT BLDCRRT: 38.6 SEC
BASOPHILS # BLD AUTO: 0.01 K/UL
BASOPHILS NFR BLD: 0.1 %
BUN SERPL-MCNC: 85 MG/DL
CALCIUM SERPL-MCNC: 10.3 MG/DL
CHLORIDE SERPL-SCNC: 87 MMOL/L
CO2 SERPL-SCNC: 25 MMOL/L
CREAT SERPL-MCNC: 2 MG/DL
DIFFERENTIAL METHOD: ABNORMAL
EOSINOPHIL # BLD AUTO: 0.1 K/UL
EOSINOPHIL NFR BLD: 0.5 %
ERYTHROCYTE [DISTWIDTH] IN BLOOD BY AUTOMATED COUNT: 15.4 %
EST. GFR  (AFRICAN AMERICAN): 37 ML/MIN/1.73 M^2
EST. GFR  (NON AFRICAN AMERICAN): 32 ML/MIN/1.73 M^2
GLUCOSE SERPL-MCNC: 151 MG/DL
HCT VFR BLD AUTO: 38.5 %
HGB BLD-MCNC: 13.1 G/DL
INR PPP: 2.5
LYMPHOCYTES # BLD AUTO: 1.6 K/UL
LYMPHOCYTES NFR BLD: 13 %
MCH RBC QN AUTO: 30.8 PG
MCHC RBC AUTO-ENTMCNC: 34 G/DL
MCV RBC AUTO: 91 FL
MONOCYTES # BLD AUTO: 1.3 K/UL
MONOCYTES NFR BLD: 10.4 %
NEUTROPHILS # BLD AUTO: 9.6 K/UL
NEUTROPHILS NFR BLD: 76 %
PLATELET # BLD AUTO: 379 K/UL
PMV BLD AUTO: 8.8 FL
POCT GLUCOSE: 158 MG/DL (ref 70–110)
POCT GLUCOSE: 163 MG/DL (ref 70–110)
POCT GLUCOSE: 200 MG/DL (ref 70–110)
POCT GLUCOSE: 242 MG/DL (ref 70–110)
POCT GLUCOSE: 259 MG/DL (ref 70–110)
POTASSIUM SERPL-SCNC: 5.3 MMOL/L
PROTHROMBIN TIME: 24.7 SEC
RBC # BLD AUTO: 4.25 M/UL
SODIUM SERPL-SCNC: 126 MMOL/L
WBC # BLD AUTO: 12.65 K/UL

## 2018-01-05 PROCEDURE — 94640 AIRWAY INHALATION TREATMENT: CPT

## 2018-01-05 PROCEDURE — 94799 UNLISTED PULMONARY SVC/PX: CPT

## 2018-01-05 PROCEDURE — 25000242 PHARM REV CODE 250 ALT 637 W/ HCPCS: Performed by: NURSE PRACTITIONER

## 2018-01-05 PROCEDURE — 36415 COLL VENOUS BLD VENIPUNCTURE: CPT

## 2018-01-05 PROCEDURE — 25000242 PHARM REV CODE 250 ALT 637 W/ HCPCS: Performed by: FAMILY MEDICINE

## 2018-01-05 PROCEDURE — 25000003 PHARM REV CODE 250: Performed by: INTERNAL MEDICINE

## 2018-01-05 PROCEDURE — 27000221 HC OXYGEN, UP TO 24 HOURS

## 2018-01-05 PROCEDURE — 97116 GAIT TRAINING THERAPY: CPT

## 2018-01-05 PROCEDURE — 85610 PROTHROMBIN TIME: CPT

## 2018-01-05 PROCEDURE — 99900035 HC TECH TIME PER 15 MIN (STAT)

## 2018-01-05 PROCEDURE — 63600175 PHARM REV CODE 636 W HCPCS: Performed by: INTERNAL MEDICINE

## 2018-01-05 PROCEDURE — 92610 EVALUATE SWALLOWING FUNCTION: CPT

## 2018-01-05 PROCEDURE — 80048 BASIC METABOLIC PNL TOTAL CA: CPT

## 2018-01-05 PROCEDURE — 25000003 PHARM REV CODE 250: Performed by: NURSE PRACTITIONER

## 2018-01-05 PROCEDURE — 85730 THROMBOPLASTIN TIME PARTIAL: CPT | Mod: 91

## 2018-01-05 PROCEDURE — 21400001 HC TELEMETRY ROOM

## 2018-01-05 PROCEDURE — 99232 SBSQ HOSP IP/OBS MODERATE 35: CPT | Mod: ,,, | Performed by: INTERNAL MEDICINE

## 2018-01-05 PROCEDURE — 96372 THER/PROPH/DIAG INJ SC/IM: CPT

## 2018-01-05 PROCEDURE — 97530 THERAPEUTIC ACTIVITIES: CPT

## 2018-01-05 PROCEDURE — 25000003 PHARM REV CODE 250: Performed by: FAMILY MEDICINE

## 2018-01-05 PROCEDURE — 97110 THERAPEUTIC EXERCISES: CPT

## 2018-01-05 PROCEDURE — 63600175 PHARM REV CODE 636 W HCPCS: Performed by: FAMILY MEDICINE

## 2018-01-05 PROCEDURE — 85025 COMPLETE CBC W/AUTO DIFF WBC: CPT

## 2018-01-05 RX ORDER — WARFARIN SODIUM 5 MG/1
5 TABLET ORAL
Status: DISCONTINUED | OUTPATIENT
Start: 2018-01-08 | End: 2018-01-07 | Stop reason: DRUGHIGH

## 2018-01-05 RX ORDER — MOXIFLOXACIN HYDROCHLORIDE 400 MG/1
400 TABLET ORAL DAILY
Status: DISCONTINUED | OUTPATIENT
Start: 2018-01-05 | End: 2018-01-05

## 2018-01-05 RX ORDER — MOXIFLOXACIN HYDROCHLORIDE 400 MG/1
400 TABLET ORAL DAILY
Status: DISCONTINUED | OUTPATIENT
Start: 2018-01-05 | End: 2018-01-08 | Stop reason: HOSPADM

## 2018-01-05 RX ORDER — WARFARIN 3 MG/1
3 TABLET ORAL ONCE
Status: COMPLETED | OUTPATIENT
Start: 2018-01-05 | End: 2018-01-05

## 2018-01-05 RX ADMIN — BUDESONIDE 0.5 MG: 0.5 SUSPENSION RESPIRATORY (INHALATION) at 07:01

## 2018-01-05 RX ADMIN — PIPERACILLIN SODIUM AND TAZOBACTAM SODIUM 2.25 G: 2; .25 INJECTION, POWDER, FOR SOLUTION INTRAVENOUS at 08:01

## 2018-01-05 RX ADMIN — IPRATROPIUM BROMIDE AND ALBUTEROL SULFATE 3 ML: .5; 3 SOLUTION RESPIRATORY (INHALATION) at 01:01

## 2018-01-05 RX ADMIN — GUAIFENESIN 600 MG: 600 TABLET, EXTENDED RELEASE ORAL at 09:01

## 2018-01-05 RX ADMIN — BUMETANIDE 1 MG: 1 TABLET ORAL at 09:01

## 2018-01-05 RX ADMIN — METOPROLOL TARTRATE 25 MG: 25 TABLET ORAL at 09:01

## 2018-01-05 RX ADMIN — IPRATROPIUM BROMIDE AND ALBUTEROL SULFATE 3 ML: .5; 3 SOLUTION RESPIRATORY (INHALATION) at 07:01

## 2018-01-05 RX ADMIN — INSULIN ASPART 3 UNITS: 100 INJECTION, SOLUTION INTRAVENOUS; SUBCUTANEOUS at 10:01

## 2018-01-05 RX ADMIN — ACETAMINOPHEN 650 MG: 325 TABLET ORAL at 11:01

## 2018-01-05 RX ADMIN — ARFORMOTEROL TARTRATE 15 MCG: 15 SOLUTION RESPIRATORY (INHALATION) at 08:01

## 2018-01-05 RX ADMIN — PIPERACILLIN SODIUM AND TAZOBACTAM SODIUM 2.25 G: 2; .25 INJECTION, POWDER, FOR SOLUTION INTRAVENOUS at 12:01

## 2018-01-05 RX ADMIN — FERROUS SULFATE TAB EC 325 MG (65 MG FE EQUIVALENT) 325 MG: 325 (65 FE) TABLET DELAYED RESPONSE at 01:01

## 2018-01-05 RX ADMIN — BUDESONIDE 0.5 MG: 0.5 SUSPENSION RESPIRATORY (INHALATION) at 08:01

## 2018-01-05 RX ADMIN — PANTOPRAZOLE SODIUM 40 MG: 40 TABLET, DELAYED RELEASE ORAL at 09:01

## 2018-01-05 RX ADMIN — POTASSIUM CHLORIDE 20 MEQ: 1500 TABLET, EXTENDED RELEASE ORAL at 09:01

## 2018-01-05 RX ADMIN — MOXIFLOXACIN 400 MG: 400 TABLET, FILM COATED ORAL at 01:01

## 2018-01-05 RX ADMIN — ARFORMOTEROL TARTRATE 15 MCG: 15 SOLUTION RESPIRATORY (INHALATION) at 07:01

## 2018-01-05 RX ADMIN — ASPIRIN 81 MG: 81 TABLET, COATED ORAL at 09:01

## 2018-01-05 RX ADMIN — FERROUS SULFATE TAB EC 325 MG (65 MG FE EQUIVALENT) 325 MG: 325 (65 FE) TABLET DELAYED RESPONSE at 09:01

## 2018-01-05 RX ADMIN — ACETAMINOPHEN 650 MG: 325 TABLET ORAL at 09:01

## 2018-01-05 RX ADMIN — WARFARIN SODIUM 3 MG: 3 TABLET ORAL at 05:01

## 2018-01-05 RX ADMIN — CIPROFLOXACIN 400 MG: 2 INJECTION, SOLUTION INTRAVENOUS at 09:01

## 2018-01-05 RX ADMIN — IPRATROPIUM BROMIDE AND ALBUTEROL SULFATE 3 ML: .5; 3 SOLUTION RESPIRATORY (INHALATION) at 08:01

## 2018-01-05 RX ADMIN — ROSUVASTATIN CALCIUM 10 MG: 10 TABLET, FILM COATED ORAL at 09:01

## 2018-01-05 RX ADMIN — LINEZOLID 600 MG: 600 TABLET, FILM COATED ORAL at 09:01

## 2018-01-05 NOTE — PROGRESS NOTES
Coumadin Progress Notes:  INR = 2.5 trended up from 2.1 yesterday  Will give a 3 mg dose today  Goal INR = 2-3  Indication: Afib   Pharmacy will continue to follow daily INR's and make adjustments accordingly/Radha Huitron [facility] 1/5/2018 7:34 AM

## 2018-01-05 NOTE — SUBJECTIVE & OBJECTIVE
Review of Systems   Constitutional: Positive for fatigue. Negative for activity change, chills, diaphoresis, fever and unexpected weight change.   HENT: Negative for congestion, postnasal drip, rhinorrhea, sinus pressure, sore throat and trouble swallowing.    Eyes: Negative for photophobia and visual disturbance.   Respiratory: Positive for cough (chronic). Negative for apnea, chest tightness, shortness of breath and wheezing.    Cardiovascular: Positive for leg swelling (chronic). Negative for chest pain and palpitations.   Gastrointestinal: Negative for abdominal distention, abdominal pain, blood in stool, constipation, diarrhea, nausea and vomiting.   Endocrine: Negative for polydipsia, polyphagia and polyuria.   Genitourinary: Negative for decreased urine volume, difficulty urinating, dysuria, frequency, hematuria and urgency.   Musculoskeletal: Negative for arthralgias, back pain, gait problem, joint swelling and myalgias.   Skin: Negative for pallor, rash and wound.   Neurological: Positive for weakness (generalized). Negative for dizziness, seizures, syncope, facial asymmetry, speech difficulty, light-headedness, numbness and headaches.   Psychiatric/Behavioral: Negative for confusion. The patient is not nervous/anxious.    All other systems reviewed and are negative.    Objective:     Vital Signs (Most Recent):  Temp: 97.9 °F (36.6 °C) (01/05/18 1711)  Pulse: 96 (01/05/18 1711)  Resp: 16 (01/05/18 1711)  BP: (!) 101/54 (01/05/18 1711)  SpO2: 96 % (01/05/18 1711) Vital Signs (24h Range):  Temp:  [97.4 °F (36.3 °C)-98.1 °F (36.7 °C)] 97.9 °F (36.6 °C)  Pulse:  [75-98] 96  Resp:  [] 16  SpO2:  [95 %-100 %] 96 %  BP: (101-135)/(54-61) 101/54     Weight: 109.2 kg (240 lb 11.9 oz)  Body mass index is 34.54 kg/m².    Intake/Output Summary (Last 24 hours) at 01/05/18 6518  Last data filed at 01/05/18 1452   Gross per 24 hour   Intake              990 ml   Output             1100 ml   Net             -110 ml       Physical Exam   Constitutional: He is oriented to person, place, and time. He appears well-developed and well-nourished. No distress.   HENT:   Head: Normocephalic and atraumatic.   Mouth/Throat: Oropharynx is clear and moist.   Eyes: Conjunctivae are normal.   Neck: Normal range of motion. Neck supple. No JVD present.   Cardiovascular: Normal rate and intact distal pulses.   No extrasystoles are present. Exam reveals no gallop.    Murmur heard.  Irregular rhythm    Pulmonary/Chest: Effort normal. No accessory muscle usage. No tachypnea. No respiratory distress. He has no decreased breath sounds. He has no wheezes. He has no rhonchi. He has no rales.   Abdominal: Soft. Bowel sounds are normal. He exhibits no distension. There is no tenderness. There is no rebound and no CVA tenderness.   Musculoskeletal: Normal range of motion. He exhibits edema (1+ BLE ). He exhibits no tenderness or deformity.   Neurological: He is alert and oriented to person, place, and time. No cranial nerve deficit or sensory deficit.   Skin: Skin is warm, dry and intact. Capillary refill takes less than 2 seconds. He is not diaphoretic. No erythema.   Psychiatric: He has a normal mood and affect. His speech is normal and behavior is normal. Cognition and memory are normal.   Nursing note and vitals reviewed.      Significant Labs:   Recent Lab Results       01/05/18  1055 01/05/18  0622 01/05/18  0614 01/04/18  2232 01/04/18  2102      Anion Gap   14       aPTT   38.6  Comment:  aPTT therapeutic range = 39-69 seconds(H)  37.1  Comment:  aPTT therapeutic range = 39-69 seconds(H)     Baso #   0.01       Basophil%   0.1       BUN, Bld   85(H)       Calcium   10.3       Chloride   87(L)       CO2   25       Creatinine   2.0(H)       Differential Method   Automated       eGFR if    37(A)       eGFR if non    32  Comment:  Calculation used to obtain the estimated glomerular filtration  rate (eGFR) is the CKD-EPI  equation.   (A)       Eos #   0.1       Eosinophil%   0.5       Glucose   151(H)       Gram Stain (Respiratory)          Gran #   9.6(H)       Gran%   76.0(H)       Hematocrit   38.5(L)       Hemoglobin   13.1(L)       Coumadin Monitoring INR   2.5  Comment:  Coumadin Therapy:  2.0 - 3.0 for INR for all indicators except mechanical heart valves  and antiphospholipid syndromes which should use 2.5 - 3.5.  (H)       Lymph #   1.6       Lymph%   13.0(L)       MCH   30.8       MCHC   34.0       MCV   91       Mono #   1.3(H)       Mono%   10.4       MPV   8.8(L)       Platelets   379(H)       POCT Glucose 259(H) 163(H)  242(H)      Potassium   5.3(H)       Protime   24.7(H)       RBC   4.25(L)       RDW   15.4(H)       Sodium   126(L)       WBC   12.65                   01/04/18  2042 01/04/18  1758      Anion Gap       aPTT       Baso #       Basophil%       BUN, Bld       Calcium       Chloride       CO2       Creatinine       Differential Method       eGFR if        eGFR if non        Eos #       Eosinophil%       Glucose       Gram Stain (Respiratory) <10 epithelial cells per low power field.       Moderate WBC's       No organisms seen      Gran #       Gran%       Hematocrit       Hemoglobin       Coumadin Monitoring INR       Lymph #       Lymph%       MCH       MCHC       MCV       Mono #       Mono%       MPV       Platelets       POCT Glucose  158(H)     Potassium       Protime       RBC       RDW       Sodium       WBC           All pertinent labs within the past 24 hours have been reviewed.    Significant Imaging: I have reviewed all pertinent imaging results/findings within the past 24 hours.

## 2018-01-05 NOTE — PT/OT/SLP PROGRESS
Physical Therapy  Treatment    Beni Cosby   MRN: [id number]   Admitting Diagnosis: Hyponatremia       PT Start Time: 1315     PT Stop Time: 1340    PT Total Time (min): 25 min       Billable Minutes:  Gait Training 10 and Therapeutic Exercise 15    Treatment Type: Treatment  PT/PTA: PT             General Precautions: Standard, fall  Orthopedic Precautions: N/A   Braces: N/A         Subjective:  Communicated with Natalya prior to session.      Pain/Comfort  Pain Rating 1: 0/10    Objective:   Patient found with: peripheral IV, oxygen, telemetry    Functional Mobility:  Bed Mobility: min A       Transfers: min A x 2 reps       Gait: 25ft RW min A - flexed posture and dec speed       Stairs:      Balance:   Sit - F+/F  Stand - F/P+ with RW    Therapeutic Activities and Exercises:  PT educated pt./cg on POC, le exs, benefits of up in chair, and safety with mobility     AM-PAC 6 CLICK MOBILITY  How much help from another person does this patient currently need?   1 = Unable, Total/Dependent Assistance  2 = A lot, Maximum/Moderate Assistance  3 = A little, Minimum/Contact Guard/Supervision  4 = None, Modified [place]/Independent    Turning over in bed (including adjusting bedclothes, sheets and blankets)?: 3  Sitting down on and standing up from a chair with arms (e.g., wheelchair, bedside commode, etc.): 3  Moving from lying on back to sitting on the side of the bed?: 3  Moving to and from a bed to a chair (including a wheelchair)?: 3  Need to walk in hospital room?: 3  Climbing 3-5 steps with a railing?: 1  Total Score: 16    AM-PAC Raw Score CMS G-Code Modifier Level of Impairment Assistance   6 % Total / Unable   7 - 9 CM 80 - 100% Maximal Assist   10 - 14 CL 60 - 80% Moderate Assist   15 - 19 CK 40 - 60% Moderate Assist   20 - 22 CJ 20 - 40% Minimal Assist   23 CI 1-20% SBA / CGA   24 CH 0% Independent/ Mod I     Patient left up in chair with all lines intact, call button in reach, nurse notified and  wife present.    Assessment:  Beni Cosby is a 72 y.o. male with a medical diagnosis of Hyponatremia and presents with impaired mobility.    Rehab identified problem list/impairments:      Rehab potential is good.    Activity tolerance: Good    Discharge recommendations: Discharge Facility/Level Of Care Needs: nursing facility, skilled     Barriers to discharge:      Equipment recommendations:       GOALS:    Physical Therapy Goals        Problem: Physical Therapy Goal    Goal Priority Disciplines Outcome Goal Variances Interventions   Physical Therapy Goal     PT/OT, PT      Description:  PT WILL BE SEEN FORP.T. FOR A MIN OF 5 OUT OF 7 DAYS A WEEK  LT/10/18  1. PT WILL COMPLETE BED MOBILITY BOBY   2. PT WILL T/F TO CHAIR WITH RW MOD I  3. PT WILL GT TRAIN X 100' WITH RW AND SBA.  4. PT WILL COMPLETE B LE TE X 20 REPS FOR STRENGTHENING.                    PLAN:    Patient to be seen 5 x/week  to address the above listed problems via gait training, therapeutic activities, therapeutic exercises  Plan of Care expires: 01/10/18  Plan of Care reviewed with: spouse, patient         Otoniel Larson, PT  2018

## 2018-01-05 NOTE — PLAN OF CARE
Problem: Patient Care Overview  Goal: Plan of Care Review  PT REQUIRES MIN A FOR GT X 15' WITH RW AND SLOW PACE.    Outcome: Ongoing (interventions implemented as appropriate)  Pt. amb 25ft RW min A; supine to sit to stand min A with Rw; pt. Left up in chair

## 2018-01-05 NOTE — PLAN OF CARE
Problem: Patient Care Overview  Goal: Plan of Care Review  PT REQUIRES MIN A FOR GT X 15' WITH RW AND SLOW PACE.    Outcome: Ongoing (interventions implemented as appropriate)  Patient remains on 1lpm nasal cannula and tolerating breathing treatments well.

## 2018-01-05 NOTE — ASSESSMENT & PLAN NOTE
Elevated troponin     Due to renal insufficency and demand ischjemia from tachycardia and pneumonia

## 2018-01-05 NOTE — ASSESSMENT & PLAN NOTE
Prior treatment with diuretics and metolazone.     tolvaptan given  - Discussed case with Nephrology  Low salt diet, fluid restriction- pt requested 1500ml.  Na stable at 126 -stable x 3 days

## 2018-01-05 NOTE — PT/OT/SLP EVAL
Speech Language Pathology Evaluation  Bedside Swallow    Patient Name:  Beni Cosby   MRN:  [id number]  Admitting Diagnosis: Hyponatremia    Recommendations:                 General Recommendations:  Follow-up not indicated  Diet recommendations:  Regular, Thin   Aspiration Precautions: 1 bite/sip at a time, HOB to 90 degrees and Remain upright 30 minutes post meal   General Precautions: Standard, fall, respiratory  Communication strategies:  none    History:     Past Medical History:   Diagnosis Date    Anticoagulant long-term use     Aortic valve stenosis with insufficiency 4/2008    Surgery [facility]    Arthritis     CHF (congestive heart failure)     Chronic a-fib     CKD (chronic kidney disease) stage 3, GFR 30-59 ml/min     COPD (chronic obstructive pulmonary disease)     Diabetes mellitus     Encounter for blood transfusion     Primary cancer of right kidney     Surgery, no further treatment       Past Surgical History:   Procedure Laterality Date    Atrial septem device implanted  2005    BONE MARROW BIOPSY      CARDIAC SURGERY  2008    Aotric valve replacement     KIDNEY SURGERY Right 2007    NOSE SURGERY  2008    RENAL BIOPSY  8/30/       Social History: Patient lives with his wife at home.    Chest X-Rays: Subtle left basilar infiltrate with Improved aeration.     Prior diet: Regular    Subjective     Pt was seen at bedside with his wife present.  He is without complaint and requested assistance to get back in bed.  Patient goals: none reported     Objective:     Oral Musculature Evaluation  · Oral Musculature: WFL  · Dentition: present and adequate    Bedside Swallow Eval:   Consistencies Assessed:  · Thin liquids, puree, and solids were assessed at bedside      Oral Phase:   · WFL    Pharyngeal Phase:   · no overt clinical signs/symptoms of aspiration  · no overt clinical signs/symptoms of pharyngeal dysphagia    Compensatory Strategies  · None    Assessment:     Beni Cosby is  a 72 y.o. male with possible pneumonia.  He presents with a functional swallow and no overt signs of aspiration.  He reports having significant heartburn when he takes potassium pills at home and is on medication for heartburn symptoms daily.  Aspiration of gastric content nor silent aspiration can be excluded at bedside.  Please order MBSS if deemed necessary.        Plan:     · Plan of Care reviewed with:  patient, spouse   · SLP Follow-Up:  No         Time Tracking:     SLP Treatment Date:   01/05/18  Speech Start Time:  1530  Speech Stop Time:  1600     Speech Total Time (min):  30 min    Billable Minutes: Eval Swallow and Oral Function 30    Rose Foley CCC-SLP  01/05/2018

## 2018-01-05 NOTE — PLAN OF CARE
Problem: Patient Care Overview  Goal: Plan of Care Review  PT REQUIRES MIN A FOR GT X 15' WITH RW AND SLOW PACE.    Outcome: Ongoing (interventions implemented as appropriate)  Plan of care reviewed and discussed with patient and wife.  No acute distress noted.  Safety and fall precautions reviewed and discussed with patient.  Patient free from injury.  Pain managed adequately.  Fluid restriction maintained.  O2 at 2L.  SCDs in place.  Call bell and personal items in reach.  Bed in low position and locked.  Side rails up x3.  Encouraged to call if any assistance needed.  Will continue to monitor.

## 2018-01-05 NOTE — PLAN OF CARE
Problem: Patient Care Overview  Goal: Plan of Care Review  PT REQUIRES MIN A FOR GT X 15' WITH RW AND SLOW PACE.    Outcome: Ongoing (interventions implemented as appropriate)  .

## 2018-01-05 NOTE — PLAN OF CARE
Problem: Patient Care Overview  Goal: Plan of Care Review  PT REQUIRES MIN A FOR GT X 15' WITH RW AND SLOW PACE.    Outcome: Ongoing (interventions implemented as appropriate)  Pt verbalized understanding of plan of care. Fall precautions maintained.  Bed locked and low.  Call light and personal items within reach. SR up x 2. Family at bedside. Hand washing/ hand sanitizers encouraged.IVAB admin as ordered. Pt repositioned q2h and PRN.CBG monitoring ordered AC/HS.  Scheduled and SSI admin as ordered. 24 hour chart check complete.

## 2018-01-05 NOTE — CONSULTS
Consult received for skilled placement in [place].  Met with patient and wife. Preference letter obtained for [facility]. Contacted [facility], faxed referral. [place] [facility] [place] and will review. They do not accept new skilled patients on the weekend. Rain to follow up on Monday.

## 2018-01-05 NOTE — PT/OT/SLP PROGRESS
"Occupational Therapy  Treatment    Beni Cosby   MRN: [id number]   Admitting Diagnosis: Hyponatremia    OT Date of Treatment: 01/05/18   OT Start Time: 1345  OT Stop Time: 1405  OT Total Time (min): 20 min    Billable Minutes:  Therapeutic Activity  x 20 min    General Precautions: Standard, fall  Orthopedic Precautions: N/A  Braces: N/A         Subjective:  Communicated with pt, spouse, and nurse Nunu prior to session.    Pain/Comfort  Pain Rating 1: 0/10    Objective:  Patient found with: peripheral IV, oxygen, telemetry     Functional Mobility:  Bed Mobility:       Transfers:        Functional Ambulation:     Activities of Daily Living:     Feeding adaptive equipment:      UE adaptive equipment:      LE adaptive equipment:                     Bathing adaptive equipment:     Balance:   Static Sit: GOOD: Takes MODERATE challenges from all directions  Dynamic Sit: FAIR+: Maintains balance through MINIMAL excursions of active trunk motion  Static Stand: POOR+: Needs MINIMAL assist to maintain  Dynamic stand: POOR: N/A    Therapeutic Activities and Exercises:  Pt seen at bedside, bed mobility with min A, good static sitting balance, sit to stand with mod A with bed elevated, fx mobility with RW in room with min A. Pt seated in [employer] q tx, fatigue quickly. Pt lula tx well, cooperative.     AM-PAC 6 CLICK ADL   How much help from another person does this patient currently need?   1 = Unable, Total/Dependent Assistance  2 = A lot, Maximum/Moderate Assistance  3 = A little, Minimum/Contact Guard/Supervision  4 = None, Modified Van/Independent    Putting on and taking off regular lower body clothing? : 1  Bathing (including washing, rinsing, drying)?: 1  Toileting, which includes using toilet, bedpan, or urinal? : 1  Putting on and taking off regular upper body clothing?: 2  Taking care of personal grooming such as brushing teeth?: 3  Eating meals?: 4  Total Score: 12     AM-PAC Raw Score CMS "G-Code Modifier " Level of Impairment Assistance   6 % Total / Unable   7 - 8 CM 80 - 100% Maximal Assist   9-13 CL 60 - 80% Moderate Assist   14 - 19 CK 40 - 60% Moderate Assist   20 - 22 CJ 20 - 40% Minimal Assist   23 CI 1-20% SBA / CGA   24 CH 0% Independent/ Mod I       Patient left up in chair with all lines intact, call button in reach and spouse present    ASSESSMENT:  Beni Cosby is a 72 y.o. male with a medical diagnosis of Hyponatremia and presents with impaired self care and fx mobility.    Rehab identified problem list/impairments: Rehab identified problem list/impairments: weakness, impaired endurance, gait instability, impaired functional mobilty, impaired self care skills, impaired balance, decreased coordination    Rehab potential is good.    Activity tolerance: Fair    Discharge recommendations: Discharge Facility/Level Of Care Needs: nursing facility, skilled     Barriers to discharge: Barriers to Discharge: None    Equipment recommendations: none     GOALS:    Occupational Therapy Goals        Problem: Occupational Therapy Goal    Goal Priority Disciplines Outcome Interventions   Occupational Therapy Goal     OT, PT/OT Ongoing (interventions implemented as appropriate)    Description:  Goals to be met by: 1/10/18     Patient will increase functional independence with ADLs by performing:    UE Dressing with Minimal Assistance.  LE Dressing with Maximum Assistance.  Toileting from toilet with Moderate Assistance for hygiene and clothing management.   Toilet transfer to toilet with Contact Guard Assistance.  Upper extremity exercise program x10 reps per handout, with supervision.                      Plan:  Patient to be seen 3 x/week to address the above listed problems via self-care/home management, therapeutic activities, therapeutic exercises  Plan of Care expires: 01/10/18  Plan of Care reviewed with: patient, spouse         Nader Frias, OT  01/05/2018

## 2018-01-06 LAB
ANION GAP SERPL CALC-SCNC: 13 MMOL/L
APTT BLDCRRT: 38.6 SEC
APTT BLDCRRT: 40.4 SEC
BASOPHILS # BLD AUTO: 0.01 K/UL
BASOPHILS NFR BLD: 0.1 %
BUN SERPL-MCNC: 85 MG/DL
CALCIUM SERPL-MCNC: 10 MG/DL
CHLORIDE SERPL-SCNC: 86 MMOL/L
CO2 SERPL-SCNC: 27 MMOL/L
CREAT SERPL-MCNC: 2.1 MG/DL
DIFFERENTIAL METHOD: ABNORMAL
EOSINOPHIL # BLD AUTO: 0.1 K/UL
EOSINOPHIL NFR BLD: 0.6 %
ERYTHROCYTE [DISTWIDTH] IN BLOOD BY AUTOMATED COUNT: 15.4 %
EST. GFR  (AFRICAN AMERICAN): 35 ML/MIN/1.73 M^2
EST. GFR  (NON AFRICAN AMERICAN): 31 ML/MIN/1.73 M^2
GLUCOSE SERPL-MCNC: 140 MG/DL
HCT VFR BLD AUTO: 37 %
HGB BLD-MCNC: 12.6 G/DL
INR PPP: 2.8
LYMPHOCYTES # BLD AUTO: 1.8 K/UL
LYMPHOCYTES NFR BLD: 15.9 %
MCH RBC QN AUTO: 30.8 PG
MCHC RBC AUTO-ENTMCNC: 34.1 G/DL
MCV RBC AUTO: 91 FL
MONOCYTES # BLD AUTO: 1.2 K/UL
MONOCYTES NFR BLD: 10.6 %
NEUTROPHILS # BLD AUTO: 8.3 K/UL
NEUTROPHILS NFR BLD: 72.8 %
PLATELET # BLD AUTO: 378 K/UL
PMV BLD AUTO: 8.9 FL
POCT GLUCOSE: 139 MG/DL (ref 70–110)
POCT GLUCOSE: 161 MG/DL (ref 70–110)
POCT GLUCOSE: 163 MG/DL (ref 70–110)
POCT GLUCOSE: 170 MG/DL (ref 70–110)
POTASSIUM SERPL-SCNC: 4.9 MMOL/L
PROTHROMBIN TIME: 27.5 SEC
RBC # BLD AUTO: 4.09 M/UL
SODIUM SERPL-SCNC: 126 MMOL/L
WBC # BLD AUTO: 11.41 K/UL

## 2018-01-06 PROCEDURE — 36415 COLL VENOUS BLD VENIPUNCTURE: CPT

## 2018-01-06 PROCEDURE — 85025 COMPLETE CBC W/AUTO DIFF WBC: CPT

## 2018-01-06 PROCEDURE — 94640 AIRWAY INHALATION TREATMENT: CPT

## 2018-01-06 PROCEDURE — 25000242 PHARM REV CODE 250 ALT 637 W/ HCPCS: Performed by: NURSE PRACTITIONER

## 2018-01-06 PROCEDURE — 85730 THROMBOPLASTIN TIME PARTIAL: CPT

## 2018-01-06 PROCEDURE — 97530 THERAPEUTIC ACTIVITIES: CPT

## 2018-01-06 PROCEDURE — 97166 OT EVAL MOD COMPLEX 45 MIN: CPT

## 2018-01-06 PROCEDURE — 85610 PROTHROMBIN TIME: CPT

## 2018-01-06 PROCEDURE — 80048 BASIC METABOLIC PNL TOTAL CA: CPT

## 2018-01-06 PROCEDURE — 25000242 PHARM REV CODE 250 ALT 637 W/ HCPCS: Performed by: FAMILY MEDICINE

## 2018-01-06 PROCEDURE — 99232 SBSQ HOSP IP/OBS MODERATE 35: CPT | Mod: ,,, | Performed by: INTERNAL MEDICINE

## 2018-01-06 PROCEDURE — 25000003 PHARM REV CODE 250: Performed by: INTERNAL MEDICINE

## 2018-01-06 PROCEDURE — 25000003 PHARM REV CODE 250: Performed by: NURSE PRACTITIONER

## 2018-01-06 PROCEDURE — 25000003 PHARM REV CODE 250: Performed by: FAMILY MEDICINE

## 2018-01-06 PROCEDURE — 21400001 HC TELEMETRY ROOM

## 2018-01-06 PROCEDURE — 97535 SELF CARE MNGMENT TRAINING: CPT

## 2018-01-06 PROCEDURE — 99900035 HC TECH TIME PER 15 MIN (STAT)

## 2018-01-06 RX ORDER — WARFARIN 2.5 MG/1
2.5 TABLET ORAL ONCE
Status: COMPLETED | OUTPATIENT
Start: 2018-01-06 | End: 2018-01-06

## 2018-01-06 RX ADMIN — ARFORMOTEROL TARTRATE 15 MCG: 15 SOLUTION RESPIRATORY (INHALATION) at 09:01

## 2018-01-06 RX ADMIN — METOPROLOL TARTRATE 25 MG: 25 TABLET ORAL at 09:01

## 2018-01-06 RX ADMIN — BUDESONIDE 0.5 MG: 0.5 SUSPENSION RESPIRATORY (INHALATION) at 09:01

## 2018-01-06 RX ADMIN — BUMETANIDE 1 MG: 1 TABLET ORAL at 08:01

## 2018-01-06 RX ADMIN — IPRATROPIUM BROMIDE AND ALBUTEROL SULFATE 3 ML: .5; 3 SOLUTION RESPIRATORY (INHALATION) at 07:01

## 2018-01-06 RX ADMIN — GUAIFENESIN 600 MG: 600 TABLET, EXTENDED RELEASE ORAL at 08:01

## 2018-01-06 RX ADMIN — PANTOPRAZOLE SODIUM 40 MG: 40 TABLET, DELAYED RELEASE ORAL at 09:01

## 2018-01-06 RX ADMIN — IPRATROPIUM BROMIDE AND ALBUTEROL SULFATE 3 ML: .5; 3 SOLUTION RESPIRATORY (INHALATION) at 01:01

## 2018-01-06 RX ADMIN — MOXIFLOXACIN 400 MG: 400 TABLET, FILM COATED ORAL at 09:01

## 2018-01-06 RX ADMIN — ARFORMOTEROL TARTRATE 15 MCG: 15 SOLUTION RESPIRATORY (INHALATION) at 08:01

## 2018-01-06 RX ADMIN — FERROUS SULFATE TAB EC 325 MG (65 MG FE EQUIVALENT) 325 MG: 325 (65 FE) TABLET DELAYED RESPONSE at 08:01

## 2018-01-06 RX ADMIN — BUDESONIDE 0.5 MG: 0.5 SUSPENSION RESPIRATORY (INHALATION) at 07:01

## 2018-01-06 RX ADMIN — WARFARIN SODIUM 2.5 MG: 2.5 TABLET ORAL at 05:01

## 2018-01-06 RX ADMIN — ACETAMINOPHEN 650 MG: 325 TABLET ORAL at 11:01

## 2018-01-06 RX ADMIN — POTASSIUM CHLORIDE 20 MEQ: 1500 TABLET, EXTENDED RELEASE ORAL at 08:01

## 2018-01-06 RX ADMIN — IPRATROPIUM BROMIDE AND ALBUTEROL SULFATE 3 ML: .5; 3 SOLUTION RESPIRATORY (INHALATION) at 09:01

## 2018-01-06 RX ADMIN — POTASSIUM CHLORIDE 20 MEQ: 1500 TABLET, EXTENDED RELEASE ORAL at 09:01

## 2018-01-06 RX ADMIN — ROSUVASTATIN CALCIUM 10 MG: 10 TABLET, FILM COATED ORAL at 08:01

## 2018-01-06 RX ADMIN — FERROUS SULFATE TAB EC 325 MG (65 MG FE EQUIVALENT) 325 MG: 325 (65 FE) TABLET DELAYED RESPONSE at 09:01

## 2018-01-06 RX ADMIN — BUMETANIDE 1 MG: 1 TABLET ORAL at 09:01

## 2018-01-06 RX ADMIN — GUAIFENESIN 600 MG: 600 TABLET, EXTENDED RELEASE ORAL at 09:01

## 2018-01-06 RX ADMIN — ASPIRIN 81 MG: 81 TABLET, COATED ORAL at 09:01

## 2018-01-06 RX ADMIN — METOPROLOL TARTRATE 25 MG: 25 TABLET ORAL at 08:01

## 2018-01-06 NOTE — ASSESSMENT & PLAN NOTE
Resume loop diuretics.  Gentle IV normal saline.  Liberalize salt and water intake.  Avoid Zaroxolyn.

## 2018-01-06 NOTE — PROGRESS NOTES
[facility] Hospital Medicine  Progress Note    Patient Name: Beni Cosby  MRN: [id number]  Patient Class: IP- Inpatient   Admission Date: 12/30/2017  Length of Stay: 7 days  Attending Physician: Dane Ruby MD  Primary Care Provider: Jackson Brandt MD        Subjective:     Principal Problem:Hyponatremia    HPI:  Mr. Cosby is a 71yo male  with a PMHx of chronic diastolic HFpEF (right-sided), CKD stage III, chronic hyponatremia, COPD, DM II, chronic AF on Coumadin, and AS with remote bioprosthetic AVR, who presented to the ED with c/o fatigue that has progressively worsened over the past 1 day.  Associated generalized weakness.  Denies any fever, chills, CP, SOB, worsening cough, worsening edema, ABD pain, N/V/D, dysuria, back pain, HA, AMS, visual disturbances, seizure-like activity, lightheadedness/dizziness syncope, or numbness/tingling.  No aggravating to alleviating factors.  He was admitted to [facility] 12/19 for hyponatremia with Na 119.  He received 0.9 NS IVF's, and metolazone added per Nephrology.  Initial work-up in ED resulted Na 123, cr. 2, BUN 85, WBC 16.4, , troponin 0.3.  CXR showed RLL infiltrate consistent with PNA.  EKG revealed A-Fib with controlled rate, no acute changes from previous tracings.  Case discussed with Nephrology in ED, who recommend admission for initiation of tolvaptan therapy.  Hospital Medicine consulted for admission.    Hospital Course:  Patient was admitted to Med Surg for hyponatremia and pneumonia under the care of Hospital Medicine. He was given IV abx for PNA and nephrology was consulted for hyponatremia. He was started on Tolvaptan.  1/1: Pt reports decreased SOB and cough - continue IV abx. Nephrology following - Na stable - fluid restrictions. Cardiology consulted.  1/2: Pt slowly improving. Wheezing decreased. Incentive spirometry added. Cardiology following - elevated troponin 2/2 renal insufficiency and demand ischemia.   1/3: Pt is  sleeping good - reports decreased cough - no wheezing audible. WBC slowly improving. Na level better today but still low - no mentation changes. Cardiology and nephrology following  1/4: WBC normalized, afebrile. Na stable at 126. De-escalate Abx. Pt/spouse requests SNF  Hyperkalemia- Spironolactone held   1/5/18: Hyperkalemia resolved. Serum Na stable. Very weak. Speech consult for possible aspiration   1/6/17: spouse concerned about severe, generalized weakness. Labs stable. Will check electrolytes. Awaiting SNF placement     Review of Systems   Constitutional: Positive for fatigue. Negative for activity change, chills, diaphoresis, fever and unexpected weight change.   HENT: Negative for congestion, postnasal drip, rhinorrhea, sinus pressure, sore throat and trouble swallowing.    Eyes: Negative for photophobia and visual disturbance.   Respiratory: Positive for cough (chronic). Negative for apnea, chest tightness, shortness of breath and wheezing.    Cardiovascular: Positive for leg swelling (chronic). Negative for chest pain and palpitations.   Gastrointestinal: Negative for abdominal distention, abdominal pain, blood in stool, constipation, diarrhea, nausea and vomiting.   Endocrine: Negative for polydipsia, polyphagia and polyuria.   Genitourinary: Negative for decreased urine volume, difficulty urinating, dysuria, frequency, hematuria and urgency.   Musculoskeletal: Negative for arthralgias, back pain, gait problem, joint swelling and myalgias.   Skin: Negative for pallor, rash and wound.   Neurological: Positive for weakness (generalized). Negative for dizziness, seizures, syncope, facial asymmetry, speech difficulty, light-headedness, numbness and headaches.   Psychiatric/Behavioral: Negative for confusion. The patient is not nervous/anxious.    All other systems reviewed and are negative.    Objective:     Vital Signs (Most Recent):  Temp: 97.9 °F (36.6 °C) (01/06/18 1216)  Pulse: 79 (01/06/18  1531)  Resp: 18 (01/06/18 1531)  BP: 120/62 (01/06/18 1531)  SpO2: 95 % (01/06/18 1305) Vital Signs (24h Range):  Temp:  [97.4 °F (36.3 °C)-98.1 °F (36.7 °C)] 97.9 °F (36.6 °C)  Pulse:  [] 79  Resp:  [16-20] 18  SpO2:  [95 %-99 %] 95 %  BP: (101-125)/(54-64) 120/62     Weight: 109.2 kg (240 lb 11.9 oz)  Body mass index is 34.54 kg/m².    Intake/Output Summary (Last 24 hours) at 01/06/18 1709  Last data filed at 01/06/18 1000   Gross per 24 hour   Intake              200 ml   Output              370 ml   Net             -170 ml      Physical Exam   Constitutional: He is oriented to person, place, and time. He appears well-developed and well-nourished. No distress.   HENT:   Head: Normocephalic and atraumatic.   Mouth/Throat: Oropharynx is clear and moist.   Eyes: Conjunctivae are normal.   Neck: Normal range of motion. Neck supple. No JVD present.   Cardiovascular: Normal rate and intact distal pulses.   No extrasystoles are present. Exam reveals no gallop.    Murmur heard.  Irregular rhythm    Pulmonary/Chest: Effort normal. No accessory muscle usage. No tachypnea. No respiratory distress. He has no decreased breath sounds. He has no wheezes. He has no rhonchi. He has no rales.   Abdominal: Soft. Bowel sounds are normal. He exhibits no distension. There is no tenderness. There is no rebound and no CVA tenderness.   Musculoskeletal: Normal range of motion. He exhibits edema (1+ BLE ). He exhibits no tenderness or deformity.   Neurological: He is alert and oriented to person, place, and time. No cranial nerve deficit or sensory deficit.   Skin: Skin is warm, dry and intact. Capillary refill takes less than 2 seconds. He is not diaphoretic. No erythema.   Psychiatric: He has a normal mood and affect. His speech is normal and behavior is normal. Cognition and memory are normal.   Nursing note and vitals reviewed.      Significant Labs:   Recent Lab Results       01/06/18  0920 01/06/18  0612 01/06/18  0601  01/05/18 2104 01/05/18 2010      Anion Gap  13        aPTT 38.6  Comment:  aPTT therapeutic range = 39-69 seconds(H)    38.0  Comment:  aPTT therapeutic range = 39-69 seconds(H)     Baso #  0.01        Basophil%  0.1        BUN, Bld  85(H)        Calcium  10.0        Chloride  86(L)        CO2  27        Creatinine  2.1(H)        Differential Method  Automated        eGFR if   35(A)        eGFR if non   31  Comment:  Calculation used to obtain the estimated glomerular filtration  rate (eGFR) is the CKD-EPI equation.   (A)        Eos #  0.1        Eosinophil%  0.6        Glucose  140(H)        Gran #  8.3(H)        Gran%  72.8        Hematocrit  37.0(L)        Hemoglobin  12.6(L)        Coumadin Monitoring INR  2.8  Comment:  Coumadin Therapy:  2.0 - 3.0 for INR for all indicators except mechanical heart valves  and antiphospholipid syndromes which should use 2.5 - 3.5.  (H)        Lymph #  1.8        Lymph%  15.9(L)        MCH  30.8        MCHC  34.1        MCV  91        Mono #  1.2(H)        Mono%  10.6        MPV  8.9(L)        Platelets  378(H)        POCT Glucose   139(H) 161(H)      Potassium  4.9        Protime  27.5(H)        RBC  4.09(L)        RDW  15.4(H)        Sodium  126(L)        WBC  11.41                    01/05/18  1801      Anion Gap      aPTT      Baso #      Basophil%      BUN, Bld      Calcium      Chloride      CO2      Creatinine      Differential Method      eGFR if       eGFR if non       Eos #      Eosinophil%      Glucose      Gran #      Gran%      Hematocrit      Hemoglobin      Coumadin Monitoring INR      Lymph #      Lymph%      MCH      MCHC      MCV      Mono #      Mono%      MPV      Platelets      POCT Glucose 200(H)     Potassium      Protime      RBC      RDW      Sodium      WBC          All pertinent labs within the past 24 hours have been reviewed.    Significant Imaging: I have reviewed all pertinent  imaging results/findings within the past 24 hours.    Assessment/Plan:      * Acute on chronic Hyponatremia    Prior treatment with diuretics and metolazone.    Na now stable at 126  Tolvaptan given   Discussed case with Nephrology  Cont Bumex po   Liberalize salt and water intake.  Avoid Zaroxolyn          Chronic diastolic heart failure              Pneumonia of left lower lobe due to infectious organism    Leukocytosis resolved, Afebrile   De-escalate IVabx to Avelox   BC show NGTD  Sputum cx pending         Acute renal failure superimposed on stage 3 chronic kidney disease    Nephrology following  Stable           Chronic atrial fibrillation    - Rate controlled.  - Continue BB and Coumadin   Daily INR.        Elevated troponin         Due to renal insufficency and demand ischjemia from tachycardia and pneumonia                 Aortic valve stenosis with remote bioprosthetic AVR    - Stable.  - Followed by Dr. Chew ( Cardiology).        Type 2 diabetes mellitus    - Recent HbA1c 6.9 on 12/20.  - Accuchecks with SSI.  - Diabetic diet.          VTE Risk Mitigation         Ordered     warfarin tablet 7.5 mg  Every Saturday     Route:  Oral        01/06/18 0831     warfarin tablet 7.5 mg  Every Tuesday     Route:  Oral        01/03/18 1505     warfarin (COUMADIN) tablet 5 mg  Every Mon, Wed, Fri, Sun     Route:  Oral        01/05/18 0736     warfarin (COUMADIN) tablet 5 mg  Every Thursday     Route:  Oral        01/03/18 1505     Medium Risk of VTE  Once      12/31/17 1436     Place sequential compression device  Until discontinued      12/31/17 1436              Keyanna Lee NP  Department of Hospital Medicine   [facility]

## 2018-01-06 NOTE — PLAN OF CARE
Problem: Patient Care Overview  Goal: Plan of Care Review  PT REQUIRES MIN A FOR GT X 15' WITH RW AND SLOW PACE.    Outcome: Ongoing (interventions implemented as appropriate)  Plan of care reviewed and discussed with patient.  No acute distress noted.  Safety and fall precautions reviewed and discussed with patient.  Patient free from injury.  Pain managed adequately.  1500mL fluid restriction maintained.  Bed mobility promoted.  Call bell and personal items in reach.  Bed in low position and locked.  Side rails up x2.  Encouraged to call if any assistance needed.  Will continue to monitor.

## 2018-01-06 NOTE — PLAN OF CARE
Problem: Occupational Therapy Goal  Goal: Occupational Therapy Goal  Goals to be met by: 1/10/18     Patient will increase functional independence with ADLs by performing:    UE Dressing with Minimal Assistance.  LE Dressing with Maximum Assistance.  Toileting from toilet with Moderate Assistance for hygiene and clothing management.   Toilet transfer to toilet with Contact Guard Assistance.  Upper extremity exercise program x10 reps per handout, with supervision.     Outcome: Ongoing (interventions implemented as appropriate)  CONT TO ADDRESS GOALS ESTABLISHED AT EVAL TIME.    Comments: PT WILL CONT TO PARTICIPATE IN ADL & T/F AX PER GOALS SET DURING EVAL TO INCREASE SAFETY/(I) WITH SELF CARE.

## 2018-01-06 NOTE — PROGRESS NOTES
[facility]   Nephrology  Progress Note    Patient Name: Beni Cosby  MRN: [id number]  Admission Date: 12/30/2017  Hospital Length of Stay: 7 days  Attending Provider: Dane Ruby MD   Primary Care Physician: Jackson Brandt MD  Principal Problem:Hyponatremia    Subjective:     HPI: Pt was seen and examined. H/o was reviewed. Pt is a 71 y/o male with a pertinent h/o of CKD stage 3 (Cr 1.9 at baseline), diastolic CHF, and DM who presented with ;eg weakness and swqlling. Pt was noted to have s Na was 124, and renal service was consulted for hyponatremia. Pt feels slightly SOB, and has leg swelling. His wife states with certainty that he is on a 1 L fluid restriction at home. Pt takes bumex 2 mg ama nd 1 mg pm at home. He also takes metolazone 2.5 mg qd (2.5 mg), which is a thiazide diuretic. He states his diet is not salty. TSH was normal. No h/o of liver disease but alb is 2.9. Pt denies alcohol use, but noted that AST is slightly higher than ALT. He feels tired and sleepy.    Review of Systems   Constitutional: Positive for fatigue. Negative for activity change, chills, diaphoresis, fever and unexpected weight change.   HENT: Negative for congestion, postnasal drip, rhinorrhea, sinus pressure, sore throat and trouble swallowing.    Eyes: Negative for photophobia and visual disturbance.   Respiratory: Positive for cough (chronic). Negative for apnea, chest tightness, shortness of breath and wheezing.    Cardiovascular: Positive for leg swelling (chronic). Negative for chest pain and palpitations.   Gastrointestinal: Negative for abdominal distention, abdominal pain, blood in stool, constipation, diarrhea, nausea and vomiting.   Endocrine: Negative for polydipsia, polyphagia and polyuria.   Genitourinary: Negative for decreased urine volume, difficulty urinating, dysuria, frequency, hematuria and urgency.   Musculoskeletal: Negative for arthralgias, back pain, gait problem, joint swelling and  myalgias.   Skin: Negative for pallor, rash and wound.   Neurological: Positive for weakness (generalized). Negative for dizziness, seizures, syncope, facial asymmetry, speech difficulty, light-headedness, numbness and headaches.   Psychiatric/Behavioral: Negative for confusion. The patient is not nervous/anxious.    All other systems reviewed and are negative.    Objective:     Vital Signs (Most Recent):  Temp: 97.9 °F (36.6 °C) (01/06/18 1216)  Pulse: 102 (01/06/18 1305)  Resp: 18 (01/06/18 1305)  BP: (!) 106/56 (01/06/18 1216)  SpO2: 95 % (01/06/18 1305) Vital Signs (24h Range):  Temp:  [97.4 °F (36.3 °C)-98.1 °F (36.7 °C)] 97.9 °F (36.6 °C)  Pulse:  [] 102  Resp:  [16-20] 18  SpO2:  [95 %-99 %] 95 %  BP: (101-125)/(54-64) 106/56     Weight: 109.2 kg (240 lb 11.9 oz)  Body mass index is 34.54 kg/m².    Intake/Output Summary (Last 24 hours) at 01/06/18 1310  Last data filed at 01/06/18 1000   Gross per 24 hour   Intake              320 ml   Output              570 ml   Net             -250 ml      Physical Exam   Constitutional: He is oriented to person, place, and time. He appears well-developed and well-nourished. No distress.   HENT:   Head: Normocephalic and atraumatic.   Mouth/Throat: Oropharynx is clear and moist.   Eyes: Conjunctivae are normal.   Neck: Normal range of motion. Neck supple. No JVD present.   Cardiovascular: Normal rate and intact distal pulses.   No extrasystoles are present. Exam reveals no gallop.    Murmur heard.  Irregular rhythm    Pulmonary/Chest: Effort normal. No accessory muscle usage. No tachypnea. No respiratory distress. He has no decreased breath sounds. He has no wheezes. He has no rhonchi. He has no rales.   Abdominal: Soft. Bowel sounds are normal. He exhibits no distension. There is no tenderness. There is no rebound and no CVA tenderness.   Musculoskeletal: Normal range of motion. He exhibits edema (1+ BLE ). He exhibits no tenderness or deformity.   Neurological: He  is alert and oriented to person, place, and time. No cranial nerve deficit or sensory deficit.   Skin: Skin is warm, dry and intact. Capillary refill takes less than 2 seconds. He is not diaphoretic. No erythema.   Psychiatric: He has a normal mood and affect. His speech is normal and behavior is normal. Cognition and memory are normal.   Nursing note and vitals reviewed.      Significant Labs:   Recent Lab Results       01/06/18  0920 01/06/18  0612 01/06/18  0601 01/05/18  2104 01/05/18 2010      Anion Gap  13        aPTT 38.6  Comment:  aPTT therapeutic range = 39-69 seconds(H)    38.0  Comment:  aPTT therapeutic range = 39-69 seconds(H)     Baso #  0.01        Basophil%  0.1        BUN, Bld  85(H)        Calcium  10.0        Chloride  86(L)        CO2  27        Creatinine  2.1(H)        Differential Method  Automated        eGFR if   35(A)        eGFR if non   31  Comment:  Calculation used to obtain the estimated glomerular filtration  rate (eGFR) is the CKD-EPI equation.   (A)        Eos #  0.1        Eosinophil%  0.6        Glucose  140(H)        Gran #  8.3(H)        Gran%  72.8        Hematocrit  37.0(L)        Hemoglobin  12.6(L)        Coumadin Monitoring INR  2.8  Comment:  Coumadin Therapy:  2.0 - 3.0 for INR for all indicators except mechanical heart valves  and antiphospholipid syndromes which should use 2.5 - 3.5.  (H)        Lymph #  1.8        Lymph%  15.9(L)        MCH  30.8        MCHC  34.1        MCV  91        Mono #  1.2(H)        Mono%  10.6        MPV  8.9(L)        Platelets  378(H)        POCT Glucose   139(H) 161(H)      Potassium  4.9        Protime  27.5(H)        RBC  4.09(L)        RDW  15.4(H)        Sodium  126(L)        WBC  11.41                    01/05/18  1801      Anion Gap      aPTT      Baso #      Basophil%      BUN, Bld      Calcium      Chloride      CO2      Creatinine      Differential Method      eGFR if       eGFR if  non       Eos #      Eosinophil%      Glucose      Gran #      Gran%      Hematocrit      Hemoglobin      Coumadin Monitoring INR      Lymph #      Lymph%      MCH      MCHC      MCV      Mono #      Mono%      MPV      Platelets      POCT Glucose 200(H)     Potassium      Protime      RBC      RDW      Sodium      WBC          All pertinent labs within the past 24 hours have been reviewed.    Significant Imaging: I have reviewed all pertinent imaging results/findings within the past 24 hours.    Assessment/Plan:     * Acute on chronic Hyponatremia    loop diuretics. Liberalize salt and water intake.  Avoid Zaroxolyn.        Acute renal failure superimposed on stage 3 chronic kidney disease    Acute kidney injury and chronic kidney disease have stabilized            Thank you for your consult.     Rubin Thomas MD  Nephrology  [facility]

## 2018-01-06 NOTE — PT/OT/SLP PROGRESS
"Occupational Therapy  Treatment    Beni Cosby   MRN: [id number]   Admitting Diagnosis: Hyponatremia                   Billable Minutes:  Self Care/Home Management 15    General Precautions: Standard, fall  Orthopedic Precautions:    Braces:           Subjective:  Communicated with NURSE, PT'S WIFE & PATIENT prior to session.    Objective:  PT PARTICIPATED IN ADL AX WITH FOCUS ON BED MOBILITY, TOILETING HYGIENE AFTER USE OF BED PAN, LB DRESSING & T/F SUPINE > SIT > SCOOT TO EOB > STAND PIVOT T/F TO B/S CHAIR.     Functional Mobility:  Bed Mobility:  PT MAX (A) WITH ROLL (R) & (L) WITH USE OF BED RAIL & MAX (A) SIDELIE ON (R) > SIT EOB & MIN (A) TO SCOOT SEATED EOB > CGA WITH STAND PIVOT EOB > B/S CHAIR.    Transfers:  PT CGA WITH STAND PIVOT FROM EOB > B/S CHAIR WITH USE OF RW.      Functional Ambulation: N/A    Activities of Daily Living:     Feeding adaptive equipment: N/A     UE adaptive equipment: N/A     LE adaptive equipment: PT MAX (A) TO SWAPNA DEPENDS BRIEF SUPINE WITH USE OF ROLLING AND BRIDGING.                    Bathing adaptive equipment: TBD    Balance:   Static Sit: FAIR-: Maintains without assist but inconsistent   Dynamic Sit: FAIR: Cannot move trunk without losing balance  Static Stand: POOR+: Needs MINIMAL assist to maintain  Dynamic stand: FAIR: Needs CONTACT GUARD during gait    Therapeutic Activities and Exercises:  PT PERFORMED ADL AX AS STATED IN "OBJECTIVE."    AM-PAC 6 CLICK ADL   How much help from another person does this patient currently need?   1 = Unable, Total/Dependent Assistance  2 = A lot, Maximum/Moderate Assistance  3 = A little, Minimum/Contact Guard/Supervision  4 = None, Modified [place]/Independent          AM-PAC Raw Score CMS "G-Code Modifier Level of Impairment Assistance   6 % Total / Unable   7 - 8 CM 80 - 100% Maximal Assist   9-13 CL 60 - 80% Moderate Assist   14 - 19 CK 40 - 60% Moderate Assist   20 - 22 CJ 20 - 40% Minimal Assist   23 CI 1-20% SBA / " CGA   24 CH 0% Independent/ Mod I       Patient left up in chair with all lines intact, call button in reach and WIFE present    ASSESSMENT:  Beni Cosby is a 72 y.o. male with a medical diagnosis of Hyponatremia and presents with SELF CARE DEBILITY.    Rehab identified problem list/impairments:      Rehab potential is good.    Activity tolerance: Fair    Discharge recommendations:   AS NOTED IN EVAL.    Barriers to discharge:   AS NOTED IN EVAL.    Equipment recommendations:   AS NOTED IN EVAL AND/OR TBD.  GOALS:    Occupational Therapy Goals        Problem: Occupational Therapy Goal    Goal Priority Disciplines Outcome Interventions   Occupational Therapy Goal     OT, PT/OT Ongoing (interventions implemented as appropriate)    Description:  Goals to be met by: 1/10/18     Patient will increase functional independence with ADLs by performing:    UE Dressing with Minimal Assistance.  LE Dressing with Maximum Assistance.  Toileting from toilet with Moderate Assistance for hygiene and clothing management.   Toilet transfer to toilet with Contact Guard Assistance.  Upper extremity exercise program x10 reps per handout, with supervision.                      Plan:  Patient to be seen 3 x/week to address the above listed problems via self-care/home management, therapeutic activities, therapeutic exercises  Plan of Care expires: 01/10/18  Plan of Care reviewed with: patient, spouse         Vidya Camarena, OT  01/06/2018

## 2018-01-06 NOTE — PROGRESS NOTES
[facility] Hospital Medicine  Progress Note    Patient Name: Beni Cosby  MRN: [id number]  Patient Class: IP- Inpatient   Admission Date: 12/30/2017  Length of Stay: 6 days  Attending Physician: Dane Ruby MD  Primary Care Provider: Jackson Brandt MD        Subjective:     Principal Problem:Hyponatremia    HPI:  Mr. Cosby is a 73yo male  with a PMHx of chronic diastolic HFpEF (right-sided), CKD stage III, chronic hyponatremia, COPD, DM II, chronic AF on Coumadin, and AS with remote bioprosthetic AVR, who presented to the ED with c/o fatigue that has progressively worsened over the past 1 day.  Associated generalized weakness.  Denies any fever, chills, CP, SOB, worsening cough, worsening edema, ABD pain, N/V/D, dysuria, back pain, HA, AMS, visual disturbances, seizure-like activity, lightheadedness/dizziness syncope, or numbness/tingling.  No aggravating to alleviating factors.  He was admitted to [facility] 12/19 for hyponatremia with Na 119.  He received 0.9 NS IVF's, and metolazone added per Nephrology.  Initial work-up in ED resulted Na 123, cr. 2, BUN 85, WBC 16.4, , troponin 0.3.  CXR showed RLL infiltrate consistent with PNA.  EKG revealed A-Fib with controlled rate, no acute changes from previous tracings.  Case discussed with Nephrology in ED, who recommend admission for initiation of tolvaptan therapy.  Hospital Medicine consulted for admission.    Hospital Course:  Patient was admitted to Med Surg for hyponatremia and pneumonia under the care of Hospital Medicine. He was given IV abx for PNA and nephrology was consulted for hyponatremia. He was started on Tolvaptan.  1/1: Pt reports decreased SOB and cough - continue IV abx. Nephrology following - Na stable - fluid restrictions. Cardiology consulted.  1/2: Pt slowly improving. Wheezing decreased. Incentive spirometry added. Cardiology following - elevated troponin 2/2 renal insufficiency and demand ischemia.   1/3: Pt is  sleeping good - reports decreased cough - no wheezing audible. WBC slowly improving. Na level better today but still low - no mentation changes. Cardiology and nephrology following  1/4: WBC normalized, afebrile. Na stable at 126. De-escalate Abx. Pt/spouse requests SNF  Hyperkalemia- Spironolactone held     Review of Systems   Constitutional: Positive for fatigue. Negative for activity change, chills, diaphoresis, fever and unexpected weight change.   HENT: Negative for congestion, postnasal drip, rhinorrhea, sinus pressure, sore throat and trouble swallowing.    Eyes: Negative for photophobia and visual disturbance.   Respiratory: Positive for cough (chronic). Negative for apnea, chest tightness, shortness of breath and wheezing.    Cardiovascular: Positive for leg swelling (chronic). Negative for chest pain and palpitations.   Gastrointestinal: Negative for abdominal distention, abdominal pain, blood in stool, constipation, diarrhea, nausea and vomiting.   Endocrine: Negative for polydipsia, polyphagia and polyuria.   Genitourinary: Negative for decreased urine volume, difficulty urinating, dysuria, frequency, hematuria and urgency.   Musculoskeletal: Negative for arthralgias, back pain, gait problem, joint swelling and myalgias.   Skin: Negative for pallor, rash and wound.   Neurological: Positive for weakness (generalized). Negative for dizziness, seizures, syncope, facial asymmetry, speech difficulty, light-headedness, numbness and headaches.   Psychiatric/Behavioral: Negative for confusion. The patient is not nervous/anxious.    All other systems reviewed and are negative.    Objective:     Vital Signs (Most Recent):  Temp: 97.9 °F (36.6 °C) (01/05/18 1711)  Pulse: 96 (01/05/18 1711)  Resp: 16 (01/05/18 1711)  BP: (!) 101/54 (01/05/18 1711)  SpO2: 96 % (01/05/18 1711) Vital Signs (24h Range):  Temp:  [97.4 °F (36.3 °C)-98.1 °F (36.7 °C)] 97.9 °F (36.6 °C)  Pulse:  [75-98] 96  Resp:  [] 16  SpO2:  [95  %-100 %] 96 %  BP: (101-135)/(54-61) 101/54     Weight: 109.2 kg (240 lb 11.9 oz)  Body mass index is 34.54 kg/m².    Intake/Output Summary (Last 24 hours) at 01/05/18 1748  Last data filed at 01/05/18 1452   Gross per 24 hour   Intake              990 ml   Output             1100 ml   Net             -110 ml      Physical Exam   Constitutional: He is oriented to person, place, and time. He appears well-developed and well-nourished. No distress.   HENT:   Head: Normocephalic and atraumatic.   Mouth/Throat: Oropharynx is clear and moist.   Eyes: Conjunctivae are normal.   Neck: Normal range of motion. Neck supple. No JVD present.   Cardiovascular: Normal rate and intact distal pulses.   No extrasystoles are present. Exam reveals no gallop.    Murmur heard.  Irregular rhythm    Pulmonary/Chest: Effort normal. No accessory muscle usage. No tachypnea. No respiratory distress. He has no decreased breath sounds. He has no wheezes. He has no rhonchi. He has no rales.   Abdominal: Soft. Bowel sounds are normal. He exhibits no distension. There is no tenderness. There is no rebound and no CVA tenderness.   Musculoskeletal: Normal range of motion. He exhibits edema (1+ BLE ). He exhibits no tenderness or deformity.   Neurological: He is alert and oriented to person, place, and time. No cranial nerve deficit or sensory deficit.   Skin: Skin is warm, dry and intact. Capillary refill takes less than 2 seconds. He is not diaphoretic. No erythema.   Psychiatric: He has a normal mood and affect. His speech is normal and behavior is normal. Cognition and memory are normal.   Nursing note and vitals reviewed.      Significant Labs:   Recent Lab Results       01/05/18  1055 01/05/18  0622 01/05/18  0614 01/04/18  2232 01/04/18  2102      Anion Gap   14       aPTT   38.6  Comment:  aPTT therapeutic range = 39-69 seconds(H)  37.1  Comment:  aPTT therapeutic range = 39-69 seconds(H)     Baso #   0.01       Basophil%   0.1       BUN, Bld    85(H)       Calcium   10.3       Chloride   87(L)       CO2   25       Creatinine   2.0(H)       Differential Method   Automated       eGFR if    37(A)       eGFR if non    32  Comment:  Calculation used to obtain the estimated glomerular filtration  rate (eGFR) is the CKD-EPI equation.   (A)       Eos #   0.1       Eosinophil%   0.5       Glucose   151(H)       Gram Stain (Respiratory)          Gran #   9.6(H)       Gran%   76.0(H)       Hematocrit   38.5(L)       Hemoglobin   13.1(L)       Coumadin Monitoring INR   2.5  Comment:  Coumadin Therapy:  2.0 - 3.0 for INR for all indicators except mechanical heart valves  and antiphospholipid syndromes which should use 2.5 - 3.5.  (H)       Lymph #   1.6       Lymph%   13.0(L)       MCH   30.8       MCHC   34.0       MCV   91       Mono #   1.3(H)       Mono%   10.4       MPV   8.8(L)       Platelets   379(H)       POCT Glucose 259(H) 163(H)  242(H)      Potassium   5.3(H)       Protime   24.7(H)       RBC   4.25(L)       RDW   15.4(H)       Sodium   126(L)       WBC   12.65                   01/04/18  2042 01/04/18  1758      Anion Gap       aPTT       Baso #       Basophil%       BUN, Bld       Calcium       Chloride       CO2       Creatinine       Differential Method       eGFR if        eGFR if non        Eos #       Eosinophil%       Glucose       Gram Stain (Respiratory) <10 epithelial cells per low power field.       Moderate WBC's       No organisms seen      Gran #       Gran%       Hematocrit       Hemoglobin       Coumadin Monitoring INR       Lymph #       Lymph%       MCH       MCHC       MCV       Mono #       Mono%       MPV       Platelets       POCT Glucose  158(H)     Potassium       Protime       RBC       RDW       Sodium       WBC           All pertinent labs within the past 24 hours have been reviewed.    Significant Imaging: I have reviewed all pertinent imaging results/findings  within the past 24 hours.    Assessment/Plan:      * Acute on chronic Hyponatremia    Prior treatment with diuretics and metolazone.     tolvaptan given  - Discussed case with Nephrology  Low salt diet, fluid restriction- pt requested 1500ml.  Na stable at 126 -stable x 3 days         Chronic diastolic heart failure              Pneumonia of left lower lobe due to infectious organism    WBC trending down,  De-escalate IVabx   BC show NGTD  Sputum cx pending         Acute renal failure superimposed on stage 3 chronic kidney disease    Nephrology following  Diuretics on hold           Chronic atrial fibrillation    - Rate controlled.  - Continue home beta blocker.  - Continue Coumadin for AC, pharmacy to dose.  - Daily INR.        Elevated troponin    Elevated troponin     Due to renal insufficency and demand ischjemia from tachycardia and pneumonia                 Aortic valve stenosis with remote bioprosthetic AVR    - Stable.  - Followed by Dr. Chew ( Cardiology).        Type 2 diabetes mellitus    - Recent HbA1c 6.9 on 12/20.  - Accuchecks with SSI.  - Diabetic diet.          VTE Risk Mitigation         Ordered     warfarin tablet 7.5 mg  Every Tuesday     Route:  Oral        01/03/18 1505     warfarin (COUMADIN) tablet 5 mg  Every Mon, Wed, Fri, Sun     Route:  Oral        01/05/18 0736     warfarin tablet 7.5 mg  Every Saturday     Route:  Oral        01/03/18 1505     warfarin (COUMADIN) tablet 3 mg  Once     Route:  Oral        01/05/18 0737     warfarin (COUMADIN) tablet 5 mg  Every Thursday     Route:  Oral        01/03/18 1505     Medium Risk of VTE  Once      12/31/17 1436     Place sequential compression device  Until discontinued      12/31/17 1436              Keyanna Lee NP  Department of Hospital Medicine   [facility]

## 2018-01-06 NOTE — PROGRESS NOTES
[facility]   Nephrology  Progress Note    Patient Name: Beni Cosby  MRN: [id number]  Admission Date: 12/30/2017  Hospital Length of Stay: 6 days  Attending Provider: Dane Ruby MD   Primary Care Physician: Jackson Brandt MD  Principal Problem:Hyponatremia    Subjective:     HPI: Pt was seen and examined. H/o was reviewed. Pt is a 73 y/o male with a pertinent h/o of CKD stage 3 (Cr 1.9 at baseline), diastolic CHF, and DM who presented with ;eg weakness and swqlling. Pt was noted to have s Na was 124, and renal service was consulted for hyponatremia. Pt feels slightly SOB, and has leg swelling. His wife states with certainty that he is on a 1 L fluid restriction at home. Pt takes bumex 2 mg ama nd 1 mg pm at home. He also takes metolazone 2.5 mg qd (2.5 mg), which is a thiazide diuretic. He states his diet is not salty. TSH was normal. No h/o of liver disease but alb is 2.9. Pt denies alcohol use, but noted that AST is slightly higher than ALT. He feels tired and sleepy.    Review of Systems   Constitutional: Positive for fatigue. Negative for activity change, chills, diaphoresis, fever and unexpected weight change.   HENT: Negative for congestion, postnasal drip, rhinorrhea, sinus pressure, sore throat and trouble swallowing.    Eyes: Negative for photophobia and visual disturbance.   Respiratory: Positive for cough (chronic). Negative for apnea, chest tightness, shortness of breath and wheezing.    Cardiovascular: Positive for leg swelling (chronic). Negative for chest pain and palpitations.   Gastrointestinal: Negative for abdominal distention, abdominal pain, blood in stool, constipation, diarrhea, nausea and vomiting.   Endocrine: Negative for polydipsia, polyphagia and polyuria.   Genitourinary: Negative for decreased urine volume, difficulty urinating, dysuria, frequency, hematuria and urgency.   Musculoskeletal: Negative for arthralgias, back pain, gait problem, joint swelling and  myalgias.   Skin: Negative for pallor, rash and wound.   Neurological: Positive for weakness (generalized). Negative for dizziness, seizures, syncope, facial asymmetry, speech difficulty, light-headedness, numbness and headaches.   Psychiatric/Behavioral: Negative for confusion. The patient is not nervous/anxious.    All other systems reviewed and are negative.    Objective:     Vital Signs (Most Recent):  Temp: 97.9 °F (36.6 °C) (01/05/18 1711)  Pulse: 96 (01/05/18 1711)  Resp: 16 (01/05/18 1711)  BP: (!) 101/54 (01/05/18 1711)  SpO2: 96 % (01/05/18 1711) Vital Signs (24h Range):  Temp:  [97.4 °F (36.3 °C)-98.1 °F (36.7 °C)] 97.9 °F (36.6 °C)  Pulse:  [75-98] 96  Resp:  [] 16  SpO2:  [95 %-100 %] 96 %  BP: (101-135)/(54-61) 101/54     Weight: 109.2 kg (240 lb 11.9 oz)  Body mass index is 34.54 kg/m².    Intake/Output Summary (Last 24 hours) at 01/05/18 1914  Last data filed at 01/05/18 1452   Gross per 24 hour   Intake              850 ml   Output              900 ml   Net              -50 ml      Physical Exam   Constitutional: He is oriented to person, place, and time. He appears well-developed and well-nourished. No distress.   HENT:   Head: Normocephalic and atraumatic.   Mouth/Throat: Oropharynx is clear and moist.   Eyes: Conjunctivae are normal.   Neck: Normal range of motion. Neck supple. No JVD present.   Cardiovascular: Normal rate and intact distal pulses.   No extrasystoles are present. Exam reveals no gallop.    Murmur heard.  Irregular rhythm    Pulmonary/Chest: Effort normal. No accessory muscle usage. No tachypnea. No respiratory distress. He has no decreased breath sounds. He has no wheezes. He has no rhonchi. He has no rales.   Abdominal: Soft. Bowel sounds are normal. He exhibits no distension. There is no tenderness. There is no rebound and no CVA tenderness.   Musculoskeletal: Normal range of motion. He exhibits edema (1+ BLE ). He exhibits no tenderness or deformity.   Neurological: He  is alert and oriented to person, place, and time. No cranial nerve deficit or sensory deficit.   Skin: Skin is warm, dry and intact. Capillary refill takes less than 2 seconds. He is not diaphoretic. No erythema.   Psychiatric: He has a normal mood and affect. His speech is normal and behavior is normal. Cognition and memory are normal.   Nursing note and vitals reviewed.      Significant Labs:   Recent Lab Results       01/05/18  1055 01/05/18  0622 01/05/18  0614 01/04/18  2232 01/04/18  2102      Anion Gap   14       aPTT   38.6  Comment:  aPTT therapeutic range = 39-69 seconds(H)  37.1  Comment:  aPTT therapeutic range = 39-69 seconds(H)     Baso #   0.01       Basophil%   0.1       BUN, Bld   85(H)       Calcium   10.3       Chloride   87(L)       CO2   25       Creatinine   2.0(H)       Differential Method   Automated       eGFR if    37(A)       eGFR if non    32  Comment:  Calculation used to obtain the estimated glomerular filtration  rate (eGFR) is the CKD-EPI equation.   (A)       Eos #   0.1       Eosinophil%   0.5       Glucose   151(H)       Gram Stain (Respiratory)          Gran #   9.6(H)       Gran%   76.0(H)       Hematocrit   38.5(L)       Hemoglobin   13.1(L)       Coumadin Monitoring INR   2.5  Comment:  Coumadin Therapy:  2.0 - 3.0 for INR for all indicators except mechanical heart valves  and antiphospholipid syndromes which should use 2.5 - 3.5.  (H)       Lymph #   1.6       Lymph%   13.0(L)       MCH   30.8       MCHC   34.0       MCV   91       Mono #   1.3(H)       Mono%   10.4       MPV   8.8(L)       Platelets   379(H)       POCT Glucose 259(H) 163(H)  242(H)      Potassium   5.3(H)       Protime   24.7(H)       RBC   4.25(L)       RDW   15.4(H)       Sodium   126(L)       WBC   12.65                   01/04/18  2042      Anion Gap      aPTT      Baso #      Basophil%      BUN, Bld      Calcium      Chloride      CO2      Creatinine      Differential  Method      eGFR if       eGFR if non       Eos #      Eosinophil%      Glucose      Gram Stain (Respiratory) <10 epithelial cells per low power field.      Moderate WBC's      No organisms seen     Gran #      Gran%      Hematocrit      Hemoglobin      Coumadin Monitoring INR      Lymph #      Lymph%      MCH      MCHC      MCV      Mono #      Mono%      MPV      Platelets      POCT Glucose      Potassium      Protime      RBC      RDW      Sodium      WBC          All pertinent labs within the past 24 hours have been reviewed.    Significant Imaging: I have reviewed all pertinent imaging results/findings within the past 24 hours.    Assessment/Plan:     * Acute on chronic Hyponatremia    Resume loop diuretics.  Gentle IV normal saline.  Liberalize salt and water intake.  Avoid Zaroxolyn.        Acute renal failure superimposed on stage 3 chronic kidney disease    Stable             Thank you for your consult.     Rubin Thomas MD  Nephrology  [facility]

## 2018-01-06 NOTE — SUBJECTIVE & OBJECTIVE
Review of Systems   Constitutional: Positive for fatigue. Negative for activity change, chills, diaphoresis, fever and unexpected weight change.   HENT: Negative for congestion, postnasal drip, rhinorrhea, sinus pressure, sore throat and trouble swallowing.    Eyes: Negative for photophobia and visual disturbance.   Respiratory: Positive for cough (chronic). Negative for apnea, chest tightness, shortness of breath and wheezing.    Cardiovascular: Positive for leg swelling (chronic). Negative for chest pain and palpitations.   Gastrointestinal: Negative for abdominal distention, abdominal pain, blood in stool, constipation, diarrhea, nausea and vomiting.   Endocrine: Negative for polydipsia, polyphagia and polyuria.   Genitourinary: Negative for decreased urine volume, difficulty urinating, dysuria, frequency, hematuria and urgency.   Musculoskeletal: Negative for arthralgias, back pain, gait problem, joint swelling and myalgias.   Skin: Negative for pallor, rash and wound.   Neurological: Positive for weakness (generalized). Negative for dizziness, seizures, syncope, facial asymmetry, speech difficulty, light-headedness, numbness and headaches.   Psychiatric/Behavioral: Negative for confusion. The patient is not nervous/anxious.    All other systems reviewed and are negative.    Objective:     Vital Signs (Most Recent):  Temp: 97.9 °F (36.6 °C) (01/06/18 1216)  Pulse: 102 (01/06/18 1305)  Resp: 18 (01/06/18 1305)  BP: (!) 106/56 (01/06/18 1216)  SpO2: 95 % (01/06/18 1305) Vital Signs (24h Range):  Temp:  [97.4 °F (36.3 °C)-98.1 °F (36.7 °C)] 97.9 °F (36.6 °C)  Pulse:  [] 102  Resp:  [16-20] 18  SpO2:  [95 %-99 %] 95 %  BP: (101-125)/(54-64) 106/56     Weight: 109.2 kg (240 lb 11.9 oz)  Body mass index is 34.54 kg/m².    Intake/Output Summary (Last 24 hours) at 01/06/18 1310  Last data filed at 01/06/18 1000   Gross per 24 hour   Intake              320 ml   Output              570 ml   Net             -250 ml       Physical Exam   Constitutional: He is oriented to person, place, and time. He appears well-developed and well-nourished. No distress.   HENT:   Head: Normocephalic and atraumatic.   Mouth/Throat: Oropharynx is clear and moist.   Eyes: Conjunctivae are normal.   Neck: Normal range of motion. Neck supple. No JVD present.   Cardiovascular: Normal rate and intact distal pulses.   No extrasystoles are present. Exam reveals no gallop.    Murmur heard.  Irregular rhythm    Pulmonary/Chest: Effort normal. No accessory muscle usage. No tachypnea. No respiratory distress. He has no decreased breath sounds. He has no wheezes. He has no rhonchi. He has no rales.   Abdominal: Soft. Bowel sounds are normal. He exhibits no distension. There is no tenderness. There is no rebound and no CVA tenderness.   Musculoskeletal: Normal range of motion. He exhibits edema (1+ BLE ). He exhibits no tenderness or deformity.   Neurological: He is alert and oriented to person, place, and time. No cranial nerve deficit or sensory deficit.   Skin: Skin is warm, dry and intact. Capillary refill takes less than 2 seconds. He is not diaphoretic. No erythema.   Psychiatric: He has a normal mood and affect. His speech is normal and behavior is normal. Cognition and memory are normal.   Nursing note and vitals reviewed.      Significant Labs:   Recent Lab Results       01/06/18  0920 01/06/18  0612 01/06/18  0601 01/05/18  2104 01/05/18 2010      Anion Gap  13        aPTT 38.6  Comment:  aPTT therapeutic range = 39-69 seconds(H)    38.0  Comment:  aPTT therapeutic range = 39-69 seconds(H)     Baso #  0.01        Basophil%  0.1        BUN, Bld  85(H)        Calcium  10.0        Chloride  86(L)        CO2  27        Creatinine  2.1(H)        Differential Method  Automated        eGFR if   35(A)        eGFR if non   31  Comment:  Calculation used to obtain the estimated glomerular filtration  rate (eGFR) is the CKD-EPI  equation.   (A)        Eos #  0.1        Eosinophil%  0.6        Glucose  140(H)        Gran #  8.3(H)        Gran%  72.8        Hematocrit  37.0(L)        Hemoglobin  12.6(L)        Coumadin Monitoring INR  2.8  Comment:  Coumadin Therapy:  2.0 - 3.0 for INR for all indicators except mechanical heart valves  and antiphospholipid syndromes which should use 2.5 - 3.5.  (H)        Lymph #  1.8        Lymph%  15.9(L)        MCH  30.8        MCHC  34.1        MCV  91        Mono #  1.2(H)        Mono%  10.6        MPV  8.9(L)        Platelets  378(H)        POCT Glucose   139(H) 161(H)      Potassium  4.9        Protime  27.5(H)        RBC  4.09(L)        RDW  15.4(H)        Sodium  126(L)        WBC  11.41                    01/05/18  1801      Anion Gap      aPTT      Baso #      Basophil%      BUN, Bld      Calcium      Chloride      CO2      Creatinine      Differential Method      eGFR if       eGFR if non       Eos #      Eosinophil%      Glucose      Gran #      Gran%      Hematocrit      Hemoglobin      Coumadin Monitoring INR      Lymph #      Lymph%      MCH      MCHC      MCV      Mono #      Mono%      MPV      Platelets      POCT Glucose 200(H)     Potassium      Protime      RBC      RDW      Sodium      WBC          All pertinent labs within the past 24 hours have been reviewed.    Significant Imaging: I have reviewed all pertinent imaging results/findings within the past 24 hours.

## 2018-01-06 NOTE — ASSESSMENT & PLAN NOTE
Prior treatment with diuretics and metolazone.    Na now stable at 126  Tolvaptan given   Discussed case with Nephrology  Cont Bumex po   Liberalize salt and water intake.  Avoid Zaroxolyn

## 2018-01-06 NOTE — SUBJECTIVE & OBJECTIVE
Review of Systems   Constitutional: Positive for fatigue. Negative for activity change, chills, diaphoresis, fever and unexpected weight change.   HENT: Negative for congestion, postnasal drip, rhinorrhea, sinus pressure, sore throat and trouble swallowing.    Eyes: Negative for photophobia and visual disturbance.   Respiratory: Positive for cough (chronic). Negative for apnea, chest tightness, shortness of breath and wheezing.    Cardiovascular: Positive for leg swelling (chronic). Negative for chest pain and palpitations.   Gastrointestinal: Negative for abdominal distention, abdominal pain, blood in stool, constipation, diarrhea, nausea and vomiting.   Endocrine: Negative for polydipsia, polyphagia and polyuria.   Genitourinary: Negative for decreased urine volume, difficulty urinating, dysuria, frequency, hematuria and urgency.   Musculoskeletal: Negative for arthralgias, back pain, gait problem, joint swelling and myalgias.   Skin: Negative for pallor, rash and wound.   Neurological: Positive for weakness (generalized). Negative for dizziness, seizures, syncope, facial asymmetry, speech difficulty, light-headedness, numbness and headaches.   Psychiatric/Behavioral: Negative for confusion. The patient is not nervous/anxious.    All other systems reviewed and are negative.    Objective:     Vital Signs (Most Recent):  Temp: 97.9 °F (36.6 °C) (01/06/18 1216)  Pulse: 79 (01/06/18 1531)  Resp: 18 (01/06/18 1531)  BP: 120/62 (01/06/18 1531)  SpO2: 95 % (01/06/18 1305) Vital Signs (24h Range):  Temp:  [97.4 °F (36.3 °C)-98.1 °F (36.7 °C)] 97.9 °F (36.6 °C)  Pulse:  [] 79  Resp:  [16-20] 18  SpO2:  [95 %-99 %] 95 %  BP: (101-125)/(54-64) 120/62     Weight: 109.2 kg (240 lb 11.9 oz)  Body mass index is 34.54 kg/m².    Intake/Output Summary (Last 24 hours) at 01/06/18 1709  Last data filed at 01/06/18 1000   Gross per 24 hour   Intake              200 ml   Output              370 ml   Net             -170 ml       Physical Exam   Constitutional: He is oriented to person, place, and time. He appears well-developed and well-nourished. No distress.   HENT:   Head: Normocephalic and atraumatic.   Mouth/Throat: Oropharynx is clear and moist.   Eyes: Conjunctivae are normal.   Neck: Normal range of motion. Neck supple. No JVD present.   Cardiovascular: Normal rate and intact distal pulses.   No extrasystoles are present. Exam reveals no gallop.    Murmur heard.  Irregular rhythm    Pulmonary/Chest: Effort normal. No accessory muscle usage. No tachypnea. No respiratory distress. He has no decreased breath sounds. He has no wheezes. He has no rhonchi. He has no rales.   Abdominal: Soft. Bowel sounds are normal. He exhibits no distension. There is no tenderness. There is no rebound and no CVA tenderness.   Musculoskeletal: Normal range of motion. He exhibits edema (1+ BLE ). He exhibits no tenderness or deformity.   Neurological: He is alert and oriented to person, place, and time. No cranial nerve deficit or sensory deficit.   Skin: Skin is warm, dry and intact. Capillary refill takes less than 2 seconds. He is not diaphoretic. No erythema.   Psychiatric: He has a normal mood and affect. His speech is normal and behavior is normal. Cognition and memory are normal.   Nursing note and vitals reviewed.      Significant Labs:   Recent Lab Results       01/06/18  0920 01/06/18  0612 01/06/18  0601 01/05/18  2104 01/05/18 2010      Anion Gap  13        aPTT 38.6  Comment:  aPTT therapeutic range = 39-69 seconds(H)    38.0  Comment:  aPTT therapeutic range = 39-69 seconds(H)     Baso #  0.01        Basophil%  0.1        BUN, Bld  85(H)        Calcium  10.0        Chloride  86(L)        CO2  27        Creatinine  2.1(H)        Differential Method  Automated        eGFR if   35(A)        eGFR if non   31  Comment:  Calculation used to obtain the estimated glomerular filtration  rate (eGFR) is the CKD-EPI  equation.   (A)        Eos #  0.1        Eosinophil%  0.6        Glucose  140(H)        Gran #  8.3(H)        Gran%  72.8        Hematocrit  37.0(L)        Hemoglobin  12.6(L)        Coumadin Monitoring INR  2.8  Comment:  Coumadin Therapy:  2.0 - 3.0 for INR for all indicators except mechanical heart valves  and antiphospholipid syndromes which should use 2.5 - 3.5.  (H)        Lymph #  1.8        Lymph%  15.9(L)        MCH  30.8        MCHC  34.1        MCV  91        Mono #  1.2(H)        Mono%  10.6        MPV  8.9(L)        Platelets  378(H)        POCT Glucose   139(H) 161(H)      Potassium  4.9        Protime  27.5(H)        RBC  4.09(L)        RDW  15.4(H)        Sodium  126(L)        WBC  11.41                    01/05/18  1801      Anion Gap      aPTT      Baso #      Basophil%      BUN, Bld      Calcium      Chloride      CO2      Creatinine      Differential Method      eGFR if       eGFR if non       Eos #      Eosinophil%      Glucose      Gran #      Gran%      Hematocrit      Hemoglobin      Coumadin Monitoring INR      Lymph #      Lymph%      MCH      MCHC      MCV      Mono #      Mono%      MPV      Platelets      POCT Glucose 200(H)     Potassium      Protime      RBC      RDW      Sodium      WBC          All pertinent labs within the past 24 hours have been reviewed.    Significant Imaging: I have reviewed all pertinent imaging results/findings within the past 24 hours.

## 2018-01-06 NOTE — PT/OT/SLP PROGRESS
Physical Therapy Treatment    Patient Name:  Beni Cosby   MRN:  [id number]    Recommendations:     Discharge Recommendations:  nursing facility, skilled   Discharge Equipment Recommendations: none   Barriers to discharge: Decreased caregiver support    Assessment:     Beni Cosby is a 72 y.o. male admitted with a medical diagnosis of Hyponatremia.  He presents with the following impairments/functional limitations:  weakness, impaired endurance, gait instability, impaired balance, decreased lower extremity function, impaired coordination.  Pt very fatigued today with bed mobility/clean following bowel movement.  Transferred with CGA.  Unwilling to ambulate de to fatigue.    Rehab Prognosis:  Good ; patient would benefit from acute skilled PT services to address these deficits and reach maximum level of function.      Recent Surgery: * No surgery found *      Plan:     During this hospitalization, patient to be seen 5 x/week to address the above listed problems via gait training, therapeutic activities, therapeutic exercises  · Plan of Care Expires:  01/10/18   Plan of Care Reviewed with: patient, spouse    Subjective     Communicated with nursing prior to session.  Patient found supine on bed pan upon PT entry to room, agreeable to treatment.      Chief Complaint: fatigue/weakness  Patient comments/goals: improve strength  Pain/Comfort:  · Pain Rating 1: 0/10    Patients cultural, spiritual, Jew conflicts given the current situation:      Objective:     Patient found with: peripheral IV, oxygen, telemetry     General Precautions: Standard, fall, respiratory   Orthopedic Precautions:N/A   Braces:       Functional Mobility:  · Bed Mobility:     · Supine to Sit: moderate assistance  · Transfers:     · Bed to Chair: contact guard assistance with  rolling walker  using  Stand Pivot      AM-PAC 6 CLICK MOBILITY  Turning over in bed (including adjusting bedclothes, sheets and blankets)?: 3  Sitting down on  and standing up from a chair with arms (e.g., wheelchair, bedside commode, etc.): 3  Moving from lying on back to sitting on the side of the bed?: 3  Moving to and from a bed to a chair (including a wheelchair)?: 3  Need to walk in hospital room?: 3  Climbing 3-5 steps with a railing?: 1  Total Score: 16       Therapeutic Activities and Exercises:   Log rolled to R with mod assist for cleaning following bowel movement. Mod A with supine to sit    Patient left up in chair with all lines intact, call button in reach and wife present..    GOALS:    Physical Therapy Goals        Problem: Physical Therapy Goal    Goal Priority Disciplines Outcome Goal Variances Interventions   Physical Therapy Goal     PT/OT, PT      Description:  PT WILL BE SEEN FORP.T. FOR A MIN OF 5 OUT OF 7 DAYS A WEEK  LT/10/18  1. PT WILL COMPLETE BED MOBILITY BOBY   2. PT WILL T/F TO CHAIR WITH RW MOD I  3. PT WILL GT TRAIN X 100' WITH RW AND SBA.  4. PT WILL COMPLETE B LE TE X 20 REPS FOR STRENGTHENING.                    Time Tracking:     PT Received On: 18  PT Start Time: 0830     PT Stop Time: 0855  PT Total Time (min): 25 min     Billable Minutes: Therapeutic Activity 25    Treatment Type: Treatment  PT/PTA: PT           Garry South, PT  2018

## 2018-01-06 NOTE — PROGRESS NOTES
Coumadin Progress Notes:    INR = 2.8 trended up from 2.5 yesterday  Will give a 2.5 mg dose today  Goal INR = 2-3  Indication: Afib   Pharmacy will continue to follow daily INR's and make adjustments accordingly    Piedad Monaco RPh. 1/6/2018 8:32 AM

## 2018-01-06 NOTE — PLAN OF CARE
Problem: Patient Care Overview  Goal: Plan of Care Review  PT REQUIRES MIN A FOR GT X 15' WITH RW AND SLOW PACE.    Outcome: Ongoing (interventions implemented as appropriate)  Patient is now on room air and tolerating well. Breathing treatments continue. No distress noted at this time.

## 2018-01-06 NOTE — PLAN OF CARE
Problem: Patient Care Overview  Goal: Plan of Care Review  PT REQUIRES MIN A FOR GT X 15' WITH RW AND SLOW PACE.    Outcome: Ongoing (interventions implemented as appropriate)  Pt verbalized understanding of plan of care. Fall precautions maintained.  Bed locked and low.  Call light and personal items within reach. SR up x 2. CBG monitoring ordered AC/HS.  Scheduled and SSI admin as ordered. 24 hour chart check complete.

## 2018-01-06 NOTE — SUBJECTIVE & OBJECTIVE
Review of Systems   Constitutional: Positive for fatigue. Negative for activity change, chills, diaphoresis, fever and unexpected weight change.   HENT: Negative for congestion, postnasal drip, rhinorrhea, sinus pressure, sore throat and trouble swallowing.    Eyes: Negative for photophobia and visual disturbance.   Respiratory: Positive for cough (chronic). Negative for apnea, chest tightness, shortness of breath and wheezing.    Cardiovascular: Positive for leg swelling (chronic). Negative for chest pain and palpitations.   Gastrointestinal: Negative for abdominal distention, abdominal pain, blood in stool, constipation, diarrhea, nausea and vomiting.   Endocrine: Negative for polydipsia, polyphagia and polyuria.   Genitourinary: Negative for decreased urine volume, difficulty urinating, dysuria, frequency, hematuria and urgency.   Musculoskeletal: Negative for arthralgias, back pain, gait problem, joint swelling and myalgias.   Skin: Negative for pallor, rash and wound.   Neurological: Positive for weakness (generalized). Negative for dizziness, seizures, syncope, facial asymmetry, speech difficulty, light-headedness, numbness and headaches.   Psychiatric/Behavioral: Negative for confusion. The patient is not nervous/anxious.    All other systems reviewed and are negative.    Objective:     Vital Signs (Most Recent):  Temp: 97.9 °F (36.6 °C) (01/05/18 1711)  Pulse: 96 (01/05/18 1711)  Resp: 16 (01/05/18 1711)  BP: (!) 101/54 (01/05/18 1711)  SpO2: 96 % (01/05/18 1711) Vital Signs (24h Range):  Temp:  [97.4 °F (36.3 °C)-98.1 °F (36.7 °C)] 97.9 °F (36.6 °C)  Pulse:  [75-98] 96  Resp:  [] 16  SpO2:  [95 %-100 %] 96 %  BP: (101-135)/(54-61) 101/54     Weight: 109.2 kg (240 lb 11.9 oz)  Body mass index is 34.54 kg/m².    Intake/Output Summary (Last 24 hours) at 01/05/18 1914  Last data filed at 01/05/18 1452   Gross per 24 hour   Intake              850 ml   Output              900 ml   Net              -50 ml       Physical Exam   Constitutional: He is oriented to person, place, and time. He appears well-developed and well-nourished. No distress.   HENT:   Head: Normocephalic and atraumatic.   Mouth/Throat: Oropharynx is clear and moist.   Eyes: Conjunctivae are normal.   Neck: Normal range of motion. Neck supple. No JVD present.   Cardiovascular: Normal rate and intact distal pulses.   No extrasystoles are present. Exam reveals no gallop.    Murmur heard.  Irregular rhythm    Pulmonary/Chest: Effort normal. No accessory muscle usage. No tachypnea. No respiratory distress. He has no decreased breath sounds. He has no wheezes. He has no rhonchi. He has no rales.   Abdominal: Soft. Bowel sounds are normal. He exhibits no distension. There is no tenderness. There is no rebound and no CVA tenderness.   Musculoskeletal: Normal range of motion. He exhibits edema (1+ BLE ). He exhibits no tenderness or deformity.   Neurological: He is alert and oriented to person, place, and time. No cranial nerve deficit or sensory deficit.   Skin: Skin is warm, dry and intact. Capillary refill takes less than 2 seconds. He is not diaphoretic. No erythema.   Psychiatric: He has a normal mood and affect. His speech is normal and behavior is normal. Cognition and memory are normal.   Nursing note and vitals reviewed.      Significant Labs:   Recent Lab Results       01/05/18  1055 01/05/18  0622 01/05/18  0614 01/04/18  2232 01/04/18  2102      Anion Gap   14       aPTT   38.6  Comment:  aPTT therapeutic range = 39-69 seconds(H)  37.1  Comment:  aPTT therapeutic range = 39-69 seconds(H)     Baso #   0.01       Basophil%   0.1       BUN, Bld   85(H)       Calcium   10.3       Chloride   87(L)       CO2   25       Creatinine   2.0(H)       Differential Method   Automated       eGFR if    37(A)       eGFR if non    32  Comment:  Calculation used to obtain the estimated glomerular filtration  rate (eGFR) is the CKD-EPI  equation.   (A)       Eos #   0.1       Eosinophil%   0.5       Glucose   151(H)       Gram Stain (Respiratory)          Gran #   9.6(H)       Gran%   76.0(H)       Hematocrit   38.5(L)       Hemoglobin   13.1(L)       Coumadin Monitoring INR   2.5  Comment:  Coumadin Therapy:  2.0 - 3.0 for INR for all indicators except mechanical heart valves  and antiphospholipid syndromes which should use 2.5 - 3.5.  (H)       Lymph #   1.6       Lymph%   13.0(L)       MCH   30.8       MCHC   34.0       MCV   91       Mono #   1.3(H)       Mono%   10.4       MPV   8.8(L)       Platelets   379(H)       POCT Glucose 259(H) 163(H)  242(H)      Potassium   5.3(H)       Protime   24.7(H)       RBC   4.25(L)       RDW   15.4(H)       Sodium   126(L)       WBC   12.65                   01/04/18  2042      Anion Gap      aPTT      Baso #      Basophil%      BUN, Bld      Calcium      Chloride      CO2      Creatinine      Differential Method      eGFR if       eGFR if non       Eos #      Eosinophil%      Glucose      Gram Stain (Respiratory) <10 epithelial cells per low power field.      Moderate WBC's      No organisms seen     Gran #      Gran%      Hematocrit      Hemoglobin      Coumadin Monitoring INR      Lymph #      Lymph%      MCH      MCHC      MCV      Mono #      Mono%      MPV      Platelets      POCT Glucose      Potassium      Protime      RBC      RDW      Sodium      WBC          All pertinent labs within the past 24 hours have been reviewed.    Significant Imaging: I have reviewed all pertinent imaging results/findings within the past 24 hours.

## 2018-01-07 LAB
ALBUMIN SERPL BCP-MCNC: 3.1 G/DL
ALP SERPL-CCNC: 135 U/L
ALT SERPL W/O P-5'-P-CCNC: 91 U/L
ANION GAP SERPL CALC-SCNC: 14 MMOL/L
APTT BLDCRRT: 42.9 SEC
APTT BLDCRRT: 43.4 SEC
AST SERPL-CCNC: 78 U/L
BACTERIA BLD CULT: NORMAL
BACTERIA SPEC AEROBE CULT: NORMAL
BASOPHILS # BLD AUTO: 0.02 K/UL
BASOPHILS NFR BLD: 0.2 %
BILIRUB SERPL-MCNC: 1.3 MG/DL
BUN SERPL-MCNC: 88 MG/DL
CALCIUM SERPL-MCNC: 9.9 MG/DL
CHLORIDE SERPL-SCNC: 88 MMOL/L
CO2 SERPL-SCNC: 23 MMOL/L
CREAT SERPL-MCNC: 2.1 MG/DL
DIFFERENTIAL METHOD: ABNORMAL
EOSINOPHIL # BLD AUTO: 0.1 K/UL
EOSINOPHIL NFR BLD: 0.6 %
ERYTHROCYTE [DISTWIDTH] IN BLOOD BY AUTOMATED COUNT: 15.6 %
EST. GFR  (AFRICAN AMERICAN): 35 ML/MIN/1.73 M^2
EST. GFR  (NON AFRICAN AMERICAN): 31 ML/MIN/1.73 M^2
GLUCOSE SERPL-MCNC: 146 MG/DL
GRAM STN SPEC: NORMAL
HCT VFR BLD AUTO: 36.1 %
HGB BLD-MCNC: 12.4 G/DL
INR PPP: 3.1
LYMPHOCYTES # BLD AUTO: 1.6 K/UL
LYMPHOCYTES NFR BLD: 16 %
MAGNESIUM SERPL-MCNC: 2.1 MG/DL
MCH RBC QN AUTO: 30.8 PG
MCHC RBC AUTO-ENTMCNC: 34.3 G/DL
MCV RBC AUTO: 90 FL
MONOCYTES # BLD AUTO: 1.1 K/UL
MONOCYTES NFR BLD: 10.7 %
NEUTROPHILS # BLD AUTO: 7.3 K/UL
NEUTROPHILS NFR BLD: 72.5 %
PHOSPHATE SERPL-MCNC: 3.7 MG/DL
PLATELET # BLD AUTO: 358 K/UL
PMV BLD AUTO: 8.6 FL
POCT GLUCOSE: 159 MG/DL (ref 70–110)
POCT GLUCOSE: 168 MG/DL (ref 70–110)
POCT GLUCOSE: 187 MG/DL (ref 70–110)
POCT GLUCOSE: 201 MG/DL (ref 70–110)
POTASSIUM SERPL-SCNC: 5.2 MMOL/L
PROT SERPL-MCNC: 7.4 G/DL
PROTHROMBIN TIME: 31.2 SEC
RBC # BLD AUTO: 4.03 M/UL
SODIUM SERPL-SCNC: 125 MMOL/L
WBC # BLD AUTO: 10.07 K/UL

## 2018-01-07 PROCEDURE — 85025 COMPLETE CBC W/AUTO DIFF WBC: CPT

## 2018-01-07 PROCEDURE — 84100 ASSAY OF PHOSPHORUS: CPT

## 2018-01-07 PROCEDURE — 21400001 HC TELEMETRY ROOM

## 2018-01-07 PROCEDURE — 25000003 PHARM REV CODE 250: Performed by: INTERNAL MEDICINE

## 2018-01-07 PROCEDURE — 94640 AIRWAY INHALATION TREATMENT: CPT

## 2018-01-07 PROCEDURE — 80053 COMPREHEN METABOLIC PANEL: CPT

## 2018-01-07 PROCEDURE — 36415 COLL VENOUS BLD VENIPUNCTURE: CPT

## 2018-01-07 PROCEDURE — 25000003 PHARM REV CODE 250: Performed by: NURSE PRACTITIONER

## 2018-01-07 PROCEDURE — 85730 THROMBOPLASTIN TIME PARTIAL: CPT

## 2018-01-07 PROCEDURE — 25000242 PHARM REV CODE 250 ALT 637 W/ HCPCS: Performed by: FAMILY MEDICINE

## 2018-01-07 PROCEDURE — 99232 SBSQ HOSP IP/OBS MODERATE 35: CPT | Mod: ,,, | Performed by: INTERNAL MEDICINE

## 2018-01-07 PROCEDURE — 99900035 HC TECH TIME PER 15 MIN (STAT)

## 2018-01-07 PROCEDURE — 25000242 PHARM REV CODE 250 ALT 637 W/ HCPCS: Performed by: NURSE PRACTITIONER

## 2018-01-07 PROCEDURE — 85610 PROTHROMBIN TIME: CPT

## 2018-01-07 PROCEDURE — 97530 THERAPEUTIC ACTIVITIES: CPT

## 2018-01-07 PROCEDURE — 97110 THERAPEUTIC EXERCISES: CPT

## 2018-01-07 PROCEDURE — 83735 ASSAY OF MAGNESIUM: CPT

## 2018-01-07 RX ORDER — SODIUM CHLORIDE 9 MG/ML
INJECTION, SOLUTION INTRAVENOUS CONTINUOUS
Status: DISCONTINUED | OUTPATIENT
Start: 2018-01-07 | End: 2018-01-08 | Stop reason: HOSPADM

## 2018-01-07 RX ORDER — CITALOPRAM 20 MG/1
20 TABLET, FILM COATED ORAL DAILY
Status: DISCONTINUED | OUTPATIENT
Start: 2018-01-07 | End: 2018-01-08 | Stop reason: HOSPADM

## 2018-01-07 RX ORDER — WARFARIN SODIUM 5 MG/1
5 TABLET ORAL
Status: DISCONTINUED | OUTPATIENT
Start: 2018-01-08 | End: 2018-01-08 | Stop reason: DRUGHIGH

## 2018-01-07 RX ORDER — SODIUM CHLORIDE 9 MG/ML
INJECTION, SOLUTION INTRAVENOUS CONTINUOUS
Status: DISCONTINUED | OUTPATIENT
Start: 2018-01-07 | End: 2018-01-07

## 2018-01-07 RX ADMIN — BUMETANIDE 1 MG: 1 TABLET ORAL at 08:01

## 2018-01-07 RX ADMIN — IPRATROPIUM BROMIDE AND ALBUTEROL SULFATE 3 ML: .5; 3 SOLUTION RESPIRATORY (INHALATION) at 07:01

## 2018-01-07 RX ADMIN — CITALOPRAM HYDROBROMIDE 20 MG: 20 TABLET ORAL at 01:01

## 2018-01-07 RX ADMIN — MOXIFLOXACIN 400 MG: 400 TABLET, FILM COATED ORAL at 08:01

## 2018-01-07 RX ADMIN — METOPROLOL TARTRATE 25 MG: 25 TABLET ORAL at 08:01

## 2018-01-07 RX ADMIN — PANTOPRAZOLE SODIUM 40 MG: 40 TABLET, DELAYED RELEASE ORAL at 08:01

## 2018-01-07 RX ADMIN — BUDESONIDE 0.5 MG: 0.5 SUSPENSION RESPIRATORY (INHALATION) at 07:01

## 2018-01-07 RX ADMIN — IPRATROPIUM BROMIDE AND ALBUTEROL SULFATE 3 ML: .5; 3 SOLUTION RESPIRATORY (INHALATION) at 01:01

## 2018-01-07 RX ADMIN — ACETAMINOPHEN 650 MG: 325 TABLET ORAL at 07:01

## 2018-01-07 RX ADMIN — ARFORMOTEROL TARTRATE 15 MCG: 15 SOLUTION RESPIRATORY (INHALATION) at 07:01

## 2018-01-07 RX ADMIN — ACETAMINOPHEN 650 MG: 325 TABLET ORAL at 08:01

## 2018-01-07 RX ADMIN — FERROUS SULFATE TAB EC 325 MG (65 MG FE EQUIVALENT) 325 MG: 325 (65 FE) TABLET DELAYED RESPONSE at 09:01

## 2018-01-07 RX ADMIN — ASPIRIN 81 MG: 81 TABLET, COATED ORAL at 08:01

## 2018-01-07 RX ADMIN — POTASSIUM CHLORIDE 20 MEQ: 1500 TABLET, EXTENDED RELEASE ORAL at 08:01

## 2018-01-07 RX ADMIN — METOPROLOL TARTRATE 25 MG: 25 TABLET ORAL at 09:01

## 2018-01-07 RX ADMIN — SODIUM CHLORIDE: 0.9 INJECTION, SOLUTION INTRAVENOUS at 11:01

## 2018-01-07 RX ADMIN — ROSUVASTATIN CALCIUM 10 MG: 10 TABLET, FILM COATED ORAL at 09:01

## 2018-01-07 RX ADMIN — FERROUS SULFATE TAB EC 325 MG (65 MG FE EQUIVALENT) 325 MG: 325 (65 FE) TABLET DELAYED RESPONSE at 08:01

## 2018-01-07 RX ADMIN — GUAIFENESIN 600 MG: 600 TABLET, EXTENDED RELEASE ORAL at 08:01

## 2018-01-07 RX ADMIN — BUMETANIDE 1 MG: 1 TABLET ORAL at 09:01

## 2018-01-07 RX ADMIN — GUAIFENESIN 600 MG: 600 TABLET, EXTENDED RELEASE ORAL at 09:01

## 2018-01-07 NOTE — PROGRESS NOTES
[facility] -   Nephrology  Progress Note    Patient Name: Beni Cosby  MRN: [id number]  Admission Date: 12/30/2017  Hospital Length of Stay: 8 days  Attending Provider: Abimbola Sherman MD   Primary Care Physician: Jackson Brandt MD  Principal Problem:Hyponatremia    Subjective:     HPI: Pt was seen and examined. H/o was reviewed. Pt is a 71 y/o male with a pertinent h/o of CKD stage 3 (Cr 1.9 at baseline), diastolic CHF, and DM who presented with ;eg weakness and swqlling. Pt was noted to have s Na was 124, and renal service was consulted for hyponatremia. Pt feels slightly SOB, and has leg swelling. His wife states with certainty that he is on a 1 L fluid restriction at home. Pt takes bumex 2 mg ama nd 1 mg pm at home. He also takes metolazone 2.5 mg qd (2.5 mg), which is a thiazide diuretic. He states his diet is not salty. TSH was normal. No h/o of liver disease but alb is 2.9. Pt denies alcohol use, but noted that AST is slightly higher than ALT. He feels tired and sleepy.    Review of Systems   Constitutional: Positive for fatigue. Negative for activity change, chills, diaphoresis, fever and unexpected weight change.   HENT: Negative for congestion, postnasal drip, rhinorrhea, sinus pressure, sore throat and trouble swallowing.    Eyes: Negative for photophobia and visual disturbance.   Respiratory: Positive for cough (chronic). Negative for apnea, chest tightness, shortness of breath and wheezing.    Cardiovascular: Positive for leg swelling (chronic). Negative for chest pain and palpitations.   Gastrointestinal: Negative for abdominal distention, abdominal pain, blood in stool, constipation, diarrhea, nausea and vomiting.   Endocrine: Negative for polydipsia, polyphagia and polyuria.   Genitourinary: Negative for decreased urine volume, difficulty urinating, dysuria, frequency, hematuria and urgency.   Musculoskeletal: Negative for arthralgias, back pain, gait problem, joint swelling and  myalgias.   Skin: Negative for pallor, rash and wound.   Neurological: Positive for weakness (generalized). Negative for dizziness, seizures, syncope, facial asymmetry, speech difficulty, light-headedness, numbness and headaches.   Psychiatric/Behavioral: Negative for confusion. The patient is not nervous/anxious.    All other systems reviewed and are negative.    Objective:     Vital Signs (Most Recent):  Temp: 97.2 °F (36.2 °C) (01/07/18 1121)  Pulse: 86 (01/07/18 1121)  Resp: 18 (01/07/18 1121)  BP: (!) 112/57 (01/07/18 1121)  SpO2: 95 % (01/07/18 1121) Vital Signs (24h Range):  Temp:  [97.2 °F (36.2 °C)-98.2 °F (36.8 °C)] 97.2 °F (36.2 °C)  Pulse:  [] 86  Resp:  [18-20] 18  SpO2:  [95 %-99 %] 95 %  BP: (102-120)/(56-62) 112/57     Weight: 109 kg (240 lb 4.8 oz)  Body mass index is 34.48 kg/m².    Intake/Output Summary (Last 24 hours) at 01/07/18 1202  Last data filed at 01/07/18 1121   Gross per 24 hour   Intake              280 ml   Output              550 ml   Net             -270 ml      Physical Exam   Constitutional: He is oriented to person, place, and time. He appears well-developed and well-nourished. No distress.   HENT:   Head: Normocephalic and atraumatic.   Mouth/Throat: Oropharynx is clear and moist.   Eyes: Conjunctivae are normal.   Neck: Normal range of motion. Neck supple. No JVD present.   Cardiovascular: Normal rate and intact distal pulses.   No extrasystoles are present. Exam reveals no gallop.    Murmur heard.  Irregular rhythm    Pulmonary/Chest: Effort normal. No accessory muscle usage. No tachypnea. No respiratory distress. He has no decreased breath sounds. He has no wheezes. He has no rhonchi. He has no rales.   Abdominal: Soft. Bowel sounds are normal. He exhibits no distension. There is no tenderness. There is no rebound and no CVA tenderness.   Musculoskeletal: Normal range of motion. He exhibits edema (1+ BLE ). He exhibits no tenderness or deformity.   Neurological: He is  alert and oriented to person, place, and time. No cranial nerve deficit or sensory deficit.   Skin: Skin is warm, dry and intact. Capillary refill takes less than 2 seconds. He is not diaphoretic. No erythema.   Psychiatric: He has a normal mood and affect. His speech is normal and behavior is normal. Cognition and memory are normal.   Nursing note and vitals reviewed.      Significant Labs:   Recent Lab Results       01/07/18  0820 01/07/18  0610 01/07/18  0549 01/06/18 2017 01/06/18 2012      Albumin  3.1(L)        Alkaline Phosphatase  135        ALT  91(H)        Anion Gap  14        aPTT 43.4  Comment:  aPTT therapeutic range = 39-69 seconds(H)    40.4  Comment:  aPTT therapeutic range = 39-69 seconds(H)     AST  78(H)        Baso #  0.02        Basophil%  0.2        Total Bilirubin  1.3  Comment:  For infants and newborns, interpretation of results should be based  on gestational age, weight and in agreement with clinical  observations.  Premature Infant recommended reference ranges:  Up to 24 hours.............<8.0 mg/dL  Up to 48 hours............<12.0 mg/dL  3-5 days..................<15.0 mg/dL  6-29 days.................<15.0 mg/dL  (H)        BUN, Bld  88(H)        Calcium  9.9        Chloride  88(L)        CO2  23        Creatinine  2.1(H)        Differential Method  Automated        eGFR if   35(A)        eGFR if non   31  Comment:  Calculation used to obtain the estimated glomerular filtration  rate (eGFR) is the CKD-EPI equation.   (A)        Eos #  0.1        Eosinophil%  0.6        Glucose  146(H)        Gran #  7.3        Gran%  72.5        Hematocrit  36.1(L)        Hemoglobin  12.4(L)        Coumadin Monitoring INR  3.1  Comment:  Coumadin Therapy:  2.0 - 3.0 for INR for all indicators except mechanical heart valves  and antiphospholipid syndromes which should use 2.5 - 3.5.  (H)        Lymph #  1.6        Lymph%  16.0(L)        Magnesium  2.1        MCH  30.8         MCHC  34.3        MCV  90        Mono #  1.1(H)        Mono%  10.7        MPV  8.6(L)        Phosphorus  3.7        Platelets  358(H)        POCT Glucose   168(H) 163(H)      Potassium  5.2(H)        Total Protein  7.4        Protime  31.2(H)        RBC  4.03(L)        RDW  15.6(H)        Sodium  125(L)        WBC  10.07                    01/06/18  1714      Albumin      Alkaline Phosphatase      ALT      Anion Gap      aPTT      AST      Baso #      Basophil%      Total Bilirubin      BUN, Bld      Calcium      Chloride      CO2      Creatinine      Differential Method      eGFR if       eGFR if non       Eos #      Eosinophil%      Glucose      Gran #      Gran%      Hematocrit      Hemoglobin      Coumadin Monitoring INR      Lymph #      Lymph%      Magnesium      MCH      MCHC      MCV      Mono #      Mono%      MPV      Phosphorus      Platelets      POCT Glucose 170(H)     Potassium      Total Protein      Protime      RBC      RDW      Sodium      WBC          All pertinent labs within the past 24 hours have been reviewed.    Significant Imaging: I have reviewed all pertinent imaging results/findings within the past 24 hours.    Assessment/Plan:     * Acute on chronic Hyponatremia    loop diuretics. Liberalize salt and water intake.  Avoid Zaroxolyn. IVF         Acute renal failure superimposed on stage 3 chronic kidney disease    Acute kidney injury and chronic kidney disease are stable             Thank you for your consult.     Rubin Thomas MD  Nephrology  [facility]

## 2018-01-07 NOTE — PLAN OF CARE
Problem: Patient Care Overview  Goal: Plan of Care Review  PT REQUIRES MIN A FOR GT X 15' WITH RW AND SLOW PACE.    Outcome: Ongoing (interventions implemented as appropriate)  Pt remains free of injuries.   C/o blossom LE pain, 3/10, sore, burning. Worse c palpation/walking. Moderately controlled c PRN meds,repositioning, and relaxation techniques.  IVF per MD orders.   PO fluid intake 420 mL so far.  12 hr chart check completed. Will continue to monitor.

## 2018-01-07 NOTE — PROGRESS NOTES
Coumadin Progress Notes:     INR = 3.1 trended up from 2.8 yesterday  Will hold dose today  Goal INR = 2-3  Indication: Afib   Pharmacy will continue to follow daily INR's and make adjustments accordingly     Piedad Monaco RPh. 1/6/2018 8:32 AM

## 2018-01-07 NOTE — PT/OT/SLP PROGRESS
Physical Therapy Treatment    Patient Name:  Beni Cosby   MRN:  [id number]    Recommendations:     Discharge Recommendations:  nursing facility, skilled   Discharge Equipment Recommendations: none   Barriers to discharge: Decreased caregiver support    Assessment:     Increased difficulty with sit to stand today due to LE weakness.  Fatigued very quickly with LE exercises    Rehab Prognosis:  fair; patient would benefit from acute skilled PT services to address these deficits and reach maximum level of function.      Recent Surgery: * No surgery found *      Plan:     During this hospitalization, patient to be seen 5 x/week to address the above listed problems via gait training, therapeutic exercises  · Plan of Care Expires:  01/10/18   Plan of Care Reviewed with: patient    Subjective     Communicated with nursing prior to session.  Patient found supine upon PT entry to room, agreeable to treatment.      Chief Complaint: fatigue  Patient comments/goals: improve strength  Pain/Comfort:  · Pain Rating 1: 0/10    Patients cultural, spiritual, Druze conflicts given the current situation:      Objective:     Patient found with: oxygen, peripheral IV, telemetry     General Precautions: Standard, fall   Orthopedic Precautions:N/A   Braces:       Functional Mobility:  · Bed Mobility:     · Supine to Sit: moderate assistance  · Transfers:     · Sit to Stand:  moderate assistance with rolling walker  · Bed to Chair: moderate assistance with  rolling walker  using  Stand Pivot      AM-PAC 6 CLICK MOBILITY  Turning over in bed (including adjusting bedclothes, sheets and blankets)?: 3  Sitting down on and standing up from a chair with arms (e.g., wheelchair, bedside commode, etc.): 2  Moving from lying on back to sitting on the side of the bed?: 3  Moving to and from a bed to a chair (including a wheelchair)?: 2  Need to walk in hospital room?: 2  Climbing 3-5 steps with a railing?: 1  Total Score:  13       Therapeutic Activities and Exercises:   Performed seated LE weakness in chaire- LAQ, heel/toe raises, hip ADD and marching x 10 reps.  Did not attempt gait today due weakness/safety with transfer    Patient left up in chair with all lines intact and call button in reach..    GOALS:    Physical Therapy Goals        Problem: Physical Therapy Goal    Goal Priority Disciplines Outcome Goal Variances Interventions   Physical Therapy Goal     PT/OT, PT      Description:  PT WILL BE SEEN FORP.T. FOR A MIN OF 5 OUT OF 7 DAYS A WEEK  LT/10/18  1. PT WILL COMPLETE BED MOBILITY BOBY   2. PT WILL T/F TO CHAIR WITH RW MOD I  3. PT WILL GT TRAIN X 100' WITH RW AND SBA.  4. PT WILL COMPLETE B LE TE X 20 REPS FOR STRENGTHENING.                    Time Tracking:     PT Received On: 18  PT Start Time: 1100     PT Stop Time: 1130  PT Total Time (min): 30 min     Billable Minutes: Therapeutic Activity 15 and Therapeutic Exercise 15    Treatment Type: Treatment  PT/PTA: PT           Garry South, PT  2018

## 2018-01-07 NOTE — SUBJECTIVE & OBJECTIVE
Review of Systems   Constitutional: Positive for fatigue. Negative for activity change, chills, diaphoresis, fever and unexpected weight change.   HENT: Negative for congestion, postnasal drip, rhinorrhea, sinus pressure, sore throat and trouble swallowing.    Eyes: Negative for photophobia and visual disturbance.   Respiratory: Positive for cough (chronic). Negative for apnea, chest tightness, shortness of breath and wheezing.    Cardiovascular: Positive for leg swelling (chronic). Negative for chest pain and palpitations.   Gastrointestinal: Negative for abdominal distention, abdominal pain, blood in stool, constipation, diarrhea, nausea and vomiting.   Endocrine: Negative for polydipsia, polyphagia and polyuria.   Genitourinary: Negative for decreased urine volume, difficulty urinating, dysuria, frequency, hematuria and urgency.   Musculoskeletal: Negative for arthralgias, back pain, gait problem, joint swelling and myalgias.   Skin: Negative for pallor, rash and wound.   Neurological: Positive for weakness (generalized). Negative for dizziness, seizures, syncope, facial asymmetry, speech difficulty, light-headedness, numbness and headaches.   Psychiatric/Behavioral: Negative for confusion. The patient is not nervous/anxious.    All other systems reviewed and are negative.    Objective:     Vital Signs (Most Recent):  Temp: 97.2 °F (36.2 °C) (01/07/18 1121)  Pulse: 86 (01/07/18 1121)  Resp: 18 (01/07/18 1121)  BP: (!) 112/57 (01/07/18 1121)  SpO2: 95 % (01/07/18 1121) Vital Signs (24h Range):  Temp:  [97.2 °F (36.2 °C)-98.2 °F (36.8 °C)] 97.2 °F (36.2 °C)  Pulse:  [] 86  Resp:  [18-20] 18  SpO2:  [95 %-99 %] 95 %  BP: (102-120)/(56-62) 112/57     Weight: 109 kg (240 lb 4.8 oz)  Body mass index is 34.48 kg/m².    Intake/Output Summary (Last 24 hours) at 01/07/18 1202  Last data filed at 01/07/18 1121   Gross per 24 hour   Intake              280 ml   Output              550 ml   Net             -270 ml       Physical Exam   Constitutional: He is oriented to person, place, and time. He appears well-developed and well-nourished. No distress.   HENT:   Head: Normocephalic and atraumatic.   Mouth/Throat: Oropharynx is clear and moist.   Eyes: Conjunctivae are normal.   Neck: Normal range of motion. Neck supple. No JVD present.   Cardiovascular: Normal rate and intact distal pulses.   No extrasystoles are present. Exam reveals no gallop.    Murmur heard.  Irregular rhythm    Pulmonary/Chest: Effort normal. No accessory muscle usage. No tachypnea. No respiratory distress. He has no decreased breath sounds. He has no wheezes. He has no rhonchi. He has no rales.   Abdominal: Soft. Bowel sounds are normal. He exhibits no distension. There is no tenderness. There is no rebound and no CVA tenderness.   Musculoskeletal: Normal range of motion. He exhibits edema (1+ BLE ). He exhibits no tenderness or deformity.   Neurological: He is alert and oriented to person, place, and time. No cranial nerve deficit or sensory deficit.   Skin: Skin is warm, dry and intact. Capillary refill takes less than 2 seconds. He is not diaphoretic. No erythema.   Psychiatric: He has a normal mood and affect. His speech is normal and behavior is normal. Cognition and memory are normal.   Nursing note and vitals reviewed.      Significant Labs:   Recent Lab Results       01/07/18  0820 01/07/18  0610 01/07/18  0549 01/06/18 2017 01/06/18 2012      Albumin  3.1(L)        Alkaline Phosphatase  135        ALT  91(H)        Anion Gap  14        aPTT 43.4  Comment:  aPTT therapeutic range = 39-69 seconds(H)    40.4  Comment:  aPTT therapeutic range = 39-69 seconds(H)     AST  78(H)        Baso #  0.02        Basophil%  0.2        Total Bilirubin  1.3  Comment:  For infants and newborns, interpretation of results should be based  on gestational age, weight and in agreement with clinical  observations.  Premature Infant recommended reference ranges:  Up to 24  hours.............<8.0 mg/dL  Up to 48 hours............<12.0 mg/dL  3-5 days..................<15.0 mg/dL  6-29 days.................<15.0 mg/dL  (H)        BUN, Bld  88(H)        Calcium  9.9        Chloride  88(L)        CO2  23        Creatinine  2.1(H)        Differential Method  Automated        eGFR if   35(A)        eGFR if non   31  Comment:  Calculation used to obtain the estimated glomerular filtration  rate (eGFR) is the CKD-EPI equation.   (A)        Eos #  0.1        Eosinophil%  0.6        Glucose  146(H)        Gran #  7.3        Gran%  72.5        Hematocrit  36.1(L)        Hemoglobin  12.4(L)        Coumadin Monitoring INR  3.1  Comment:  Coumadin Therapy:  2.0 - 3.0 for INR for all indicators except mechanical heart valves  and antiphospholipid syndromes which should use 2.5 - 3.5.  (H)        Lymph #  1.6        Lymph%  16.0(L)        Magnesium  2.1        MCH  30.8        MCHC  34.3        MCV  90        Mono #  1.1(H)        Mono%  10.7        MPV  8.6(L)        Phosphorus  3.7        Platelets  358(H)        POCT Glucose   168(H) 163(H)      Potassium  5.2(H)        Total Protein  7.4        Protime  31.2(H)        RBC  4.03(L)        RDW  15.6(H)        Sodium  125(L)        WBC  10.07                    01/06/18  1714      Albumin      Alkaline Phosphatase      ALT      Anion Gap      aPTT      AST      Baso #      Basophil%      Total Bilirubin      BUN, Bld      Calcium      Chloride      CO2      Creatinine      Differential Method      eGFR if       eGFR if non       Eos #      Eosinophil%      Glucose      Gran #      Gran%      Hematocrit      Hemoglobin      Coumadin Monitoring INR      Lymph #      Lymph%      Magnesium      MCH      MCHC      MCV      Mono #      Mono%      MPV      Phosphorus      Platelets      POCT Glucose 170(H)     Potassium      Total Protein      Protime      RBC      RDW      Sodium      WBC           All pertinent labs within the past 24 hours have been reviewed.    Significant Imaging: I have reviewed all pertinent imaging results/findings within the past 24 hours.

## 2018-01-07 NOTE — PLAN OF CARE
Problem: Patient Care Overview  Goal: Plan of Care Review  PT REQUIRES MIN A FOR GT X 15' WITH RW AND SLOW PACE.    Outcome: Ongoing (interventions implemented as appropriate)  Pt lung sounds are coarse RUL and LOVE. Pt has a nonproductive cough. Pt is on 1500 ML fluids restriction. Blood glucose is being monitored.  Pt denies any pain. IV ABX given per order. No injuries. Will continue to monitor. 12 hour chart check is completed.

## 2018-01-07 NOTE — PLAN OF CARE
Problem: Patient Care Overview  Goal: Plan of Care Review  PT REQUIRES MIN A FOR GT X 15' WITH RW AND SLOW PACE.    Outcome: Ongoing (interventions implemented as appropriate)  Patient tolerating breathing treatments well. Remains on room air, no distress noted at this time.

## 2018-01-08 ENCOUNTER — TELEPHONE (OUTPATIENT)
Dept: NEPHROLOGY | Facility: CLINIC | Age: 73
End: 2018-01-08

## 2018-01-08 VITALS
SYSTOLIC BLOOD PRESSURE: 105 MMHG | HEIGHT: 70 IN | OXYGEN SATURATION: 96 % | BODY MASS INDEX: 34.16 KG/M2 | HEART RATE: 94 BPM | TEMPERATURE: 98 F | DIASTOLIC BLOOD PRESSURE: 57 MMHG | RESPIRATION RATE: 16 BRPM | WEIGHT: 238.63 LBS

## 2018-01-08 DIAGNOSIS — E87.1 HYPONATREMIA: Primary | ICD-10-CM

## 2018-01-08 LAB
ALBUMIN SERPL BCP-MCNC: 2.8 G/DL
ALP SERPL-CCNC: 130 U/L
ALT SERPL W/O P-5'-P-CCNC: 92 U/L
ANION GAP SERPL CALC-SCNC: 11 MMOL/L
APTT BLDCRRT: 40.7 SEC
AST SERPL-CCNC: 81 U/L
BASOPHILS # BLD AUTO: 0.01 K/UL
BASOPHILS NFR BLD: 0.1 %
BILIRUB SERPL-MCNC: 1.3 MG/DL
BUN SERPL-MCNC: 79 MG/DL
CALCIUM SERPL-MCNC: 9 MG/DL
CHLORIDE SERPL-SCNC: 91 MMOL/L
CO2 SERPL-SCNC: 25 MMOL/L
CREAT SERPL-MCNC: 2 MG/DL
DIFFERENTIAL METHOD: ABNORMAL
EOSINOPHIL # BLD AUTO: 0.1 K/UL
EOSINOPHIL NFR BLD: 1.1 %
ERYTHROCYTE [DISTWIDTH] IN BLOOD BY AUTOMATED COUNT: 15.5 %
EST. GFR  (AFRICAN AMERICAN): 37 ML/MIN/1.73 M^2
EST. GFR  (NON AFRICAN AMERICAN): 32 ML/MIN/1.73 M^2
GLUCOSE SERPL-MCNC: 136 MG/DL
HCT VFR BLD AUTO: 32.2 %
HGB BLD-MCNC: 11.2 G/DL
INR PPP: 2.5
LYMPHOCYTES # BLD AUTO: 1.6 K/UL
LYMPHOCYTES NFR BLD: 19.7 %
MCH RBC QN AUTO: 31.3 PG
MCHC RBC AUTO-ENTMCNC: 34.8 G/DL
MCV RBC AUTO: 90 FL
MONOCYTES # BLD AUTO: 1.1 K/UL
MONOCYTES NFR BLD: 12.7 %
NEUTROPHILS # BLD AUTO: 5.5 K/UL
NEUTROPHILS NFR BLD: 66.4 %
PLATELET # BLD AUTO: 326 K/UL
PMV BLD AUTO: 8.8 FL
POCT GLUCOSE: 162 MG/DL (ref 70–110)
POCT GLUCOSE: 164 MG/DL (ref 70–110)
POCT GLUCOSE: 171 MG/DL (ref 70–110)
POTASSIUM SERPL-SCNC: 4.7 MMOL/L
PROT SERPL-MCNC: 6.7 G/DL
PROTHROMBIN TIME: 25.6 SEC
RBC # BLD AUTO: 3.58 M/UL
SODIUM SERPL-SCNC: 127 MMOL/L
WBC # BLD AUTO: 8.27 K/UL

## 2018-01-08 PROCEDURE — 25000003 PHARM REV CODE 250: Performed by: NURSE PRACTITIONER

## 2018-01-08 PROCEDURE — 85730 THROMBOPLASTIN TIME PARTIAL: CPT

## 2018-01-08 PROCEDURE — 97110 THERAPEUTIC EXERCISES: CPT

## 2018-01-08 PROCEDURE — 85025 COMPLETE CBC W/AUTO DIFF WBC: CPT

## 2018-01-08 PROCEDURE — 97530 THERAPEUTIC ACTIVITIES: CPT

## 2018-01-08 PROCEDURE — 99232 SBSQ HOSP IP/OBS MODERATE 35: CPT | Mod: ,,, | Performed by: INTERNAL MEDICINE

## 2018-01-08 PROCEDURE — 25000242 PHARM REV CODE 250 ALT 637 W/ HCPCS: Performed by: FAMILY MEDICINE

## 2018-01-08 PROCEDURE — 85610 PROTHROMBIN TIME: CPT

## 2018-01-08 PROCEDURE — 25000242 PHARM REV CODE 250 ALT 637 W/ HCPCS: Performed by: NURSE PRACTITIONER

## 2018-01-08 PROCEDURE — 80053 COMPREHEN METABOLIC PANEL: CPT

## 2018-01-08 PROCEDURE — 36415 COLL VENOUS BLD VENIPUNCTURE: CPT

## 2018-01-08 PROCEDURE — 94640 AIRWAY INHALATION TREATMENT: CPT

## 2018-01-08 PROCEDURE — 94761 N-INVAS EAR/PLS OXIMETRY MLT: CPT

## 2018-01-08 RX ORDER — WARFARIN SODIUM 5 MG/1
5 TABLET ORAL
Status: DISCONTINUED | OUTPATIENT
Start: 2018-01-10 | End: 2018-01-08 | Stop reason: HOSPADM

## 2018-01-08 RX ORDER — CITALOPRAM 20 MG/1
20 TABLET, FILM COATED ORAL DAILY
Qty: 30 TABLET | Refills: 0 | Status: SHIPPED | OUTPATIENT
Start: 2018-01-09 | End: 2018-02-08

## 2018-01-08 RX ORDER — GUAIFENESIN 600 MG/1
600 TABLET, EXTENDED RELEASE ORAL 2 TIMES DAILY
COMMUNITY
Start: 2018-01-08

## 2018-01-08 RX ORDER — WARFARIN 2.5 MG/1
2.5 TABLET ORAL ONCE
Status: DISCONTINUED | OUTPATIENT
Start: 2018-01-08 | End: 2018-01-08 | Stop reason: HOSPADM

## 2018-01-08 RX ORDER — MOXIFLOXACIN HYDROCHLORIDE 400 MG/1
400 TABLET ORAL DAILY
Qty: 3 TABLET | Refills: 0 | Status: SHIPPED | OUTPATIENT
Start: 2018-01-09 | End: 2018-01-12

## 2018-01-08 RX ORDER — ASPIRIN 81 MG/1
81 TABLET ORAL DAILY
Refills: 0 | COMMUNITY
Start: 2018-01-09 | End: 2019-01-09

## 2018-01-08 RX ADMIN — CITALOPRAM HYDROBROMIDE 20 MG: 20 TABLET ORAL at 08:01

## 2018-01-08 RX ADMIN — ARFORMOTEROL TARTRATE 15 MCG: 15 SOLUTION RESPIRATORY (INHALATION) at 07:01

## 2018-01-08 RX ADMIN — GUAIFENESIN 600 MG: 600 TABLET, EXTENDED RELEASE ORAL at 08:01

## 2018-01-08 RX ADMIN — PANTOPRAZOLE SODIUM 40 MG: 40 TABLET, DELAYED RELEASE ORAL at 08:01

## 2018-01-08 RX ADMIN — ASPIRIN 81 MG: 81 TABLET, COATED ORAL at 08:01

## 2018-01-08 RX ADMIN — BUDESONIDE 0.5 MG: 0.5 SUSPENSION RESPIRATORY (INHALATION) at 07:01

## 2018-01-08 RX ADMIN — BUMETANIDE 1 MG: 1 TABLET ORAL at 08:01

## 2018-01-08 RX ADMIN — SODIUM CHLORIDE 250 ML: 0.9 INJECTION, SOLUTION INTRAVENOUS at 12:01

## 2018-01-08 RX ADMIN — SODIUM CHLORIDE: 0.9 INJECTION, SOLUTION INTRAVENOUS at 08:01

## 2018-01-08 RX ADMIN — MOXIFLOXACIN 400 MG: 400 TABLET, FILM COATED ORAL at 08:01

## 2018-01-08 RX ADMIN — FERROUS SULFATE TAB EC 325 MG (65 MG FE EQUIVALENT) 325 MG: 325 (65 FE) TABLET DELAYED RESPONSE at 08:01

## 2018-01-08 RX ADMIN — IPRATROPIUM BROMIDE AND ALBUTEROL SULFATE 3 ML: .5; 3 SOLUTION RESPIRATORY (INHALATION) at 07:01

## 2018-01-08 RX ADMIN — ACETAMINOPHEN 650 MG: 325 TABLET ORAL at 03:01

## 2018-01-08 NOTE — TELEPHONE ENCOUNTER
----- Message from Rubin Thomas MD sent at 1/8/2018 10:57 AM CST -----  Patient was seen inpatient      Please accommodate for follow up appointment after d/c from hospital with labs in 2 weeks     Thanks     Angi Thomas MD

## 2018-01-08 NOTE — PT/OT/SLP PROGRESS
Occupational Therapy      Patient Name:  Beni Cosby   MRN:  [id number]    S: PT COOPERATIVE WITH THERAPY SESSION  O: 1 SET X 20 REPS B UE  WITH 1.5 DOWEL IN ALL AVAILABLE PLANES AND RANGES SEATED IN BEDSIDE CHAIR.  A: PT  CONTINUE TO DISPLAY IMPROVEMENT B UE STRENGTH/ENDURANCE  P: CONTINUE WITH POC    Navya Maldonado OT  1/8/2018    [address]  [phone]

## 2018-01-08 NOTE — ASSESSMENT & PLAN NOTE
Prior treatment with diuretics and metolazone.    Na now stable at 125  Tolvaptan given   Discussed case with Nephrology  Cont Bumex po   Add IV NS x one liter  Liberalize salt and water intake.  Avoid Zaroxolyn

## 2018-01-08 NOTE — SUBJECTIVE & OBJECTIVE
Review of Systems   Constitutional: Positive for activity change, appetite change and fatigue. Negative for chills, diaphoresis, fever and unexpected weight change.   HENT: Negative for congestion, postnasal drip, rhinorrhea, sinus pressure, sore throat and trouble swallowing.    Eyes: Negative for photophobia and visual disturbance.   Respiratory: Positive for cough (chronic). Negative for apnea, chest tightness, shortness of breath and wheezing.    Cardiovascular: Positive for leg swelling (chronic). Negative for chest pain and palpitations.   Gastrointestinal: Negative for abdominal distention, abdominal pain, blood in stool, constipation, diarrhea, nausea and vomiting.   Endocrine: Negative for polydipsia, polyphagia and polyuria.   Genitourinary: Negative for decreased urine volume, difficulty urinating, dysuria, frequency, hematuria and urgency.   Musculoskeletal: Negative for arthralgias, back pain, gait problem, joint swelling and myalgias.   Skin: Negative for pallor, rash and wound.   Neurological: Positive for weakness (generalized). Negative for dizziness, seizures, syncope, facial asymmetry, speech difficulty, light-headedness, numbness and headaches.   Psychiatric/Behavioral: Negative for confusion. The patient is not nervous/anxious.    All other systems reviewed and are negative.    Objective:     Vital Signs (Most Recent):  Temp: 98 °F (36.7 °C) (01/07/18 1628)  Pulse: 78 (01/07/18 1628)  Resp: 17 (01/07/18 1628)  BP: (!) 119/59 (01/07/18 1628)  SpO2: 97 % (01/07/18 1628) Vital Signs (24h Range):  Temp:  [97.2 °F (36.2 °C)-98.2 °F (36.8 °C)] 98 °F (36.7 °C)  Pulse:  [] 78  Resp:  [17-20] 17  SpO2:  [95 %-98 %] 97 %  BP: (102-119)/(56-62) 119/59     Weight: 109 kg (240 lb 4.8 oz)  Body mass index is 34.48 kg/m².    Intake/Output Summary (Last 24 hours) at 01/07/18 6383  Last data filed at 01/07/18 1809   Gross per 24 hour   Intake           1337.5 ml   Output              650 ml   Net             687.5 ml      Physical Exam   Constitutional: He is oriented to person, place, and time. He appears well-developed and well-nourished. No distress.   HENT:   Head: Normocephalic and atraumatic.   Mouth/Throat: Oropharynx is clear and moist.   Eyes: Conjunctivae are normal.   Neck: Normal range of motion. Neck supple. No JVD present.   Cardiovascular: Normal rate and intact distal pulses.   No extrasystoles are present. Exam reveals no gallop.    Murmur heard.  Irregular rhythm    Pulmonary/Chest: Effort normal. No accessory muscle usage. No tachypnea. No respiratory distress. He has no decreased breath sounds. He has no wheezes. He has no rhonchi. He has no rales.   Abdominal: Soft. Bowel sounds are normal. He exhibits no distension. There is no tenderness. There is no rebound and no CVA tenderness.   Musculoskeletal: Normal range of motion. He exhibits edema (1+ BLE ). He exhibits no tenderness or deformity.   Neurological: He is alert and oriented to person, place, and time. No cranial nerve deficit or sensory deficit.   Skin: Skin is warm, dry and intact. Capillary refill takes less than 2 seconds. He is not diaphoretic. No erythema.   Psychiatric: He has a normal mood and affect. His speech is normal and behavior is normal. Cognition and memory are normal.   Nursing note and vitals reviewed.      Significant Labs:   Recent Lab Results       01/07/18  1122 01/07/18  0820 01/07/18  0610 01/07/18  0549 01/06/18 2017      Albumin   3.1(L)       Alkaline Phosphatase   135       ALT   91(H)       Anion Gap   14       aPTT  43.4  Comment:  aPTT therapeutic range = 39-69 seconds(H)        AST   78(H)       Baso #   0.02       Basophil%   0.2       Total Bilirubin   1.3  Comment:  For infants and newborns, interpretation of results should be based  on gestational age, weight and in agreement with clinical  observations.  Premature Infant recommended reference ranges:  Up to 24 hours.............<8.0 mg/dL  Up to 48  hours............<12.0 mg/dL  3-5 days..................<15.0 mg/dL  6-29 days.................<15.0 mg/dL  (H)       BUN, Bld   88(H)       Calcium   9.9       Chloride   88(L)       CO2   23       Creatinine   2.1(H)       Differential Method   Automated       eGFR if    35(A)       eGFR if non    31  Comment:  Calculation used to obtain the estimated glomerular filtration  rate (eGFR) is the CKD-EPI equation.   (A)       Eos #   0.1       Eosinophil%   0.6       Glucose   146(H)       Gran #   7.3       Gran%   72.5       Hematocrit   36.1(L)       Hemoglobin   12.4(L)       Coumadin Monitoring INR   3.1  Comment:  Coumadin Therapy:  2.0 - 3.0 for INR for all indicators except mechanical heart valves  and antiphospholipid syndromes which should use 2.5 - 3.5.  (H)       Lymph #   1.6       Lymph%   16.0(L)       Magnesium   2.1       MCH   30.8       MCHC   34.3       MCV   90       Mono #   1.1(H)       Mono%   10.7       MPV   8.6(L)       Phosphorus   3.7       Platelets   358(H)       POCT Glucose 159(H)   168(H) 163(H)     Potassium   5.2(H)       Total Protein   7.4       Protime   31.2(H)       RBC   4.03(L)       RDW   15.6(H)       Sodium   125(L)       WBC   10.07                   01/06/18 2012      Albumin      Alkaline Phosphatase      ALT      Anion Gap      aPTT 40.4  Comment:  aPTT therapeutic range = 39-69 seconds(H)     AST      Baso #      Basophil%      Total Bilirubin      BUN, Bld      Calcium      Chloride      CO2      Creatinine      Differential Method      eGFR if       eGFR if non       Eos #      Eosinophil%      Glucose      Gran #      Gran%      Hematocrit      Hemoglobin      Coumadin Monitoring INR      Lymph #      Lymph%      Magnesium      MCH      MCHC      MCV      Mono #      Mono%      MPV      Phosphorus      Platelets      POCT Glucose      Potassium      Total Protein      Protime      RBC      RDW       Sodium      WBC          All pertinent labs within the past 24 hours have been reviewed.    Significant Imaging: I have reviewed all pertinent imaging results/findings within the past 24 hours.

## 2018-01-08 NOTE — DISCHARGE SUMMARY
[facility] Hospital Medicine  Discharge Summary      Patient Name: Beni Cosby  MRN: [id number]  Admission Date: 12/30/2017  Hospital Length of Stay: 9 days  Discharge Date and Time: 1/8/2018  1:58 PM  Attending Physician: Abimbola Sherman MD   Discharging Provider: Renée Jackson NP  Primary Care Provider: Jackson Brandt MD      HPI:   Mr. Cosby is a 71yo male  with a PMHx of chronic diastolic HFpEF (right-sided), CKD stage III, chronic hyponatremia, COPD, DM II, chronic AF on Coumadin, and AS with remote bioprosthetic AVR, who presented to the ED with c/o fatigue that has progressively worsened over the past 1 day.  Associated generalized weakness.  Denies any fever, chills, CP, SOB, worsening cough, worsening edema, ABD pain, N/V/D, dysuria, back pain, HA, AMS, visual disturbances, seizure-like activity, lightheadedness/dizziness syncope, or numbness/tingling.  No aggravating to alleviating factors.  He was admitted to [facility] 12/19 for hyponatremia with Na 119.  He received 0.9 NS IVF's, and metolazone added per Nephrology.  Initial work-up in ED resulted Na 123, cr. 2, BUN 85, WBC 16.4, , troponin 0.3.  CXR showed RLL infiltrate consistent with PNA.  EKG revealed A-Fib with controlled rate, no acute changes from previous tracings.  Case discussed with Nephrology in ED, who recommend admission for initiation of tolvaptan therapy.  Hospital Medicine consulted for admission.    * No surgery found *      Hospital Course:   Patient was admitted to Med Surg for hyponatremia and pneumonia under the care of Hospital Medicine. He was given IV abx for PNA and nephrology was consulted for hyponatremia. He was started on Tolvaptan.  1/1: Pt reports decreased SOB and cough - continue IV abx. Nephrology following - Na stable - fluid restrictions. Cardiology consulted.  1/2: Pt slowly improving. Wheezing decreased. Incentive spirometry added. Cardiology following - elevated troponin 2/2 renal  insufficiency and demand ischemia.   1/3: Pt is sleeping good - reports decreased cough - no wheezing audible. WBC slowly improving. Na level better today but still low - no mentation changes. Cardiology and nephrology following  1/4: WBC normalized, afebrile. Na stable at 126. De-escalate Abx. Pt/spouse requests SNF  Hyperkalemia- Spironolactone held   1/5/18: Hyperkalemia resolved. Serum Na stable. Very weak. Speech consult for possible aspiration   1/6/18: spouse concerned about severe, generalized weakness. Labs stable. Will check electrolytes. Awaiting SNF placement   1/7/18: No acute changes over night. Na 125. Sitting up in chair     Consults:   Consults         Status Ordering Provider     Inpatient consult to Cardiology  Once     Provider:  Silvano Smallwood MD    Completed SHERRON MORILLO     Inpatient consult to Nephrology  Once     Provider:  George Inman MD    Acknowledged CAROL BRADLEY     Inpatient consult to Social Work  Once     Provider:  (Not yet assigned)    Completed MARGIE WILSON     Pharmacy to dose Vancomycin consult  Once     Provider:  (Not yet assigned)    Completed SHERRON MORILLO     Pharmacy to dose Warfarin consult  Once     Provider:  (Not yet assigned)    Completed LETI ADAMSON     Pharmacy to dose Warfarin consult  Once     Provider:  (Not yet assigned)    Acknowledged SHERRON MORILLO          Final Active Diagnoses:    Diagnosis Date Noted POA    PRINCIPAL PROBLEM:  Acute on chronic Hyponatremia [E87.1] 12/20/2017 Yes    Chronic diastolic heart failure [I50.32]  Yes    Pneumonia of left lower lobe due to infectious organism [J18.1] 12/31/2017 Yes    Elevated troponin [R74.8] 12/31/2017 Yes    Acute renal failure superimposed on stage 3 chronic kidney disease [N17.9, N18.3] 05/04/2016 Yes    Type 2 diabetes mellitus [E11.9] 08/31/2015 Yes     Chronic    Chronic atrial fibrillation [I48.2] 05/24/2012 Yes     Chronic    Aortic valve stenosis with remote  bioprosthetic AVR [I35.0] 05/24/2012 Yes     Chronic      Problems Resolved During this Admission:    Diagnosis Date Noted Date Resolved POA       Discharged Condition: stable    Disposition: Skilled Nursing Facility    Follow Up:  Follow-up Information     [facility].    Specialty:  SNF Agency  Why:  SNF  Contact information:  [address]  [facility] [address]  [phone]             Jackson Brandt MD In 3 days.    Specialty:  Family Medicine  Why:  hospital follow up and repeat CMP due to elevated LFTs  Contact information:  [address]   [address]  [facility] [address]  [phone]             ANGELA Payne.    Specialty:  Gastroenterology  Why:  -follow up for +FOB on ASA and Coumadin with stable H/H  Contact information:  [address]  [place] [id number] [phone]                 Patient Instructions:     Activity as tolerated     Notify your health care provider if you experience any of the following:  temperature >100.4     Notify your health care provider if you experience any of the following:  persistent nausea and vomiting or diarrhea     Notify your health care provider if you experience any of the following:  difficulty breathing or increased cough     Notify your health care provider if you experience any of the following:  persistent dizziness, light-headedness, or visual disturbances     Notify your health care provider if you experience any of the following:  increased confusion or weakness         Significant Diagnostic Studies:   Imaging Results          X-Ray Chest 1 View (Final result)  Result time 01/05/18 13:32:07    Final result by Kwan Tyson MD (01/05/18 13:32:07)                 Impression:      Interval improvement in the left basilar pneumonia.    Please see above.      Electronically signed by: KWAN TYSON MD  Date:     01/05/18  Time:    13:32              Narrative:    EXAM: Portable CXR one view    CLINICAL HISTORY: Congestion.   Aspiration..    COMPARISON STUDIES: Chest x-ray 01/01/2018 and 12/30/2017    FINDINGS:  The patient is rotated to left.  Evidence for prior median sternotomy and CABG.  Cardia megaly. Mediastinal calcifications which can be seen with prior granulomatous infection.  Subtle left basilar infiltrate with Improved aeration.  Otherwise clear lungs.  Ribs appear intact .                             X-Ray Chest AP Portable (Final result)  Result time 01/01/18 08:20:57    Final result by Martin Escobedo MD (01/01/18 08:20:57)                 Impression:     Stable exam.            Electronically signed by: MARTIN ESCOBEDO MD  Date:     01/01/18  Time:    08:20              Narrative:    Exam: XR CHEST AP PORTABLE    Clinical History: Shortness of breath. concern for PNA    Findings:     Chronic interstitial coarsening in the bilateral lung bases similar to prior studies.  No gross infiltrates detected. Cardiomegaly.  Status post CABG.  Aortic atherosclerosis.                             X-Ray Chest AP Portable (Final result)  Result time 12/30/17 22:16:26    Final result by Migue Oscar Jr., MD (12/30/17 22:16:26)                 Impression:     Cephalization of flow without lily pulmonary edema.        Electronically signed by: MIGUE OSCAR M.D.  Date:     12/30/17  Time:    22:16              Narrative:    XR CHEST AP PORTABLE    Clinical history: CHF    Comparison: 12/19/2017.    Findings: There has been a prior median sternotomy.  The heart is enlarged. There is cephalization of flow without lily pulmonary edema.  The lungs are better aerated than on the previous study. No pneumothorax.  No pleural effusion.  No acute osseous abnormality.                              Pending Diagnostic Studies:     None         Medications:  Reconciled Home Medications:   Discharge Medication List as of 1/8/2018 12:42 PM      START taking these medications    Details   aspirin (ECOTRIN) 81 MG EC tablet Take 1 tablet (81 mg total) by  mouth once daily., Starting Tue 1/9/2018, Until Wed 1/9/2019, OTC      citalopram (CELEXA) 20 MG tablet Take 1 tablet (20 mg total) by mouth once daily., Starting Tue 1/9/2018, Until Thu 2/8/2018, Normal      guaiFENesin (MUCINEX) 600 mg 12 hr tablet Take 1 tablet (600 mg total) by mouth 2 (two) times daily., Starting Mon 1/8/2018, OTC      moxifloxacin (AVELOX) 400 mg tablet Take 1 tablet (400 mg total) by mouth once daily., Starting Tue 1/9/2018, Until Fri 1/12/2018, Normal         CONTINUE these medications which have NOT CHANGED    Details   blood sugar diagnostic (CONTOUR NEXT STRIPS) Strp Use once daily., Historical Med      bumetanide (BUMEX) 2 MG tablet Take 2 mg by mouth 2 (two) times daily. Take 2mg every  Am and 1 mg every pm, Historical Med      ferrous sulfate 325 (65 FE) MG EC tablet Take 325 mg by mouth 2 (two) times daily. , Historical Med      glimepiride (AMARYL) 1 MG tablet Take 1 mg by mouth before breakfast., Until Discontinued, Historical Med      ipratropium (ATROVENT) 0.02 % nebulizer solution Take 500 mcg by nebulization 4 (four) times daily. Rescue, Historical Med      lansoprazole (PREVACID) 30 MG capsule Take 30 mg by mouth 2 (two) times daily. TAKE 1 CAPSULE BY MOUTH ONCE DAILY, Starting 8/13/2015, Until Discontinued, Historical Med      metoprolol succinate (TOPROL-XL) 100 MG 24 hr tablet Take 50 mg by mouth once daily. 1/2 tablet daily, Starting 1/18/2016, Until Discontinued, Historical Med      mometasone (NASONEX) 50 mcg/actuation nasal spray 2 sprays by Nasal route daily as needed. , Until Discontinued, Historical Med      mometasone-formoterol (DULERA) 200-5 mcg/actuation inhaler Inhale 2 puffs into the lungs once daily. Controller , Historical Med      polyethylene glycol (GLYCOLAX) 17 gram PwPk Take by mouth as needed., Historical Med      potassium chloride SA (K-DUR,KLOR-CON) 20 MEQ tablet Take 20 mEq by mouth 2 (two) times daily. Take 2 tablets daily, Until Discontinued,  Historical Med      rosuvastatin (CRESTOR) 10 MG tablet qhs, Historical Med      !! warfarin (COUMADIN) 5 MG tablet 5 mg. Sunday, Monday, Wednesday, Thursday, Friday, Historical Med      !! warfarin (COUMADIN) 7.5 MG tablet Tuesday and Saturday, Starting Fri 3/24/2017, Historical Med       !! - Potential duplicate medications found. Please discuss with provider.      STOP taking these medications       metolazone (ZAROXOLYN) 5 MG tablet Comments:   Reason for Stopping:         mometasone-formoterol (DULERA) 100-5 mcg/actuation HFAA Comments:   Reason for Stopping:         spironolactone (ALDACTONE) 25 MG tablet Comments:   Reason for Stopping:               Indwelling Lines/Drains at time of discharge:   Lines/Drains/Airways          No matching active lines, drains, or airways          Time spent on the discharge of patient: 45 minutes  Patient was seen and examined on the date of discharge and determined to be suitable for discharge.         Renée Jackson NP  Department of Hospital Medicine  [facility]

## 2018-01-08 NOTE — PLAN OF CARE
Problem: Patient Care Overview  Goal: Plan of Care Review  PT REQUIRES MIN A FOR GT X 15' WITH RW AND SLOW PACE.    Outcome: Ongoing (interventions implemented as appropriate)  Pt on room air tolerating well. No distress noted at this time.

## 2018-01-08 NOTE — PLAN OF CARE
Problem: Patient Care Overview  Goal: Plan of Care Review  PT REQUIRES MIN A FOR GT X 15' WITH RW AND SLOW PACE.    Outcome: Ongoing (interventions implemented as appropriate)  Remains free from harm, pain controlled via PO pain meds, no acute distress noted. 24 hour chart check complete.

## 2018-01-08 NOTE — PLAN OF CARE
Problem: Patient Care Overview  Goal: Plan of Care Review  PT REQUIRES MIN A FOR GT X 15' WITH RW AND SLOW PACE.    Outcome: Outcome(s) achieved Date Met: 01/08/18  Remains injury free. Denies c/o pain. Transferring to SNF. Stable condition.

## 2018-01-08 NOTE — PROGRESS NOTES
[facility] Hospital Medicine  Progress Note    Patient Name: Beni Cosby  MRN: [id number]  Patient Class: IP- Inpatient   Admission Date: 12/30/2017  Length of Stay: 8 days  Attending Physician: Abimbola Sherman MD  Primary Care Provider: Jackson Brandt MD        Subjective:     Principal Problem:Hyponatremia    HPI:  Mr. Cosby is a 73yo male  with a PMHx of chronic diastolic HFpEF (right-sided), CKD stage III, chronic hyponatremia, COPD, DM II, chronic AF on Coumadin, and AS with remote bioprosthetic AVR, who presented to the ED with c/o fatigue that has progressively worsened over the past 1 day.  Associated generalized weakness.  Denies any fever, chills, CP, SOB, worsening cough, worsening edema, ABD pain, N/V/D, dysuria, back pain, HA, AMS, visual disturbances, seizure-like activity, lightheadedness/dizziness syncope, or numbness/tingling.  No aggravating to alleviating factors.  He was admitted to [facility] 12/19 for hyponatremia with Na 119.  He received 0.9 NS IVF's, and metolazone added per Nephrology.  Initial work-up in ED resulted Na 123, cr. 2, BUN 85, WBC 16.4, , troponin 0.3.  CXR showed RLL infiltrate consistent with PNA.  EKG revealed A-Fib with controlled rate, no acute changes from previous tracings.  Case discussed with Nephrology in ED, who recommend admission for initiation of tolvaptan therapy.  Hospital Medicine consulted for admission.    Hospital Course:  Patient was admitted to Med Surg for hyponatremia and pneumonia under the care of Hospital Medicine. He was given IV abx for PNA and nephrology was consulted for hyponatremia. He was started on Tolvaptan.  1/1: Pt reports decreased SOB and cough - continue IV abx. Nephrology following - Na stable - fluid restrictions. Cardiology consulted.  1/2: Pt slowly improving. Wheezing decreased. Incentive spirometry added. Cardiology following - elevated troponin 2/2 renal insufficiency and demand ischemia.   1/3: Pt is  sleeping good - reports decreased cough - no wheezing audible. WBC slowly improving. Na level better today but still low - no mentation changes. Cardiology and nephrology following  1/4: WBC normalized, afebrile. Na stable at 126. De-escalate Abx. Pt/spouse requests SNF  Hyperkalemia- Spironolactone held   1/5/18: Hyperkalemia resolved. Serum Na stable. Very weak. Speech consult for possible aspiration   1/6/18: spouse concerned about severe, generalized weakness. Labs stable. Will check electrolytes. Awaiting SNF placement   1/7/18: No acute changes over night. Na 125. Sitting up in chair    Review of Systems   Constitutional: Positive for activity change, appetite change and fatigue. Negative for chills, diaphoresis, fever and unexpected weight change.   HENT: Negative for congestion, postnasal drip, rhinorrhea, sinus pressure, sore throat and trouble swallowing.    Eyes: Negative for photophobia and visual disturbance.   Respiratory: Positive for cough (chronic). Negative for apnea, chest tightness, shortness of breath and wheezing.    Cardiovascular: Positive for leg swelling (chronic). Negative for chest pain and palpitations.   Gastrointestinal: Negative for abdominal distention, abdominal pain, blood in stool, constipation, diarrhea, nausea and vomiting.   Endocrine: Negative for polydipsia, polyphagia and polyuria.   Genitourinary: Negative for decreased urine volume, difficulty urinating, dysuria, frequency, hematuria and urgency.   Musculoskeletal: Negative for arthralgias, back pain, gait problem, joint swelling and myalgias.   Skin: Negative for pallor, rash and wound.   Neurological: Positive for weakness (generalized). Negative for dizziness, seizures, syncope, facial asymmetry, speech difficulty, light-headedness, numbness and headaches.   Psychiatric/Behavioral: Negative for confusion. The patient is not nervous/anxious.    All other systems reviewed and are negative.    Objective:     Vital Signs  (Most Recent):  Temp: 98 °F (36.7 °C) (01/07/18 1628)  Pulse: 78 (01/07/18 1628)  Resp: 17 (01/07/18 1628)  BP: (!) 119/59 (01/07/18 1628)  SpO2: 97 % (01/07/18 1628) Vital Signs (24h Range):  Temp:  [97.2 °F (36.2 °C)-98.2 °F (36.8 °C)] 98 °F (36.7 °C)  Pulse:  [] 78  Resp:  [17-20] 17  SpO2:  [95 %-98 %] 97 %  BP: (102-119)/(56-62) 119/59     Weight: 109 kg (240 lb 4.8 oz)  Body mass index is 34.48 kg/m².    Intake/Output Summary (Last 24 hours) at 01/07/18 1823  Last data filed at 01/07/18 1809   Gross per 24 hour   Intake           1337.5 ml   Output              650 ml   Net            687.5 ml      Physical Exam   Constitutional: He is oriented to person, place, and time. He appears well-developed and well-nourished. No distress.   HENT:   Head: Normocephalic and atraumatic.   Mouth/Throat: Oropharynx is clear and moist.   Eyes: Conjunctivae are normal.   Neck: Normal range of motion. Neck supple. No JVD present.   Cardiovascular: Normal rate and intact distal pulses.   No extrasystoles are present. Exam reveals no gallop.    Murmur heard.  Irregular rhythm    Pulmonary/Chest: Effort normal. No accessory muscle usage. No tachypnea. No respiratory distress. He has no decreased breath sounds. He has no wheezes. He has no rhonchi. He has no rales.   Abdominal: Soft. Bowel sounds are normal. He exhibits no distension. There is no tenderness. There is no rebound and no CVA tenderness.   Musculoskeletal: Normal range of motion. He exhibits edema (1+ BLE ). He exhibits no tenderness or deformity.   Neurological: He is alert and oriented to person, place, and time. No cranial nerve deficit or sensory deficit.   Skin: Skin is warm, dry and intact. Capillary refill takes less than 2 seconds. He is not diaphoretic. No erythema.   Psychiatric: He has a normal mood and affect. His speech is normal and behavior is normal. Cognition and memory are normal.   Nursing note and vitals reviewed.      Significant Labs:    Recent Lab Results       01/07/18  1122 01/07/18  0820 01/07/18  0610 01/07/18  0549 01/06/18 2017      Albumin   3.1(L)       Alkaline Phosphatase   135       ALT   91(H)       Anion Gap   14       aPTT  43.4  Comment:  aPTT therapeutic range = 39-69 seconds(H)        AST   78(H)       Baso #   0.02       Basophil%   0.2       Total Bilirubin   1.3  Comment:  For infants and newborns, interpretation of results should be based  on gestational age, weight and in agreement with clinical  observations.  Premature Infant recommended reference ranges:  Up to 24 hours.............<8.0 mg/dL  Up to 48 hours............<12.0 mg/dL  3-5 days..................<15.0 mg/dL  6-29 days.................<15.0 mg/dL  (H)       BUN, Bld   88(H)       Calcium   9.9       Chloride   88(L)       CO2   23       Creatinine   2.1(H)       Differential Method   Automated       eGFR if    35(A)       eGFR if non    31  Comment:  Calculation used to obtain the estimated glomerular filtration  rate (eGFR) is the CKD-EPI equation.   (A)       Eos #   0.1       Eosinophil%   0.6       Glucose   146(H)       Gran #   7.3       Gran%   72.5       Hematocrit   36.1(L)       Hemoglobin   12.4(L)       Coumadin Monitoring INR   3.1  Comment:  Coumadin Therapy:  2.0 - 3.0 for INR for all indicators except mechanical heart valves  and antiphospholipid syndromes which should use 2.5 - 3.5.  (H)       Lymph #   1.6       Lymph%   16.0(L)       Magnesium   2.1       MCH   30.8       MCHC   34.3       MCV   90       Mono #   1.1(H)       Mono%   10.7       MPV   8.6(L)       Phosphorus   3.7       Platelets   358(H)       POCT Glucose 159(H)   168(H) 163(H)     Potassium   5.2(H)       Total Protein   7.4       Protime   31.2(H)       RBC   4.03(L)       RDW   15.6(H)       Sodium   125(L)       WBC   10.07                   01/06/18 2012      Albumin      Alkaline Phosphatase      ALT      Anion Gap      aPTT  40.4  Comment:  aPTT therapeutic range = 39-69 seconds(H)     AST      Baso #      Basophil%      Total Bilirubin      BUN, Bld      Calcium      Chloride      CO2      Creatinine      Differential Method      eGFR if       eGFR if non       Eos #      Eosinophil%      Glucose      Gran #      Gran%      Hematocrit      Hemoglobin      Coumadin Monitoring INR      Lymph #      Lymph%      Magnesium      MCH      MCHC      MCV      Mono #      Mono%      MPV      Phosphorus      Platelets      POCT Glucose      Potassium      Total Protein      Protime      RBC      RDW      Sodium      WBC          All pertinent labs within the past 24 hours have been reviewed.    Significant Imaging: I have reviewed all pertinent imaging results/findings within the past 24 hours.    Assessment/Plan:      * Acute on chronic Hyponatremia    Prior treatment with diuretics and metolazone.    Na now stable at 125  Tolvaptan given   Discussed case with Nephrology  Cont Bumex po   Add IV NS x one liter  Liberalize salt and water intake.  Avoid Zaroxolyn          Chronic diastolic heart failure      Cont po Bumex          Pneumonia of left lower lobe due to infectious organism    Leukocytosis resolved, Afebrile   De-escalate IVabx to Avelox   BC show NGTD  Sputum cx showed no growth          Acute renal failure superimposed on stage 3 chronic kidney disease    Nephrology following  Stable           Chronic atrial fibrillation    - Rate controlled.  - Continue BB and Coumadin   Daily INR.        Elevated troponin         Due to renal insufficency and demand ischjemia from tachycardia and pneumonia                 Aortic valve stenosis with remote bioprosthetic AVR    - Stable.  - Followed by Dr. Chew ( Cardiology).        Type 2 diabetes mellitus    - Recent HbA1c 6.9 on 12/20.  - Accuchecks with SSI.  - Diabetic diet.          VTE Risk Mitigation         Ordered     warfarin tablet 7.5 mg  Every  Saturday     Route:  Oral        01/06/18 0831     warfarin tablet 7.5 mg  Every Tuesday     Route:  Oral        01/03/18 1505     warfarin (COUMADIN) tablet 5 mg  Every Mon, Wed, Fri, Sun     Route:  Oral        01/07/18 0744     warfarin (COUMADIN) tablet 5 mg  Every Thursday     Route:  Oral        01/03/18 1505     Medium Risk of VTE  Once      12/31/17 1436     Place sequential compression device  Until discontinued      12/31/17 1436              Keyanna Lee NP  Department of Hospital Medicine   [facility]

## 2018-01-08 NOTE — PROGRESS NOTES
Coumadin Progress Notes:     INR = 2.5 trended down from 3.1 yesterday  Will give coumadin 2.5 mg  dose today  Goal INR = 2-3  Indication: Afib   Pharmacy will continue to follow daily INR's and make adjustments accordingly     Piedad Monaco RPh. 1/6/2018 8:32 AM

## 2018-01-08 NOTE — SUBJECTIVE & OBJECTIVE
Interval History: no chest pain or shortness of breath ; no nausea or vomiting ; no hematemesis no melena no bleeding from anywhere;  no fevers no chills;  The rest of the review of system involving all 10 systems of the body is negative        Review of patient's allergies indicates:   Allergen Reactions    Morphine      Other reaction(s): Nausea Only     Current Facility-Administered Medications   Medication Frequency    0.9%  NaCl infusion Continuous    acetaminophen tablet 650 mg Q6H PRN    albuterol-ipratropium 2.5mg-0.5mg/3mL nebulizer solution 3 mL Q4H PRN    albuterol-ipratropium 2.5mg-0.5mg/3mL nebulizer solution 3 mL Q6H WAKE    arformoterol nebulizer solution 15 mcg BID    And    budesonide nebulizer solution 0.5 mg BID    aspirin EC tablet 81 mg Daily    bumetanide tablet 1 mg BID    citalopram tablet 20 mg Daily    dextrose 50% injection 12.5 g PRN    dextrose 50% injection 25 g PRN    ferrous sulfate EC tablet 325 mg BID    glucagon (human recombinant) injection 1 mg PRN    glucose chewable tablet 16 g PRN    glucose chewable tablet 24 g PRN    guaiFENesin 12 hr tablet 600 mg BID    insulin aspart pen 0-5 Units QID (AC + HS) PRN    metoprolol tartrate (LOPRESSOR) tablet 25 mg BID    moxifloxacin tablet 400 mg Daily    ondansetron injection 4 mg Q6H PRN    pantoprazole EC tablet 40 mg Daily    polyethylene glycol packet 17 g BID PRN    rosuvastatin tablet 10 mg QHS    warfarin (COUMADIN) tablet 2.5 mg Once    warfarin (COUMADIN) tablet 5 mg Every Thurs    [START ON 1/10/2018] warfarin (COUMADIN) tablet 5 mg Every Mon, Wed, Fri, Sun    [START ON 1/9/2018] warfarin tablet 7.5 mg Every Tues    [START ON 1/13/2018] warfarin tablet 7.5 mg Every Sat       Objective:     Vital Signs (Most Recent):  Temp: 98.4 °F (36.9 °C) (01/08/18 0800)  Pulse: 92 (01/08/18 0800)  Resp: 18 (01/08/18 0800)  BP: (!) 102/56 (01/08/18 0809)  SpO2: 99 % (01/08/18 0800)  O2 Device (Oxygen Therapy): room  air (01/08/18 0800) Vital Signs (24h Range):  Temp:  [97.2 °F (36.2 °C)-98.4 °F (36.9 °C)] 98.4 °F (36.9 °C)  Pulse:  [74-92] 92  Resp:  [14-20] 18  SpO2:  [95 %-99 %] 99 %  BP: ()/(53-60) 102/56     Weight: 108.2 kg (238 lb 10.1 oz) (01/08/18 0600)  Body mass index is 34.24 kg/m².  Body surface area is 2.31 meters squared.    I/O last 3 completed shifts:  In: 2706.3 [P.O.:1300; I.V.:1406.3]  Out: 1500 [Urine:1500]    Physical Exam   Constitutional: He is oriented to person, place, and time. He appears well-developed and well-nourished. No distress.   HENT:   Head: Normocephalic and atraumatic.   Mouth/Throat: Oropharynx is clear and moist.   Eyes: Conjunctivae are normal.   Neck: Normal range of motion. Neck supple. No JVD present.   Cardiovascular: Normal rate and intact distal pulses.   No extrasystoles are present. Exam reveals no gallop.    Murmur heard.  Irregular rhythm    Pulmonary/Chest: Effort normal. No accessory muscle usage. No tachypnea. No respiratory distress. He has no decreased breath sounds. He has no wheezes. He has no rhonchi. He has no rales.   Abdominal: Soft. Bowel sounds are normal. He exhibits no distension. There is no tenderness. There is no rebound and no CVA tenderness.   Musculoskeletal: Normal range of motion. He exhibits edema (1+ BLE ). He exhibits no tenderness or deformity.   Neurological: He is alert and oriented to person, place, and time. No cranial nerve deficit or sensory deficit.   Skin: Skin is warm, dry and intact. Capillary refill takes less than 2 seconds. He is not diaphoretic. No erythema.   Psychiatric: He has a normal mood and affect. His speech is normal and behavior is normal. Cognition and memory are normal.   Nursing note and vitals reviewed.      Significant Labs:  All labs within the past 24 hours have been reviewed.     Significant Imaging:  Labs: Reviewed

## 2018-01-08 NOTE — PLAN OF CARE
"Call received from Rain Chapa at [facility] , she stated that her physician has reviewed today"s notes and are ready to accept the patient. Spoke with Renetta NP who will be issuing orders. Once orders are in Epic will fax via [employer] to [facility]. Update to patient and wife.     @ 1032 Discharge orders, diet orders and avs faxed to [facility] via [employer]. Spoke with patient and his wife, patient's wife stated that she will still provide transportation once he is ready to go. Patient stated that they are still treating his blood pressure , he stated that he is to receive a "fluid bolus" and his pressure rechecked before he can leave. Updated information shared with Renetta LEVY discharging provider.     @ 1300 Contacted Rain at [facility] . gerardoe have received paperwork . Primary nurse to call report once patient is ready for discharge. # for report is [phone]. Update to Natalya valenzuela nurse.  "

## 2018-01-08 NOTE — PT/OT/SLP PROGRESS
Physical Therapy  Treatment    Beni Cosby   MRN: [id number]   Admitting Diagnosis: Hyponatremia    PT Received On: 01/08/18  PT Start Time: 1020     PT Stop Time: 1045    PT Total Time (min): 25 min       Billable Minutes:  Therapeutic Activity 15 and Therapeutic Exercise 10    Treatment Type: Treatment  PT/PTA: PT             General Precautions: Standard, fall  Orthopedic Precautions: N/A   Braces: N/A         Subjective:  Communicated with ELIEL AND BYRON prior to session.  PT FOUND IN SUPINE AND AGREES TO THERAPY    Pain/Comfort  Pain Rating 1: 0/10    Objective:   Patient found with: peripheral IV, telemetry    Functional Mobility:  Bed Mobility: JONATHON       Transfers: JONATHON X1-2 FOR STEADYING AND TO ALLAY FEAR OF FALLING    Gait: PT TOOK A FEW STEPS TO TURN AND SIT IN CHAIR    Balance:   Static Sit: FAIR+: Able to take MINIMAL challenges from all directions  Dynamic Sit: FAIR+: Maintains balance through MINIMAL excursions of active trunk motion  Static Stand: POOR+: Needs MINIMAL assist to maintain  Dynamic stand: POOR: N/A     Therapeutic Activities and Exercises:  PT ASSISTED TO SITTING ON SIDE OF BED, THEN TO CHAIR FOR OOB TOLERANCE.  PT/FAMILY EDUCATION.     AM-PAC 6 CLICK MOBILITY  How much help from another person does this patient currently need?   1 = Unable, Total/Dependent Assistance  2 = A lot, Maximum/Moderate Assistance  3 = A little, Minimum/Contact Guard/Supervision  4 = None, Modified Dudley/Independent    Turning over in bed (including adjusting bedclothes, sheets and blankets)?: 3  Sitting down on and standing up from a chair with arms (e.g., wheelchair, bedside commode, etc.): 2  Moving from lying on back to sitting on the side of the bed?: 3  Moving to and from a bed to a chair (including a wheelchair)?: 3  Need to walk in hospital room?: 2  Climbing 3-5 steps with a railing?: 1  Total Score: 14    AM-PAC Raw Score CMS G-Code Modifier Level of Impairment Assistance   6 %  Total / Unable   7 - 9 CM 80 - 100% Maximal Assist   10 - 14 CL 60 - 80% Moderate Assist   15 - 19 CK 40 - 60% Moderate Assist   20 - 22 CJ 20 - 40% Minimal Assist   23 CI 1-20% SBA / CGA   24 CH 0% Independent/ Mod I     Patient left up in chair with all lines intact, call button in reach, NURSE notified and FAMILY present.    Assessment:  Beni Cosby is a 72 y.o. male with a medical diagnosis of Hyponatremia and presents with IMPAIRED MOBILITY  AND WILL NEED CONT P.T.    Rehab identified problem list/impairments: Rehab identified problem list/impairments: weakness, impaired endurance, impaired functional mobilty, impaired self care skills, impaired balance, gait instability    Rehab potential is good.    Activity tolerance: Good    Discharge recommendations: Discharge Facility/Level Of Care Needs: nursing facility, skilled     Barriers to discharge:  NONE    Equipment recommendations: Equipment Needed After Discharge: none     GOALS:    Physical Therapy Goals     Not on file          Multidisciplinary Problems (Resolved)        Problem: Physical Therapy Goal    Goal Priority Disciplines Outcome Goal Variances Interventions   Physical Therapy Goal   (Resolved)     PT/OT, PT Outcome(s) achieved     Description:  PT WILL BE SEEN FORP.T. FOR A MIN OF 5 OUT OF 7 DAYS A WEEK  LT/10/18  1. PT WILL COMPLETE BED MOBILITY BOBY   2. PT WILL T/F TO CHAIR WITH RW MOD I  3. PT WILL GT TRAIN X 100' WITH RW AND SBA.  4. PT WILL COMPLETE B LE TE X 20 REPS FOR STRENGTHENING.                    PLAN:    Patient to be seen 5 x/week  to address the above listed problems via gait training, therapeutic exercises  Plan of Care expires: 01/10/18  Plan of Care reviewed with: patient, spouse, son         Virgie Thomas, PT  2018

## 2018-01-08 NOTE — ASSESSMENT & PLAN NOTE
loop diuretics. Liberalize salt and water intake.  Avoid Zaroxolyn. Better s/p IVF     Will arrange follow up

## 2018-01-08 NOTE — ASSESSMENT & PLAN NOTE
Leukocytosis resolved, Afebrile   De-escalate IVabx to Avelox   BC show NGTD  Sputum cx showed no growth

## 2018-01-08 NOTE — PROGRESS NOTES
[facility] -   Nephrology  Progress Note    Patient Name: Beni Cosby  MRN: [id number]  Admission Date: 12/30/2017  Hospital Length of Stay: 9 days  Attending Provider: Abimbola Sherman MD   Primary Care Physician: Jackson Brandt MD  Principal Problem:Hyponatremia    Subjective:     HPI: Pt was seen and examined. H/o was reviewed. Pt is a 71 y/o male with a pertinent h/o of CKD stage 3 (Cr 1.9 at baseline), diastolic CHF, and DM who presented with ;eg weakness and swqlling. Pt was noted to have s Na was 124, and renal service was consulted for hyponatremia. Pt feels slightly SOB, and has leg swelling. His wife states with certainty that he is on a 1 L fluid restriction at home. Pt takes bumex 2 mg ama nd 1 mg pm at home. He also takes metolazone 2.5 mg qd (2.5 mg), which is a thiazide diuretic. He states his diet is not salty. TSH was normal. No h/o of liver disease but alb is 2.9. Pt denies alcohol use, but noted that AST is slightly higher than ALT. He feels tired and sleepy.    Interval History: no chest pain or shortness of breath ; no nausea or vomiting ; no hematemesis no melena no bleeding from anywhere;  no fevers no chills;  The rest of the review of system involving all 10 systems of the body is negative        Review of patient's allergies indicates:   Allergen Reactions    Morphine      Other reaction(s): Nausea Only     Current Facility-Administered Medications   Medication Frequency    0.9%  NaCl infusion Continuous    acetaminophen tablet 650 mg Q6H PRN    albuterol-ipratropium 2.5mg-0.5mg/3mL nebulizer solution 3 mL Q4H PRN    albuterol-ipratropium 2.5mg-0.5mg/3mL nebulizer solution 3 mL Q6H WAKE    arformoterol nebulizer solution 15 mcg BID    And    budesonide nebulizer solution 0.5 mg BID    aspirin EC tablet 81 mg Daily    bumetanide tablet 1 mg BID    citalopram tablet 20 mg Daily    dextrose 50% injection 12.5 g PRN    dextrose 50% injection 25 g PRN    ferrous  sulfate EC tablet 325 mg BID    glucagon (human recombinant) injection 1 mg PRN    glucose chewable tablet 16 g PRN    glucose chewable tablet 24 g PRN    guaiFENesin 12 hr tablet 600 mg BID    insulin aspart pen 0-5 Units QID (AC + HS) PRN    metoprolol tartrate (LOPRESSOR) tablet 25 mg BID    moxifloxacin tablet 400 mg Daily    ondansetron injection 4 mg Q6H PRN    pantoprazole EC tablet 40 mg Daily    polyethylene glycol packet 17 g BID PRN    rosuvastatin tablet 10 mg QHS    warfarin (COUMADIN) tablet 2.5 mg Once    warfarin (COUMADIN) tablet 5 mg Every Thurs    [START ON 1/10/2018] warfarin (COUMADIN) tablet 5 mg Every Mon, Wed, Fri, Sun    [START ON 1/9/2018] warfarin tablet 7.5 mg Every Tues    [START ON 1/13/2018] warfarin tablet 7.5 mg Every Sat       Objective:     Vital Signs (Most Recent):  Temp: 98.4 °F (36.9 °C) (01/08/18 0800)  Pulse: 92 (01/08/18 0800)  Resp: 18 (01/08/18 0800)  BP: (!) 102/56 (01/08/18 0809)  SpO2: 99 % (01/08/18 0800)  O2 Device (Oxygen Therapy): room air (01/08/18 0800) Vital Signs (24h Range):  Temp:  [97.2 °F (36.2 °C)-98.4 °F (36.9 °C)] 98.4 °F (36.9 °C)  Pulse:  [74-92] 92  Resp:  [14-20] 18  SpO2:  [95 %-99 %] 99 %  BP: ()/(53-60) 102/56     Weight: 108.2 kg (238 lb 10.1 oz) (01/08/18 0600)  Body mass index is 34.24 kg/m².  Body surface area is 2.31 meters squared.    I/O last 3 completed shifts:  In: 2706.3 [P.O.:1300; I.V.:1406.3]  Out: 1500 [Urine:1500]    Physical Exam   Constitutional: He is oriented to person, place, and time. He appears well-developed and well-nourished. No distress.   HENT:   Head: Normocephalic and atraumatic.   Mouth/Throat: Oropharynx is clear and moist.   Eyes: Conjunctivae are normal.   Neck: Normal range of motion. Neck supple. No JVD present.   Cardiovascular: Normal rate and intact distal pulses.   No extrasystoles are present. Exam reveals no gallop.    Murmur heard.  Irregular rhythm    Pulmonary/Chest: Effort normal.  No accessory muscle usage. No tachypnea. No respiratory distress. He has no decreased breath sounds. He has no wheezes. He has no rhonchi. He has no rales.   Abdominal: Soft. Bowel sounds are normal. He exhibits no distension. There is no tenderness. There is no rebound and no CVA tenderness.   Musculoskeletal: Normal range of motion. He exhibits edema (1+ BLE ). He exhibits no tenderness or deformity.   Neurological: He is alert and oriented to person, place, and time. No cranial nerve deficit or sensory deficit.   Skin: Skin is warm, dry and intact. Capillary refill takes less than 2 seconds. He is not diaphoretic. No erythema.   Psychiatric: He has a normal mood and affect. His speech is normal and behavior is normal. Cognition and memory are normal.   Nursing note and vitals reviewed.      Significant Labs:  All labs within the past 24 hours have been reviewed.     Significant Imaging:  Labs: Reviewed    Assessment/Plan:     * Acute on chronic Hyponatremia    loop diuretics. Liberalize salt and water intake.  Avoid Zaroxolyn. Better s/p IVF     Will arrange follow up             Thank you for your consult.     Rubin Thomas MD  Nephrology  [facility] -

## 2018-01-09 ENCOUNTER — PATIENT OUTREACH (OUTPATIENT)
Dept: ADMINISTRATIVE | Facility: CLINIC | Age: 73
End: 2018-01-09

## 2018-01-09 NOTE — PLAN OF CARE
01/09/18 0935   Final Note   Assessment Type Final Discharge Note   Dis[place] Disposition SNF  [place] [place]

## 2018-01-19 ENCOUNTER — TELEPHONE (OUTPATIENT)
Dept: NEPHROLOGY | Facility: CLINIC | Age: 73
End: 2018-01-19

## 2018-01-19 NOTE — TELEPHONE ENCOUNTER
Incoming call from Rain who states that patient is weak and don't know if he really need this appointment. She would like to know if patient should cancel. Advised her that patient was coming in as a hospital f/u and I wouldn't want to cancel without getting recent labs and letting the doctor decide. She states that she will have her Provider order some labs and fax them over for the doctor to review. I provided her with the fax.

## 2018-01-19 NOTE — TELEPHONE ENCOUNTER
----- Message from Bianca Nascimento sent at 1/19/2018  9:02 AM CST -----  Rain///with [facility]//at [phone]//pt has a Dr appt and lab on 1/23/18//// pt is in Rehab [facility]ill nursing now//not sure pt will be strong enough to make the appts//is calling to ask if it is necessary to come to the appt on that date or can it be rescheduled//please call to discuss//mark/raymundo

## 2018-02-28 ENCOUNTER — TELEPHONE (OUTPATIENT)
Dept: NEPHROLOGY | Facility: CLINIC | Age: 73
End: 2018-02-28

## 2018-02-28 NOTE — TELEPHONE ENCOUNTER
Returned call to Bianca, advised that this is the first message we have received, scheduled for lab work.

## 2018-02-28 NOTE — TELEPHONE ENCOUNTER
----- Message from Precious Madden sent at 2/28/2018  9:02 AM CST -----  Contact: Bianca guajardo, [facility]  Ms Valenzuela states she has been leaving messages about lab orders for patient,but has  not heard back, please call her back at [phone]. Thank you

## 2018-04-23 DIAGNOSIS — N18.30 CKD (CHRONIC KIDNEY DISEASE) STAGE 3, GFR 30-59 ML/MIN: Primary | ICD-10-CM

## 2018-08-13 ENCOUNTER — OFFICE VISIT (OUTPATIENT)
Dept: NEPHROLOGY | Facility: CLINIC | Age: 73
End: 2018-08-13
Payer: MEDICARE

## 2018-08-13 ENCOUNTER — LAB VISIT (OUTPATIENT)
Dept: LAB | Facility: HOSPITAL | Age: 73
End: 2018-08-13
Attending: INTERNAL MEDICINE
Payer: MEDICARE

## 2018-08-13 VITALS — HEIGHT: 70 IN | SYSTOLIC BLOOD PRESSURE: 108 MMHG | BODY MASS INDEX: 34.24 KG/M2 | DIASTOLIC BLOOD PRESSURE: 62 MMHG

## 2018-08-13 DIAGNOSIS — N18.30 CKD (CHRONIC KIDNEY DISEASE) STAGE 3, GFR 30-59 ML/MIN: ICD-10-CM

## 2018-08-13 DIAGNOSIS — N18.30 CKD (CHRONIC KIDNEY DISEASE) STAGE 3, GFR 30-59 ML/MIN: Primary | ICD-10-CM

## 2018-08-13 LAB
ALBUMIN SERPL BCP-MCNC: 3.4 G/DL
ANION GAP SERPL CALC-SCNC: 7 MMOL/L
BASOPHILS # BLD AUTO: 0.03 K/UL
BASOPHILS NFR BLD: 0.5 %
BUN SERPL-MCNC: 42 MG/DL
CALCIUM SERPL-MCNC: 9.2 MG/DL
CHLORIDE SERPL-SCNC: 93 MMOL/L
CO2 SERPL-SCNC: 28 MMOL/L
CREAT SERPL-MCNC: 1.2 MG/DL
DIFFERENTIAL METHOD: ABNORMAL
EOSINOPHIL # BLD AUTO: 0.1 K/UL
EOSINOPHIL NFR BLD: 1 %
ERYTHROCYTE [DISTWIDTH] IN BLOOD BY AUTOMATED COUNT: 19.3 %
EST. GFR  (AFRICAN AMERICAN): >60 ML/MIN/1.73 M^2
EST. GFR  (NON AFRICAN AMERICAN): >60 ML/MIN/1.73 M^2
GLUCOSE SERPL-MCNC: 150 MG/DL
HCT VFR BLD AUTO: 29.8 %
HGB BLD-MCNC: 9.6 G/DL
LYMPHOCYTES # BLD AUTO: 0.8 K/UL
LYMPHOCYTES NFR BLD: 14.1 %
MCH RBC QN AUTO: 29.1 PG
MCHC RBC AUTO-ENTMCNC: 32.2 G/DL
MCV RBC AUTO: 90 FL
MONOCYTES # BLD AUTO: 0.5 K/UL
MONOCYTES NFR BLD: 9.3 %
NEUTROPHILS # BLD AUTO: 4.4 K/UL
NEUTROPHILS NFR BLD: 75.1 %
PHOSPHATE SERPL-MCNC: 3.7 MG/DL
PLATELET # BLD AUTO: 212 K/UL
PMV BLD AUTO: 8.9 FL
POTASSIUM SERPL-SCNC: 4.8 MMOL/L
RBC # BLD AUTO: 3.3 M/UL
SODIUM SERPL-SCNC: 128 MMOL/L
WBC # BLD AUTO: 5.83 K/UL

## 2018-08-13 PROCEDURE — 36415 COLL VENOUS BLD VENIPUNCTURE: CPT | Mod: PO

## 2018-08-13 PROCEDURE — 99999 PR PBB SHADOW E&M-EST. PATIENT-LVL IV: CPT | Mod: PBBFAC,,, | Performed by: INTERNAL MEDICINE

## 2018-08-13 PROCEDURE — 99214 OFFICE O/P EST MOD 30 MIN: CPT | Mod: PBBFAC,PO | Performed by: INTERNAL MEDICINE

## 2018-08-13 PROCEDURE — 85025 COMPLETE CBC W/AUTO DIFF WBC: CPT | Mod: PO

## 2018-08-13 PROCEDURE — 80069 RENAL FUNCTION PANEL: CPT | Mod: PO

## 2018-08-13 PROCEDURE — 99213 OFFICE O/P EST LOW 20 MIN: CPT | Mod: S$PBB,,, | Performed by: INTERNAL MEDICINE

## 2018-08-13 RX ORDER — FLUTICASONE PROPIONATE AND SALMETEROL 250; 50 UG/1; UG/1
1 POWDER RESPIRATORY (INHALATION)
COMMUNITY

## 2018-08-13 RX ORDER — CITALOPRAM 20 MG/1
TABLET, FILM COATED ORAL
COMMUNITY
Start: 2018-08-03

## 2018-08-13 RX ORDER — LEVALBUTEROL INHALATION SOLUTION 1.25 MG/3ML
SOLUTION RESPIRATORY (INHALATION)
COMMUNITY
Start: 2018-06-14

## 2018-08-13 RX ORDER — ONDANSETRON 4 MG/1
TABLET, FILM COATED ORAL
COMMUNITY
Start: 2018-07-30

## 2018-08-13 RX ORDER — PANTOPRAZOLE SODIUM 40 MG/1
40 TABLET, DELAYED RELEASE ORAL
COMMUNITY
Start: 2018-04-30

## 2018-08-13 RX ORDER — ALLOPURINOL 100 MG/1
TABLET ORAL
COMMUNITY
Start: 2018-08-03

## 2018-08-13 RX ORDER — BUMETANIDE 2 MG/1
2 TABLET ORAL
COMMUNITY
Start: 2018-04-30 | End: 2018-08-13 | Stop reason: SDUPTHER

## 2018-08-13 RX ORDER — GLIMEPIRIDE 1 MG/1
1 TABLET ORAL
COMMUNITY
Start: 2018-04-30 | End: 2018-08-13 | Stop reason: SDUPTHER

## 2018-08-13 RX ORDER — GABAPENTIN 300 MG/1
CAPSULE ORAL
COMMUNITY
Start: 2018-05-29

## 2018-08-13 RX ORDER — COLCHICINE 0.6 MG/1
0.6 TABLET ORAL
COMMUNITY
Start: 2018-04-30

## 2018-08-13 RX ORDER — ALBUTEROL SULFATE 0.83 MG/ML
SOLUTION RESPIRATORY (INHALATION)
COMMUNITY
Start: 2018-06-14

## 2018-08-13 RX ORDER — PRAVASTATIN SODIUM 20 MG/1
20 TABLET ORAL
COMMUNITY
Start: 2018-04-30

## 2018-08-13 NOTE — PROGRESS NOTES
PROGRESS NOTE FOR ESTABLISHED PATIENT    PHYSICIAN REQUESTING THE CONSULT: Hospital follow up, PMD: Dr. Jackson Brandt    REASON FOR CONSULTATION: CKD/ANA LAURA    72 y.o. male with history of AV stenosis (s/p AVR with pig valve), atrial fibrillation, right renal cell cancer (s/p partial resection of right kidney), CHF, CKD 3, CAD (s/p CABG), DM2 presents to the renal clinic for evaluation of renal insufficiency.     Patient had recent kidney biopsy which was negative for malignancy and fungus/AFB ([facility] on 8/30/17) . Rare Granuloma tissue was noted. He reports multiple hospital stays since last visit for pneumonia and recurrent C. Diff. C. Diff has now resolved. Patient has lost about 40 pounds in water weight since last visit. He is a NH resident since June of 2018. He presents in wheelchair with his wife. He reports mild LE edema. No other issues.             Past Medical History:   Diagnosis Date    Anticoagulant long-term use     Aortic valve stenosis with insufficiency 4/2008    Surgery Pig Velve    Arthritis     CHF (congestive heart failure)     Chronic a-fib     CKD (chronic kidney disease) stage 3, GFR 30-59 ml/min     COPD (chronic obstructive pulmonary disease)     Diabetes mellitus     Encounter for blood transfusion     Primary cancer of right kidney     Surgery, no further treatment       Past Surgical History:   Procedure Laterality Date    Atrial septem device implanted  2005    BONE MARROW BIOPSY      CARDIAC SURGERY  2008    Aotric valve replacement     KIDNEY SURGERY Right 2007    NOSE SURGERY  2008    RENAL BIOPSY  8/30/       Review of patient's allergies indicates:   Allergen Reactions    Morphine      Other reaction(s): Nausea Only       Current Outpatient Medications   Medication Sig Dispense Refill    albuterol (PROVENTIL) 2.5 mg /3 mL (0.083 %) nebulizer solution       allopurinol (ZYLOPRIM) 100 MG tablet       aspirin (ECOTRIN) 81 MG EC tablet Take 1 tablet (81  mg total) by mouth once daily.  0    blood sugar diagnostic (CONTOUR NEXT STRIPS) Strp Use once daily.      bumetanide (BUMEX) 2 MG tablet Take 2 mg by mouth 2 (two) times daily. Take 2mg every  Am and 1 mg every pm      citalopram (CELEXA) 20 MG tablet       colchicine 0.6 mg tablet Take 0.6 mg by mouth.      ferrous sulfate 325 (65 FE) MG EC tablet Take 325 mg by mouth 2 (two) times daily.       fluticasone-salmeterol 250-50 mcg/dose (ADVAIR) 250-50 mcg/dose diskus inhaler Inhale 1 puff into the lungs.      gabapentin (NEURONTIN) 300 MG capsule TAKE ONE CAPSULE BY MOUTH DAILY      glimepiride (AMARYL) 1 MG tablet Take 1 mg by mouth before breakfast.      levalbuterol (XOPENEX) 1.25 mg/3 mL nebulizer solution       metoprolol succinate (TOPROL-XL) 100 MG 24 hr tablet Take 50 mg by mouth once daily. 1/2 tablet daily      ondansetron (ZOFRAN) 4 MG tablet       pantoprazole (PROTONIX) 40 MG tablet Take 40 mg by mouth.      polyethylene glycol (GLYCOLAX) 17 gram PwPk Take by mouth as needed.      pravastatin (PRAVACHOL) 20 MG tablet Take 20 mg by mouth.      rosuvastatin (CRESTOR) 10 MG tablet qhs      warfarin (COUMADIN) 5 MG tablet 5 mg. Sunday, Monday, Wednesday, Thursday, Friday      warfarin (COUMADIN) 7.5 MG tablet Tuesday and Saturday      guaiFENesin (MUCINEX) 600 mg 12 hr tablet Take 1 tablet (600 mg total) by mouth 2 (two) times daily.      ipratropium (ATROVENT) 0.02 % nebulizer solution Take 500 mcg by nebulization 4 (four) times daily. Rescue      lansoprazole (PREVACID) 30 MG capsule Take 30 mg by mouth 2 (two) times daily. TAKE 1 CAPSULE BY MOUTH ONCE DAILY      mometasone-formoterol (DULERA) 200-5 mcg/actuation inhaler Inhale 2 puffs into the lungs once daily. Controller       potassium chloride SA (K-DUR,KLOR-CON) 20 MEQ tablet Take 20 mEq by mouth 2 (two) times daily. Take 2 tablets daily       No current facility-administered medications for this visit.        No family history  "on file.    Social History     Socioeconomic History    Marital status:      Spouse name: Not on file    Number of children: Not on file    Years of education: Not on file    Highest education level: Not on file   Social Needs    Financial resource strain: Not on file    Food insecurity - worry: Not on file    Food insecurity - inability: Not on file    Transportation needs - medical: Not on file    Transportation needs - non-medical: Not on file   Occupational History    Not on file   Tobacco Use    Smoking status: Never Smoker    Smokeless tobacco: Never Used   Substance and Sexual Activity    Alcohol use: No     Alcohol/week: 0.0 oz    Drug use: No    Sexual activity: Not Currently   Other Topics Concern    Not on file   Social History Narrative    Not on file       Review of Systems:  1. GENERAL: patient denies any fever, weight changes, no dizziness, fatigue.   2. HEENT: patient denies headaches, visual disturbances, swallowing problems, sinus pain, nasal congestion.  3. CARDIOVASCULAR: patient denies chest pain, palpitations.  4. PULMONARY: patient reports chronic SOB with exercise, none at present, no coughing, hemoptysis, wheezing.  5. GASTROINTESTINAL: patient denies abdominal pain, nausea, vomiting, diarrhea.  6. GENITOURINARY: patient denies dysuria, hematuria, hesitancy, frequency.  7. EXTREMITIES: patient reports chronic bilateral LE edema, no LE cramping.  8. DERMATOLOGY: patient denies rashes, ulcers.  9. NEURO: patient denies tremors, extremity weakness, extremity numbness/tingling.  10. MUSCULOSKELETAL: patient denies joint pain, joint swelling.  11. HEMATOLOGY: patient denies rectal or gum bleeding.  12: PSYCH: patient denies anxiety, depression.      PHYSICAL EXAM:  /62   Ht 5' 10" (1.778 m)   BMI 34.24 kg/m²     GENERAL: Pleasant tall gentleman presents to clinic with non-labored breathing.  HEENT: PERRL, no nasal discharge, no icterus, no oral exudates, moist " mucosal membranes.  NECK: no thyroid mass, no lymphadenopathy.  HEART: Regular, S1/S2, no rubs, good peripheral pulses.  LUNGS: CTA bilaterally, no wheezing, breathing is nonlabored.  ABDOMEN: soft, nontender, not distended, bowel sounds are present, no abdominal hernia.  EXTREM: mild bilateral LE edema  SKIN: no rashes, skin is warm and dry.  NEURO: A & O x 3, no obvious focal signs.    LABORATORY RESULTS:    Lab Results   Component Value Date    CREATININE 1.2 08/13/2018    BUN 42 (H) 08/13/2018     (L) 08/13/2018    K 4.8 08/13/2018    CL 93 (L) 08/13/2018    CO2 28 08/13/2018     Lab Results   Component Value Date    .0 (H) 04/04/2017    CALCIUM 9.2 08/13/2018    PHOS 3.7 08/13/2018     Lab Results   Component Value Date    ALBUMIN 3.4 (L) 08/13/2018       Lab Results   Component Value Date    WBC 5.83 08/13/2018    HGB 9.6 (L) 08/13/2018    HCT 29.8 (L) 08/13/2018    MCV 90 08/13/2018     08/13/2018             ASSESSMENT AND PLAN:  72 y.o. male with history of AV stenosis (s/p AVR with pig valve), atrial fibrillation, right renal cell cancer, CHF, CKD 3, CAD (s/p CABG), DM2 presents to the renal clinic for evaluation of renal insufficiency.     1. Renal insufficiency: Patient's renal function has improved since last visit with creatinine decreasing to 1.2.  Patient was advised to hydrate as recommended (he is on 1200 ml of fluid restrictions). Patient was advised to continue Bumex at current dose. He will return in 5 months for follow up.     2. Electrolytes: Hyponatremia: Patient is on fluid restrictions (1200 ml per day, as per cardiologist, Dr. Chew).     3. Acid base status: mild alkalosis, has resolved.     4. Volume: Bilateral LE edema. Continue Bumex. Patient was advised to avoid excessive salt.     5. Hypertension: good BP control.       6. Medications: Reviewed.     7. CHF: continue fluid restrictions. Patient has follow up with Cardiology (Dr. Chew).    8. Atrial  fibrillation/AVR: continue Coumadin.    9. DM2: Continue glipizide.     10. Anemia: Hematology is following.

## 2018-09-27 ENCOUNTER — TELEPHONE (OUTPATIENT)
Dept: NEPHROLOGY | Facility: CLINIC | Age: 73
End: 2018-09-27

## 2018-10-03 ENCOUNTER — TELEPHONE (OUTPATIENT)
Dept: NEPHROLOGY | Facility: CLINIC | Age: 73
End: 2018-10-03

## 2018-11-21 NOTE — H&P
"[facility] Hospital Medicine  History & Physical    Patient Name: Beni Cosby  MRN: [id number]  Admission Date: 12/19/2017  Attending Physician: Abimbola Sherman MD  Primary Care Provider: Jackson Brandt MD         Patient information was obtained from patient, past medical records and ER records.     Subjective:     Principal Problem:Hyponatremia    Chief Complaint:   Chief Complaint   Patient presents with    Hyponatremia     sent from [facility] for admission        HPI: Patient is a transfer from [facility] as his renal doctor is [facility].  Beni Cosby is a 72 y.o. male patient  was transferred for renal evaluation for hyponatremia. Symptoms are constant and moderate in severity. No mitigating or exacerbating factors reported. Associated sxs include increased fatigue over the past week and 30 Ib weight loss over a 1-2 month period. Patient denies any fever, chills, n/v/d, abd pain, CP, SOB, BLE edema/pain, and all other sxs at this time. Prior Tx includes 2 L of fluids at [facility]. No further complaints or concerns at this time. To note the patient reports he drinks an excessive amount of water daily, "im always thirsty," and takes a number of diuretics. relavent labs from [facility] on arrival: 119Na, 70BUN, 2.04Cr, 78Cl, Ua neg. On repeat prior to arrival 121Na, 67BUN, 2.00Cr, 82Cl. The patient was reevaluated in the ER. Sodium continuing to improve. Patient admitted for Hyponatremia.        Past Medical History:   Diagnosis Date    Anticoagulant long-term use     Aortic valve stenosis with insufficiency 4/2008    Surgery [facility]    Arthritis     CHF (congestive heart failure)     Chronic a-fib     CKD (chronic kidney disease) stage 3, GFR 30-59 ml/min     COPD (chronic obstructive pulmonary disease)     Diabetes mellitus     Encounter for blood transfusion     Primary cancer of right kidney     Surgery, no further treatment       Past Surgical History: " Ventricular Rate : 59   Atrial Rate : 59   P-R Interval : 215   QRS Duration : 90   Q-T Interval : 463   QTC Calculation(Bezet) : 459   P Axis : 2   R Axis : -14   T Axis : 123   Diagnosis : Sinus rhythm with 1st degree AV block~Abnormal anterior R wave progression  consider anterior infarct~Repol abnrm suggests ischemia, lateral leads~~Confirmed by Robin Krishnamurthy ([id number]) on 1/16/2018 8:40:14 PM        Procedure Laterality Date    Atrial septem device implanted  2005    BONE MARROW BIOPSY      CARDIAC SURGERY  2008    Aotric valve replacement     KIDNEY SURGERY Right 2007    NOSE SURGERY  2008    RENAL BIOPSY  8/30/       Review of patient's allergies indicates:   Allergen Reactions    Morphine      Other reaction(s): Nausea Only       No current facility-administered medications on file prior to encounter.      Current Outpatient Prescriptions on File Prior to Encounter   Medication Sig    bumetanide (BUMEX) 2 MG tablet Take 2 mg by mouth 2 (two) times daily. Take 2mg every  Am and 1 mg every pm    ferrous sulfate 325 (65 FE) MG EC tablet Take 325 mg by mouth 3 (three) times daily with meals.    glimepiride (AMARYL) 1 MG tablet Take 1 mg by mouth before breakfast.    lansoprazole (PREVACID) 30 MG capsule Take 30 mg by mouth 2 (two) times daily. TAKE 1 CAPSULE BY MOUTH ONCE DAILY    metolazone (ZAROXOLYN) 5 MG tablet once daily.     metoprolol succinate (TOPROL-XL) 100 MG 24 hr tablet Take 50 mg by mouth once daily. 1/2 tablet daily    potassium chloride SA (K-DUR,KLOR-CON) 20 MEQ tablet Take 20 mEq by mouth 2 (two) times daily. Take 2 tablets daily    spironolactone (ALDACTONE) 25 MG tablet Take 25 mg by mouth 2 (two) times daily.     warfarin (COUMADIN) 5 MG tablet 5 mg.    warfarin (COUMADIN) 7.5 MG tablet     aspirin (ECOTRIN) 81 MG EC tablet Take 1 tablet (81 mg total) by mouth once daily.    blood sugar diagnostic (CONTOUR NEXT STRIPS) Strp Use once daily.    gabapentin (NEURONTIN) 100 MG capsule Take 100 mg by mouth 3 (three) times daily.    mometasone (NASONEX) 50 mcg/actuation nasal spray 2 sprays by Nasal route daily as needed.     mometasone-formoterol (DULERA) 100-5 mcg/actuation HFAA Inhale into the lungs 2 (two) times daily. Controller    ondansetron (ZOFRAN, AS HYDROCHLORIDE,) 4 MG tablet Take 1 tablet (4 mg total) by mouth every 12 (twelve) hours as needed for Nausea.     polyethylene glycol (GLYCOLAX) 17 gram PwPk Take by mouth as needed.    rosuvastatin (CRESTOR) 10 MG tablet TAKE 1 TABLET BY MOUTH DAILY.     Family History     Reviewed and not Pertinent           Social History Main Topics    Smoking status: Never Smoker    Smokeless tobacco: Never Used    Alcohol use No    Drug use: No    Sexual activity: Not Currently     Review of Systems   Constitutional: Positive for fatigue and unexpected weight change. Negative for chills, diaphoresis and fever.   HENT: Negative for congestion, sore throat and voice change.    Eyes: Negative for photophobia and visual disturbance.   Respiratory: Negative for cough, shortness of breath, wheezing and stridor.    Cardiovascular: Negative for chest pain and leg swelling.   Gastrointestinal: Negative for abdominal distention, abdominal pain, constipation, diarrhea, nausea and vomiting.   Endocrine: Negative for polydipsia, polyphagia and polyuria.   Genitourinary: Negative for difficulty urinating, dysuria, flank pain, testicular pain and urgency.   Musculoskeletal: Negative for back pain, joint swelling, neck pain and neck stiffness.   Skin: Negative for color change and rash.   Allergic/Immunologic: Negative for immunocompromised state.   Neurological: Negative for dizziness, syncope, weakness, numbness and headaches.   Hematological: Does not bruise/bleed easily.   Psychiatric/Behavioral: Negative for agitation, behavioral problems and confusion.     Objective:     Vital Signs (Most Recent):  Temp: 98 °F (36.7 °C) (12/19/17 2128)  Pulse: 61 (12/19/17 2334)  Resp: 16 (12/19/17 2334)  BP: 132/60 (12/19/17 2332)  SpO2: 100 % (12/19/17 2334) Vital Signs (24h Range):  Temp:  [98 °F (36.7 °C)] 98 °F (36.7 °C)  Pulse:  [61-66] 61  Resp:  [16-17] 16  SpO2:  [100 %] 100 %  BP: (132-136)/(60-65) 132/60     Weight: 121.3 kg (267 lb 6 oz)  Body mass index is 33.42 kg/m².    Physical Exam   Constitutional: He is oriented to person, place, and time.  He appears well-developed. No distress.   Elderly male    HENT:   Head: Normocephalic and atraumatic.   Nose: Nose normal.   Eyes: Conjunctivae and EOM are normal. Pupils are equal, round, and reactive to light. No scleral icterus.   Neck: Normal range of motion. Neck supple. No tracheal deviation present.   Cardiovascular: Normal rate, regular rhythm and intact distal pulses.    Murmur heard.  Bilateral lower ext edema, +1+2   Pulmonary/Chest: Effort normal and breath sounds normal. No stridor. No respiratory distress. He has no wheezes. He has no rales.   Abdominal: Soft. Bowel sounds are normal. He exhibits no distension. There is no tenderness. There is no guarding.   obese   Genitourinary:   Genitourinary Comments: deferred   Musculoskeletal: Normal range of motion. He exhibits no edema or deformity.   Neurological: He is alert and oriented to person, place, and time. No cranial nerve deficit.   Skin: Skin is warm and dry. Capillary refill takes 2 to 3 seconds. No rash noted. He is not diaphoretic.   Chronic discoloration to bilateral lower ext.    Psychiatric: He has a normal mood and affect. His behavior is normal. Judgment and thought content normal.   Nursing note reviewed.        CRANIAL NERVES     CN III, IV, VI   Pupils are equal, round, and reactive to light.  Extraocular motions are normal.        Significant Labs: All pertinent labs within the past 24 hours have been reviewed.   Results for orders placed or performed during the hospital encounter of 12/19/17   Basic metabolic panel   Result Value Ref Range    Sodium 124 (L) 136 - 145 mmol/L    Potassium 4.4 3.5 - 5.1 mmol/L    Chloride 90 (L) 95 - 110 mmol/L    CO2 19 (L) 23 - 29 mmol/L    Glucose 102 70 - 110 mg/dL    BUN, Bld 61 (H) 8 - 23 mg/dL    Creatinine 1.7 (H) 0.5 - 1.4 mg/dL    Calcium 8.9 8.7 - 10.5 mg/dL    Anion Gap 15 8 - 16 mmol/L    eGFR if African American 46 (A) >60 mL/min/1.73 m^2    eGFR if non  39 (A) >60  mL/min/1.73 m^2   Protime-INR   Result Value Ref Range    Prothrombin Time 36.3 (H) 9.0 - 12.5 sec    INR 3.7 (H) 0.8 - 1.2   Urinalysis   Result Value Ref Range    Specimen UA Urine, Clean Catch     Color, UA Yellow Yellow, Straw, Amaya    Appearance, UA Clear Clear    pH, UA 6.0 5.0 - 8.0    Specific Gravity, UA <=1.005 (A) 1.005 - 1.030    Protein, UA Negative Negative    Glucose, UA Negative Negative    Ketones, UA Negative Negative    Bilirubin (UA) Negative Negative    Occult Blood UA Negative Negative    Nitrite, UA Negative Negative    Urobilinogen, UA Negative <2.0 EU/dL    Leukocytes, UA Negative Negative         Significant Imaging: I have reviewed all pertinent imaging results/findings within the past 24 hours.   Imaging Results          X-Ray Chest PA And Lateral (Final result)  Result time 12/19/17 23:17:38    Final result by Kwan Cardenas III, MD (12/19/17 23:17:38)                 Impression:     Stable cardiomegaly and mild vascular congestion.  Stable chronic interstitial lung disease versus interstitial edema.      Electronically signed by: KWAN CARDENAS MD  Date:     12/19/17  Time:    23:17              Narrative:    XR CHEST PA AND LATERAL    Clinical history: R07.9 Chest pain, unspecified    Comparison: 05/12/2016    Findings: Prior sternotomy is again noted.  There is stable mild cardiomegaly and pulmonary vascular congestion.  There are stable mild interstitial thickening and patchy hazy opacities in the bilateral mid and lower lung zone representing interstitial edema or chronic interstitial lung disease. No new acute appearing infiltrate, pleural effusion, pneumothorax or other acute disease identified. No acute osseous abnormality detected.                              I have personally reviewed the patients labs, imaging, and discussed the patient case in detail with the Er provider    Assessment/Plan:     * Hyponatremia    Hold fee water  NS monitor closely  Renal consult  Consider  medication profile.             Recent weight loss    ?excessive diuresis  Dietary consult  Daily weights  CBC, CMP, Mg, Phos          Weakness    PT/OT eval   consult for dc planning.         Atrial fibrillation    Stable rate controlled on CM  Check EKG  INR 3.7, pharmacy to monitor  Check ekg  Continue BB        Type 2 diabetes mellitus    Check A1C  SSI  Monitor           VTE Risk Mitigation         Ordered     Place sequential compression device  Until discontinued      12/20/17 0007     Medium Risk of VTE  Once      12/20/17 0007             Markus Vasquez NP  Department of Hospital Medicine   [facility]

## 2019-08-10 NOTE — ASSESSMENT & PLAN NOTE
--brovana and budesonide neb tx  --pt uses dulera @ home  --monitor    
--continue statin  --cardiac diet    
1. Hyponatremia : acute on chronic , due to ADHF ,  Pt has valve disease , repeat  Echocardiogram report reviewed  ,     worsening serum sodium can be a sign of underlying worsening cardiac status , his sodium level has been fluctuating between 120 and 125 in last few months, likely his baseline,  d/c home , cont fluid restriction of 40 oz a day and low salt diet, f/u in clinic next week , Thyroid fn can be tested as out pt ,     2. CKD stage 3 : renal fn at baseline    3. U/S negative for cirrhosis of  liver ,     4. CHF with Pulm HTN - sees Dr Chew ,     5. HTN : BP controlled    
1. Hyponatremia : acute on chronic , due to ADHF , start IV lasix and monitor, Pt has severe valve disease , repeat an echocardiogram , worsening serum sodium can be a sign of underlying worsening cardiac status , his sodium level has been fluctuating between 120 and 125 in last few months, likely his baseline, will diurese overnight and if stable tomorrow can d/c home , cont fluid restriction of 40 oz a day and low salt diet,     2. CKD stage 3 : renal fn at baseline    3. U/S to eval his liver ,     4. CHF with Pulm HTN - sees Dr Chew , repeat echo, poor prognosis   
?excessive diuresis  Dietary consult  Daily weights  CBC, CMP, Mg, Phos    
?excessive diuresis  Dietary consult  Daily weights  CBC, CMP, Mg, Phos    12/20 F/U with PCP for LT weight loss  - Diet  - Exercise    
?excessive diuresis  Dietary consult  Daily weights  CBC, CMP, Mg, Phos    12/20 F/U with PCP for LT weight loss  - Diet  - Exercise    12/21: pt reports he has gained weight back since getting IVF's. Discussed need for daily weights once he goes home    
Check A1C  SSI  Monitor   
Check A1C  SSI  Monitor     12/20 - Controlled  - NISS  
Check A1C  SSI  Monitor     12/20 - Controlled  - NISS  
Check A1C  SSI & accuchecks  Monitor     
Hold fee water  NS monitor closely  Renal consult  Consider medication profile.       
Hold fee water  NS monitor closely  Renal consult  Consider medication profile.    12/20 - Hold zaroxylen. NS infusion / Nephrology. D/C planning       
Hold fee water  NS monitor closely  Renal consult  Consider medication profile.    12/20 - Hold zaroxylen. NS infusion / Nephrology. D/C planning   12/21 - Na still low - pt reports he drank at least 1 large glass of water yesterday. Tolvaptan started      
Hold fee water  NS monitor closely  Renal consult  Consider medication profile.    12/20 - Hold zaroxylen. NS infusion / Nephrology. D/C planning   12/21 - Na still low - pt reports he drank at least 1 large glass of water yesterday. Tolvaptan started  12/22 - Na dropping - abd US to check for liver cirrhosis. AST/ALT WNL. Renal following      
Hold fee water  NS monitor closely  Renal consult  Consider medication profile.    12/20 - Hold zaroxylen. NS infusion / Nephrology. D/C planning   12/21 - Na still low - pt reports he drank at least 1 large glass of water yesterday. Tolvaptan started  12/22 - Na dropping - abd US to check for liver cirrhosis. AST/ALT WNL. Renal following  12/23/17: abd ultrasound no acute findings, echo with mild TR and pulmonary HTN, improved compared to echo on 5/2/2016. Na at baseline, per nephrology    
PT/OT eval   consult for dc planning.   
PT/OT eval   consult for dc planning.     12/20 - Monitor  
PT/OT eval   consult for dc planning.     12/20 - Monitor  
PT/OT eval   consult for dc planning.   Monitor  
Stable rate controlled on CM  Check EKG  INR 3.7, pharmacy to monitor    Continue BB  
Stable rate controlled on CM  Check EKG  INR 3.7, pharmacy to monitor    Continue BB  
Stable rate controlled on CM  Check EKG  INR 3.7, pharmacy to monitor  Check ekg  Continue BB  
Stable rate controlled on CM  Check EKG  INR 3.7, pharmacy to monitor  Check ekg  Continue BB  
Stop Zaroxolyn and continue with normal saline.  Long discussion with the patient and the family about avoiding free water.  We may have to adjust the diuretics at the time of discharge.  Maybe increase the loop diuretic instead of the Zaroxolyn which can be used sparingly 1 to twice a week  
Stopped Zaroxolyn and continue with normal saline.  Long discussion with the patient and the family about avoiding free water.  We may have to adjust the diuretics at the time of discharge.  Maybe increase the loop diuretic instead of the Zaroxolyn which can be used sparingly 1 to twice a week    Add tolvaptan for CHF and hyponatremia  
- - -

## 2020-01-13 NOTE — ADDENDUM NOTE
Addended by: CECILY ALEJANDRA on: 4/27/2017 03:40 PM     Modules accepted: Orders     Quality 137: Melanoma: Continuity Of Care - Recall System: Patient information entered into a recall system that includes: target date for the next exam specified AND a process to follow up with patients regarding missed or unscheduled appointments Detail Level: Detailed Quality 138: Melanoma: Coordination Of Care: A treatment plan was communicated to the physicians providing continuing care within one month of diagnosis outlining: diagnosis, tumor thickness and a plan for surgery or alternate care.

## 2022-07-18 NOTE — PROGRESS NOTES
"Subjective:       Patient ID: Beni Cosby is a 71 y.o. male.    Chief Complaint: Hypotension    HPI Comments: Pt is a 71 year old male to clinic today with complaints of hypotension and dizziness that occurred at a previous Drs appt today. Pt states he went renal appt today and BP was 120/72. He then went to next appt and when he stood to get on scale he felt dizzy and his BP was 80/50. Pt states he did not take bumex this morning. Pt states he currently feels "normal".      Dizziness:   Chronicity:  New  Onset:  Today  Progression since onset:  Resolved  Frequency - weeks/days included: 2 episodes.  Dizziness characteristics:  Off-balance and lightheaded/impending faint   Associated symptoms: light-headedness.no hearing loss, no ear congestion, no ear pain, no fever, no headaches, no tinnitus, no nausea, no vomiting, no diaphoresis, no aural fullness, no weakness, no visual disturbances, no syncope, no palpitations, no panic, no facial weakness, no slurred speech, no numbness in extremities and no chest pain.  Aggravated by:  Position changes  Treatments tried:  Nothing    Review of Systems   Constitutional: Negative for activity change, appetite change, chills, diaphoresis, fatigue and fever.   HENT: Negative for congestion, ear discharge, ear pain, hearing loss, postnasal drip, rhinorrhea, sinus pressure, sneezing, sore throat and tinnitus.    Eyes: Negative for photophobia, pain and visual disturbance.   Respiratory: Negative for cough, chest tightness, shortness of breath and wheezing.    Cardiovascular: Negative for chest pain, palpitations, leg swelling and syncope.   Gastrointestinal: Negative for abdominal pain, diarrhea, nausea and vomiting.   Genitourinary: Negative for dysuria.   Musculoskeletal: Negative for back pain, myalgias and neck pain.   Skin: Negative for pallor and rash.   Neurological: Positive for dizziness and light-headedness. Negative for tremors, seizures, syncope, facial asymmetry, " speech difficulty, weakness, numbness and headaches.   Psychiatric/Behavioral: Negative for confusion. The patient is not nervous/anxious.        Objective:      Physical Exam   Constitutional: He is oriented to person, place, and time. He appears well-developed and well-nourished. He is active.  Non-toxic appearance. He does not have a sickly appearance. He does not appear ill. No distress.   HENT:   Head: Normocephalic.   Right Ear: Hearing, tympanic membrane, external ear and ear canal normal. No drainage, swelling or tenderness. Tympanic membrane is not perforated, not erythematous and not bulging. No middle ear effusion. No decreased hearing is noted.   Left Ear: Hearing, tympanic membrane, external ear and ear canal normal. No drainage, swelling or tenderness. Tympanic membrane is not perforated, not erythematous and not bulging.  No middle ear effusion. No decreased hearing is noted.   Nose: Nose normal. No mucosal edema or rhinorrhea. Right sinus exhibits no maxillary sinus tenderness and no frontal sinus tenderness. Left sinus exhibits no maxillary sinus tenderness and no frontal sinus tenderness.   Mouth/Throat: Uvula is midline, oropharynx is clear and moist and mucous membranes are normal.   Eyes: Conjunctivae and EOM are normal. Pupils are equal, round, and reactive to light. Right eye exhibits normal extraocular motion and no nystagmus. Left eye exhibits normal extraocular motion and no nystagmus.   Neck: Normal range of motion and full passive range of motion without pain. Neck supple.   Cardiovascular: Normal rate, regular rhythm and normal heart sounds.  Exam reveals no gallop and no friction rub.    No murmur heard.  Pulses:       Radial pulses are 2+ on the right side, and 2+ on the left side.   Pulmonary/Chest: Effort normal and breath sounds normal. No respiratory distress. He has no wheezes. He has no rales.   Abdominal: Bowel sounds are normal.   Musculoskeletal:        Right hand: Normal  strength noted.        Left hand: Normal strength noted.   ALISON UE and LE strength 5/5   Neurological: He is alert and oriented to person, place, and time. He has normal strength. He is not disoriented. No cranial nerve deficit or sensory deficit. Coordination and gait normal.   Skin: Skin is warm and dry. No rash noted. He is not diaphoretic.   Psychiatric: He has a normal mood and affect. His speech is normal and behavior is normal.   Nursing note and vitals reviewed.      Assessment:       1. Hypotension, unspecified hypotension type    2. Dizzy        Plan:   Hypotension, unspecified hypotension type  -     POCT glucose  -     Orthostatic blood pressure    Dizzy  -     POCT glucose  -     Orthostatic blood pressure      CBG- 174  Orthostatic Vital signs:  Layin/56, 58  Sittin/50, 56  Standin/46, 56    Recommend F/U with PCP for further eval and maintenance.  Recommend change positions slowly.  Recommend keep BP and glucose log.    Follow prescribed treatment plan as directed.  Stay hydrated and rest.  Report to ER if symptoms worsen.  Follow up with PCP in 2-3 days or sooner if symptoms do not improve.       30 secs

## (undated) DEVICE — MANIFOLD 4 PORT